# Patient Record
Sex: MALE | Race: WHITE | Employment: OTHER | URBAN - METROPOLITAN AREA
[De-identification: names, ages, dates, MRNs, and addresses within clinical notes are randomized per-mention and may not be internally consistent; named-entity substitution may affect disease eponyms.]

---

## 2017-02-28 ENCOUNTER — OFFICE VISIT (OUTPATIENT)
Dept: FAMILY MEDICINE CLINIC | Age: 62
End: 2017-02-28

## 2017-02-28 VITALS
OXYGEN SATURATION: 98 % | HEIGHT: 68 IN | DIASTOLIC BLOOD PRESSURE: 64 MMHG | BODY MASS INDEX: 34.8 KG/M2 | WEIGHT: 229.6 LBS | SYSTOLIC BLOOD PRESSURE: 122 MMHG | HEART RATE: 60 BPM

## 2017-02-28 DIAGNOSIS — I10 ESSENTIAL HYPERTENSION: ICD-10-CM

## 2017-02-28 DIAGNOSIS — E11.8 TYPE 2 DIABETES MELLITUS WITH COMPLICATION, UNSPECIFIED LONG TERM INSULIN USE STATUS: Primary | ICD-10-CM

## 2017-02-28 LAB
CREATININE URINE POCT: NORMAL
HBA1C MFR BLD: 7.2 %
MICROALBUMIN/CREAT 24H UR: NORMAL MG/G{CREAT}
MICROALBUMIN/CREAT UR-RTO: NORMAL

## 2017-02-28 PROCEDURE — G8417 CALC BMI ABV UP PARAM F/U: HCPCS | Performed by: INTERNAL MEDICINE

## 2017-02-28 PROCEDURE — 3045F PR MOST RECENT HEMOGLOBIN A1C LEVEL 7.0-9.0%: CPT | Performed by: INTERNAL MEDICINE

## 2017-02-28 PROCEDURE — 3017F COLORECTAL CA SCREEN DOC REV: CPT | Performed by: INTERNAL MEDICINE

## 2017-02-28 PROCEDURE — G8427 DOCREV CUR MEDS BY ELIG CLIN: HCPCS | Performed by: INTERNAL MEDICINE

## 2017-02-28 PROCEDURE — G8484 FLU IMMUNIZE NO ADMIN: HCPCS | Performed by: INTERNAL MEDICINE

## 2017-02-28 PROCEDURE — 1036F TOBACCO NON-USER: CPT | Performed by: INTERNAL MEDICINE

## 2017-02-28 PROCEDURE — 99214 OFFICE O/P EST MOD 30 MIN: CPT | Performed by: INTERNAL MEDICINE

## 2017-02-28 PROCEDURE — 82044 UR ALBUMIN SEMIQUANTITATIVE: CPT | Performed by: INTERNAL MEDICINE

## 2017-02-28 PROCEDURE — 83036 HEMOGLOBIN GLYCOSYLATED A1C: CPT | Performed by: INTERNAL MEDICINE

## 2017-05-30 ENCOUNTER — OFFICE VISIT (OUTPATIENT)
Dept: FAMILY MEDICINE CLINIC | Age: 62
End: 2017-05-30

## 2017-05-30 VITALS
SYSTOLIC BLOOD PRESSURE: 108 MMHG | OXYGEN SATURATION: 97 % | WEIGHT: 231 LBS | BODY MASS INDEX: 35.12 KG/M2 | DIASTOLIC BLOOD PRESSURE: 64 MMHG | HEART RATE: 63 BPM

## 2017-05-30 DIAGNOSIS — E78.5 HYPERLIPIDEMIA, UNSPECIFIED HYPERLIPIDEMIA TYPE: ICD-10-CM

## 2017-05-30 DIAGNOSIS — E11.9 TYPE 2 DIABETES MELLITUS WITHOUT COMPLICATION, WITH LONG-TERM CURRENT USE OF INSULIN (HCC): Primary | ICD-10-CM

## 2017-05-30 DIAGNOSIS — Z79.4 TYPE 2 DIABETES MELLITUS WITHOUT COMPLICATION, WITH LONG-TERM CURRENT USE OF INSULIN (HCC): Primary | ICD-10-CM

## 2017-05-30 DIAGNOSIS — I10 ESSENTIAL HYPERTENSION: ICD-10-CM

## 2017-05-30 LAB — HBA1C MFR BLD: 7.9 %

## 2017-05-30 PROCEDURE — 3045F PR MOST RECENT HEMOGLOBIN A1C LEVEL 7.0-9.0%: CPT | Performed by: INTERNAL MEDICINE

## 2017-05-30 PROCEDURE — G8427 DOCREV CUR MEDS BY ELIG CLIN: HCPCS | Performed by: INTERNAL MEDICINE

## 2017-05-30 PROCEDURE — 83036 HEMOGLOBIN GLYCOSYLATED A1C: CPT | Performed by: INTERNAL MEDICINE

## 2017-05-30 PROCEDURE — 99214 OFFICE O/P EST MOD 30 MIN: CPT | Performed by: INTERNAL MEDICINE

## 2017-05-30 PROCEDURE — 3017F COLORECTAL CA SCREEN DOC REV: CPT | Performed by: INTERNAL MEDICINE

## 2017-05-30 PROCEDURE — 1036F TOBACCO NON-USER: CPT | Performed by: INTERNAL MEDICINE

## 2017-05-30 PROCEDURE — G8417 CALC BMI ABV UP PARAM F/U: HCPCS | Performed by: INTERNAL MEDICINE

## 2017-05-30 RX ORDER — GABAPENTIN 300 MG/1
CAPSULE ORAL
Qty: 90 CAPSULE | Refills: 1 | Status: SHIPPED | OUTPATIENT
Start: 2017-05-30 | End: 2017-07-17 | Stop reason: SDUPTHER

## 2017-05-30 RX ORDER — OMEPRAZOLE 40 MG/1
CAPSULE, DELAYED RELEASE ORAL
Qty: 90 CAPSULE | Refills: 1 | Status: SHIPPED | OUTPATIENT
Start: 2017-05-30 | End: 2017-07-17 | Stop reason: SDUPTHER

## 2017-05-30 RX ORDER — HYDROCHLOROTHIAZIDE 25 MG/1
TABLET ORAL
Qty: 90 TABLET | Refills: 1 | Status: SHIPPED | OUTPATIENT
Start: 2017-05-30 | End: 2017-07-17 | Stop reason: SDUPTHER

## 2017-05-30 RX ORDER — SIMVASTATIN 40 MG
TABLET ORAL
Qty: 90 TABLET | Refills: 1 | Status: SHIPPED | OUTPATIENT
Start: 2017-05-30 | End: 2017-07-17 | Stop reason: SDUPTHER

## 2017-05-30 RX ORDER — LOSARTAN POTASSIUM 100 MG/1
TABLET ORAL
Qty: 90 TABLET | Refills: 1 | Status: SHIPPED | OUTPATIENT
Start: 2017-05-30 | End: 2017-07-17 | Stop reason: SDUPTHER

## 2017-07-17 DIAGNOSIS — I10 ESSENTIAL HYPERTENSION: ICD-10-CM

## 2017-07-17 DIAGNOSIS — E78.5 HYPERLIPIDEMIA, UNSPECIFIED HYPERLIPIDEMIA TYPE: ICD-10-CM

## 2017-07-20 RX ORDER — OMEPRAZOLE 40 MG/1
CAPSULE, DELAYED RELEASE ORAL
Qty: 90 CAPSULE | Refills: 0 | Status: SHIPPED | OUTPATIENT
Start: 2017-07-20 | End: 2018-01-30 | Stop reason: SDUPTHER

## 2017-07-20 RX ORDER — LOSARTAN POTASSIUM 100 MG/1
TABLET ORAL
Qty: 90 TABLET | Refills: 0 | Status: SHIPPED | OUTPATIENT
Start: 2017-07-20 | End: 2018-01-30 | Stop reason: SDUPTHER

## 2017-07-20 RX ORDER — SIMVASTATIN 40 MG
TABLET ORAL
Qty: 90 TABLET | Refills: 0 | Status: SHIPPED | OUTPATIENT
Start: 2017-07-20 | End: 2018-01-30 | Stop reason: SDUPTHER

## 2017-07-20 RX ORDER — HYDROCHLOROTHIAZIDE 25 MG/1
TABLET ORAL
Qty: 90 TABLET | Refills: 0 | Status: SHIPPED | OUTPATIENT
Start: 2017-07-20 | End: 2018-06-04 | Stop reason: SDUPTHER

## 2017-07-20 RX ORDER — METFORMIN HYDROCHLORIDE 500 MG/1
500 TABLET, EXTENDED RELEASE ORAL DAILY
Qty: 90 TABLET | Refills: 0 | Status: SHIPPED | OUTPATIENT
Start: 2017-07-20 | End: 2018-01-30 | Stop reason: SDUPTHER

## 2017-07-20 RX ORDER — GABAPENTIN 300 MG/1
CAPSULE ORAL
Qty: 90 CAPSULE | Refills: 0 | Status: SHIPPED | OUTPATIENT
Start: 2017-07-20 | End: 2017-12-22 | Stop reason: SDUPTHER

## 2017-08-29 ENCOUNTER — OFFICE VISIT (OUTPATIENT)
Dept: FAMILY MEDICINE CLINIC | Age: 62
End: 2017-08-29

## 2017-08-29 VITALS
DIASTOLIC BLOOD PRESSURE: 70 MMHG | BODY MASS INDEX: 35.55 KG/M2 | SYSTOLIC BLOOD PRESSURE: 120 MMHG | OXYGEN SATURATION: 98 % | WEIGHT: 233.8 LBS | HEART RATE: 70 BPM

## 2017-08-29 DIAGNOSIS — S39.012A LUMBAR STRAIN, INITIAL ENCOUNTER: ICD-10-CM

## 2017-08-29 DIAGNOSIS — I10 ESSENTIAL HYPERTENSION: ICD-10-CM

## 2017-08-29 DIAGNOSIS — Z11.59 ENCOUNTER FOR HEPATITIS C SCREENING TEST FOR LOW RISK PATIENT: ICD-10-CM

## 2017-08-29 DIAGNOSIS — Z23 NEED FOR PROPHYLACTIC VACCINATION AND INOCULATION AGAINST INFLUENZA: ICD-10-CM

## 2017-08-29 DIAGNOSIS — Z79.4 TYPE 2 DIABETES MELLITUS WITHOUT COMPLICATION, WITH LONG-TERM CURRENT USE OF INSULIN (HCC): Primary | ICD-10-CM

## 2017-08-29 DIAGNOSIS — E78.5 HYPERLIPIDEMIA, UNSPECIFIED HYPERLIPIDEMIA TYPE: ICD-10-CM

## 2017-08-29 DIAGNOSIS — E11.9 TYPE 2 DIABETES MELLITUS WITHOUT COMPLICATION, WITH LONG-TERM CURRENT USE OF INSULIN (HCC): Primary | ICD-10-CM

## 2017-08-29 LAB
A/G RATIO: 1.8 (ref 1.1–2.2)
ALBUMIN SERPL-MCNC: 4.6 G/DL (ref 3.4–5)
ALP BLD-CCNC: 73 U/L (ref 40–129)
ALT SERPL-CCNC: 24 U/L (ref 10–40)
ANION GAP SERPL CALCULATED.3IONS-SCNC: 14 MMOL/L (ref 3–16)
AST SERPL-CCNC: 19 U/L (ref 15–37)
BILIRUB SERPL-MCNC: 0.4 MG/DL (ref 0–1)
BUN BLDV-MCNC: 16 MG/DL (ref 7–20)
CALCIUM SERPL-MCNC: 10.1 MG/DL (ref 8.3–10.6)
CHLORIDE BLD-SCNC: 98 MMOL/L (ref 99–110)
CHOLESTEROL, TOTAL: 126 MG/DL (ref 0–199)
CO2: 26 MMOL/L (ref 21–32)
CREAT SERPL-MCNC: 0.7 MG/DL (ref 0.8–1.3)
GFR AFRICAN AMERICAN: >60
GFR NON-AFRICAN AMERICAN: >60
GLOBULIN: 2.6 G/DL
GLUCOSE BLD-MCNC: 191 MG/DL (ref 70–99)
HBA1C MFR BLD: 8.2 %
HCT VFR BLD CALC: 45.6 % (ref 40.5–52.5)
HDLC SERPL-MCNC: 53 MG/DL (ref 40–60)
HEMOGLOBIN: 15.5 G/DL (ref 13.5–17.5)
HEPATITIS C ANTIBODY INTERPRETATION: NORMAL
LDL CHOLESTEROL CALCULATED: 47 MG/DL
MCH RBC QN AUTO: 31.1 PG (ref 26–34)
MCHC RBC AUTO-ENTMCNC: 34 G/DL (ref 31–36)
MCV RBC AUTO: 91.6 FL (ref 80–100)
PDW BLD-RTO: 12.6 % (ref 12.4–15.4)
PLATELET # BLD: 201 K/UL (ref 135–450)
PMV BLD AUTO: 9.5 FL (ref 5–10.5)
POTASSIUM SERPL-SCNC: 4.4 MMOL/L (ref 3.5–5.1)
RBC # BLD: 4.98 M/UL (ref 4.2–5.9)
SODIUM BLD-SCNC: 138 MMOL/L (ref 136–145)
TOTAL PROTEIN: 7.2 G/DL (ref 6.4–8.2)
TRIGL SERPL-MCNC: 130 MG/DL (ref 0–150)
TSH REFLEX: 2.73 UIU/ML (ref 0.27–4.2)
VLDLC SERPL CALC-MCNC: 26 MG/DL
WBC # BLD: 9.4 K/UL (ref 4–11)

## 2017-08-29 PROCEDURE — 90686 IIV4 VACC NO PRSV 0.5 ML IM: CPT | Performed by: INTERNAL MEDICINE

## 2017-08-29 PROCEDURE — G0008 ADMIN INFLUENZA VIRUS VAC: HCPCS | Performed by: INTERNAL MEDICINE

## 2017-08-29 PROCEDURE — 99214 OFFICE O/P EST MOD 30 MIN: CPT | Performed by: INTERNAL MEDICINE

## 2017-08-29 PROCEDURE — 83036 HEMOGLOBIN GLYCOSYLATED A1C: CPT | Performed by: INTERNAL MEDICINE

## 2017-08-29 RX ORDER — TIZANIDINE 4 MG/1
4 TABLET ORAL 3 TIMES DAILY
Qty: 40 TABLET | Refills: 1 | Status: SHIPPED | OUTPATIENT
Start: 2017-08-29 | End: 2018-01-30 | Stop reason: SDUPTHER

## 2017-11-15 ENCOUNTER — TELEPHONE (OUTPATIENT)
Dept: FAMILY MEDICINE CLINIC | Age: 62
End: 2017-11-15

## 2017-12-21 ENCOUNTER — TELEPHONE (OUTPATIENT)
Dept: FAMILY MEDICINE CLINIC | Age: 62
End: 2017-12-21

## 2017-12-22 RX ORDER — GABAPENTIN 300 MG/1
CAPSULE ORAL
Qty: 90 CAPSULE | Refills: 0 | Status: SHIPPED | OUTPATIENT
Start: 2017-12-22 | End: 2018-01-30 | Stop reason: SDUPTHER

## 2018-01-23 ENCOUNTER — TELEPHONE (OUTPATIENT)
Dept: FAMILY MEDICINE CLINIC | Age: 63
End: 2018-01-23

## 2018-01-23 DIAGNOSIS — E11.9 TYPE 2 DIABETES MELLITUS WITHOUT COMPLICATION, WITH LONG-TERM CURRENT USE OF INSULIN (HCC): Primary | ICD-10-CM

## 2018-01-23 DIAGNOSIS — Z79.4 TYPE 2 DIABETES MELLITUS WITHOUT COMPLICATION, WITH LONG-TERM CURRENT USE OF INSULIN (HCC): Primary | ICD-10-CM

## 2018-01-30 ENCOUNTER — OFFICE VISIT (OUTPATIENT)
Dept: FAMILY MEDICINE CLINIC | Age: 63
End: 2018-01-30

## 2018-01-30 VITALS
DIASTOLIC BLOOD PRESSURE: 80 MMHG | SYSTOLIC BLOOD PRESSURE: 160 MMHG | BODY MASS INDEX: 34.64 KG/M2 | WEIGHT: 227.8 LBS | OXYGEN SATURATION: 96 % | HEART RATE: 116 BPM

## 2018-01-30 DIAGNOSIS — E78.5 HYPERLIPIDEMIA, UNSPECIFIED HYPERLIPIDEMIA TYPE: ICD-10-CM

## 2018-01-30 DIAGNOSIS — F33.0 MILD EPISODE OF RECURRENT MAJOR DEPRESSIVE DISORDER (HCC): ICD-10-CM

## 2018-01-30 DIAGNOSIS — A88.1 ACUTE EPIDEMIC VERTIGO: ICD-10-CM

## 2018-01-30 DIAGNOSIS — I10 ESSENTIAL HYPERTENSION: ICD-10-CM

## 2018-01-30 DIAGNOSIS — E11.9 TYPE 2 DIABETES MELLITUS WITHOUT COMPLICATION, WITH LONG-TERM CURRENT USE OF INSULIN (HCC): Primary | ICD-10-CM

## 2018-01-30 DIAGNOSIS — M25.511 ACUTE PAIN OF RIGHT SHOULDER: ICD-10-CM

## 2018-01-30 DIAGNOSIS — I49.9 IRREGULAR HEARTBEAT: ICD-10-CM

## 2018-01-30 DIAGNOSIS — Z79.4 TYPE 2 DIABETES MELLITUS WITHOUT COMPLICATION, WITH LONG-TERM CURRENT USE OF INSULIN (HCC): Primary | ICD-10-CM

## 2018-01-30 LAB — HBA1C MFR BLD: 7.2 %

## 2018-01-30 PROCEDURE — 93000 ELECTROCARDIOGRAM COMPLETE: CPT | Performed by: INTERNAL MEDICINE

## 2018-01-30 PROCEDURE — 99214 OFFICE O/P EST MOD 30 MIN: CPT | Performed by: INTERNAL MEDICINE

## 2018-01-30 PROCEDURE — 83036 HEMOGLOBIN GLYCOSYLATED A1C: CPT | Performed by: INTERNAL MEDICINE

## 2018-01-30 RX ORDER — PAROXETINE HYDROCHLORIDE 20 MG/1
20 TABLET, FILM COATED ORAL EVERY MORNING
Qty: 30 TABLET | Refills: 3 | Status: SHIPPED | OUTPATIENT
Start: 2018-01-30 | End: 2018-06-04 | Stop reason: ALTCHOICE

## 2018-01-30 RX ORDER — AMOXICILLIN 500 MG/1
500 CAPSULE ORAL 3 TIMES DAILY
Qty: 30 CAPSULE | Refills: 0 | Status: SHIPPED | OUTPATIENT
Start: 2018-01-30 | End: 2018-02-09

## 2018-01-30 RX ORDER — MECLIZINE HYDROCHLORIDE 25 MG/1
25 TABLET ORAL 3 TIMES DAILY PRN
Qty: 20 TABLET | Refills: 0 | Status: SHIPPED | OUTPATIENT
Start: 2018-01-30 | End: 2018-02-19

## 2018-01-30 RX ORDER — OMEPRAZOLE 40 MG/1
CAPSULE, DELAYED RELEASE ORAL
Qty: 90 CAPSULE | Refills: 1 | Status: SHIPPED | OUTPATIENT
Start: 2018-01-30 | End: 2018-10-17 | Stop reason: SDUPTHER

## 2018-01-30 RX ORDER — SIMVASTATIN 40 MG
TABLET ORAL
Qty: 90 TABLET | Refills: 1 | Status: SHIPPED | OUTPATIENT
Start: 2018-01-30 | End: 2019-01-25 | Stop reason: SDUPTHER

## 2018-01-30 RX ORDER — GABAPENTIN 300 MG/1
CAPSULE ORAL
Qty: 90 CAPSULE | Refills: 1 | Status: SHIPPED | OUTPATIENT
Start: 2018-01-30 | End: 2018-10-05

## 2018-01-30 RX ORDER — TIZANIDINE 4 MG/1
4 TABLET ORAL 3 TIMES DAILY
Qty: 40 TABLET | Refills: 1 | Status: SHIPPED | OUTPATIENT
Start: 2018-01-30 | End: 2018-10-05 | Stop reason: ALTCHOICE

## 2018-01-30 RX ORDER — LOSARTAN POTASSIUM 100 MG/1
TABLET ORAL
Qty: 90 TABLET | Refills: 1 | Status: SHIPPED | OUTPATIENT
Start: 2018-01-30 | End: 2018-06-04 | Stop reason: SDUPTHER

## 2018-01-30 RX ORDER — METFORMIN HYDROCHLORIDE 500 MG/1
1000 TABLET, EXTENDED RELEASE ORAL 2 TIMES DAILY
Qty: 360 TABLET | Refills: 1 | Status: SHIPPED | OUTPATIENT
Start: 2018-01-30 | End: 2019-04-12 | Stop reason: SDUPTHER

## 2018-02-08 PROBLEM — F33.0 MILD EPISODE OF RECURRENT MAJOR DEPRESSIVE DISORDER (HCC): Status: ACTIVE | Noted: 2018-02-08

## 2018-02-09 ENCOUNTER — OFFICE VISIT (OUTPATIENT)
Dept: FAMILY MEDICINE CLINIC | Age: 63
End: 2018-02-09

## 2018-02-09 VITALS
HEIGHT: 68 IN | DIASTOLIC BLOOD PRESSURE: 60 MMHG | BODY MASS INDEX: 35.86 KG/M2 | OXYGEN SATURATION: 98 % | WEIGHT: 236.6 LBS | SYSTOLIC BLOOD PRESSURE: 110 MMHG | HEART RATE: 80 BPM

## 2018-02-09 DIAGNOSIS — G89.29 CHRONIC RIGHT SHOULDER PAIN: Primary | ICD-10-CM

## 2018-02-09 DIAGNOSIS — M25.511 CHRONIC RIGHT SHOULDER PAIN: Primary | ICD-10-CM

## 2018-02-09 DIAGNOSIS — I10 ESSENTIAL HYPERTENSION: ICD-10-CM

## 2018-02-09 PROCEDURE — 99212 OFFICE O/P EST SF 10 MIN: CPT | Performed by: INTERNAL MEDICINE

## 2018-02-09 RX ORDER — FLUTICASONE PROPIONATE 50 MCG
2 SPRAY, SUSPENSION (ML) NASAL DAILY
Qty: 1 BOTTLE | Refills: 3 | Status: SHIPPED | OUTPATIENT
Start: 2018-02-09 | End: 2019-01-11

## 2018-04-06 ENCOUNTER — OFFICE VISIT (OUTPATIENT)
Dept: FAMILY MEDICINE CLINIC | Age: 63
End: 2018-04-06

## 2018-04-06 VITALS
HEART RATE: 76 BPM | BODY MASS INDEX: 36.77 KG/M2 | WEIGHT: 241.8 LBS | DIASTOLIC BLOOD PRESSURE: 70 MMHG | SYSTOLIC BLOOD PRESSURE: 124 MMHG | OXYGEN SATURATION: 96 %

## 2018-04-06 DIAGNOSIS — I10 ESSENTIAL HYPERTENSION: ICD-10-CM

## 2018-04-06 DIAGNOSIS — Z79.4 TYPE 2 DIABETES MELLITUS WITHOUT COMPLICATION, WITH LONG-TERM CURRENT USE OF INSULIN (HCC): Primary | ICD-10-CM

## 2018-04-06 DIAGNOSIS — E11.9 TYPE 2 DIABETES MELLITUS WITHOUT COMPLICATION, WITH LONG-TERM CURRENT USE OF INSULIN (HCC): Primary | ICD-10-CM

## 2018-04-06 LAB — HBA1C MFR BLD: 8.7 %

## 2018-04-06 PROCEDURE — 99213 OFFICE O/P EST LOW 20 MIN: CPT | Performed by: INTERNAL MEDICINE

## 2018-04-06 PROCEDURE — 83036 HEMOGLOBIN GLYCOSYLATED A1C: CPT | Performed by: INTERNAL MEDICINE

## 2018-04-06 ASSESSMENT — PATIENT HEALTH QUESTIONNAIRE - PHQ9
SUM OF ALL RESPONSES TO PHQ9 QUESTIONS 1 & 2: 0
2. FEELING DOWN, DEPRESSED OR HOPELESS: 0
SUM OF ALL RESPONSES TO PHQ QUESTIONS 1-9: 0
1. LITTLE INTEREST OR PLEASURE IN DOING THINGS: 0

## 2018-04-13 ENCOUNTER — OFFICE VISIT (OUTPATIENT)
Dept: FAMILY MEDICINE CLINIC | Age: 63
End: 2018-04-13

## 2018-04-13 VITALS
DIASTOLIC BLOOD PRESSURE: 76 MMHG | TEMPERATURE: 100.1 F | OXYGEN SATURATION: 96 % | BODY MASS INDEX: 36.4 KG/M2 | SYSTOLIC BLOOD PRESSURE: 122 MMHG | HEART RATE: 101 BPM | WEIGHT: 239.4 LBS

## 2018-04-13 DIAGNOSIS — J10.1 INFLUENZA A: Primary | ICD-10-CM

## 2018-04-13 PROCEDURE — 99213 OFFICE O/P EST LOW 20 MIN: CPT | Performed by: INTERNAL MEDICINE

## 2018-04-13 RX ORDER — BENZONATATE 200 MG/1
200 CAPSULE ORAL 3 TIMES DAILY PRN
Qty: 30 CAPSULE | Refills: 0 | Status: SHIPPED | OUTPATIENT
Start: 2018-04-13 | End: 2018-04-20

## 2018-04-13 RX ORDER — INHALER, ASSIST DEVICES
SPACER (EA) MISCELLANEOUS
Qty: 1 DEVICE | Refills: 1 | Status: SHIPPED | OUTPATIENT
Start: 2018-04-13 | End: 2018-10-05

## 2018-04-13 RX ORDER — ONDANSETRON 4 MG/1
4 TABLET, FILM COATED ORAL EVERY 8 HOURS PRN
Qty: 30 TABLET | Refills: 0 | Status: SHIPPED | OUTPATIENT
Start: 2018-04-13 | End: 2018-10-05 | Stop reason: ALTCHOICE

## 2018-04-13 RX ORDER — OSELTAMIVIR PHOSPHATE 75 MG/1
75 CAPSULE ORAL 2 TIMES DAILY
Qty: 10 CAPSULE | Refills: 0 | Status: SHIPPED | OUTPATIENT
Start: 2018-04-13 | End: 2018-04-23

## 2018-04-13 RX ORDER — ALBUTEROL SULFATE 90 UG/1
2 AEROSOL, METERED RESPIRATORY (INHALATION) EVERY 6 HOURS PRN
Qty: 1 INHALER | Refills: 0 | Status: SHIPPED | OUTPATIENT
Start: 2018-04-13 | End: 2018-06-04 | Stop reason: SDUPTHER

## 2018-04-13 ASSESSMENT — ENCOUNTER SYMPTOMS
SORE THROAT: 1
WHEEZING: 0
COUGH: 1
SHORTNESS OF BREATH: 0

## 2018-06-04 ENCOUNTER — NURSE ONLY (OUTPATIENT)
Dept: URGENT CARE | Age: 63
End: 2018-06-04

## 2018-06-04 ENCOUNTER — OFFICE VISIT (OUTPATIENT)
Dept: FAMILY MEDICINE CLINIC | Age: 63
End: 2018-06-04

## 2018-06-04 VITALS
SYSTOLIC BLOOD PRESSURE: 116 MMHG | DIASTOLIC BLOOD PRESSURE: 76 MMHG | BODY MASS INDEX: 35.46 KG/M2 | TEMPERATURE: 98.2 F | HEART RATE: 72 BPM | WEIGHT: 234 LBS | OXYGEN SATURATION: 99 % | RESPIRATION RATE: 16 BRPM | HEIGHT: 68 IN

## 2018-06-04 DIAGNOSIS — I10 ESSENTIAL HYPERTENSION: ICD-10-CM

## 2018-06-04 DIAGNOSIS — E78.5 HYPERLIPIDEMIA, UNSPECIFIED HYPERLIPIDEMIA TYPE: ICD-10-CM

## 2018-06-04 DIAGNOSIS — R05.9 COUGH: ICD-10-CM

## 2018-06-04 DIAGNOSIS — R05.9 COUGH: Primary | ICD-10-CM

## 2018-06-04 PROCEDURE — 99213 OFFICE O/P EST LOW 20 MIN: CPT | Performed by: INTERNAL MEDICINE

## 2018-06-04 RX ORDER — BENZONATATE 200 MG/1
200 CAPSULE ORAL 3 TIMES DAILY PRN
Qty: 30 CAPSULE | Refills: 1 | Status: SHIPPED | OUTPATIENT
Start: 2018-06-04 | End: 2018-06-11

## 2018-06-04 RX ORDER — HYDROCHLOROTHIAZIDE 25 MG/1
TABLET ORAL
Qty: 90 TABLET | Refills: 0 | Status: SHIPPED | OUTPATIENT
Start: 2018-06-04 | End: 2018-09-01 | Stop reason: SDUPTHER

## 2018-06-04 RX ORDER — INHALER, ASSIST DEVICES
SPACER (EA) MISCELLANEOUS
Qty: 1 DEVICE | Refills: 1 | Status: SHIPPED | OUTPATIENT
Start: 2018-06-04 | End: 2019-04-12

## 2018-06-04 RX ORDER — ALBUTEROL SULFATE 90 UG/1
2 AEROSOL, METERED RESPIRATORY (INHALATION) EVERY 6 HOURS PRN
Qty: 1 INHALER | Refills: 0 | Status: CANCELLED | OUTPATIENT
Start: 2018-06-04

## 2018-06-04 RX ORDER — FLUTICASONE PROPIONATE 50 MCG
2 SPRAY, SUSPENSION (ML) NASAL DAILY
Qty: 1 BOTTLE | Refills: 3 | Status: CANCELLED | OUTPATIENT
Start: 2018-06-04

## 2018-06-04 RX ORDER — SIMVASTATIN 40 MG
TABLET ORAL
Qty: 90 TABLET | Refills: 1 | Status: CANCELLED | OUTPATIENT
Start: 2018-06-04

## 2018-06-04 RX ORDER — ONDANSETRON 4 MG/1
4 TABLET, FILM COATED ORAL EVERY 8 HOURS PRN
Qty: 30 TABLET | Refills: 0 | Status: CANCELLED | OUTPATIENT
Start: 2018-06-04

## 2018-06-04 RX ORDER — GABAPENTIN 300 MG/1
CAPSULE ORAL
Qty: 90 CAPSULE | Refills: 1 | Status: CANCELLED | OUTPATIENT
Start: 2018-06-04 | End: 2018-09-02

## 2018-06-04 RX ORDER — HYDROCHLOROTHIAZIDE 25 MG/1
TABLET ORAL
Qty: 90 TABLET | Refills: 0 | Status: CANCELLED | OUTPATIENT
Start: 2018-06-04

## 2018-06-04 RX ORDER — METFORMIN HYDROCHLORIDE 500 MG/1
1000 TABLET, EXTENDED RELEASE ORAL 2 TIMES DAILY
Qty: 360 TABLET | Refills: 1 | Status: CANCELLED | OUTPATIENT
Start: 2018-06-04

## 2018-06-04 RX ORDER — OMEPRAZOLE 40 MG/1
CAPSULE, DELAYED RELEASE ORAL
Qty: 90 CAPSULE | Refills: 1 | Status: CANCELLED | OUTPATIENT
Start: 2018-06-04

## 2018-06-04 RX ORDER — ALBUTEROL SULFATE 90 UG/1
2 AEROSOL, METERED RESPIRATORY (INHALATION) EVERY 6 HOURS PRN
Qty: 1 INHALER | Refills: 0 | Status: SHIPPED | OUTPATIENT
Start: 2018-06-04 | End: 2019-04-12 | Stop reason: ALTCHOICE

## 2018-06-04 RX ORDER — LOSARTAN POTASSIUM 100 MG/1
TABLET ORAL
Qty: 90 TABLET | Refills: 1 | Status: SHIPPED | OUTPATIENT
Start: 2018-06-04 | End: 2019-07-12 | Stop reason: SDUPTHER

## 2018-06-04 RX ORDER — LOSARTAN POTASSIUM 100 MG/1
TABLET ORAL
Qty: 90 TABLET | Refills: 1 | Status: CANCELLED | OUTPATIENT
Start: 2018-06-04

## 2018-06-04 RX ORDER — MONTELUKAST SODIUM 10 MG/1
10 TABLET ORAL NIGHTLY
Qty: 30 TABLET | Refills: 2 | Status: SHIPPED | OUTPATIENT
Start: 2018-06-04 | End: 2018-08-25 | Stop reason: SDUPTHER

## 2018-06-04 ASSESSMENT — ENCOUNTER SYMPTOMS
WHEEZING: 0
SORE THROAT: 0
SHORTNESS OF BREATH: 1
COUGH: 1

## 2018-08-07 ENCOUNTER — TELEPHONE (OUTPATIENT)
Dept: FAMILY MEDICINE CLINIC | Age: 63
End: 2018-08-07

## 2018-08-07 RX ORDER — PEN NEEDLE, DIABETIC 31 G X1/4"
NEEDLE, DISPOSABLE MISCELLANEOUS
Qty: 200 EACH | Refills: 5 | Status: SHIPPED | OUTPATIENT
Start: 2018-08-07

## 2018-08-09 RX ORDER — BLOOD SUGAR DIAGNOSTIC
1 STRIP MISCELLANEOUS 2 TIMES DAILY
Qty: 100 EACH | Refills: 3 | Status: SHIPPED | OUTPATIENT
Start: 2018-08-09 | End: 2019-07-12 | Stop reason: CLARIF

## 2018-08-13 ENCOUNTER — TELEPHONE (OUTPATIENT)
Dept: FAMILY MEDICINE CLINIC | Age: 63
End: 2018-08-13

## 2018-08-13 NOTE — TELEPHONE ENCOUNTER
Pt's wife, Anjel Hanks (on hipaa) called stating pt is in the donut hole and cannot afford his insulin. [employer] and has a form for Patient Assistance. Anjel Hanks says there is a small portion that has to be filled out by Dr. An De Jesus. Will drop form off this afternoon to be filled out. Wants to know if Dr. An De Jesus will fill out without patient having an appointment? Please advise    If Dr. An De Jesus will fill out, Anjel Hanks asked that we fax/scan form and then send original to address on file.

## 2018-08-28 RX ORDER — MONTELUKAST SODIUM 10 MG/1
TABLET ORAL
Qty: 30 TABLET | Refills: 2 | Status: SHIPPED | OUTPATIENT
Start: 2018-08-28 | End: 2019-04-28 | Stop reason: SDUPTHER

## 2018-09-04 RX ORDER — HYDROCHLOROTHIAZIDE 25 MG/1
TABLET ORAL
Qty: 90 TABLET | Refills: 0 | Status: SHIPPED | OUTPATIENT
Start: 2018-09-04 | End: 2019-01-12 | Stop reason: SDUPTHER

## 2018-10-05 ENCOUNTER — OFFICE VISIT (OUTPATIENT)
Dept: FAMILY MEDICINE CLINIC | Age: 63
End: 2018-10-05
Payer: COMMERCIAL

## 2018-10-05 VITALS
OXYGEN SATURATION: 99 % | DIASTOLIC BLOOD PRESSURE: 64 MMHG | BODY MASS INDEX: 37.74 KG/M2 | WEIGHT: 249 LBS | SYSTOLIC BLOOD PRESSURE: 118 MMHG | HEART RATE: 78 BPM | HEIGHT: 68 IN

## 2018-10-05 DIAGNOSIS — I10 ESSENTIAL HYPERTENSION: ICD-10-CM

## 2018-10-05 DIAGNOSIS — Z79.4 TYPE 2 DIABETES MELLITUS WITHOUT COMPLICATION, WITH LONG-TERM CURRENT USE OF INSULIN (HCC): Primary | ICD-10-CM

## 2018-10-05 DIAGNOSIS — F33.0 MILD EPISODE OF RECURRENT MAJOR DEPRESSIVE DISORDER (HCC): ICD-10-CM

## 2018-10-05 DIAGNOSIS — E78.5 HYPERLIPIDEMIA, UNSPECIFIED HYPERLIPIDEMIA TYPE: ICD-10-CM

## 2018-10-05 DIAGNOSIS — E11.9 TYPE 2 DIABETES MELLITUS WITHOUT COMPLICATION, WITH LONG-TERM CURRENT USE OF INSULIN (HCC): Primary | ICD-10-CM

## 2018-10-05 LAB
CREATININE URINE POCT: 200
HBA1C MFR BLD: 8.8 %
MICROALBUMIN/CREAT 24H UR: 10 MG/G{CREAT}
MICROALBUMIN/CREAT UR-RTO: <30

## 2018-10-05 PROCEDURE — 99214 OFFICE O/P EST MOD 30 MIN: CPT | Performed by: INTERNAL MEDICINE

## 2018-10-05 PROCEDURE — 83036 HEMOGLOBIN GLYCOSYLATED A1C: CPT | Performed by: INTERNAL MEDICINE

## 2018-10-05 PROCEDURE — 36415 COLL VENOUS BLD VENIPUNCTURE: CPT | Performed by: INTERNAL MEDICINE

## 2018-10-05 PROCEDURE — 82044 UR ALBUMIN SEMIQUANTITATIVE: CPT | Performed by: INTERNAL MEDICINE

## 2018-10-06 LAB
ALBUMIN SERPL-MCNC: 4.6 G/DL (ref 3.4–5)
ANION GAP SERPL CALCULATED.3IONS-SCNC: 14 MMOL/L (ref 3–16)
BUN BLDV-MCNC: 20 MG/DL (ref 7–20)
CALCIUM SERPL-MCNC: 9.9 MG/DL (ref 8.3–10.6)
CHLORIDE BLD-SCNC: 102 MMOL/L (ref 99–110)
CHOLESTEROL, FASTING: 137 MG/DL (ref 0–199)
CO2: 26 MMOL/L (ref 21–32)
CREAT SERPL-MCNC: 1 MG/DL (ref 0.8–1.3)
GFR AFRICAN AMERICAN: >60
GFR NON-AFRICAN AMERICAN: >60
GLUCOSE BLD-MCNC: 124 MG/DL (ref 70–99)
HDLC SERPL-MCNC: 40 MG/DL (ref 40–60)
LDL CHOLESTEROL CALCULATED: 54 MG/DL
PHOSPHORUS: 2.7 MG/DL (ref 2.5–4.9)
POTASSIUM SERPL-SCNC: 4.2 MMOL/L (ref 3.5–5.1)
SODIUM BLD-SCNC: 142 MMOL/L (ref 136–145)
TRIGLYCERIDE, FASTING: 214 MG/DL (ref 0–150)
VLDLC SERPL CALC-MCNC: 43 MG/DL

## 2018-10-17 RX ORDER — OMEPRAZOLE 40 MG/1
CAPSULE, DELAYED RELEASE ORAL
Qty: 90 CAPSULE | Refills: 1 | Status: SHIPPED | OUTPATIENT
Start: 2018-10-17 | End: 2019-04-12 | Stop reason: SDUPTHER

## 2019-01-02 ENCOUNTER — TELEPHONE (OUTPATIENT)
Dept: FAMILY MEDICINE CLINIC | Age: 64
End: 2019-01-02

## 2019-01-02 RX ORDER — BLOOD-GLUCOSE METER
1 EACH MISCELLANEOUS DAILY
Qty: 1 KIT | Refills: 0 | Status: SHIPPED | OUTPATIENT
Start: 2019-01-02

## 2019-01-11 ENCOUNTER — OFFICE VISIT (OUTPATIENT)
Dept: FAMILY MEDICINE CLINIC | Age: 64
End: 2019-01-11
Payer: COMMERCIAL

## 2019-01-11 VITALS
OXYGEN SATURATION: 99 % | SYSTOLIC BLOOD PRESSURE: 130 MMHG | WEIGHT: 250 LBS | HEIGHT: 68 IN | BODY MASS INDEX: 37.89 KG/M2 | DIASTOLIC BLOOD PRESSURE: 68 MMHG | HEART RATE: 70 BPM

## 2019-01-11 DIAGNOSIS — E78.2 MIXED HYPERLIPIDEMIA: ICD-10-CM

## 2019-01-11 DIAGNOSIS — Z79.4 TYPE 2 DIABETES MELLITUS WITHOUT COMPLICATION, WITH LONG-TERM CURRENT USE OF INSULIN (HCC): Primary | ICD-10-CM

## 2019-01-11 DIAGNOSIS — I10 ESSENTIAL HYPERTENSION: ICD-10-CM

## 2019-01-11 DIAGNOSIS — F33.0 MILD EPISODE OF RECURRENT MAJOR DEPRESSIVE DISORDER (HCC): ICD-10-CM

## 2019-01-11 DIAGNOSIS — E11.9 TYPE 2 DIABETES MELLITUS WITHOUT COMPLICATION, WITH LONG-TERM CURRENT USE OF INSULIN (HCC): Primary | ICD-10-CM

## 2019-01-11 DIAGNOSIS — Z23 NEED FOR PROPHYLACTIC VACCINATION AND INOCULATION AGAINST VARICELLA: ICD-10-CM

## 2019-01-11 DIAGNOSIS — R35.0 BENIGN PROSTATIC HYPERPLASIA WITH URINARY FREQUENCY: ICD-10-CM

## 2019-01-11 DIAGNOSIS — N40.1 BENIGN PROSTATIC HYPERPLASIA WITH URINARY FREQUENCY: ICD-10-CM

## 2019-01-11 LAB — HBA1C MFR BLD: 9 %

## 2019-01-11 PROCEDURE — 83036 HEMOGLOBIN GLYCOSYLATED A1C: CPT | Performed by: INTERNAL MEDICINE

## 2019-01-11 PROCEDURE — 99214 OFFICE O/P EST MOD 30 MIN: CPT | Performed by: INTERNAL MEDICINE

## 2019-01-14 RX ORDER — HYDROCHLOROTHIAZIDE 25 MG/1
TABLET ORAL
Qty: 90 TABLET | Refills: 0 | Status: SHIPPED | OUTPATIENT
Start: 2019-01-14 | End: 2019-04-12 | Stop reason: SDUPTHER

## 2019-01-25 DIAGNOSIS — E78.5 HYPERLIPIDEMIA, UNSPECIFIED HYPERLIPIDEMIA TYPE: ICD-10-CM

## 2019-01-25 RX ORDER — SIMVASTATIN 40 MG
TABLET ORAL
Qty: 90 TABLET | Refills: 1 | Status: SHIPPED | OUTPATIENT
Start: 2019-01-25 | End: 2019-04-12 | Stop reason: SDUPTHER

## 2019-04-12 ENCOUNTER — OFFICE VISIT (OUTPATIENT)
Dept: FAMILY MEDICINE CLINIC | Age: 64
End: 2019-04-12
Payer: COMMERCIAL

## 2019-04-12 ENCOUNTER — TELEPHONE (OUTPATIENT)
Dept: FAMILY MEDICINE CLINIC | Age: 64
End: 2019-04-12

## 2019-04-12 VITALS
TEMPERATURE: 98 F | DIASTOLIC BLOOD PRESSURE: 62 MMHG | WEIGHT: 250 LBS | HEIGHT: 68 IN | BODY MASS INDEX: 37.89 KG/M2 | HEART RATE: 74 BPM | SYSTOLIC BLOOD PRESSURE: 136 MMHG | OXYGEN SATURATION: 98 %

## 2019-04-12 DIAGNOSIS — Z79.4 TYPE 2 DIABETES MELLITUS WITHOUT COMPLICATION, WITH LONG-TERM CURRENT USE OF INSULIN (HCC): Primary | ICD-10-CM

## 2019-04-12 DIAGNOSIS — E11.9 TYPE 2 DIABETES MELLITUS WITHOUT COMPLICATION, WITH LONG-TERM CURRENT USE OF INSULIN (HCC): Primary | ICD-10-CM

## 2019-04-12 DIAGNOSIS — R10.11 RUQ PAIN: ICD-10-CM

## 2019-04-12 DIAGNOSIS — E78.5 HYPERLIPIDEMIA, UNSPECIFIED HYPERLIPIDEMIA TYPE: ICD-10-CM

## 2019-04-12 DIAGNOSIS — E11.65 UNCONTROLLED TYPE 2 DIABETES MELLITUS WITH HYPERGLYCEMIA (HCC): Primary | ICD-10-CM

## 2019-04-12 DIAGNOSIS — I10 ESSENTIAL HYPERTENSION: ICD-10-CM

## 2019-04-12 LAB
A/G RATIO: 1.8 (ref 1.1–2.2)
ALBUMIN SERPL-MCNC: 5 G/DL (ref 3.4–5)
ALP BLD-CCNC: 106 U/L (ref 40–129)
ALT SERPL-CCNC: 34 U/L (ref 10–40)
ANION GAP SERPL CALCULATED.3IONS-SCNC: 15 MMOL/L (ref 3–16)
AST SERPL-CCNC: 30 U/L (ref 15–37)
BILIRUB SERPL-MCNC: 0.4 MG/DL (ref 0–1)
BUN BLDV-MCNC: 20 MG/DL (ref 7–20)
CALCIUM SERPL-MCNC: 10.5 MG/DL (ref 8.3–10.6)
CHLORIDE BLD-SCNC: 103 MMOL/L (ref 99–110)
CO2: 28 MMOL/L (ref 21–32)
CREAT SERPL-MCNC: 1 MG/DL (ref 0.8–1.3)
GFR AFRICAN AMERICAN: >60
GFR NON-AFRICAN AMERICAN: >60
GLOBULIN: 2.8 G/DL
GLUCOSE BLD-MCNC: 83 MG/DL (ref 70–99)
HBA1C MFR BLD: 9.7 %
POTASSIUM SERPL-SCNC: 4.6 MMOL/L (ref 3.5–5.1)
SODIUM BLD-SCNC: 146 MMOL/L (ref 136–145)
TOTAL PROTEIN: 7.8 G/DL (ref 6.4–8.2)

## 2019-04-12 PROCEDURE — 36415 COLL VENOUS BLD VENIPUNCTURE: CPT | Performed by: INTERNAL MEDICINE

## 2019-04-12 PROCEDURE — 99214 OFFICE O/P EST MOD 30 MIN: CPT | Performed by: INTERNAL MEDICINE

## 2019-04-12 PROCEDURE — 83036 HEMOGLOBIN GLYCOSYLATED A1C: CPT | Performed by: INTERNAL MEDICINE

## 2019-04-12 RX ORDER — OMEPRAZOLE 40 MG/1
CAPSULE, DELAYED RELEASE ORAL
Qty: 90 CAPSULE | Refills: 1 | Status: SHIPPED | OUTPATIENT
Start: 2019-04-12 | End: 2019-07-12 | Stop reason: SDUPTHER

## 2019-04-12 RX ORDER — HYDROCHLOROTHIAZIDE 25 MG/1
TABLET ORAL
Qty: 90 TABLET | Refills: 1 | Status: SHIPPED | OUTPATIENT
Start: 2019-04-12 | End: 2019-11-25 | Stop reason: SDUPTHER

## 2019-04-12 RX ORDER — SIMVASTATIN 40 MG
TABLET ORAL
Qty: 90 TABLET | Refills: 1 | Status: SHIPPED | OUTPATIENT
Start: 2019-04-12 | End: 2019-11-25 | Stop reason: SDUPTHER

## 2019-04-12 RX ORDER — METFORMIN HYDROCHLORIDE 500 MG/1
1000 TABLET, EXTENDED RELEASE ORAL 2 TIMES DAILY
Qty: 360 TABLET | Refills: 1 | Status: SHIPPED | OUTPATIENT
Start: 2019-04-12 | End: 2020-08-25 | Stop reason: SDUPTHER

## 2019-04-12 NOTE — PROGRESS NOTES
SUBJECTIVE:  CC: Diabetes    HPI:  Cas Conner presents for follow up and evaluation of diabetes. Also follow up on   Problem List Items Addressed This Visit     Hyperlipidemia    Relevant Medications    simvastatin (ZOCOR) 40 MG tablet    hydrochlorothiazide (HYDRODIURIL) 25 MG tablet    Type 2 diabetes mellitus without complication, with long-term current use of insulin (HCC) - Primary    Relevant Medications    metFORMIN (GLUCOPHAGE-XR) 500 MG extended release tablet    insulin detemir (LEVEMIR) 100 UNIT/ML injection vial    insulin aspart (NOVOLOG) 100 UNIT/ML injection vial    Other Relevant Orders    POCT glycosylated hemoglobin (Hb A1C) (Completed)    Essential hypertension    Relevant Medications    simvastatin (ZOCOR) 40 MG tablet    hydrochlorothiazide (HYDRODIURIL) 25 MG tablet      Other Visit Diagnoses     RUQ pain        Relevant Orders    [address] blood glucose log brought to visit: No Control is labile per patient report. Notes glucose can be 130 at night, then is over 200 in the morning without eating anything. Discussed again to bring a log and offered again for patient to input mychart for review and help with adjusting insulin. Diet has worsened and this was helping before. Discussed carb consistent diet to make insulin dosing easier. Wonders about prostate screening. Notes increased nocturia and frequency. he has no symptoms of hypoglycemia. he has No symptoms of hyperglycemia. The patient denied polyphagia, polydipsia, or polyuria.     The last HBA1C and routine lab was   Lab Results   Component Value Date    LABA1C 9.7 04/12/2019     Lab Results   Component Value Date    .2 10/08/2014     Lab Results   Component Value Date    WBC 9.4 08/29/2017    HGB 15.5 08/29/2017    HCT 45.6 08/29/2017     08/29/2017    CHOL 126 08/29/2017    TRIG 130 08/29/2017    HDL 40 10/05/2018    ALT 24 08/29/2017    AST 19 08/29/2017     10/05/2018    K 4.2 10/05/2018     10/05/2018    CREATININE 1.0 10/05/2018    BUN 20 10/05/2018    CO2 26 10/05/2018    TSH 1.17 12/10/2010    PSA 0.23 04/27/2012    LABA1C 9.0 01/11/2019    LABMICR 0.4 05/06/2015    LABMICR 0.4 05/06/2015       he has no side effects from the current diabetes medications. Review Of Systems:   Constitutional: No fatigue or weight loss. Respiratory: No cough or dyspnea. Cardiovascular: No chest pain or palpitations. Gastrointestinal: No constipation or diarrhea. OBJECTIVE:    VS:   /62 (Site: Left Upper Arm, Position: Sitting, Cuff Size: Large Adult)   Pulse 74   Temp 98 °F (36.7 °C) (Oral)   Ht 5' 7.5\" (1.715 m)   Wt 250 lb (113.4 kg)   SpO2 98% Comment: RA  BMI 38.58 kg/m²   General appearance: Alert, Awake, Oriented times 3, no distress  Skin: Warm and dry  Lungs: Lungs clear to auscultation bilaterally. No rhonchi, crackles or wheezes  Heart: S1 S2  Regular rate and rhythm. No rub, murmur or gallop  Extremities: No edema, Peripheral pulses palpable  Feet:  No lesions, sensation intact to monofilament, Toe nails intact and without signs of fungus    ASSESSMENT/PLAN:    Tomás Wilson was seen today for diabetes, hyperlipidemia and hypertension. Diagnoses and all orders for this visit:    Type 2 diabetes mellitus without complication, with long-term current use of insulin ([facility])  -     POCT glycosylated hemoglobin (Hb A1C)    Hyperlipidemia, unspecified hyperlipidemia type  -     simvastatin (ZOCOR) 40 MG tablet; TAKE 1 TABLET BY MOUTH NIGHTLY. -     COMPREHENSIVE METABOLIC PANEL    Essential hypertension    RUQ pain  -     US ABDOMEN LIMITED; Future  -     COMPREHENSIVE METABOLIC PANEL    Other orders  -     metFORMIN (GLUCOPHAGE-XR) 500 MG extended release tablet;  Take 2 tablets by mouth 2 times daily  -     insulin detemir (LEVEMIR) 100 UNIT/ML injection vial; Inject 100 Units into the skin nightly  -     insulin aspart (NOVOLOG) 100 UNIT/ML injection vial; Inject 35 Units into the skin 3 times daily (before meals)  -     omeprazole (PRILOSEC) 40 MG delayed release capsule; TAKE ONE CAPSULE BY MOUTH EVERY DAY  -     hydrochlorothiazide (HYDRODIURIL) 25 MG tablet; TAKE 1 TABLET BY MOUTH EVERY DAY  -     Liraglutide (VICTOZA) 18 MG/3ML SOPN SC injection; Inject 0.6 mg into the skin daily    trial of victoza for uncontrolled DM, adjust insulin and control carbs. Instructions:    See patient goals. I have reviewed my findings and recommendations with Lane Sanchez.     Brijesh Tadeo MD

## 2019-04-17 ENCOUNTER — HOSPITAL ENCOUNTER (OUTPATIENT)
Dept: ULTRASOUND IMAGING | Age: 64
Discharge: HOME | End: 2019-04-17
Payer: COMMERCIAL

## 2019-04-17 DIAGNOSIS — R10.11 RUQ PAIN: ICD-10-CM

## 2019-04-17 PROCEDURE — 76705 ECHO EXAM OF ABDOMEN: CPT

## 2019-04-22 NOTE — TELEPHONE ENCOUNTER
Please let patient know, I have sent alternative into the pharmacy, trulicity is a once weekly injection. I sent in 90 day supplies, can fill for 30 if cheaper.

## 2019-04-29 RX ORDER — MONTELUKAST SODIUM 10 MG/1
TABLET ORAL
Qty: 30 TABLET | Refills: 2 | Status: SHIPPED | OUTPATIENT
Start: 2019-04-29 | End: 2019-07-12 | Stop reason: SDUPTHER

## 2019-05-01 ENCOUNTER — TELEPHONE (OUTPATIENT)
Dept: FAMILY MEDICINE CLINIC | Age: 64
End: 2019-05-01

## 2019-05-01 NOTE — TELEPHONE ENCOUNTER
Received fax from [facility] advising that Humalog Solution isn't on pt's formulary and they are requesting an alternative therapy of Novolin R Fiasp.   Do you wish to switch or PA the Humalog

## 2019-05-13 ENCOUNTER — TELEPHONE (OUTPATIENT)
Dept: FAMILY MEDICINE CLINIC | Age: 64
End: 2019-05-13

## 2019-05-13 NOTE — TELEPHONE ENCOUNTER
Pt states insulin was changed to Novolin R, this replaces the Levemir, states this was written for a 12-day supply. Asking if this could be sent in for a 60 or 90-day supply or if there was a reason it was written for a 12 day supply on the Novolin.

## 2019-05-15 NOTE — TELEPHONE ENCOUNTER
I believe Dr. Peter Sayres message needed to state to cancel Levemir script and Novolin R sent to replace     I called [facility] - Spoke Taz Scale at [facility] and informed her to cancel Levemir and Novolog. Pt is using Novolin R.     I spoke to pt's wife and informed her of the changes.

## 2019-05-29 ENCOUNTER — TELEPHONE (OUTPATIENT)
Dept: FAMILY MEDICINE CLINIC | Age: 64
End: 2019-05-29

## 2019-07-12 ENCOUNTER — OFFICE VISIT (OUTPATIENT)
Dept: FAMILY MEDICINE CLINIC | Age: 64
End: 2019-07-12
Payer: COMMERCIAL

## 2019-07-12 ENCOUNTER — CARE COORDINATION (OUTPATIENT)
Dept: CARE COORDINATION | Age: 64
End: 2019-07-12

## 2019-07-12 VITALS
DIASTOLIC BLOOD PRESSURE: 78 MMHG | BODY MASS INDEX: 38.42 KG/M2 | OXYGEN SATURATION: 96 % | SYSTOLIC BLOOD PRESSURE: 142 MMHG | WEIGHT: 249 LBS | HEART RATE: 70 BPM

## 2019-07-12 DIAGNOSIS — Z79.4 TYPE 2 DIABETES MELLITUS WITHOUT COMPLICATION, WITH LONG-TERM CURRENT USE OF INSULIN (HCC): Primary | ICD-10-CM

## 2019-07-12 DIAGNOSIS — Z11.4 ENCOUNTER FOR SCREENING FOR HIV: ICD-10-CM

## 2019-07-12 DIAGNOSIS — I10 ESSENTIAL HYPERTENSION: ICD-10-CM

## 2019-07-12 DIAGNOSIS — E11.9 TYPE 2 DIABETES MELLITUS WITHOUT COMPLICATION, WITH LONG-TERM CURRENT USE OF INSULIN (HCC): Primary | ICD-10-CM

## 2019-07-12 LAB
CHOLESTEROL, TOTAL: 144 MG/DL (ref 0–199)
HBA1C MFR BLD: 9.8 %
HDLC SERPL-MCNC: 50 MG/DL (ref 40–60)
LDL CHOLESTEROL CALCULATED: 66 MG/DL
TRIGL SERPL-MCNC: 138 MG/DL (ref 0–150)
VLDLC SERPL CALC-MCNC: 28 MG/DL

## 2019-07-12 PROCEDURE — 99213 OFFICE O/P EST LOW 20 MIN: CPT | Performed by: INTERNAL MEDICINE

## 2019-07-12 PROCEDURE — 83036 HEMOGLOBIN GLYCOSYLATED A1C: CPT | Performed by: INTERNAL MEDICINE

## 2019-07-12 RX ORDER — LOSARTAN POTASSIUM 100 MG/1
TABLET ORAL
Qty: 90 TABLET | Refills: 1 | Status: SHIPPED | OUTPATIENT
Start: 2019-07-12 | End: 2019-11-25 | Stop reason: SDUPTHER

## 2019-07-12 RX ORDER — OMEPRAZOLE 40 MG/1
CAPSULE, DELAYED RELEASE ORAL
Qty: 90 CAPSULE | Refills: 1 | Status: SHIPPED | OUTPATIENT
Start: 2019-07-12 | End: 2019-11-25 | Stop reason: SDUPTHER

## 2019-07-12 RX ORDER — MONTELUKAST SODIUM 10 MG/1
TABLET ORAL
Qty: 90 TABLET | Refills: 1 | Status: SHIPPED | OUTPATIENT
Start: 2019-07-12 | End: 2019-11-25 | Stop reason: SDUPTHER

## 2019-07-12 NOTE — PROGRESS NOTES
SUBJECTIVE:  CC: Diabetes    HPI:  Juan Kumar presents for follow up and evaluation of diabetes. Also follow up on   Problem List Items Addressed This Visit     Type 2 diabetes mellitus without complication, with long-term current use of insulin ([address] - Primary    Relevant Medications    insulin regular (NOVOLIN R) 100 UNIT/ML injection    Other Relevant Orders    POCT glycosylated hemoglobin (Hb A1C) (Completed)    LIPID PANEL (Completed)    Essential hypertension    Relevant Medications    losartan (COZAAR) 100 MG tablet      Other Visit Diagnoses     Encounter for screening for HIV        Relevant Orders    HIV-1 AND HIV-2 ANTIBODIES (Completed)      Home blood glucose log brought to visit: No Control is labile per patient report. Notes glucose can be 130 at night, then is over 200 in the morning without eating anything. Discussed again to bring a log and offered again for patient to input mychart for review and help with adjusting insulin. Diet has worsened and this was helping before. Discussed carb consistent diet to make insulin dosing easier. Wonders about prostate screening. Notes increased nocturia and frequency. he has no symptoms of hypoglycemia. he has No symptoms of hyperglycemia. The patient denied polyphagia, polydipsia, or polyuria.     The last HBA1C and routine lab was   Lab Results   Component Value Date    LABA1C 9.8 07/12/2019     Lab Results   Component Value Date    .2 10/08/2014     Lab Results   Component Value Date    WBC 9.4 08/29/2017    HGB 15.5 08/29/2017    HCT 45.6 08/29/2017     08/29/2017    CHOL 126 08/29/2017    TRIG 130 08/29/2017    HDL 40 10/05/2018    ALT 34 04/12/2019    AST 30 04/12/2019     (H) 04/12/2019    K 4.6 04/12/2019     04/12/2019    CREATININE 1.0 04/12/2019    BUN 20 04/12/2019    CO2 28 04/12/2019    TSH 1.17 12/10/2010    PSA 0.23 04/27/2012    LABA1C 9.8 07/12/2019    LABMICR 0.4 05/06/2015    LABMICR 0.4

## 2019-07-13 LAB
HIV AG/AB: NORMAL
HIV ANTIGEN: NORMAL
HIV-1 ANTIBODY: NORMAL
HIV-2 AB: NORMAL

## 2019-07-18 ENCOUNTER — CARE COORDINATION (OUTPATIENT)
Dept: FAMILY MEDICINE CLINIC | Age: 64
End: 2019-07-18

## 2019-07-22 ENCOUNTER — OFFICE VISIT (OUTPATIENT)
Dept: FAMILY MEDICINE CLINIC | Age: 64
End: 2019-07-22
Payer: COMMERCIAL

## 2019-07-22 VITALS
OXYGEN SATURATION: 99 % | WEIGHT: 253 LBS | HEIGHT: 68 IN | SYSTOLIC BLOOD PRESSURE: 136 MMHG | BODY MASS INDEX: 38.34 KG/M2 | HEART RATE: 82 BPM | TEMPERATURE: 98.6 F | RESPIRATION RATE: 16 BRPM | DIASTOLIC BLOOD PRESSURE: 76 MMHG

## 2019-07-22 DIAGNOSIS — M79.645 PAIN OF FINGER OF LEFT HAND: Primary | ICD-10-CM

## 2019-07-22 PROCEDURE — 99213 OFFICE O/P EST LOW 20 MIN: CPT | Performed by: NURSE PRACTITIONER

## 2019-07-22 RX ORDER — METHYLPREDNISOLONE 4 MG/1
TABLET ORAL
Qty: 1 KIT | Refills: 0 | Status: SHIPPED | OUTPATIENT
Start: 2019-07-22 | End: 2019-07-28

## 2019-07-22 RX ORDER — TRAMADOL HYDROCHLORIDE 50 MG/1
50 TABLET ORAL EVERY 6 HOURS PRN
Qty: 10 TABLET | Refills: 0 | Status: SHIPPED | OUTPATIENT
Start: 2019-07-22 | End: 2019-07-26

## 2019-07-22 ASSESSMENT — ENCOUNTER SYMPTOMS
CONSTIPATION: 0
BACK PAIN: 0
NAUSEA: 0
CHEST TIGHTNESS: 0

## 2019-07-23 LAB
SEDIMENTATION RATE, ERYTHROCYTE: 9 MM/HR (ref 0–20)
URIC ACID, SERUM: 6.3 MG/DL (ref 3.5–7.2)

## 2019-07-24 ENCOUNTER — CARE COORDINATION (OUTPATIENT)
Dept: FAMILY MEDICINE CLINIC | Age: 64
End: 2019-07-24

## 2019-07-24 NOTE — CARE COORDINATION
Outreach call made to f/u on extra help application and PAP for insulin. No answer and voicemail has not been set up.

## 2019-07-25 ENCOUNTER — TELEPHONE (OUTPATIENT)
Dept: FAMILY MEDICINE CLINIC | Age: 64
End: 2019-07-25

## 2019-07-26 ENCOUNTER — TELEPHONE (OUTPATIENT)
Dept: FAMILY MEDICINE CLINIC | Age: 64
End: 2019-07-26

## 2019-07-26 ENCOUNTER — HOSPITAL ENCOUNTER (OUTPATIENT)
Age: 64
Discharge: HOME | End: 2019-07-26
Payer: COMMERCIAL

## 2019-07-26 ENCOUNTER — HOSPITAL ENCOUNTER (OUTPATIENT)
Dept: GENERAL RADIOLOGY | Age: 64
Discharge: HOME | End: 2019-07-26
Payer: COMMERCIAL

## 2019-07-26 DIAGNOSIS — M79.642 PAIN IN LEFT HAND: ICD-10-CM

## 2019-07-26 DIAGNOSIS — M79.642 PAIN IN LEFT HAND: Primary | ICD-10-CM

## 2019-07-26 PROCEDURE — 73120 X-RAY EXAM OF HAND: CPT

## 2019-07-26 RX ORDER — TRAMADOL HYDROCHLORIDE 50 MG/1
50 TABLET ORAL EVERY 6 HOURS PRN
Qty: 28 TABLET | Refills: 0 | Status: SHIPPED | OUTPATIENT
Start: 2019-07-26 | End: 2019-08-02

## 2019-07-26 NOTE — TELEPHONE ENCOUNTER
I ordered an xray of the left hand. He can have this done at the hospital. I have also refilled his tramadol. Did he have any improvement with the prednisone.

## 2019-07-26 NOTE — TELEPHONE ENCOUNTER
Patient is calling about still in a lot of pain when does not take pain meds . He will be out of pain meds today. He cannot go without it . If needs appt he can come back in. cb patient his wife will take call.

## 2019-07-29 ENCOUNTER — CARE COORDINATION (OUTPATIENT)
Dept: FAMILY MEDICINE CLINIC | Age: 64
End: 2019-07-29

## 2019-07-29 ENCOUNTER — OFFICE VISIT (OUTPATIENT)
Dept: FAMILY MEDICINE CLINIC | Age: 64
End: 2019-07-29
Payer: COMMERCIAL

## 2019-07-29 VITALS
BODY MASS INDEX: 39.25 KG/M2 | SYSTOLIC BLOOD PRESSURE: 138 MMHG | HEART RATE: 65 BPM | DIASTOLIC BLOOD PRESSURE: 70 MMHG | HEIGHT: 68 IN | OXYGEN SATURATION: 98 % | WEIGHT: 259 LBS | TEMPERATURE: 98.6 F

## 2019-07-29 DIAGNOSIS — M25.542 ARTHRALGIA OF LEFT HAND: Primary | ICD-10-CM

## 2019-07-29 PROCEDURE — 99213 OFFICE O/P EST LOW 20 MIN: CPT | Performed by: NURSE PRACTITIONER

## 2019-07-29 RX ORDER — GABAPENTIN 300 MG/1
300 CAPSULE ORAL 3 TIMES DAILY
Qty: 90 CAPSULE | Refills: 0 | Status: SHIPPED | OUTPATIENT
Start: 2019-07-29 | End: 2019-12-17 | Stop reason: SDUPTHER

## 2019-07-29 ASSESSMENT — ENCOUNTER SYMPTOMS
SHORTNESS OF BREATH: 0
NAUSEA: 0
CHEST TIGHTNESS: 0
CONSTIPATION: 0
BACK PAIN: 0

## 2019-07-29 NOTE — PROGRESS NOTES
Subjective:      Patient ID: Maude Rubinstein is a 70 y.o. male. HPI     For follow up pain left index finger. No relief with prednisone. Taking tramadol with some relief but no improvement. Review of Systems   Constitutional: Negative for appetite change, chills and fever. Respiratory: Negative for chest tightness and shortness of breath. Cardiovascular: Negative for chest pain and palpitations. Gastrointestinal: Negative for constipation and nausea. Musculoskeletal: Positive for arthralgias and joint swelling. Negative for back pain and gait problem. Neurological: Negative for dizziness and headaches. Psychiatric/Behavioral: Negative. Objective:   Physical Exam   Constitutional: He is oriented to person, place, and time. He appears well-developed and well-nourished. No distress. Cardiovascular: Normal rate, regular rhythm and normal heart sounds. Pulmonary/Chest: Effort normal and breath sounds normal.   Abdominal: Soft. Bowel sounds are normal.   Musculoskeletal: He exhibits edema and tenderness. Left finger # 2 tender to light touch, palpation, ROM proxima and distal joints. Distal joint tender, enlarged generalized. Neg for redness, warmth. Distal joint with appearance of cystic changes, tender. Neurological: He is alert and oriented to person, place, and time. Skin: Skin is warm and dry. Psychiatric: He has a normal mood and affect. Assessment:      1. Pain left index finger  2. Degenerative changes      Plan:      1. Reviewed xray, degenerative changes noted. Sed rate normal as well as uric acid normal    2. Will add gabapentin for nerve pain    3. Has tramadol from last refill. Will consider changing to Percocet on next refill, possibly Wednesday. Will see what develops.          KLAUDIA BAKER, APRN - CNP

## 2019-07-30 ENCOUNTER — OFFICE VISIT (OUTPATIENT)
Dept: ORTHOPEDIC SURGERY | Age: 64
End: 2019-07-30
Payer: COMMERCIAL

## 2019-07-30 VITALS — HEIGHT: 68 IN | WEIGHT: 259.04 LBS | BODY MASS INDEX: 39.26 KG/M2

## 2019-07-30 DIAGNOSIS — M79.645 FINGER PAIN, LEFT: Primary | ICD-10-CM

## 2019-07-30 PROCEDURE — 20600 DRAIN/INJ JOINT/BURSA W/O US: CPT | Performed by: ORTHOPAEDIC SURGERY

## 2019-07-30 PROCEDURE — 99203 OFFICE O/P NEW LOW 30 MIN: CPT | Performed by: ORTHOPAEDIC SURGERY

## 2019-07-30 NOTE — PROGRESS NOTES
(PEN NEEDLES) 31G X 6 MM MISC Use with insulin 4 times daily. 200 each 5    glucose blood VI test strips (ONE TOUCH ULTRA TEST) strip USE AS DIRECTED 3 TIMES A DAY Dx Code E11.9 100 strip 5    acetaminophen (TYLENOL) 500 MG tablet Take 500 mg by mouth every 6 hours as needed for Pain      ONETOUCH DELICA LANCETS 69J MISC TEST 3 TIMES A  each 12     No current facility-administered medications for this visit. Allergies   Allergen Reactions    Aspirin [Aspirin]      Takes baby aspirin without problems    Oxycontin [Oxycodone Hcl] Nausea And Vomiting       Review of Systems  Relevant review of systems reviewed and available in the patient's chart    Vital Signs  There were no vitals filed for this visit. General Exam:   Constitutional: Patient is adequately groomed with no evidence of malnutrition. He is somewhat uncomfortable in office today. Mental Status: The patient is oriented to time, place and person. The patient's mood and affect are appropriate. Lymphatic: The lymphatic examination bilaterally reveals all areas to be without enlargement or induration. Neurological: The patient has good coordination. There is no weakness or sensory deficit. Finger Examination  Inspection: Left index reveals moderate swelling at the IP joint, distally. Prominence of osteophytes, more so radially. Good alignment at rest.    Palpation: Exquisite tenderness around the DIP joint of the index without warmth. No fluctuance or discrete mass    Range of Motion: Limited motion secondary to pain and stiffness. He denies triggering. Strength:  weakness. Index finger is warm and sensate    Special Tests: No streaking erythema. No pain of the flexor tendon sheath. Skin: There are no additional worrisome rashes, ulcerations or lesions. Gait: normal    Circulation: well perfused    No pain at the PIP or MP joint of the left index. No triggering over the A1 pulley.       Radiology:     X-rays patient tomorrow to assess improvement. If symptoms are not improving over the next day or 2, MRI would be recommended. We appreciate the patient referral    All questions and concerns were addressed today. Patient is in agreement with the plan. Rebekah Dominguez MD  Hand & Upper Extremity Surgery  [id number]  A partner of [facility] ([facility])        Please note that this transcription was created using voice recognition software. Any errors are unintentional and may be due to voice recognition transcription.

## 2019-07-31 ENCOUNTER — TELEPHONE (OUTPATIENT)
Dept: ORTHOPEDIC SURGERY | Age: 64
End: 2019-07-31

## 2019-08-01 ENCOUNTER — TELEPHONE (OUTPATIENT)
Dept: ORTHOPEDIC SURGERY | Age: 64
End: 2019-08-01

## 2019-08-05 ENCOUNTER — CARE COORDINATION (OUTPATIENT)
Dept: FAMILY MEDICINE CLINIC | Age: 64
End: 2019-08-05

## 2019-08-08 DIAGNOSIS — M79.645 FINGER PAIN, LEFT: Primary | ICD-10-CM

## 2019-08-09 ENCOUNTER — CARE COORDINATION (OUTPATIENT)
Dept: FAMILY MEDICINE CLINIC | Age: 64
End: 2019-08-09

## 2019-08-14 ENCOUNTER — HOSPITAL ENCOUNTER (OUTPATIENT)
Dept: MRI IMAGING | Age: 64
Discharge: HOME | End: 2019-08-14
Payer: COMMERCIAL

## 2019-08-14 DIAGNOSIS — M79.645 FINGER PAIN, LEFT: ICD-10-CM

## 2019-08-14 PROCEDURE — 73218 MRI UPPER EXTREMITY W/O DYE: CPT

## 2019-08-15 ENCOUNTER — CARE COORDINATION (OUTPATIENT)
Dept: FAMILY MEDICINE CLINIC | Age: 64
End: 2019-08-15

## 2019-08-16 ENCOUNTER — TELEPHONE (OUTPATIENT)
Dept: ORTHOPEDIC SURGERY | Age: 64
End: 2019-08-16

## 2019-08-27 ENCOUNTER — OFFICE VISIT (OUTPATIENT)
Dept: ORTHOPEDIC SURGERY | Age: 64
End: 2019-08-27
Payer: COMMERCIAL

## 2019-08-27 VITALS — BODY MASS INDEX: 39.26 KG/M2 | WEIGHT: 259.04 LBS | HEIGHT: 68 IN

## 2019-08-27 DIAGNOSIS — M79.645 FINGER PAIN, LEFT: Primary | ICD-10-CM

## 2019-08-27 PROCEDURE — 99213 OFFICE O/P EST LOW 20 MIN: CPT | Performed by: ORTHOPAEDIC SURGERY

## 2019-08-27 PROCEDURE — 20552 NJX 1/MLT TRIGGER POINT 1/2: CPT | Performed by: ORTHOPAEDIC SURGERY

## 2019-08-27 NOTE — PROGRESS NOTES
INJECTION ADMINISTRATION DETAILS:    Date & Time:  8/27/19 10:57 AM  Site & Comments: left index finger  Administered by Dr John Tijerina    Betamethasone (30mg/mL)  #of CC:1  NDC #: 7886-1401-16  Lot #: [id number]  EXP: 05/2020    1% Lidocaine ( 10mg/mL)  # of CC : 1  NDC #: 8149-0763-76  LOT# :[id number]  EXP :11/2020

## 2019-09-11 ENCOUNTER — TELEPHONE (OUTPATIENT)
Dept: ORTHOPEDIC SURGERY | Age: 64
End: 2019-09-11

## 2019-09-11 ENCOUNTER — OFFICE VISIT (OUTPATIENT)
Dept: ORTHOPEDIC SURGERY | Age: 64
End: 2019-09-11
Payer: COMMERCIAL

## 2019-09-11 VITALS — HEIGHT: 68 IN | BODY MASS INDEX: 39.26 KG/M2 | WEIGHT: 259.04 LBS

## 2019-09-11 DIAGNOSIS — S83.242A TEAR OF MEDIAL MENISCUS OF LEFT KNEE, CURRENT, UNSPECIFIED TEAR TYPE, INITIAL ENCOUNTER: ICD-10-CM

## 2019-09-11 DIAGNOSIS — M25.562 LEFT KNEE PAIN, UNSPECIFIED CHRONICITY: Primary | ICD-10-CM

## 2019-09-11 PROCEDURE — 99214 OFFICE O/P EST MOD 30 MIN: CPT | Performed by: ORTHOPAEDIC SURGERY

## 2019-09-11 RX ORDER — DICLOFENAC SODIUM 75 MG/1
75 TABLET, DELAYED RELEASE ORAL 2 TIMES DAILY
Qty: 40 TABLET | Refills: 0 | Status: SHIPPED | OUTPATIENT
Start: 2019-09-11 | End: 2019-10-17

## 2019-09-11 NOTE — PROGRESS NOTES
Outpatient Medications:     gabapentin (NEURONTIN) 300 MG capsule, Take 1 capsule by mouth 3 times daily for 30 days. , Disp: 90 capsule, Rfl: 0    Insulin Syringe-Needle U-100 30G X 1/2\" 1 ML MISC, Use for insulin 4 times daily, Disp: 400 each, Rfl: 1    insulin regular (NOVOLIN R) 100 UNIT/ML injection, Inject 50 Units into the skin 3 times daily (before meals), Disp: 14 vial, Rfl: 1    omeprazole (PRILOSEC) 40 MG delayed release capsule, TAKE ONE CAPSULE BY MOUTH EVERY DAY, Disp: 90 capsule, Rfl: 1    losartan (COZAAR) 100 MG tablet, TAKE 1 TABLET BY MOUTH DAILY, Disp: 90 tablet, Rfl: 1    montelukast (SINGULAIR) 10 MG tablet, TAKE 1 TABLET BY MOUTH EVERY DAY AT NIGHT, Disp: 90 tablet, Rfl: 1    metFORMIN (GLUCOPHAGE-XR) 500 MG extended release tablet, Take 2 tablets by mouth 2 times daily, Disp: 360 tablet, Rfl: 1    simvastatin (ZOCOR) 40 MG tablet, TAKE 1 TABLET BY MOUTH NIGHTLY., Disp: 90 tablet, Rfl: 1    hydrochlorothiazide (HYDRODIURIL) 25 MG tablet, TAKE 1 TABLET BY MOUTH EVERY DAY, Disp: 90 tablet, Rfl: 1    Blood Glucose Monitoring Suppl (ONE TOUCH ULTRA 2) w/Device KIT, 1 kit by Does not apply route daily, Disp: 1 kit, Rfl: 0    Insulin Pen Needle (PEN NEEDLES) 31G X 6 MM MISC, Use with insulin 4 times daily. , Disp: 200 each, Rfl: 5    glucose blood VI test strips (ONE TOUCH ULTRA TEST) strip, USE AS DIRECTED 3 TIMES A DAY Dx Code E11.9, Disp: 100 strip, Rfl: 5    acetaminophen (TYLENOL) 500 MG tablet, Take 500 mg by mouth every 6 hours as needed for Pain, Disp: , Rfl:     ONETOUCH DELICA LANCETS 44S MISC, TEST 3 TIMES A DAY, Disp: 100 each, Rfl: 12  Allergies:  Aspirin [aspirin] and Oxycontin [oxycodone hcl]  Social History:    reports that he has never smoked. He quit smokeless tobacco use about 29 years ago. He reports that he does not drink alcohol or use drugs.   Family History:   Family History   Problem Relation Age of Onset    Cancer Mother     Cancer Father     Heart Disease Father     Diabetes Father     Heart Disease Brother        REVIEW OF SYSTEMS:   For new problems, a full review of systems will be found scanned in the patient's chart. CONSTITUTIONAL: Denies unexplained weight loss, fevers, chills   NEUROLOGICAL: Denies unsteady gait or progressive weakness  SKIN: Denies skin changes, delayed healing, rash, itching       PHYSICAL EXAM:    Vitals: There were no vitals taken for this visit. GENERAL EXAM:  · General Apparence: Patient is adequately groomed with no evidence of malnutrition. · Orientation: The patient is oriented to time, place and person. · Mood & Affect:The patient's mood and affect are appropriate       LEFT knee PHYSICAL EXAMINATION:  · Inspection: Mild edema without erythema or ecchymosis. · Palpation: Tenderness to palpation directly over the medial joint space    · Range of Motion: He struggles with full extension secondary to pain. · Strength: No gross strength deficits are noted    · Special Tests: Pain with Jm's testing. · Skin:  There are no rashes, ulcerations or lesions. · There are no dysvascular changes     Gait & station: Antalgic gait      Additional Examinations:        Right Lower Extremity: Examination of the right lower extremity does not show any tenderness, deformity or injury. Range of motion is unremarkable. There is no gross instability. There are no rashes, ulcerations or lesions. Strength and tone are normal.      Diagnostic Testing: The following x rays were read and interpreted by myself      1.  3 x-rays of the LEFT knee were taken today and reveal mild tricompartmental osteoarthritis with mild osteophyte formation    Orders     Orders Placed This Encounter   Procedures    XR KNEE LEFT (3 VIEWS)     Standing Status:   Future     Number of Occurrences:   1     Standing Expiration Date:   9/11/2020         Assessment / Treatment Plan:     1.   LEFT knee medial meniscus tear    Given his acute injury and consistent pain and swelling, I would like to obtain a MRI of his LEFT knee to rule out medial meniscus tear. He will return to clinic to review the results of the study. He was prescribed Voltaren 75 mg to help with the symptoms. I spent 25 minutes face-to-face with the patient and greater than 50% that time was spent counseling/coordinating care for the above stated diagnosis and treatment. I have personally performed and/or participated in the history, exam and medical decision making and agree with all pertinent clinical information. I have also reviewed and agree with the past medical, family and social history unless otherwise noted. This dictation was performed with a verbal recognition program (DRAGON) and it was checked for errors. It is possible that there are still dictated errors within this office note. If so, please bring any errors to my attention for an addendum. All efforts were made to ensure that this office note is accurate.           Gail Dumont MD

## 2019-10-03 ENCOUNTER — OFFICE VISIT (OUTPATIENT)
Dept: ORTHOPEDIC SURGERY | Age: 64
End: 2019-10-03
Payer: COMMERCIAL

## 2019-10-03 VITALS — WEIGHT: 259.04 LBS | BODY MASS INDEX: 39.26 KG/M2 | HEIGHT: 68 IN

## 2019-10-03 DIAGNOSIS — S83.412D SPRAIN OF MEDIAL COLLATERAL LIGAMENT OF LEFT KNEE, SUBSEQUENT ENCOUNTER: ICD-10-CM

## 2019-10-03 DIAGNOSIS — S83.242A ACUTE MEDIAL MENISCUS TEAR OF LEFT KNEE, INITIAL ENCOUNTER: Primary | ICD-10-CM

## 2019-10-03 DIAGNOSIS — M17.12 PRIMARY OSTEOARTHRITIS OF LEFT KNEE: ICD-10-CM

## 2019-10-03 PROCEDURE — 99214 OFFICE O/P EST MOD 30 MIN: CPT | Performed by: PHYSICIAN ASSISTANT

## 2019-10-07 DIAGNOSIS — S83.242A ACUTE MEDIAL MENISCUS TEAR OF LEFT KNEE, INITIAL ENCOUNTER: Primary | ICD-10-CM

## 2019-10-15 ENCOUNTER — TELEPHONE (OUTPATIENT)
Dept: ORTHOPEDIC SURGERY | Age: 64
End: 2019-10-15

## 2019-10-17 ENCOUNTER — OFFICE VISIT (OUTPATIENT)
Dept: FAMILY MEDICINE CLINIC | Age: 64
End: 2019-10-17
Payer: COMMERCIAL

## 2019-10-17 VITALS
HEART RATE: 76 BPM | RESPIRATION RATE: 16 BRPM | DIASTOLIC BLOOD PRESSURE: 60 MMHG | WEIGHT: 254 LBS | BODY MASS INDEX: 39.87 KG/M2 | SYSTOLIC BLOOD PRESSURE: 106 MMHG | OXYGEN SATURATION: 98 % | HEIGHT: 67 IN | TEMPERATURE: 98.1 F

## 2019-10-17 DIAGNOSIS — Z79.4 TYPE 2 DIABETES MELLITUS WITHOUT COMPLICATION, WITH LONG-TERM CURRENT USE OF INSULIN (HCC): ICD-10-CM

## 2019-10-17 DIAGNOSIS — Z01.818 PRE-OP EXAM: ICD-10-CM

## 2019-10-17 DIAGNOSIS — E11.9 TYPE 2 DIABETES MELLITUS WITHOUT COMPLICATION, WITH LONG-TERM CURRENT USE OF INSULIN (HCC): ICD-10-CM

## 2019-10-17 DIAGNOSIS — I10 ESSENTIAL HYPERTENSION: ICD-10-CM

## 2019-10-17 DIAGNOSIS — M25.562 ACUTE PAIN OF LEFT KNEE: Primary | ICD-10-CM

## 2019-10-17 PROCEDURE — 93000 ELECTROCARDIOGRAM COMPLETE: CPT | Performed by: NURSE PRACTITIONER

## 2019-10-17 PROCEDURE — 36415 COLL VENOUS BLD VENIPUNCTURE: CPT | Performed by: NURSE PRACTITIONER

## 2019-10-17 PROCEDURE — 99213 OFFICE O/P EST LOW 20 MIN: CPT | Performed by: NURSE PRACTITIONER

## 2019-10-17 ASSESSMENT — ENCOUNTER SYMPTOMS
TROUBLE SWALLOWING: 0
CHEST TIGHTNESS: 0
VOMITING: 0
COUGH: 0
DIARRHEA: 0
NAUSEA: 0
CONSTIPATION: 0
BACK PAIN: 0
APNEA: 0

## 2019-10-18 LAB
A/G RATIO: 2.7 (ref 1.1–2.2)
ALBUMIN SERPL-MCNC: 5.1 G/DL (ref 3.4–5)
ALP BLD-CCNC: 97 U/L (ref 40–129)
ALT SERPL-CCNC: 52 U/L (ref 10–40)
ANION GAP SERPL CALCULATED.3IONS-SCNC: 17 MMOL/L (ref 3–16)
AST SERPL-CCNC: 33 U/L (ref 15–37)
BASOPHILS ABSOLUTE: 0.1 K/UL (ref 0–0.2)
BASOPHILS RELATIVE PERCENT: 0.5 %
BILIRUB SERPL-MCNC: 0.5 MG/DL (ref 0–1)
BUN BLDV-MCNC: 25 MG/DL (ref 7–20)
CALCIUM SERPL-MCNC: 10.2 MG/DL (ref 8.3–10.6)
CHLORIDE BLD-SCNC: 97 MMOL/L (ref 99–110)
CO2: 26 MMOL/L (ref 21–32)
CREAT SERPL-MCNC: 1.3 MG/DL (ref 0.8–1.3)
EOSINOPHILS ABSOLUTE: 0.1 K/UL (ref 0–0.6)
EOSINOPHILS RELATIVE PERCENT: 1.2 %
GFR AFRICAN AMERICAN: >60
GFR NON-AFRICAN AMERICAN: 55
GLOBULIN: 1.9 G/DL
GLUCOSE BLD-MCNC: 89 MG/DL (ref 70–99)
HCT VFR BLD CALC: 42.4 % (ref 40.5–52.5)
HEMOGLOBIN: 14.3 G/DL (ref 13.5–17.5)
LYMPHOCYTES ABSOLUTE: 3.2 K/UL (ref 1–5.1)
LYMPHOCYTES RELATIVE PERCENT: 25.6 %
MCH RBC QN AUTO: 31.4 PG (ref 26–34)
MCHC RBC AUTO-ENTMCNC: 33.6 G/DL (ref 31–36)
MCV RBC AUTO: 93.5 FL (ref 80–100)
MONOCYTES ABSOLUTE: 1.2 K/UL (ref 0–1.3)
MONOCYTES RELATIVE PERCENT: 9.1 %
NEUTROPHILS ABSOLUTE: 8 K/UL (ref 1.7–7.7)
NEUTROPHILS RELATIVE PERCENT: 63.6 %
PDW BLD-RTO: 13.2 % (ref 12.4–15.4)
PLATELET # BLD: 182 K/UL (ref 135–450)
PMV BLD AUTO: 9.5 FL (ref 5–10.5)
POTASSIUM SERPL-SCNC: 4.8 MMOL/L (ref 3.5–5.1)
RBC # BLD: 4.53 M/UL (ref 4.2–5.9)
SODIUM BLD-SCNC: 140 MMOL/L (ref 136–145)
TOTAL PROTEIN: 7 G/DL (ref 6.4–8.2)
WBC # BLD: 12.6 K/UL (ref 4–11)

## 2019-10-19 LAB
ESTIMATED AVERAGE GLUCOSE: 223.1 MG/DL
HBA1C MFR BLD: 9.4 %

## 2019-10-22 ENCOUNTER — ANESTHESIA (OUTPATIENT)
Dept: OPERATING ROOM | Age: 64
End: 2019-10-22
Payer: COMMERCIAL

## 2019-10-22 ENCOUNTER — ANESTHESIA EVENT (OUTPATIENT)
Dept: OPERATING ROOM | Age: 64
End: 2019-10-22
Payer: COMMERCIAL

## 2019-10-22 ENCOUNTER — HOSPITAL ENCOUNTER (OUTPATIENT)
Age: 64
Setting detail: OUTPATIENT SURGERY
Discharge: HOME | End: 2019-10-22
Attending: ORTHOPAEDIC SURGERY | Admitting: ORTHOPAEDIC SURGERY
Payer: COMMERCIAL

## 2019-10-22 VITALS
TEMPERATURE: 97.8 F | WEIGHT: 259 LBS | DIASTOLIC BLOOD PRESSURE: 87 MMHG | RESPIRATION RATE: 16 BRPM | SYSTOLIC BLOOD PRESSURE: 174 MMHG | OXYGEN SATURATION: 94 % | HEIGHT: 68 IN | BODY MASS INDEX: 39.25 KG/M2 | HEART RATE: 94 BPM

## 2019-10-22 VITALS
SYSTOLIC BLOOD PRESSURE: 133 MMHG | DIASTOLIC BLOOD PRESSURE: 86 MMHG | RESPIRATION RATE: 15 BRPM | OXYGEN SATURATION: 100 %

## 2019-10-22 DIAGNOSIS — Z98.890 S/P ARTHROSCOPY OF KNEE: Primary | ICD-10-CM

## 2019-10-22 LAB
GLUCOSE BLD-MCNC: 238 MG/DL (ref 70–99)
GLUCOSE BLD-MCNC: 256 MG/DL (ref 70–99)
PERFORMED ON: ABNORMAL
PERFORMED ON: ABNORMAL

## 2019-10-22 PROCEDURE — 2580000003 HC RX 258: Performed by: ANESTHESIOLOGY

## 2019-10-22 PROCEDURE — 7100000011 HC PHASE II RECOVERY - ADDTL 15 MIN: Performed by: ORTHOPAEDIC SURGERY

## 2019-10-22 PROCEDURE — 2709999900 HC NON-CHARGEABLE SUPPLY: Performed by: ORTHOPAEDIC SURGERY

## 2019-10-22 PROCEDURE — 7100000001 HC PACU RECOVERY - ADDTL 15 MIN: Performed by: ORTHOPAEDIC SURGERY

## 2019-10-22 PROCEDURE — 2500000003 HC RX 250 WO HCPCS: Performed by: ORTHOPAEDIC SURGERY

## 2019-10-22 PROCEDURE — 3700000000 HC ANESTHESIA ATTENDED CARE: Performed by: ORTHOPAEDIC SURGERY

## 2019-10-22 PROCEDURE — 3700000001 HC ADD 15 MINUTES (ANESTHESIA): Performed by: ORTHOPAEDIC SURGERY

## 2019-10-22 PROCEDURE — 2580000003 HC RX 258: Performed by: ORTHOPAEDIC SURGERY

## 2019-10-22 PROCEDURE — 3600000004 HC SURGERY LEVEL 4 BASE: Performed by: ORTHOPAEDIC SURGERY

## 2019-10-22 PROCEDURE — 6360000002 HC RX W HCPCS: Performed by: NURSE ANESTHETIST, CERTIFIED REGISTERED

## 2019-10-22 PROCEDURE — 3600000014 HC SURGERY LEVEL 4 ADDTL 15MIN: Performed by: ORTHOPAEDIC SURGERY

## 2019-10-22 PROCEDURE — 7100000000 HC PACU RECOVERY - FIRST 15 MIN: Performed by: ORTHOPAEDIC SURGERY

## 2019-10-22 PROCEDURE — 7100000010 HC PHASE II RECOVERY - FIRST 15 MIN: Performed by: ORTHOPAEDIC SURGERY

## 2019-10-22 PROCEDURE — 2580000003 HC RX 258: Performed by: NURSE ANESTHETIST, CERTIFIED REGISTERED

## 2019-10-22 PROCEDURE — 2500000003 HC RX 250 WO HCPCS: Performed by: NURSE ANESTHETIST, CERTIFIED REGISTERED

## 2019-10-22 RX ORDER — LABETALOL HYDROCHLORIDE 5 MG/ML
5 INJECTION, SOLUTION INTRAVENOUS EVERY 10 MIN PRN
Status: DISCONTINUED | OUTPATIENT
Start: 2019-10-22 | End: 2019-10-22 | Stop reason: HOSPADM

## 2019-10-22 RX ORDER — HYDROMORPHONE HCL 110MG/55ML
PATIENT CONTROLLED ANALGESIA SYRINGE INTRAVENOUS PRN
Status: DISCONTINUED | OUTPATIENT
Start: 2019-10-22 | End: 2019-10-22 | Stop reason: SDUPTHER

## 2019-10-22 RX ORDER — SODIUM CHLORIDE 0.9 % (FLUSH) 0.9 %
10 SYRINGE (ML) INJECTION PRN
Status: DISCONTINUED | OUTPATIENT
Start: 2019-10-22 | End: 2019-10-22 | Stop reason: HOSPADM

## 2019-10-22 RX ORDER — SODIUM CHLORIDE, SODIUM LACTATE, POTASSIUM CHLORIDE, AND CALCIUM CHLORIDE .6; .31; .03; .02 G/100ML; G/100ML; G/100ML; G/100ML
IRRIGANT IRRIGATION PRN
Status: DISCONTINUED | OUTPATIENT
Start: 2019-10-22 | End: 2019-10-22 | Stop reason: ALTCHOICE

## 2019-10-22 RX ORDER — CEFAZOLIN SODIUM 1 G/3ML
INJECTION, POWDER, FOR SOLUTION INTRAMUSCULAR; INTRAVENOUS PRN
Status: DISCONTINUED | OUTPATIENT
Start: 2019-10-22 | End: 2019-10-22 | Stop reason: SDUPTHER

## 2019-10-22 RX ORDER — PROMETHAZINE HYDROCHLORIDE 25 MG/ML
6.25 INJECTION, SOLUTION INTRAMUSCULAR; INTRAVENOUS
Status: DISCONTINUED | OUTPATIENT
Start: 2019-10-22 | End: 2019-10-22 | Stop reason: HOSPADM

## 2019-10-22 RX ORDER — SODIUM CHLORIDE, SODIUM LACTATE, POTASSIUM CHLORIDE, CALCIUM CHLORIDE 600; 310; 30; 20 MG/100ML; MG/100ML; MG/100ML; MG/100ML
INJECTION, SOLUTION INTRAVENOUS CONTINUOUS
Status: DISCONTINUED | OUTPATIENT
Start: 2019-10-22 | End: 2019-10-22 | Stop reason: HOSPADM

## 2019-10-22 RX ORDER — FENTANYL CITRATE 50 UG/ML
INJECTION, SOLUTION INTRAMUSCULAR; INTRAVENOUS PRN
Status: DISCONTINUED | OUTPATIENT
Start: 2019-10-22 | End: 2019-10-22 | Stop reason: SDUPTHER

## 2019-10-22 RX ORDER — GABAPENTIN 300 MG/1
300 CAPSULE ORAL 3 TIMES DAILY
Status: ON HOLD | COMMUNITY
End: 2019-10-22 | Stop reason: HOSPADM

## 2019-10-22 RX ORDER — ONDANSETRON 2 MG/ML
4 INJECTION INTRAMUSCULAR; INTRAVENOUS
Status: DISCONTINUED | OUTPATIENT
Start: 2019-10-22 | End: 2019-10-22 | Stop reason: HOSPADM

## 2019-10-22 RX ORDER — HYDROCODONE BITARTRATE AND ACETAMINOPHEN 5; 325 MG/1; MG/1
1 TABLET ORAL PRN
Status: DISCONTINUED | OUTPATIENT
Start: 2019-10-22 | End: 2019-10-22 | Stop reason: HOSPADM

## 2019-10-22 RX ORDER — BUPIVACAINE HYDROCHLORIDE 2.5 MG/ML
INJECTION, SOLUTION INFILTRATION; PERINEURAL PRN
Status: DISCONTINUED | OUTPATIENT
Start: 2019-10-22 | End: 2019-10-22 | Stop reason: ALTCHOICE

## 2019-10-22 RX ORDER — FENTANYL CITRATE 50 UG/ML
25 INJECTION, SOLUTION INTRAMUSCULAR; INTRAVENOUS EVERY 5 MIN PRN
Status: DISCONTINUED | OUTPATIENT
Start: 2019-10-22 | End: 2019-10-22 | Stop reason: HOSPADM

## 2019-10-22 RX ORDER — LIDOCAINE HYDROCHLORIDE 20 MG/ML
INJECTION, SOLUTION INFILTRATION; PERINEURAL PRN
Status: DISCONTINUED | OUTPATIENT
Start: 2019-10-22 | End: 2019-10-22 | Stop reason: SDUPTHER

## 2019-10-22 RX ORDER — SODIUM CHLORIDE, SODIUM LACTATE, POTASSIUM CHLORIDE, CALCIUM CHLORIDE 600; 310; 30; 20 MG/100ML; MG/100ML; MG/100ML; MG/100ML
INJECTION, SOLUTION INTRAVENOUS CONTINUOUS PRN
Status: DISCONTINUED | OUTPATIENT
Start: 2019-10-22 | End: 2019-10-22 | Stop reason: SDUPTHER

## 2019-10-22 RX ORDER — HYDROCODONE BITARTRATE AND ACETAMINOPHEN 5; 325 MG/1; MG/1
2 TABLET ORAL PRN
Status: DISCONTINUED | OUTPATIENT
Start: 2019-10-22 | End: 2019-10-22 | Stop reason: HOSPADM

## 2019-10-22 RX ORDER — LIDOCAINE HYDROCHLORIDE 10 MG/ML
0.3 INJECTION, SOLUTION EPIDURAL; INFILTRATION; INTRACAUDAL; PERINEURAL
Status: DISCONTINUED | OUTPATIENT
Start: 2019-10-22 | End: 2019-10-22 | Stop reason: HOSPADM

## 2019-10-22 RX ORDER — DIPHENHYDRAMINE HYDROCHLORIDE 50 MG/ML
12.5 INJECTION INTRAMUSCULAR; INTRAVENOUS
Status: DISCONTINUED | OUTPATIENT
Start: 2019-10-22 | End: 2019-10-22 | Stop reason: HOSPADM

## 2019-10-22 RX ORDER — PROPOFOL 10 MG/ML
INJECTION, EMULSION INTRAVENOUS PRN
Status: DISCONTINUED | OUTPATIENT
Start: 2019-10-22 | End: 2019-10-22 | Stop reason: SDUPTHER

## 2019-10-22 RX ORDER — SODIUM CHLORIDE 0.9 % (FLUSH) 0.9 %
10 SYRINGE (ML) INJECTION EVERY 12 HOURS SCHEDULED
Status: DISCONTINUED | OUTPATIENT
Start: 2019-10-22 | End: 2019-10-22 | Stop reason: HOSPADM

## 2019-10-22 RX ORDER — HYDRALAZINE HYDROCHLORIDE 20 MG/ML
5 INJECTION INTRAMUSCULAR; INTRAVENOUS EVERY 10 MIN PRN
Status: DISCONTINUED | OUTPATIENT
Start: 2019-10-22 | End: 2019-10-22 | Stop reason: HOSPADM

## 2019-10-22 RX ORDER — KETAMINE HYDROCHLORIDE 100 MG/ML
INJECTION, SOLUTION INTRAMUSCULAR; INTRAVENOUS PRN
Status: DISCONTINUED | OUTPATIENT
Start: 2019-10-22 | End: 2019-10-22 | Stop reason: SDUPTHER

## 2019-10-22 RX ORDER — HYDROCODONE BITARTRATE AND ACETAMINOPHEN 5; 325 MG/1; MG/1
1 TABLET ORAL EVERY 6 HOURS PRN
Qty: 28 TABLET | Refills: 0 | Status: SHIPPED | OUTPATIENT
Start: 2019-10-22 | End: 2019-11-14 | Stop reason: SDUPTHER

## 2019-10-22 RX ORDER — ONDANSETRON 2 MG/ML
INJECTION INTRAMUSCULAR; INTRAVENOUS PRN
Status: DISCONTINUED | OUTPATIENT
Start: 2019-10-22 | End: 2019-10-22 | Stop reason: SDUPTHER

## 2019-10-22 RX ADMIN — KETAMINE HYDROCHLORIDE 10 MG: 100 INJECTION INTRAMUSCULAR; INTRAVENOUS at 09:25

## 2019-10-22 RX ADMIN — KETAMINE HYDROCHLORIDE 10 MG: 100 INJECTION INTRAMUSCULAR; INTRAVENOUS at 09:23

## 2019-10-22 RX ADMIN — CEFAZOLIN 2000 MG: 1 INJECTION, POWDER, FOR SOLUTION INTRAMUSCULAR; INTRAVENOUS at 09:07

## 2019-10-22 RX ADMIN — FENTANYL CITRATE 12.5 MCG: 50 INJECTION INTRAMUSCULAR; INTRAVENOUS at 09:07

## 2019-10-22 RX ADMIN — ONDANSETRON 4 MG: 2 INJECTION INTRAMUSCULAR; INTRAVENOUS at 08:55

## 2019-10-22 RX ADMIN — SODIUM CHLORIDE, POTASSIUM CHLORIDE, SODIUM LACTATE AND CALCIUM CHLORIDE: 600; 310; 30; 20 INJECTION, SOLUTION INTRAVENOUS at 08:55

## 2019-10-22 RX ADMIN — FENTANYL CITRATE 12.5 MCG: 50 INJECTION INTRAMUSCULAR; INTRAVENOUS at 09:11

## 2019-10-22 RX ADMIN — FENTANYL CITRATE 12.5 MCG: 50 INJECTION INTRAMUSCULAR; INTRAVENOUS at 09:16

## 2019-10-22 RX ADMIN — FENTANYL CITRATE 12.5 MCG: 50 INJECTION INTRAMUSCULAR; INTRAVENOUS at 09:01

## 2019-10-22 RX ADMIN — LIDOCAINE HYDROCHLORIDE 100 MG: 20 INJECTION, SOLUTION INFILTRATION; PERINEURAL at 09:01

## 2019-10-22 RX ADMIN — SODIUM CHLORIDE, POTASSIUM CHLORIDE, SODIUM LACTATE AND CALCIUM CHLORIDE: 600; 310; 30; 20 INJECTION, SOLUTION INTRAVENOUS at 07:37

## 2019-10-22 RX ADMIN — HYDROMORPHONE HYDROCHLORIDE 0.4 MG: 2 INJECTION INTRAMUSCULAR; INTRAVENOUS; SUBCUTANEOUS at 09:27

## 2019-10-22 RX ADMIN — PROPOFOL 250 MG: 10 INJECTION, EMULSION INTRAVENOUS at 09:01

## 2019-10-22 RX ADMIN — FENTANYL CITRATE 50 MCG: 50 INJECTION INTRAMUSCULAR; INTRAVENOUS at 09:32

## 2019-10-22 ASSESSMENT — PULMONARY FUNCTION TESTS
PIF_VALUE: 15
PIF_VALUE: 24
PIF_VALUE: 4
PIF_VALUE: 0
PIF_VALUE: 5
PIF_VALUE: 11
PIF_VALUE: 3
PIF_VALUE: 0
PIF_VALUE: 0
PIF_VALUE: 17
PIF_VALUE: 9
PIF_VALUE: 8
PIF_VALUE: 15
PIF_VALUE: 14
PIF_VALUE: 10
PIF_VALUE: 6
PIF_VALUE: 6
PIF_VALUE: 10
PIF_VALUE: 8
PIF_VALUE: 23
PIF_VALUE: 0
PIF_VALUE: 10
PIF_VALUE: 0
PIF_VALUE: 14
PIF_VALUE: 10
PIF_VALUE: 7
PIF_VALUE: 1
PIF_VALUE: 10
PIF_VALUE: 0
PIF_VALUE: 8
PIF_VALUE: 10
PIF_VALUE: 15
PIF_VALUE: 28
PIF_VALUE: 6
PIF_VALUE: 24
PIF_VALUE: 1
PIF_VALUE: 14
PIF_VALUE: 24
PIF_VALUE: 2
PIF_VALUE: 8
PIF_VALUE: 9
PIF_VALUE: 7

## 2019-10-22 ASSESSMENT — PAIN SCALES - GENERAL
PAINLEVEL_OUTOF10: 0

## 2019-10-22 ASSESSMENT — PAIN - FUNCTIONAL ASSESSMENT
PAIN_FUNCTIONAL_ASSESSMENT: PREVENTS OR INTERFERES SOME ACTIVE ACTIVITIES AND ADLS
PAIN_FUNCTIONAL_ASSESSMENT: 0-10

## 2019-10-22 ASSESSMENT — ENCOUNTER SYMPTOMS
DYSPNEA ACTIVITY LEVEL: AFTER AMBULATING 2 FLIGHTS OF STAIRS
SHORTNESS OF BREATH: 1

## 2019-10-22 ASSESSMENT — PAIN DESCRIPTION - DESCRIPTORS: DESCRIPTORS: STABBING

## 2019-10-25 ENCOUNTER — OFFICE VISIT (OUTPATIENT)
Dept: ORTHOPEDIC SURGERY | Age: 64
End: 2019-10-25

## 2019-10-25 VITALS — BODY MASS INDEX: 39.86 KG/M2 | HEIGHT: 67 IN | WEIGHT: 253.97 LBS

## 2019-10-25 DIAGNOSIS — Z98.890 H/O ARTHROSCOPY OF KNEE: Primary | ICD-10-CM

## 2019-10-25 PROCEDURE — 99024 POSTOP FOLLOW-UP VISIT: CPT | Performed by: PHYSICIAN ASSISTANT

## 2019-11-14 ENCOUNTER — OFFICE VISIT (OUTPATIENT)
Dept: ORTHOPEDIC SURGERY | Age: 64
End: 2019-11-14

## 2019-11-14 VITALS — BODY MASS INDEX: 39.86 KG/M2 | WEIGHT: 253.97 LBS | HEIGHT: 67 IN

## 2019-11-14 DIAGNOSIS — Z98.890 S/P ARTHROSCOPY OF KNEE: ICD-10-CM

## 2019-11-14 PROCEDURE — 99024 POSTOP FOLLOW-UP VISIT: CPT | Performed by: PHYSICIAN ASSISTANT

## 2019-11-14 RX ORDER — HYDROCODONE BITARTRATE AND ACETAMINOPHEN 5; 325 MG/1; MG/1
1 TABLET ORAL EVERY 6 HOURS PRN
Qty: 28 TABLET | Refills: 0 | Status: SHIPPED | OUTPATIENT
Start: 2019-11-14 | End: 2019-11-21

## 2019-11-25 ENCOUNTER — OFFICE VISIT (OUTPATIENT)
Dept: FAMILY MEDICINE CLINIC | Age: 64
End: 2019-11-25
Payer: COMMERCIAL

## 2019-11-25 VITALS
BODY MASS INDEX: 38.52 KG/M2 | HEART RATE: 74 BPM | OXYGEN SATURATION: 97 % | WEIGHT: 246 LBS | DIASTOLIC BLOOD PRESSURE: 68 MMHG | SYSTOLIC BLOOD PRESSURE: 124 MMHG

## 2019-11-25 DIAGNOSIS — E78.5 HYPERLIPIDEMIA, UNSPECIFIED HYPERLIPIDEMIA TYPE: ICD-10-CM

## 2019-11-25 DIAGNOSIS — E11.9 TYPE 2 DIABETES MELLITUS WITHOUT COMPLICATION, WITHOUT LONG-TERM CURRENT USE OF INSULIN (HCC): Primary | ICD-10-CM

## 2019-11-25 DIAGNOSIS — I10 ESSENTIAL HYPERTENSION: ICD-10-CM

## 2019-11-25 LAB
CREATININE URINE POCT: 300
HBA1C MFR BLD: 9.5 %
MICROALBUMIN/CREAT 24H UR: 30 MG/G{CREAT}
MICROALBUMIN/CREAT UR-RTO: <30

## 2019-11-25 PROCEDURE — 83036 HEMOGLOBIN GLYCOSYLATED A1C: CPT | Performed by: INTERNAL MEDICINE

## 2019-11-25 PROCEDURE — 99214 OFFICE O/P EST MOD 30 MIN: CPT | Performed by: INTERNAL MEDICINE

## 2019-11-25 PROCEDURE — 82044 UR ALBUMIN SEMIQUANTITATIVE: CPT | Performed by: INTERNAL MEDICINE

## 2019-11-25 RX ORDER — OMEPRAZOLE 40 MG/1
CAPSULE, DELAYED RELEASE ORAL
Qty: 90 CAPSULE | Refills: 1 | Status: SHIPPED | OUTPATIENT
Start: 2019-11-25 | End: 2020-05-26 | Stop reason: SDUPTHER

## 2019-11-25 RX ORDER — SIMVASTATIN 40 MG
TABLET ORAL
Qty: 90 TABLET | Refills: 1 | Status: SHIPPED | OUTPATIENT
Start: 2019-11-25 | End: 2020-05-26 | Stop reason: SDUPTHER

## 2019-11-25 RX ORDER — LOSARTAN POTASSIUM 100 MG/1
TABLET ORAL
Qty: 90 TABLET | Refills: 1 | Status: SHIPPED | OUTPATIENT
Start: 2019-11-25 | End: 2020-05-26 | Stop reason: SDUPTHER

## 2019-11-25 RX ORDER — MONTELUKAST SODIUM 10 MG/1
TABLET ORAL
Qty: 90 TABLET | Refills: 1 | Status: SHIPPED | OUTPATIENT
Start: 2019-11-25 | End: 2020-04-08 | Stop reason: SDUPTHER

## 2019-11-25 RX ORDER — METFORMIN HYDROCHLORIDE 500 MG/1
1000 TABLET, EXTENDED RELEASE ORAL 2 TIMES DAILY
Qty: 360 TABLET | Refills: 1 | Status: CANCELLED | OUTPATIENT
Start: 2019-11-25

## 2019-11-25 RX ORDER — HYDROCHLOROTHIAZIDE 25 MG/1
TABLET ORAL
Qty: 90 TABLET | Refills: 1 | Status: SHIPPED | OUTPATIENT
Start: 2019-11-25 | End: 2020-05-26 | Stop reason: SDUPTHER

## 2019-11-25 ASSESSMENT — ENCOUNTER SYMPTOMS
SINUS PRESSURE: 0
CONSTIPATION: 0
NAUSEA: 0
DIARRHEA: 0
BACK PAIN: 0
ABDOMINAL DISTENTION: 0
ABDOMINAL PAIN: 0
VOMITING: 0
SINUS PAIN: 0
RHINORRHEA: 0
COUGH: 0
WHEEZING: 0
SHORTNESS OF BREATH: 0

## 2019-11-26 ENCOUNTER — CARE COORDINATION (OUTPATIENT)
Dept: CARE COORDINATION | Age: 64
End: 2019-11-26

## 2019-11-26 ENCOUNTER — TELEPHONE (OUTPATIENT)
Dept: FAMILY MEDICINE CLINIC | Age: 64
End: 2019-11-26

## 2019-12-17 DIAGNOSIS — M25.542 ARTHRALGIA OF LEFT HAND: ICD-10-CM

## 2019-12-23 RX ORDER — GABAPENTIN 300 MG/1
CAPSULE ORAL
Qty: 90 CAPSULE | Refills: 0 | Status: SHIPPED | OUTPATIENT
Start: 2019-12-23 | End: 2020-01-30

## 2020-01-30 RX ORDER — GABAPENTIN 300 MG/1
CAPSULE ORAL
Qty: 90 CAPSULE | Refills: 0 | Status: SHIPPED | OUTPATIENT
Start: 2020-01-30 | End: 2021-05-13

## 2020-01-30 NOTE — TELEPHONE ENCOUNTER
Refill Request     Last Seen: 11/25/2019    Last Written: #90  0RF  12/23/2019    Next Appointment:   No future appointments.         Requested Prescriptions     Pending Prescriptions Disp Refills    gabapentin (NEURONTIN) 300 MG capsule [Pharmacy Med Name: GABAPENTIN 300 MG CAPSULE] 90 capsule 0     Sig: TAKE 1 CAPSULE BY MOUTH THREE TIMES A DAY

## 2020-02-06 ENCOUNTER — OFFICE VISIT (OUTPATIENT)
Dept: FAMILY MEDICINE CLINIC | Age: 65
End: 2020-02-06
Payer: COMMERCIAL

## 2020-02-06 VITALS
TEMPERATURE: 98.5 F | WEIGHT: 244.2 LBS | HEART RATE: 100 BPM | DIASTOLIC BLOOD PRESSURE: 80 MMHG | BODY MASS INDEX: 38.33 KG/M2 | HEIGHT: 67 IN | RESPIRATION RATE: 24 BRPM | SYSTOLIC BLOOD PRESSURE: 150 MMHG | OXYGEN SATURATION: 97 %

## 2020-02-06 PROCEDURE — 99213 OFFICE O/P EST LOW 20 MIN: CPT | Performed by: PHYSICIAN ASSISTANT

## 2020-02-06 RX ORDER — BENZONATATE 100 MG/1
100 CAPSULE ORAL 2 TIMES DAILY PRN
Qty: 20 CAPSULE | Refills: 0 | Status: SHIPPED | OUTPATIENT
Start: 2020-02-06 | End: 2020-02-13

## 2020-02-06 RX ORDER — ALBUTEROL SULFATE 2.5 MG/3ML
2.5 SOLUTION RESPIRATORY (INHALATION) EVERY 4 HOURS PRN
Qty: 25 VIAL | Refills: 3 | Status: SHIPPED | OUTPATIENT
Start: 2020-02-06

## 2020-02-06 RX ORDER — AZITHROMYCIN 250 MG/1
TABLET, FILM COATED ORAL
Qty: 1 PACKET | Refills: 0 | Status: SHIPPED | OUTPATIENT
Start: 2020-02-06 | End: 2020-02-16

## 2020-02-06 RX ORDER — ALBUTEROL SULFATE 90 UG/1
2 AEROSOL, METERED RESPIRATORY (INHALATION) 4 TIMES DAILY PRN
Qty: 1 INHALER | Refills: 0 | Status: SHIPPED | OUTPATIENT
Start: 2020-02-06 | End: 2020-02-24

## 2020-02-06 ASSESSMENT — ENCOUNTER SYMPTOMS
WHEEZING: 1
ABDOMINAL PAIN: 0
COUGH: 1
RHINORRHEA: 0
SORE THROAT: 0
SHORTNESS OF BREATH: 1
DIARRHEA: 0
NAUSEA: 0
VOMITING: 0
CHEST TIGHTNESS: 1
CONSTIPATION: 0

## 2020-02-06 NOTE — PROGRESS NOTES
gabapentin (NEURONTIN) 300 MG capsule TAKE 1 CAPSULE BY MOUTH THREE TIMES A DAY 90 capsule 0    hydrochlorothiazide (HYDRODIURIL) 25 MG tablet TAKE 1 TABLET BY MOUTH EVERY DAY 90 tablet 1    insulin 70-30 (NOVOLIN 70/30) (70-30) 100 UNIT per ML injection vial Inject 50 Units into the skin 3 times daily 45 mL 2    losartan (COZAAR) 100 MG tablet TAKE 1 TABLET BY MOUTH DAILY 90 tablet 1    montelukast (SINGULAIR) 10 MG tablet TAKE 1 TABLET BY MOUTH EVERY DAY AT NIGHT 90 tablet 1    omeprazole (PRILOSEC) 40 MG delayed release capsule TAKE ONE CAPSULE BY MOUTH EVERY DAY 90 capsule 1    simvastatin (ZOCOR) 40 MG tablet TAKE 1 TABLET BY MOUTH NIGHTLY. 90 tablet 1    Insulin Syringe-Needle U-100 30G X 1/2\" 1 ML MISC Use for insulin 4 times daily 400 each 1    metFORMIN (GLUCOPHAGE-XR) 500 MG extended release tablet Take 2 tablets by mouth 2 times daily 360 tablet 1    Blood Glucose Monitoring Suppl (ONE TOUCH ULTRA 2) w/Device KIT 1 kit by Does not apply route daily 1 kit 0    Insulin Pen Needle (PEN NEEDLES) 31G X 6 MM MISC Use with insulin 4 times daily. 200 each 5    glucose blood VI test strips (ONE TOUCH ULTRA TEST) strip USE AS DIRECTED 3 TIMES A DAY Dx Code E11.9 100 strip 5    acetaminophen (TYLENOL) 500 MG tablet Take 500 mg by mouth every 6 hours as needed for Pain      ONETOUCH DELICA LANCETS 17I MISC TEST 3 TIMES A  each 12     No current facility-administered medications for this visit. Vitals:    02/06/20 1034 02/06/20 1041   BP: (!) 157/74 (!) 150/80   Site: Left Upper Arm Right Upper Arm   Position: Sitting Sitting   Cuff Size: Large Adult Large Adult   Pulse: 100    Resp: 24    Temp: 98.5 °F (36.9 °C)    TempSrc: Oral    SpO2: 97%    Weight: 244 lb 3.2 oz (110.8 kg)    Height: 5' 7\" (1.702 m)      Estimated body mass index is 38.25 kg/m² as calculated from the following:    Height as of this encounter: 5' 7\" (1.702 m).     Weight as of this encounter: 244 lb 3.2 oz (110.8 kg).    Physical Exam  Constitutional:       General: He is not in acute distress. Appearance: He is well-developed. HENT:      Head: Normocephalic and atraumatic. Eyes:      Conjunctiva/sclera: Conjunctivae normal.      Pupils: Pupils are equal, round, and reactive to light. Neck:      Musculoskeletal: Neck supple. Cardiovascular:      Rate and Rhythm: Normal rate and regular rhythm. Heart sounds: Normal heart sounds. No murmur. Pulmonary:      Effort: Pulmonary effort is normal. No respiratory distress. Breath sounds: Examination of the right-upper field reveals wheezing. Examination of the right-middle field reveals rhonchi. Examination of the left-middle field reveals rhonchi. Wheezing and rhonchi present. Abdominal:      General: Bowel sounds are normal.      Palpations: Abdomen is soft. Tenderness: There is no abdominal tenderness. Lymphadenopathy:      Cervical: No cervical adenopathy. Skin:     General: Skin is warm and dry. Findings: No rash. Neurological:      Mental Status: He is alert and oriented to person, place, and time. Deep Tendon Reflexes: Reflexes are normal and symmetric. ASSESSMENT and PLAN:  Tyra Snyder was seen today for cough, generalized body aches, pharyngitis and wheezing. Diagnoses and all orders for this visit:    Bronchitis  -     azithromycin (ZITHROMAX Z-CARLINE) 250 MG tablet; Take 2 tablets on day 1, then 1 tablet daily for the next 4 days. -     benzonatate (TESSALON) 100 MG capsule; Take 1 capsule by mouth 2 times daily as needed for Cough  -     albuterol sulfate  (90 Base) MCG/ACT inhaler; Inhale 2 puffs into the lungs 4 times daily as needed for Wheezing  -     XR CHEST STANDARD (2 VW); Future  -     albuterol (PROVENTIL) (2.5 MG/3ML) 0.083% nebulizer solution; Take 3 mLs by nebulization every 4 hours as needed for Wheezing      Declines A1c today, patient due for follow up, will call to schedule when feeling better. Return if symptoms worsen or fail to improve.

## 2020-02-14 ENCOUNTER — TELEPHONE (OUTPATIENT)
Dept: FAMILY MEDICINE CLINIC | Age: 65
End: 2020-02-14

## 2020-02-14 RX ORDER — CEFUROXIME AXETIL 250 MG/1
250 TABLET ORAL 2 TIMES DAILY
Qty: 14 TABLET | Refills: 0 | Status: SHIPPED | OUTPATIENT
Start: 2020-02-14 | End: 2020-02-21

## 2020-02-24 RX ORDER — ALBUTEROL SULFATE 90 UG/1
2 AEROSOL, METERED RESPIRATORY (INHALATION) 4 TIMES DAILY PRN
Qty: 8.5 INHALER | Refills: 0 | Status: SHIPPED | OUTPATIENT
Start: 2020-02-24

## 2020-03-13 ENCOUNTER — OFFICE VISIT (OUTPATIENT)
Dept: ORTHOPEDIC SURGERY | Age: 65
End: 2020-03-13
Payer: COMMERCIAL

## 2020-03-13 RX ORDER — BUPIVACAINE HYDROCHLORIDE 2.5 MG/ML
30 INJECTION, SOLUTION INFILTRATION; PERINEURAL ONCE
Status: COMPLETED | OUTPATIENT
Start: 2020-03-13 | End: 2020-03-13

## 2020-03-13 RX ORDER — LIDOCAINE HYDROCHLORIDE 10 MG/ML
20 INJECTION, SOLUTION INFILTRATION; PERINEURAL ONCE
Status: COMPLETED | OUTPATIENT
Start: 2020-03-13 | End: 2020-03-13

## 2020-03-13 RX ORDER — METHYLPREDNISOLONE ACETATE 40 MG/ML
80 INJECTION, SUSPENSION INTRA-ARTICULAR; INTRALESIONAL; INTRAMUSCULAR; SOFT TISSUE ONCE
Status: COMPLETED | OUTPATIENT
Start: 2020-03-13 | End: 2020-03-13

## 2020-03-13 RX ADMIN — LIDOCAINE HYDROCHLORIDE 20 ML: 10 INJECTION, SOLUTION INFILTRATION; PERINEURAL at 10:44

## 2020-03-13 RX ADMIN — BUPIVACAINE HYDROCHLORIDE 75 MG: 2.5 INJECTION, SOLUTION INFILTRATION; PERINEURAL at 10:44

## 2020-03-13 RX ADMIN — METHYLPREDNISOLONE ACETATE 80 MG: 40 INJECTION, SUSPENSION INTRA-ARTICULAR; INTRALESIONAL; INTRAMUSCULAR; SOFT TISSUE at 10:44

## 2020-03-13 NOTE — PROGRESS NOTES
Chief Complaint   Patient presents with    Knee Pain     LEFT KNEE SILAS INJ     Diagnosis: Left knee osteoarthritis. Status post arthroscopy 10/22/2019    He returns to clinic today to attempt a cortisone injection to the left knee. He is continued to struggle somewhat since surgery and his symptoms do not seem to be improving with physical therapy, ice and rest.    Orders Placed This Encounter   Procedures    US ARTHR/ASP/INJ MAJOR JNT/BURSA LEFT     Order Specific Question:   Reason for exam:     Answer:   pain     I discussed in detail the risks, benefits and complications of an injection which included but are not limited to infection, skin reactions, hot swollen joint, and anaphylaxis with the patient. The patient verbalized understanding and gave informed consent for the injection. The patient's knee was flexed to 90° and the skin prepped using sterile alcohol solution. A sterile 22-gauge needle was inserted into the knee and the mixture of 4 mL of 2% Carbocaine, 4 mL of 0.25% Marcaine, and 80 mg of Depo-Medrol was injected under sterile technique. The needle was withdrawn and the puncture site sealed with a Band-Aid. Technique: Under sterile conditions a [employer] ultrasound unit with a variable frequency (6.0-15.0 MHz) linear transducer was used to localize the placement of a 22-gauge needle into the left knee joint. Findings: Successful needle placement for knee injection. Final images were taken and saved for permanent record. The patient tolerated the injection well. The patient was instructed to call the office immediately if there is any pain, redness, warmth, fever, or chills. Eldon Nelson, [facility]    This dictation was performed with a verbal recognition program [facility]) and it was checked for errors. It is possible that there are still dictated errors within this office note. If so, please bring any errors to my attention for an addendum.  All efforts were made to ensure that this

## 2020-04-08 RX ORDER — MONTELUKAST SODIUM 10 MG/1
TABLET ORAL
Qty: 90 TABLET | Refills: 1 | Status: SHIPPED | OUTPATIENT
Start: 2020-04-08 | End: 2021-07-14

## 2020-05-21 ENCOUNTER — TELEPHONE (OUTPATIENT)
Dept: FAMILY MEDICINE CLINIC | Age: 65
End: 2020-05-21

## 2020-05-26 ENCOUNTER — VIRTUAL VISIT (OUTPATIENT)
Dept: FAMILY MEDICINE CLINIC | Age: 65
End: 2020-05-26
Payer: COMMERCIAL

## 2020-05-26 VITALS — SYSTOLIC BLOOD PRESSURE: 144 MMHG | DIASTOLIC BLOOD PRESSURE: 76 MMHG | WEIGHT: 254 LBS | BODY MASS INDEX: 39.78 KG/M2

## 2020-05-26 PROCEDURE — 99442 PR PHYS/QHP TELEPHONE EVALUATION 11-20 MIN: CPT | Performed by: INTERNAL MEDICINE

## 2020-05-26 RX ORDER — SIMVASTATIN 40 MG
TABLET ORAL
Qty: 90 TABLET | Refills: 1 | Status: SHIPPED | OUTPATIENT
Start: 2020-05-26 | End: 2022-05-06 | Stop reason: SDUPTHER

## 2020-05-26 RX ORDER — LOSARTAN POTASSIUM 100 MG/1
TABLET ORAL
Qty: 90 TABLET | Refills: 1 | Status: SHIPPED | OUTPATIENT
Start: 2020-05-26 | End: 2021-04-20

## 2020-05-26 RX ORDER — HYDROCHLOROTHIAZIDE 25 MG/1
TABLET ORAL
Qty: 90 TABLET | Refills: 1 | Status: SHIPPED | OUTPATIENT
Start: 2020-05-26 | End: 2021-01-21

## 2020-05-26 RX ORDER — OMEPRAZOLE 40 MG/1
CAPSULE, DELAYED RELEASE ORAL
Qty: 90 CAPSULE | Refills: 1 | Status: SHIPPED | OUTPATIENT
Start: 2020-05-26 | End: 2021-04-27

## 2020-08-25 ENCOUNTER — OFFICE VISIT (OUTPATIENT)
Dept: FAMILY MEDICINE CLINIC | Age: 65
End: 2020-08-25
Payer: COMMERCIAL

## 2020-08-25 VITALS
OXYGEN SATURATION: 97 % | WEIGHT: 249 LBS | HEART RATE: 74 BPM | BODY MASS INDEX: 39.08 KG/M2 | DIASTOLIC BLOOD PRESSURE: 66 MMHG | HEIGHT: 67 IN | SYSTOLIC BLOOD PRESSURE: 124 MMHG | TEMPERATURE: 98.4 F

## 2020-08-25 PROBLEM — E66.01 MORBIDLY OBESE (HCC): Status: ACTIVE | Noted: 2020-08-25

## 2020-08-25 LAB
ALBUMIN SERPL-MCNC: 4.4 G/DL (ref 3.4–5)
ANION GAP SERPL CALCULATED.3IONS-SCNC: 15 MMOL/L (ref 3–16)
BILIRUBIN, POC: ABNORMAL
BLOOD URINE, POC: ABNORMAL
BUN BLDV-MCNC: 14 MG/DL (ref 7–20)
CALCIUM SERPL-MCNC: 9.7 MG/DL (ref 8.3–10.6)
CHLORIDE BLD-SCNC: 102 MMOL/L (ref 99–110)
CHOLESTEROL, FASTING: 166 MG/DL (ref 0–199)
CLARITY, POC: CLEAR
CO2: 23 MMOL/L (ref 21–32)
COLOR, POC: YELLOW
CREAT SERPL-MCNC: 1.1 MG/DL (ref 0.8–1.3)
GFR AFRICAN AMERICAN: >60
GFR NON-AFRICAN AMERICAN: >60
GLUCOSE BLD-MCNC: 269 MG/DL (ref 70–99)
GLUCOSE URINE, POC: 100
HBA1C MFR BLD: 10.6 %
HCT VFR BLD CALC: 44.8 % (ref 40.5–52.5)
HDLC SERPL-MCNC: 47 MG/DL (ref 40–60)
HEMOGLOBIN: 15 G/DL (ref 13.5–17.5)
KETONES, POC: 40
LDL CHOLESTEROL CALCULATED: 87 MG/DL
LEUKOCYTE EST, POC: ABNORMAL
MCH RBC QN AUTO: 31.5 PG (ref 26–34)
MCHC RBC AUTO-ENTMCNC: 33.6 G/DL (ref 31–36)
MCV RBC AUTO: 93.8 FL (ref 80–100)
NITRITE, POC: ABNORMAL
PDW BLD-RTO: 13.3 % (ref 12.4–15.4)
PH, POC: 5
PHOSPHORUS: 3.5 MG/DL (ref 2.5–4.9)
PLATELET # BLD: 172 K/UL (ref 135–450)
PMV BLD AUTO: 9.3 FL (ref 5–10.5)
POTASSIUM SERPL-SCNC: 4.2 MMOL/L (ref 3.5–5.1)
PROTEIN, POC: ABNORMAL
RBC # BLD: 4.78 M/UL (ref 4.2–5.9)
SODIUM BLD-SCNC: 140 MMOL/L (ref 136–145)
SPECIFIC GRAVITY, POC: 1.01
TRIGLYCERIDE, FASTING: 159 MG/DL (ref 0–150)
TSH REFLEX: 2.33 UIU/ML (ref 0.27–4.2)
UROBILINOGEN, POC: 4
VLDLC SERPL CALC-MCNC: 32 MG/DL
WBC # BLD: 9.9 K/UL (ref 4–11)

## 2020-08-25 PROCEDURE — 81002 URINALYSIS NONAUTO W/O SCOPE: CPT | Performed by: INTERNAL MEDICINE

## 2020-08-25 PROCEDURE — 83036 HEMOGLOBIN GLYCOSYLATED A1C: CPT | Performed by: INTERNAL MEDICINE

## 2020-08-25 PROCEDURE — 99214 OFFICE O/P EST MOD 30 MIN: CPT | Performed by: INTERNAL MEDICINE

## 2020-08-25 RX ORDER — METFORMIN HYDROCHLORIDE 500 MG/1
1000 TABLET, EXTENDED RELEASE ORAL 2 TIMES DAILY
Qty: 360 TABLET | Refills: 1 | Status: SHIPPED | OUTPATIENT
Start: 2020-08-25 | End: 2021-05-05

## 2020-08-25 ASSESSMENT — ENCOUNTER SYMPTOMS
COUGH: 0
SHORTNESS OF BREATH: 0

## 2020-08-25 NOTE — PROGRESS NOTES
SUBJECTIVE:  CC: Diabetes    HPI:  Rankin Crigler presents for follow up and evaluation of diabetes. Also follow up on   Problem List Items Addressed This Visit     Hyperlipidemia    Type 2 diabetes mellitus without complication, with long-term current use of insulin ([facility] [address] - Primary    Essential hypertension    Mild episode of recurrent major depressive disorder ([facility] [address]    Morbidly obese (HCC)        Acute complaint of persistent right groin pain for the last 6 weeks, not improving. Home blood glucose log brought to visit: No.  Control is Acceptable per home record. he has No symptoms of hypoglycemia. he has No symptoms of hyperglycemia. The patient denied polyphagia, polydipsia, or polyuria. The last HBA1C and routine lab was   Lab Results   Component Value Date    LABA1C 9.5 11/25/2019     Lab Results   Component Value Date    .1 10/17/2019     Lab Results   Component Value Date    WBC 12.6 (H) 10/17/2019    HGB 14.3 10/17/2019    HCT 42.4 10/17/2019     10/17/2019    CHOL 144 07/12/2019    TRIG 138 07/12/2019    HDL 50 07/12/2019    ALT 52 (H) 10/17/2019    AST 33 10/17/2019     10/17/2019    K 4.8 10/17/2019    CL 97 (L) 10/17/2019    CREATININE 1.3 10/17/2019    BUN 25 (H) 10/17/2019    CO2 26 10/17/2019    TSH 1.17 12/10/2010    PSA 0.23 04/27/2012    LABA1C 9.5 11/25/2019    LABMICR 0.4 05/06/2015    LABMICR 0.4 05/06/2015       Has been taking meds as ordered Yes. he has no side effects from the current diabetes medications. Review of Systems   Constitutional: Negative for fatigue. Respiratory: Negative for cough and shortness of breath. Cardiovascular: Negative for chest pain and leg swelling. Neurological: Negative for dizziness and headaches. OBJECTIVE:    VS: Wt 249 lb (112.9 kg)   BMI 39.00 kg/m²   General appearance: Alert, Awake, Oriented times 3, no distress  Skin: Warm and dry  Lungs: Lungs clear to auscultation bilaterally.   No rhonchi, crackles or wheezes  Heart: S1 S2  Regular rate and rhythm. No rub, murmur or gallop  Extremities: No edema, Peripheral pulses palpable  Feet:  No lesions, sensation intact to monofilament, Toe nails intact and without signs of fungus    ASSESSMENT/PLAN:    Carly Perez was seen today for diabetes, hypertension, allergic rhinitis , hyperlipidemia and immunizations. Diagnoses and all orders for this visit:    Type 2 diabetes mellitus without complication, with long-term current use of insulin (HCC)  -     POCT glycosylated hemoglobin (Hb A1C)  -     TSH with Reflex  -     CBC  -     Gracie Amaya, JOVANY, Endocrinology, [facility]    Essential hypertension  -     Renal Function Panel    Mild episode of recurrent major depressive disorder ([facility] [address]    Morbidly obese (HCC)    Mixed hyperlipidemia  -     Lipid, Fasting    RLQ abdominal pain  -     POCT Urinalysis no Micro  -     CT ABDOMEN PELVIS W IV CONTRAST Additional Contrast? Radiologist Recommendation; Future  -     Culture, Urine    Abnormal finding in urine  -     Culture, Urine    Other orders  -     Td (adult), 2 Lf Tetanus Toxoid, PF (Td, absorbed)  -     insulin 70-30 (NOVOLIN 70/30) (70-30) 100 UNIT per ML injection vial; Inject 50 Units into the skin 3 times daily  -     metFORMIN (GLUCOPHAGE-XR) 500 MG extended release tablet; Take 2 tablets by mouth 2 times daily  -     Insulin Syringe-Needle U-100 30G X 1/2\" 1 ML MISC; Use for insulin 3 times daily    DM worsening, refer back to endo  Unable to take trulicity due to nausea. New RLQ pain, check CT scan    Instructions:    See patient goals. I have reviewed my findings and recommendations with Gauri Harris

## 2020-08-26 LAB — URINE CULTURE, ROUTINE: NORMAL

## 2020-09-03 ENCOUNTER — HOSPITAL ENCOUNTER (OUTPATIENT)
Dept: CT IMAGING | Age: 65
Discharge: HOME | End: 2020-09-03
Payer: COMMERCIAL

## 2020-09-03 PROCEDURE — 6360000004 HC RX CONTRAST MEDICATION: Performed by: INTERNAL MEDICINE

## 2020-09-03 PROCEDURE — 74177 CT ABD & PELVIS W/CONTRAST: CPT

## 2020-09-03 RX ADMIN — IOHEXOL 50 ML: 240 INJECTION, SOLUTION INTRATHECAL; INTRAVASCULAR; INTRAVENOUS; ORAL at 09:30

## 2020-09-03 RX ADMIN — IOPAMIDOL 75 ML: 755 INJECTION, SOLUTION INTRAVENOUS at 09:31

## 2020-09-11 ENCOUNTER — TELEPHONE (OUTPATIENT)
Dept: FAMILY MEDICINE CLINIC | Age: 65
End: 2020-09-11

## 2020-09-11 RX ORDER — DICYCLOMINE HYDROCHLORIDE 10 MG/1
10 CAPSULE ORAL 4 TIMES DAILY
Qty: 360 CAPSULE | Refills: 1 | Status: SHIPPED | OUTPATIENT
Start: 2020-09-11 | End: 2021-06-11

## 2020-09-11 RX ORDER — AMITRIPTYLINE HYDROCHLORIDE 10 MG/1
10 TABLET, FILM COATED ORAL NIGHTLY
Qty: 30 TABLET | Refills: 5 | Status: SHIPPED | OUTPATIENT
Start: 2020-09-11 | End: 2020-09-28 | Stop reason: SDUPTHER

## 2020-09-11 NOTE — TELEPHONE ENCOUNTER
Spoke with patient regarding results. Pain is improved. Occurring intermittently, not daily. On days he has it goes 4-5 times daily. Urgently. Worse when he is upset or when he eats certain foods. Denies fevers chills, nausea and vomiting. No sign of skin infection. Discussed fatty liver and possible progression to cirrhosis. Start amitriptyline and bentyl for IBS. Follow up 2 weeks, if not improved will refer to GI. Pt to call with worsening sxs fever, melena/hematochezia, developing nausea/vomiting.

## 2020-09-28 ENCOUNTER — PROCEDURE VISIT (OUTPATIENT)
Dept: FAMILY MEDICINE CLINIC | Age: 65
End: 2020-09-28
Payer: COMMERCIAL

## 2020-09-28 ENCOUNTER — OFFICE VISIT (OUTPATIENT)
Dept: FAMILY MEDICINE CLINIC | Age: 65
End: 2020-09-28
Payer: COMMERCIAL

## 2020-09-28 VITALS
WEIGHT: 250 LBS | DIASTOLIC BLOOD PRESSURE: 82 MMHG | SYSTOLIC BLOOD PRESSURE: 134 MMHG | OXYGEN SATURATION: 98 % | HEIGHT: 67 IN | BODY MASS INDEX: 39.24 KG/M2 | TEMPERATURE: 98.1 F | HEART RATE: 84 BPM

## 2020-09-28 VITALS
HEIGHT: 67 IN | TEMPERATURE: 98.1 F | HEART RATE: 84 BPM | WEIGHT: 250 LBS | BODY MASS INDEX: 39.24 KG/M2 | DIASTOLIC BLOOD PRESSURE: 82 MMHG | SYSTOLIC BLOOD PRESSURE: 134 MMHG | OXYGEN SATURATION: 98 %

## 2020-09-28 PROCEDURE — 99213 OFFICE O/P EST LOW 20 MIN: CPT | Performed by: PHYSICIAN ASSISTANT

## 2020-09-28 PROCEDURE — 90694 VACC AIIV4 NO PRSRV 0.5ML IM: CPT | Performed by: PHYSICIAN ASSISTANT

## 2020-09-28 PROCEDURE — G0008 ADMIN INFLUENZA VIRUS VAC: HCPCS | Performed by: PHYSICIAN ASSISTANT

## 2020-09-28 PROCEDURE — 11306 SHAVE SKIN LESION 0.6-1.0 CM: CPT | Performed by: FAMILY MEDICINE

## 2020-09-28 RX ORDER — AMITRIPTYLINE HYDROCHLORIDE 10 MG/1
10 TABLET, FILM COATED ORAL NIGHTLY
Qty: 90 TABLET | Refills: 1 | Status: SHIPPED | OUTPATIENT
Start: 2020-09-28 | End: 2021-04-23

## 2020-09-28 ASSESSMENT — ENCOUNTER SYMPTOMS
VOMITING: 0
CONSTIPATION: 0
SORE THROAT: 0
SHORTNESS OF BREATH: 0
RHINORRHEA: 0
DIARRHEA: 1
COLOR CHANGE: 1
SHORTNESS OF BREATH: 0
NAUSEA: 0
VOMITING: 0
COUGH: 0
ABDOMINAL PAIN: 1
NAUSEA: 0

## 2020-09-28 NOTE — PROGRESS NOTES
Vaccine Information Sheet, \"Influenza - Inactivated\"  given to Kamille Mendoza, or parent/legal guardian of  Kamille Mendoza and verbalized understanding. Patient responses:    Have you ever had a reaction to a flu vaccine? No  Are you able to eat eggs without adverse effects? Yes  Do you have any current illness? No  Have you ever had Guillian Seneca Syndrome? No    Flu vaccine given per order. Please see immunization tab.

## 2020-09-28 NOTE — PROGRESS NOTES
 Drug use: No    Sexual activity: Yes     Partners: Female   Lifestyle    Physical activity     Days per week: Not on file     Minutes per session: Not on file    Stress: Not on file   Relationships    Social connections     Talks on phone: Not on file     Gets together: Not on file     Attends L[employer] day service: Not on file     Active member of club or organization: Not on file     Attends meetings of clubs or organizations: Not on file     Relationship status: Not on file    Intimate partner violence     Fear of current or ex partner: Not on file     Emotionally abused: Not on file     Physically abused: Not on file     Forced sexual activity: Not on file   Other Topics Concern    Not on file   Social History Narrative    Not on file       Family History   Problem Relation Age of Onset    Cancer Mother     Cancer Father     Heart Disease Father     Diabetes Father     Heart Disease Brother        Current Outpatient Medications   Medication Sig Dispense Refill    amitriptyline (ELAVIL) 10 MG tablet Take 1 tablet by mouth nightly 90 tablet 1    insulin 70-30 (NOVOLIN 70/30) (70-30) 100 UNIT per ML injection vial Inject 50 Units into the skin 3 times daily 135 mL 1    dicyclomine (BENTYL) 10 MG capsule Take 1 capsule by mouth 4 times daily 360 capsule 1    metFORMIN (GLUCOPHAGE-XR) 500 MG extended release tablet Take 2 tablets by mouth 2 times daily (Patient taking differently: Take 1,000 mg by mouth daily ) 360 tablet 1    Insulin Syringe-Needle U-100 30G X 1/2\" 1 ML MISC Use for insulin 3 times daily 300 each 1    hydroCHLOROthiazide (HYDRODIURIL) 25 MG tablet TAKE 1 TABLET BY MOUTH EVERY DAY 90 tablet 1    losartan (COZAAR) 100 MG tablet TAKE 1 TABLET BY MOUTH DAILY 90 tablet 1    omeprazole (PRILOSEC) 40 MG delayed release capsule TAKE ONE CAPSULE BY MOUTH EVERY DAY 90 capsule 1    simvastatin (ZOCOR) 40 MG tablet TAKE 1 TABLET BY MOUTH NIGHTLY.  90 tablet 1    montelukast (SINGULAIR) 10 MG tablet TAKE 1 TABLET BY MOUTH EVERY DAY AT NIGHT 90 tablet 1    albuterol sulfate  (90 Base) MCG/ACT inhaler INHALE 2 PUFFS INTO THE LUNGS 4 TIMES DAILY AS NEEDED FOR WHEEZING 8.5 Inhaler 0    albuterol (PROVENTIL) (2.5 MG/3ML) 0.083% nebulizer solution Take 3 mLs by nebulization every 4 hours as needed for Wheezing 25 vial 3    gabapentin (NEURONTIN) 300 MG capsule TAKE 1 CAPSULE BY MOUTH THREE TIMES A DAY 90 capsule 0    Blood Glucose Monitoring Suppl (ONE TOUCH ULTRA 2) w/Device KIT 1 kit by Does not apply route daily 1 kit 0    Insulin Pen Needle (PEN NEEDLES) 31G X 6 MM MISC Use with insulin 4 times daily. 200 each 5    glucose blood VI test strips (ONE TOUCH ULTRA TEST) strip USE AS DIRECTED 3 TIMES A DAY Dx Code E11.9 100 strip 5    acetaminophen (TYLENOL) 500 MG tablet Take 500 mg by mouth every 6 hours as needed for Pain      ONETOUCH DELICA LANCETS 19I MISC TEST 3 TIMES A  each 12     No current facility-administered medications for this visit.         Immunization History   Administered Date(s) Administered    Influenza 10/16/2013    Influenza Vaccine, unspecified formulation 10/15/2016    Influenza, High Dose (Fluzone 65 yrs and older) 10/24/2018, 11/14/2019    Influenza, Intradermal, Preservative free 09/28/2012    Influenza, Quadv, IM, PF (6 mo and older Fluzone, Flulaval, Fluarix, and 3 yrs and older Afluria) 08/29/2017    Influenza, Quadv, adjuvanted, 65 yrs +, IM, PF (Fluad) 09/28/2020    Pneumococcal Conjugate 13-valent ([id number]) 06/15/2015    Pneumococcal Polysaccharide ([id number]) 08/16/2016    Td (Adult), 2 Lf Tetanus Toxoid, Pf (Td, Absorbed) 08/25/2020    Tdap (Boostrix, Adacel) 07/10/2009       Allergies   Allergen Reactions    Aspirin Other (See Comments)     Takes baby aspirin without problems    Caused bleeding in the bowel    Oxycontin [Oxycodone Hcl] Nausea And Vomiting       Review of Systems   Constitutional: Negative for chills, diaphoresis and fever.   Respiratory: Negative for shortness of breath. Cardiovascular: Negative for palpitations. Gastrointestinal: Negative for nausea and vomiting. Musculoskeletal: Negative for arthralgias. Skin: Positive for color change. Negative for pallor, rash and wound. Neurological: Negative for numbness. Hematological: Negative for adenopathy. /82   Pulse 84   Temp 98.1 °F (36.7 °C) (Temporal)   Ht 5' 6.54\" (1.69 m)   Wt 250 lb (113.4 kg)   SpO2 98%   BMI 39.70 kg/m²     Physical Exam  Vitals signs and nursing note reviewed. Constitutional:       General: He is not in acute distress. Appearance: He is well-developed. He is not diaphoretic. HENT:      Head: Normocephalic and atraumatic. Eyes:      Pupils: Pupils are equal, round, and reactive to light. Neck:      Musculoskeletal: Normal range of motion and neck supple. Cardiovascular:      Rate and Rhythm: Normal rate and regular rhythm. Pulmonary:      Effort: Pulmonary effort is normal. No respiratory distress. Musculoskeletal: Normal range of motion. General: No tenderness or deformity. Lymphadenopathy:      Cervical: No cervical adenopathy. Skin:     General: Skin is warm and dry. Capillary Refill: Capillary refill takes 2 to 3 seconds. Coloration: Skin is not jaundiced or pale. Findings: Lesion (see photo) present. No bruising, erythema or rash. Neurological:      Mental Status: He is alert and oriented to person, place, and time. Sensory: No sensory deficit. Psychiatric:         Behavior: Behavior normal.         Thought Content:  Thought content normal.         Judgment: Judgment normal.                     Shave Biopsy Procedure Note    Pre-operative Diagnosis: Suspicious lesion    Post-operative Diagnosis: same    Locations:left 4th digit    Indications: growth, color change, inflammation, irritation, bleeding    Anesthesia: Lidocaine 1% without epinephrine     Procedure Details History of allergy to iodine: no  History of allergy to lidocaine: no    Patient informed of the risks (including bleeding and infection) and benefits of the   procedure and Verbal informed consent obtained. The lesion and surrounding area were given a sterile prep using alcohol and draped in the usual sterile fashion. A razor was used to shave an area of skin approximately 8mm by 6mm. Hemostasis achieved with pressure . Antibiotic ointment and a sterile dressing applied. The specimen was sent for pathologic examination. The patient tolerated the procedure well. EBL: 0.75 ml    Findings:  Per above    Condition:  Stable    Complications:  none. Plan:  1. Instructed to keep the wound dry and covered for 24-48h and clean thereafter. 2. Warning signs of infection were reviewed. 3. Recommended that the patient use OTC analgesics as needed for pain. Wound care discussed in detail. Keep area dry for 24-48 hours. Cleanse wound BID with warm water and unfragranced soap. Apply topical triple antibiotics and clean dressing with every wound cleaning. Can leave open to the air once area is covered with a scab. Can apply Vaseline nightly to assist with the healing process. Plan  1. Neoplasm of uncertain behavior of skin  - 45033 - NE SHAV SKIN LES 6-10MM REMAINDR BODY  - SURGICAL PATHOLOGY  R/o SCC    While assessing care for this patient, I have reviewed all pertinent lab work/imaging/ specialist notes and care in reference to those problems addressed above in detail. Appropriate medical decision making was based on this. Please note that portions of this note may have been completed with a voice recognition program. Efforts were made to edit the dictations but occasionally words are mis-transcribed. Return if symptoms worsen or fail to improve.

## 2020-09-28 NOTE — PROGRESS NOTES
2020  Wesley Weaver (: 1955)  77 y.o. HPI  Follow up abdominal pain. Started amitriptyline 2 weeks ago for iBS symptoms. Was having to take bentyl for the first 4 days with it. Then diarrhea improved. No longer requiring bentyl, continuing on amitriptyline. Denies side effects from the medication. Denies hematochezia/melena. No fevers, chills nausea or vomiting. Review of Systems   Constitutional: Negative for activity change, chills and fever. HENT: Negative for congestion, ear pain, rhinorrhea and sore throat. Eyes: Negative for visual disturbance. Respiratory: Negative for cough and shortness of breath. Cardiovascular: Negative for chest pain and palpitations. Gastrointestinal: Positive for abdominal pain and diarrhea. Negative for constipation, nausea and vomiting. Genitourinary: Negative for difficulty urinating and dysuria. Musculoskeletal: Negative for arthralgias and myalgias. Skin: Negative for rash. Neurological: Negative for dizziness, weakness and numbness. Psychiatric/Behavioral: Negative for sleep disturbance. Allergies, past medical history, family history, and social history reviewed and unchanged from previous encounter.      Current Outpatient Medications   Medication Sig Dispense Refill    amitriptyline (ELAVIL) 10 MG tablet Take 1 tablet by mouth nightly 90 tablet 1    insulin 70-30 (NOVOLIN 70/30) (70-30) 100 UNIT per ML injection vial Inject 50 Units into the skin 3 times daily 135 mL 1    dicyclomine (BENTYL) 10 MG capsule Take 1 capsule by mouth 4 times daily 360 capsule 1    metFORMIN (GLUCOPHAGE-XR) 500 MG extended release tablet Take 2 tablets by mouth 2 times daily (Patient taking differently: Take 1,000 mg by mouth daily ) 360 tablet 1    Insulin Syringe-Needle U-100 30G X 1/2\" 1 ML MISC Use for insulin 3 times daily 300 each 1    hydroCHLOROthiazide (HYDRODIURIL) 25 MG tablet TAKE 1 TABLET BY MOUTH EVERY DAY 90 tablet 1    losartan (COZAAR) 100 MG tablet TAKE 1 TABLET BY MOUTH DAILY 90 tablet 1    omeprazole (PRILOSEC) 40 MG delayed release capsule TAKE ONE CAPSULE BY MOUTH EVERY DAY 90 capsule 1    simvastatin (ZOCOR) 40 MG tablet TAKE 1 TABLET BY MOUTH NIGHTLY. 90 tablet 1    montelukast (SINGULAIR) 10 MG tablet TAKE 1 TABLET BY MOUTH EVERY DAY AT NIGHT 90 tablet 1    albuterol sulfate  (90 Base) MCG/ACT inhaler INHALE 2 PUFFS INTO THE LUNGS 4 TIMES DAILY AS NEEDED FOR WHEEZING 8.5 Inhaler 0    albuterol (PROVENTIL) (2.5 MG/3ML) 0.083% nebulizer solution Take 3 mLs by nebulization every 4 hours as needed for Wheezing 25 vial 3    Blood Glucose Monitoring Suppl (ONE TOUCH ULTRA 2) w/Device KIT 1 kit by Does not apply route daily 1 kit 0    Insulin Pen Needle (PEN NEEDLES) 31G X 6 MM MISC Use with insulin 4 times daily. 200 each 5    glucose blood VI test strips (ONE TOUCH ULTRA TEST) strip USE AS DIRECTED 3 TIMES A DAY Dx Code E11.9 100 strip 5    acetaminophen (TYLENOL) 500 MG tablet Take 500 mg by mouth every 6 hours as needed for Pain      ONETOUCH DELICA LANCETS 02M MISC TEST 3 TIMES A  each 12    gabapentin (NEURONTIN) 300 MG capsule TAKE 1 CAPSULE BY MOUTH THREE TIMES A DAY 90 capsule 0     No current facility-administered medications for this visit. Vitals:    09/28/20 1006 09/28/20 1013   BP: (!) 150/80 134/82   Site: Left Upper Arm Left Upper Arm   Position: Sitting Sitting   Cuff Size: Large Adult Large Adult   Pulse: 84    Temp: 98.1 °F (36.7 °C)    TempSrc: Oral    SpO2: 98%  Comment: RA    Weight: 250 lb (113.4 kg)    Height: 5' 6.54\" (1.69 m)      Estimated body mass index is 39.7 kg/m² as calculated from the following:    Height as of this encounter: 5' 6.54\" (1.69 m). Weight as of this encounter: 250 lb (113.4 kg). Physical Exam  Constitutional:       General: He is not in acute distress. Appearance: He is well-developed. HENT:      Head: Normocephalic and atraumatic. Eyes:      Conjunctiva/sclera: Conjunctivae normal.      Pupils: Pupils are equal, round, and reactive to light. Neck:      Musculoskeletal: Neck supple. Cardiovascular:      Rate and Rhythm: Normal rate and regular rhythm. Heart sounds: Normal heart sounds. No murmur. Pulmonary:      Effort: Pulmonary effort is normal.      Breath sounds: Normal breath sounds. No wheezing. Abdominal:      General: Bowel sounds are normal.      Palpations: Abdomen is soft. Tenderness: There is abdominal tenderness (RUQ). Lymphadenopathy:      Cervical: No cervical adenopathy. Skin:     General: Skin is warm and dry. Findings: No rash. Neurological:      Mental Status: He is alert and oriented to person, place, and time. Deep Tendon Reflexes: Reflexes are normal and symmetric. ASSESSMENT and PLAN:  Susan Smith was seen today for follow-up. Diagnoses and all orders for this visit:    Irritable bowel syndrome with diarrhea  -     amitriptyline (ELAVIL) 10 MG tablet; Take 1 tablet by mouth nightly  - Continue bentyl prn.  - Notify office with melena hematochezia, fever, nausea and/or vomiting. Need for influenza vaccination  -     INFLUENZA, QUADV, ADJUVANTED, 72 YRS =, IM, PF, PREFILL SYR, 0.5ML (FLUAD)      Return in about 3 months (around 12/28/2020) for Diabetes Mellitus.

## 2020-09-28 NOTE — PATIENT INSTRUCTIONS
Basic Wound Care Instruction from Dr. Vince Chavarria    Keep the area dry for 24-48 hours after your procedure. Do not submerge the wound in water, especially pools, hot tubs, rivers, lakes, or oceans. You can shower, however, keep the areas covered and away from direct water. Change the bandage after immediately if it's wet. Cleanse the wound twice daily with warm water and unfragranced soap (I.e. Orange Dial soap). Always wash your hands before you clean your wound. Pat the wound completely dry afterwards. Apply topical triple antibiotic ointment (or any other ointment I discussed with you during my visit) and a clean dressing with every wound cleaning (either gauze if the wound is draining a lot or a large bandaid). You can leave the wound open to the air once the area is covered with a protective scab. You can apply Vaseline nightly to assist with the healing process. If you are returning for suture removal, aim to see me in 8-9 days from your procedure. For pain, You can take over-the-counter Advil OR Motrin OR Ibuprofen 400-600 mg every 8 hours as needed, unless we talked about something specific during your visit, OR unless you have been told in the past not to take these medications. If this is the case, take 500 mg of Tylenol.

## 2020-12-28 ENCOUNTER — OFFICE VISIT (OUTPATIENT)
Dept: FAMILY MEDICINE CLINIC | Age: 65
End: 2020-12-28
Payer: COMMERCIAL

## 2020-12-28 VITALS
WEIGHT: 246 LBS | SYSTOLIC BLOOD PRESSURE: 138 MMHG | TEMPERATURE: 97 F | HEIGHT: 67 IN | OXYGEN SATURATION: 96 % | HEART RATE: 100 BPM | DIASTOLIC BLOOD PRESSURE: 72 MMHG | BODY MASS INDEX: 38.61 KG/M2

## 2020-12-28 DIAGNOSIS — R53.83 FATIGUE, UNSPECIFIED TYPE: ICD-10-CM

## 2020-12-28 DIAGNOSIS — Z79.4 TYPE 2 DIABETES MELLITUS WITHOUT COMPLICATION, WITH LONG-TERM CURRENT USE OF INSULIN (HCC): ICD-10-CM

## 2020-12-28 DIAGNOSIS — E11.9 TYPE 2 DIABETES MELLITUS WITHOUT COMPLICATION, WITH LONG-TERM CURRENT USE OF INSULIN (HCC): ICD-10-CM

## 2020-12-28 LAB
A/G RATIO: 1.5 (ref 1.1–2.2)
ALBUMIN SERPL-MCNC: 4.2 G/DL (ref 3.4–5)
ALP BLD-CCNC: 161 U/L (ref 40–129)
ALT SERPL-CCNC: 46 U/L (ref 10–40)
ANION GAP SERPL CALCULATED.3IONS-SCNC: 14 MMOL/L (ref 3–16)
AST SERPL-CCNC: 37 U/L (ref 15–37)
BILIRUB SERPL-MCNC: 0.8 MG/DL (ref 0–1)
BUN BLDV-MCNC: 11 MG/DL (ref 7–20)
CALCIUM SERPL-MCNC: 9.8 MG/DL (ref 8.3–10.6)
CHLORIDE BLD-SCNC: 98 MMOL/L (ref 99–110)
CO2: 29 MMOL/L (ref 21–32)
CREAT SERPL-MCNC: 0.8 MG/DL (ref 0.8–1.3)
CREATININE URINE POCT: 200
GFR AFRICAN AMERICAN: >60
GFR NON-AFRICAN AMERICAN: >60
GLOBULIN: 2.8 G/DL
GLUCOSE BLD-MCNC: 248 MG/DL (ref 70–99)
HBA1C MFR BLD: 13.6 %
MICROALBUMIN/CREAT 24H UR: 10 MG/G{CREAT}
MICROALBUMIN/CREAT UR-RTO: <30
POTASSIUM SERPL-SCNC: 3.6 MMOL/L (ref 3.5–5.1)
SODIUM BLD-SCNC: 141 MMOL/L (ref 136–145)
TOTAL PROTEIN: 7 G/DL (ref 6.4–8.2)
VITAMIN B-12: 875 PG/ML (ref 211–911)

## 2020-12-28 PROCEDURE — 83036 HEMOGLOBIN GLYCOSYLATED A1C: CPT | Performed by: PHYSICIAN ASSISTANT

## 2020-12-28 PROCEDURE — 99214 OFFICE O/P EST MOD 30 MIN: CPT | Performed by: PHYSICIAN ASSISTANT

## 2020-12-28 PROCEDURE — 82044 UR ALBUMIN SEMIQUANTITATIVE: CPT | Performed by: PHYSICIAN ASSISTANT

## 2020-12-28 SDOH — ECONOMIC STABILITY: FOOD INSECURITY: WITHIN THE PAST 12 MONTHS, THE FOOD YOU BOUGHT JUST DIDN'T LAST AND YOU DIDN'T HAVE MONEY TO GET MORE.: NEVER TRUE

## 2020-12-28 SDOH — ECONOMIC STABILITY: TRANSPORTATION INSECURITY
IN THE PAST 12 MONTHS, HAS THE LACK OF TRANSPORTATION KEPT YOU FROM MEDICAL APPOINTMENTS OR FROM GETTING MEDICATIONS?: NO

## 2020-12-28 SDOH — ECONOMIC STABILITY: FOOD INSECURITY: WITHIN THE PAST 12 MONTHS, YOU WORRIED THAT YOUR FOOD WOULD RUN OUT BEFORE YOU GOT MONEY TO BUY MORE.: NEVER TRUE

## 2020-12-28 SDOH — ECONOMIC STABILITY: TRANSPORTATION INSECURITY
IN THE PAST 12 MONTHS, HAS LACK OF TRANSPORTATION KEPT YOU FROM MEETINGS, WORK, OR FROM GETTING THINGS NEEDED FOR DAILY LIVING?: NO

## 2020-12-28 SDOH — ECONOMIC STABILITY: INCOME INSECURITY: HOW HARD IS IT FOR YOU TO PAY FOR THE VERY BASICS LIKE FOOD, HOUSING, MEDICAL CARE, AND HEATING?: SOMEWHAT HARD

## 2020-12-28 NOTE — PROGRESS NOTES
2020  Galdino Marte (: 1955)  77 y.o. HPI  Routine follow up chronic conditions. DM: on 70/30 50 units TID and metformin , has not been monitoring BG. Noncompliant with low carb diet. On arbe and statin. Neuropathy stable on gabapentin. He had appt scheduled with endo, but did not complete. Has done diabetes nutritional education in the past. \"knows what to do, just doesn't do it. \"     HTN: on losartan and hctz. Nml in office today. Patient denies HA, CP, soa, LE swelling. HLD: on simvastatin 40 mg daily, no current diet or exercise regimen. IBS: started on amitriptyline with prn bentyl. Working well. The 10-year ASCVD risk score (Harinder Leal., et al., 2013) is: 31.5%    Values used to calculate the score:      Age: 77 years      Sex: Male      Is Non- : No      Diabetic: Yes      Tobacco smoker: No      Systolic Blood Pressure: 483 mmHg      Is BP treated: Yes      HDL Cholesterol: 47 mg/dL      Total Cholesterol: 166 mg/dL       Review of Systems   Constitutional: Negative for activity change, chills and fever. HENT: Negative for congestion, ear pain, rhinorrhea and sore throat. Eyes: Negative for visual disturbance. Respiratory: Negative for cough and shortness of breath. Cardiovascular: Negative for chest pain and palpitations. Gastrointestinal: Negative for abdominal pain, constipation, diarrhea, nausea and vomiting. Genitourinary: Negative for difficulty urinating and dysuria. Musculoskeletal: Negative for arthralgias and myalgias. Skin: Negative for rash. Neurological: Negative for dizziness, weakness and numbness. Psychiatric/Behavioral: Negative for sleep disturbance. Allergies, past medical history, family history, and social history reviewed and unchanged from previous encounter.      Current Outpatient Medications   Medication Sig Dispense Refill    amitriptyline (ELAVIL) 10 MG tablet Take 1 tablet by mouth nightly 90 tablet 1    dicyclomine (BENTYL) 10 MG capsule Take 1 capsule by mouth 4 times daily 360 capsule 1    Insulin Syringe-Needle U-100 30G X 1/2\" 1 ML MISC Use for insulin 3 times daily 300 each 1    hydroCHLOROthiazide (HYDRODIURIL) 25 MG tablet TAKE 1 TABLET BY MOUTH EVERY DAY 90 tablet 1    losartan (COZAAR) 100 MG tablet TAKE 1 TABLET BY MOUTH DAILY 90 tablet 1    omeprazole (PRILOSEC) 40 MG delayed release capsule TAKE ONE CAPSULE BY MOUTH EVERY DAY 90 capsule 1    simvastatin (ZOCOR) 40 MG tablet TAKE 1 TABLET BY MOUTH NIGHTLY. 90 tablet 1    montelukast (SINGULAIR) 10 MG tablet TAKE 1 TABLET BY MOUTH EVERY DAY AT NIGHT 90 tablet 1    albuterol sulfate  (90 Base) MCG/ACT inhaler INHALE 2 PUFFS INTO THE LUNGS 4 TIMES DAILY AS NEEDED FOR WHEEZING 8.5 Inhaler 0    albuterol (PROVENTIL) (2.5 MG/3ML) 0.083% nebulizer solution Take 3 mLs by nebulization every 4 hours as needed for Wheezing 25 vial 3    Blood Glucose Monitoring Suppl (ONE TOUCH ULTRA 2) w/Device KIT 1 kit by Does not apply route daily 1 kit 0    Insulin Pen Needle (PEN NEEDLES) 31G X 6 MM MISC Use with insulin 4 times daily. 200 each 5    glucose blood VI test strips (ONE TOUCH ULTRA TEST) strip USE AS DIRECTED 3 TIMES A DAY Dx Code E11.9 100 strip 5    acetaminophen (TYLENOL) 500 MG tablet Take 500 mg by mouth every 6 hours as needed for Pain      ONETOUCH DELICA LANCETS 41X MISC TEST 3 TIMES A  each 12    insulin 70-30 (NOVOLIN 70/30) (70-30) 100 UNIT per ML injection vial Inject 50 Units into the skin 3 times daily 135 mL 1    metFORMIN (GLUCOPHAGE-XR) 500 MG extended release tablet Take 2 tablets by mouth 2 times daily (Patient not taking: Reported on 12/28/2020) 360 tablet 1    gabapentin (NEURONTIN) 300 MG capsule TAKE 1 CAPSULE BY MOUTH THREE TIMES A DAY 90 capsule 0     No current facility-administered medications for this visit.         Vitals:    12/28/20 0859   BP: 138/72   Site: Right Upper Arm   Position: progressive daily aerobic exercise program, follow a low fat, low cholesterol diet, attempt to lose weight, decrease or avoid alcohol intake and improve dietary compliance. Mixed hyperlipidemia  - due for repeat 8/2021  - discussed 10 year cardiovascular risk     Essential hypertension  - stable, continue current regimen. Return in about 3 months (around 3/28/2021) for Diabetes Mellitus. If pt sees endo, ok to follow up with PCP in 6 months.

## 2020-12-29 ASSESSMENT — ENCOUNTER SYMPTOMS
ABDOMINAL PAIN: 0
VOMITING: 0
SHORTNESS OF BREATH: 0
DIARRHEA: 0
RHINORRHEA: 0
COUGH: 0
CONSTIPATION: 0
NAUSEA: 0
SORE THROAT: 0

## 2021-01-21 RX ORDER — HYDROCHLOROTHIAZIDE 25 MG/1
TABLET ORAL
Qty: 90 TABLET | Refills: 1 | Status: SHIPPED | OUTPATIENT
Start: 2021-01-21 | End: 2021-11-29

## 2021-01-21 NOTE — TELEPHONE ENCOUNTER
Refill Request     Last Seen: 12/28/2020    Last Written: 5/26/20    Next Appointment:   Future Appointments   Date Time Provider Dre Cristina   2/4/2021  1:30 PM Scott Duverney, PA [facility]       Future appointment scheduled      Requested Prescriptions     Pending Prescriptions Disp Refills    hydroCHLOROthiazide (HYDRODIURIL) 25 MG tablet [Pharmacy Med Name: HYDROCHLOROTHIAZIDE 25 MG TAB] 90 tablet 1     Sig: TAKE 1 TABLET BY MOUTH EVERY DAY     5/26/20

## 2021-02-04 ENCOUNTER — VIRTUAL VISIT (OUTPATIENT)
Dept: FAMILY MEDICINE CLINIC | Age: 66
End: 2021-02-04
Payer: COMMERCIAL

## 2021-02-04 DIAGNOSIS — Z12.11 COLON CANCER SCREENING: Primary | ICD-10-CM

## 2021-02-04 DIAGNOSIS — Z00.00 ROUTINE GENERAL MEDICAL EXAMINATION AT A HEALTH CARE FACILITY: ICD-10-CM

## 2021-02-04 PROCEDURE — G0438 PPPS, INITIAL VISIT: HCPCS | Performed by: PHYSICIAN ASSISTANT

## 2021-02-04 RX ORDER — TIZANIDINE 4 MG/1
4 TABLET ORAL 3 TIMES DAILY PRN
Qty: 30 TABLET | Refills: 0 | Status: SHIPPED | OUTPATIENT
Start: 2021-02-04 | End: 2021-12-03 | Stop reason: SDUPTHER

## 2021-02-04 ASSESSMENT — PATIENT HEALTH QUESTIONNAIRE - PHQ9
SUM OF ALL RESPONSES TO PHQ QUESTIONS 1-9: 1
SUM OF ALL RESPONSES TO PHQ QUESTIONS 1-9: 1
2. FEELING DOWN, DEPRESSED OR HOPELESS: 1

## 2021-02-04 ASSESSMENT — LIFESTYLE VARIABLES
HAS A RELATIVE, FRIEND, DOCTOR, OR ANOTHER HEALTH PROFESSIONAL EXPRESSED CONCERN ABOUT YOUR DRINKING OR SUGGESTED YOU CUT DOWN: 0
HOW OFTEN DO YOU HAVE SIX OR MORE DRINKS ON ONE OCCASION: 0
HOW OFTEN DO YOU HAVE A DRINK CONTAINING ALCOHOL: 1
HOW OFTEN DURING THE LAST YEAR HAVE YOU BEEN UNABLE TO REMEMBER WHAT HAPPENED THE NIGHT BEFORE BECAUSE YOU HAD BEEN DRINKING: 0
HOW MANY STANDARD DRINKS CONTAINING ALCOHOL DO YOU HAVE ON A TYPICAL DAY: 0
HOW OFTEN DURING THE LAST YEAR HAVE YOU FOUND THAT YOU WERE NOT ABLE TO STOP DRINKING ONCE YOU HAD STARTED: 0

## 2021-02-04 NOTE — PROGRESS NOTES
Medicare Annual Wellness Visit  Are Name: Sj Soriano Date: 2021   MRN: <[id number]> Sex: Male   Age: 77 y.o. Ethnicity: Non-/Non    : 1955 Race: Fam Carrizales is here for Medicare AWV and Lower Back Pain (Pt hurt low back over weekend. Asking for muscle relaxer)    Screenings for behavioral, psychosocial and functional/safety risks, and cognitive dysfunction are all negative except as indicated below. These results, as well as other patient data from the [address] form, are documented in Flowsheets linked to this Encounter. Allergies   Allergen Reactions    Aspirin Other (See Comments)     Takes baby aspirin without problems    Caused bleeding in the bowel    Oxycontin [Oxycodone Hcl] Nausea And Vomiting         Prior to Visit Medications    Medication Sig Taking? Authorizing Provider   tiZANidine (ZANAFLEX) 4 MG tablet Take 1 tablet by mouth 3 times daily as needed (back pain) Yes HO Bae   hydroCHLOROthiazide (HYDRODIURIL) 25 MG tablet TAKE 1 TABLET BY MOUTH EVERY DAY Yes HO Bae   amitriptyline (ELAVIL) 10 MG tablet Take 1 tablet by mouth nightly Yes HO Bae   dicyclomine (BENTYL) 10 MG capsule Take 1 capsule by mouth 4 times daily Yes HO Bae   Insulin Syringe-Needle U-100 30G X 1/2\" 1 ML MISC Use for insulin 3 times daily Yes Santiago Briones MD   losartan (COZAAR) 100 MG tablet TAKE 1 TABLET BY MOUTH DAILY Yes Santiago Briones MD   omeprazole (PRILOSEC) 40 MG delayed release capsule TAKE ONE CAPSULE BY MOUTH EVERY DAY Yes Santiago Briones MD   simvastatin (ZOCOR) 40 MG tablet TAKE 1 TABLET BY MOUTH NIGHTLY.  Yes Santiago Briones MD   montelukast (SINGULAIR) 10 MG tablet TAKE 1 TABLET BY MOUTH EVERY DAY AT NIGHT Yes HO Bae   albuterol sulfate  (90 Base) MCG/ACT inhaler INHALE 2 PUFFS INTO THE LUNGS 4 TIMES DAILY AS NEEDED FOR WHEEZING Yes HO Bae albuterol (PROVENTIL) (2.5 MG/3ML) 0.083% nebulizer solution Take 3 mLs by nebulization every 4 hours as needed for Wheezing Yes HO Bae   Blood Glucose Monitoring Suppl (ONE TOUCH ULTRA 2) w/Device KIT 1 kit by Does not apply route daily Yes HO Bae   Insulin Pen Needle (PEN NEEDLES) 31G X 6 MM MISC Use with insulin 4 times daily.  Yes Santiago Briones MD   glucose blood VI test strips (ONE TOUCH ULTRA TEST) strip USE AS DIRECTED 3 TIMES A DAY Dx Code E11.9 Yes HO Bae   acetaminophen (TYLENOL) 500 MG tablet Take 500 mg by mouth every 6 hours as needed for Pain Yes Historical Provider, MD Moose Payneet LANCETS 71Y MISC TEST 3 TIMES A DAY Yes Santiago Briones MD   insulin 70-30 (NOVOLIN 70/30) (70-30) 100 UNIT per ML injection vial Inject 50 Units into the skin 3 times daily  HO Bae   metFORMIN (GLUCOPHAGE-XR) 500 MG extended release tablet Take 2 tablets by mouth 2 times daily  Patient not taking: Reported on 12/28/2020  Santiago Briones MD   gabapentin (NEURONTIN) 300 MG capsule TAKE 1 CAPSULE BY MOUTH THREE TIMES A DAY  HO Bae         Past Medical History:   Diagnosis Date    Cervical radiculopathy 4/22/2016    Chronic pain     GERD (gastroesophageal reflux disease)     Hyperlipidemia     Hypertension     Liver disease     Mild episode of recurrent major depressive disorder ([facility] 2/8/2018    Morbidly obese ([facility] 8/25/2020    PONV (postoperative nausea and vomiting)     Type II or unspecified type diabetes mellitus without mention of complication, not stated as uncontrolled        Past Surgical History:   Procedure Laterality Date    CERVICAL SPINE SURGERY  2014    [facility]    EXAM UNDER ANESTHESIA VIDEO ARTHROSCOPY LEFT KNEE, PARTIAL MEDIAL MENISCECTOMY,  MEDIAL-FEMORAL CHONDROPLASTY, SYNOVECTOMY, EXCISION OF ADHESIONS performed by Alvaro Mehta MD at [address] [place] [address] [facility]    NECK SURGERY  1996    fusion    ROTATOR CUFF REPAIR Right 2014    TONSILLECTOMY  1978    VASECTOMY           Family History   Problem Relation Age of Onset    Cancer Mother     Cancer Father     Heart Disease Father     Diabetes Father     Heart Disease Brother        CareTeam (Including outside providers/suppliers regularly involved in providing care):   Patient Care Team:  Mnidy Ramírez MD as PCP - Bebo Howard MD as PCP - [facility] Empaneled Provider    Wt Readings from Last 3 Encounters:   12/28/20 246 lb (111.6 kg)   09/28/20 250 lb (113.4 kg)   09/28/20 250 lb (113.4 kg)      Patient-Reported Vitals 2/4/2021   Patient-Reported Weight 246   Patient-Reported Height 5'7.5\"   Patient-Reported Systolic 887   Patient-Reported Diastolic 72      There is no height or weight on file to calculate BMI. Based upon direct observation of the patient, evaluation of cognition reveals recent and remote memory intact. Psychiatric:           [x]? Normal Affect     Patient's complete Health Risk Assessment and screening values have been reviewed and are found in Flowsheets. The following problems were reviewed today and where indicated follow up appointments were made and/or referrals ordered. Positive Risk Factor Screenings with Interventions:         Substance History:  Social History     Tobacco History     Smoking Status  Never Smoker    Smokeless Tobacco Use  Current User          Alcohol History     Alcohol Use Status  No          Drug Use     Drug Use Status  No          Sexual Activity     Sexually Active  Yes Partners  Female               Alcohol Screening: Audit-C Score: 1  Total Score: 1    A score of 8 or more is associated with harmful or hazardous drinking. A score of 13 or more in women, and 15 or more in men, is likely to indicate alcohol dependence.   Substance Abuse Interventions: · Alcohol misuse/dependence:  patient agrees to limit alcohol intake to a moderate level- no more than 14 drinks/week and 4 drinks per occasion for men, or no more than 7 drinks/week and 3 drinks/occasion for women    General Health and ACP:  General  In general, how would you say your health is?: Fair  In the past 7 days, have you experienced any of the following? New or Increased Pain, New or Increased Fatigue, Loneliness, Social Isolation, Stress or Anger?: (!) New or Increased Pain, Stress  Do you get the social and emotional support that you need?: Yes  Do you have a Living Will?: Yes  Advance Directives     Power of  Living Will ACP-Advance Directive ACP-Power of     Not on File Filed on 03/26/12 Filed Not on File      General Health Risk Interventions:  · Pain issues: injured back on a ladder.  muscle relaxer sent to the pharmacy    Health Habits/Nutrition:  Health Habits/Nutrition  Do you exercise for at least 20 minutes 2-3 times per week?: (!) No  Have you lost any weight without trying in the past 3 months?: No  Do you eat only one meal per day?: No  Have you seen the dentist within the past year?: (!) No     Health Habits/Nutrition Interventions:  · Inadequate physical activity:  patient is not ready to increase his/her physical activity level at this time     Safety:  Safety  Do you have working smoke detectors?: Yes  Have all throw rugs been removed or fastened?: (!) No  Do you have non-slip mats or surfaces in all bathtubs/showers?: Yes  Do all of your stairways have a railing or banister?: (!) No  Are your doorways, halls and stairs free of clutter?: Yes  Do you always fasten your seatbelt when you are in a car?: Yes  Safety Interventions:  · Patient declines any further evaluation/treatment for this issue     Personalized Preventive Plan   Current Health Maintenance Status  Immunization History   Administered Date(s) Administered    Influenza 10/16/2013  Influenza Vaccine, unspecified formulation 10/15/2016    Influenza, High Dose (Fluzone 65 yrs and older) 10/24/2018, 11/14/2019    Influenza, Intradermal, Preservative free 09/28/2012    Influenza, Quadv, IM, PF (6 mo and older Fluzone, Flulaval, Fluarix, and 3 yrs and older Afluria) 08/29/2017    Influenza, Quadv, adjuvanted, 65 yrs +, IM, PF (Fluad) 09/28/2020    Pneumococcal Conjugate 13-valent ([id number]) 06/15/2015    Pneumococcal Polysaccharide ([id number]) 08/16/2016    Td (Adult), 2 Lf Tetanus Toxoid, Pf (Td, Absorbed) 08/25/2020    Tdap (Boostrix, Adacel) 07/10/2009        Health Maintenance   Topic Date Due    Shingles Vaccine (1 of 2) 08/28/2003    PSA counseling  03/30/2015    Annual Wellness Visit (AWV)  05/29/2019    Diabetic retinal exam  03/28/2020    Colon cancer screen colonoscopy  12/10/2020    A1C test (Diabetic or Prediabetic)  03/28/2021    Pneumococcal 65+ years Vaccine (2 of 2 - PPSV23) 08/16/2021    Lipid screen  08/25/2021    Diabetic foot exam  12/28/2021    Diabetic microalbuminuria test  12/28/2021    Potassium monitoring  12/28/2021    Creatinine monitoring  12/28/2021    DTaP/Tdap/Td vaccine (3 - Td) 08/25/2030    Flu vaccine  Completed    Hepatitis C screen  Completed    Hepatitis A vaccine  Aged Out    Hib vaccine  Aged Out    Meningococcal (ACWY) vaccine  Aged Out     Recommendations for [employer] Due: see orders and patient instructions/AVS.  . Recommended screening schedule for the next 5-10 years is provided to the patient in written form: see Patient Atul Curran was seen today for medicare awv and lower back pain. Diagnoses and all orders for this visit:    Colon cancer screening  -     Cologuard (For External Results Only); Future    Other orders  -     tiZANidine (ZANAFLEX) 4 MG tablet;  Take 1 tablet by mouth 3 times daily as needed (back pain) Jose Gramajo is a 77 y.o. male being evaluated by a Virtual Visit (phone) encounter to address concerns as mentioned above. A caregiver was present when appropriate. Due to this being a TeleHealth encounter (During QFBAK-85 public health emergency), evaluation of the following organ systems was limited: Vitals/Constitutional/EENT/Resp/CV/GI//MS/Neuro/Skin/Heme-Lymph-Imm. Pursuant to the emergency declaration under the [address] [place] [address] and the [employer] Act, this Virtual Visit was conducted with patient's (and/or legal guardian's) consent, to reduce the patient's risk of exposure to COVID-19 and provide necessary medical care. The patient (and/or legal guardian) has also been advised to contact this office for worsening conditions or problems, and seek emergency medical treatment and/or call 911 if deemed necessary. Patient identification was verified at the start of the visit: Yes    Services were provided through phone to substitute for in-person clinic visit. Patient and provider were located at their individual homes. --HO Crowder on 2/4/2021 at 1:39 PM    An electronic signature was used to authenticate this note.

## 2021-02-04 NOTE — PATIENT INSTRUCTIONS
Personalized Preventive Plan for Thien Patel - 2/4/2021  Medicare offers a range of preventive health benefits. Some of the tests and screenings are paid in full while other may be subject to a deductible, co-insurance, and/or copay. Some of these benefits include a comprehensive review of your medical history including lifestyle, illnesses that may run in your family, and various assessments and screenings as appropriate. After reviewing your medical record and screening and assessments performed today your provider may have ordered immunizations, labs, imaging, and/or referrals for you. A list of these orders (if applicable) as well as your Preventive Care list are included within your After Visit Summary for your review. Other Preventive Recommendations:    · A preventive eye exam performed by an eye specialist is recommended every 1-2 years to screen for glaucoma; cataracts, macular degeneration, and other eye disorders. · A preventive dental visit is recommended every 6 months. · Try to get at least 150 minutes of exercise per week or 10,000 steps per day on a pedometer . · Order or download the FREE \"Exercise & Physical Activity: Your Everyday Guide\" from [employer]. Call 1-712.332.4176 or search [employer] online. · You need 2493-0696 mg of calcium and 1434-4018 IU of vitamin D per day. It is possible to meet your calcium requirement with diet alone, but a vitamin D supplement is usually necessary to meet this goal.  · When exposed to the sun, use a sunscreen that protects against both UVA and UVB radiation with an SPF of 30 or greater. Reapply every 2 to 3 hours or after sweating, drying off with a towel, or swimming. · Always wear a seat belt when traveling in a car. Always wear a helmet when riding a bicycle or motorcycle.

## 2021-03-10 DIAGNOSIS — Z12.11 COLON CANCER SCREENING: ICD-10-CM

## 2021-03-30 DIAGNOSIS — R74.8 ELEVATED LIVER ENZYMES: ICD-10-CM

## 2021-03-30 LAB
ALBUMIN SERPL-MCNC: 3.9 G/DL (ref 3.4–5)
ALP BLD-CCNC: 194 U/L (ref 40–129)
ALT SERPL-CCNC: 38 U/L (ref 10–40)
AST SERPL-CCNC: 36 U/L (ref 15–37)
BILIRUB SERPL-MCNC: 0.8 MG/DL (ref 0–1)
BILIRUBIN DIRECT: 0.3 MG/DL (ref 0–0.3)
BILIRUBIN, INDIRECT: 0.5 MG/DL (ref 0–1)
TOTAL PROTEIN: 7.4 G/DL (ref 6.4–8.2)

## 2021-04-02 ENCOUNTER — TELEPHONE (OUTPATIENT)
Dept: FAMILY MEDICINE CLINIC | Age: 66
End: 2021-04-02

## 2021-04-02 DIAGNOSIS — K74.60 CIRRHOSIS OF LIVER WITHOUT ASCITES, UNSPECIFIED HEPATIC CIRRHOSIS TYPE (HCC): Primary | ICD-10-CM

## 2021-04-02 DIAGNOSIS — R74.8 ELEVATED LIVER ENZYMES: ICD-10-CM

## 2021-04-02 NOTE — TELEPHONE ENCOUNTER
Please call patient. Elevated liver enzyme on labs. With recent ct abdomen showing cirrhosis of the liver, I do recommend follow up with GI.      [facility]- Darya Friend.  [address] [facility] [address], [address]   Phone: [phone]    If he does not already have a gi specialist, I have placed the referral.

## 2021-04-05 NOTE — TELEPHONE ENCOUNTER
Pt would like to cancel his appt with Leni Chong on 05/05/21. Spoke to him about the results from blood work and Ct from 09/21. Pt was concerned that he had liver cirrhosis. Discussed that more testing would be needed.

## 2021-04-05 NOTE — TELEPHONE ENCOUNTER
Pt informed and all Dr. Marilee Muñiz information provided. Patient has questions regarding CT results from last year, please call to discuss.

## 2021-04-10 NOTE — TELEPHONE ENCOUNTER
He is due for a diabetes follow up, last a1c was 12/2020 and 13.6. He was referred to endocrinology, if he has been unable to schedule, I recommend he keep his May appointment to check his a1c.

## 2021-04-12 NOTE — TELEPHONE ENCOUNTER
Pt and wife informed, says he does not have an appointment with Dr. Iza Buck, but they will call to get scheduled. Informed them if they can get in before May 5th then he can call back and cancel appointment with Malathi Ochoa.

## 2021-04-27 RX ORDER — OMEPRAZOLE 40 MG/1
CAPSULE, DELAYED RELEASE ORAL
Qty: 90 CAPSULE | Refills: 1 | Status: ON HOLD | OUTPATIENT
Start: 2021-04-27 | End: 2021-08-16 | Stop reason: SDUPTHER

## 2021-04-27 NOTE — TELEPHONE ENCOUNTER
Refill Request     Last Seen: Last Seen Department: 2/4/2021  Last Seen by PCP: 8/25/2020    Last Written: 5/26/2020    Next Appointment:   Future Appointments   Date Time Provider Dre Evans   5/5/2021  9:20 AM HO Shields [facility]   5/13/2021 12:45 PM Destini Amanda MD [facility]   8/11/2021  1:00 PM Thedford Dubin, APRN - CNP AND ENDO [facility]   2/7/2022  3:15 PM Jose Oppenheim, MD [facility]             Requested Prescriptions     Pending Prescriptions Disp Refills    omeprazole (PRILOSEC) 40 MG delayed release capsule [Pharmacy Med Name: OMEPRAZOLE DR 40 MG CAPSULE] 90 capsule 1     Sig: TAKE 1 CAPSULE BY MOUTH EVERY DAY

## 2021-05-05 ENCOUNTER — OFFICE VISIT (OUTPATIENT)
Dept: FAMILY MEDICINE CLINIC | Age: 66
End: 2021-05-05
Payer: COMMERCIAL

## 2021-05-05 VITALS
HEART RATE: 106 BPM | DIASTOLIC BLOOD PRESSURE: 72 MMHG | BODY MASS INDEX: 37.44 KG/M2 | OXYGEN SATURATION: 97 % | TEMPERATURE: 98.2 F | HEIGHT: 68 IN | WEIGHT: 247 LBS | SYSTOLIC BLOOD PRESSURE: 138 MMHG

## 2021-05-05 DIAGNOSIS — F33.0 MILD EPISODE OF RECURRENT MAJOR DEPRESSIVE DISORDER (HCC): ICD-10-CM

## 2021-05-05 DIAGNOSIS — E66.01 MORBIDLY OBESE (HCC): ICD-10-CM

## 2021-05-05 DIAGNOSIS — Z79.4 TYPE 2 DIABETES MELLITUS WITHOUT COMPLICATION, WITH LONG-TERM CURRENT USE OF INSULIN (HCC): Primary | ICD-10-CM

## 2021-05-05 DIAGNOSIS — E11.9 TYPE 2 DIABETES MELLITUS WITHOUT COMPLICATION, WITH LONG-TERM CURRENT USE OF INSULIN (HCC): Primary | ICD-10-CM

## 2021-05-05 DIAGNOSIS — H69.83 DYSFUNCTION OF BOTH EUSTACHIAN TUBES: ICD-10-CM

## 2021-05-05 DIAGNOSIS — J30.2 SEASONAL ALLERGIES: ICD-10-CM

## 2021-05-05 LAB — HBA1C MFR BLD: 10.1 %

## 2021-05-05 PROCEDURE — 83036 HEMOGLOBIN GLYCOSYLATED A1C: CPT | Performed by: PHYSICIAN ASSISTANT

## 2021-05-05 PROCEDURE — 99214 OFFICE O/P EST MOD 30 MIN: CPT | Performed by: PHYSICIAN ASSISTANT

## 2021-05-05 RX ORDER — AZELASTINE 1 MG/ML
1 SPRAY, METERED NASAL 2 TIMES DAILY
Qty: 1 BOTTLE | Refills: 5 | Status: SHIPPED | OUTPATIENT
Start: 2021-05-05

## 2021-05-05 ASSESSMENT — ENCOUNTER SYMPTOMS
SORE THROAT: 0
VOMITING: 0
CONSTIPATION: 0
RHINORRHEA: 0
COUGH: 0
DIARRHEA: 0
SHORTNESS OF BREATH: 0
NAUSEA: 0
ABDOMINAL PAIN: 0

## 2021-05-05 NOTE — PROGRESS NOTES
DAY AT NIGHT 90 tablet 0    losartan (COZAAR) 100 MG tablet TAKE 1 TABLET BY MOUTH EVERY DAY 90 tablet 0    tiZANidine (ZANAFLEX) 4 MG tablet Take 1 tablet by mouth 3 times daily as needed (back pain) 30 tablet 0    hydroCHLOROthiazide (HYDRODIURIL) 25 MG tablet TAKE 1 TABLET BY MOUTH EVERY DAY 90 tablet 1    dicyclomine (BENTYL) 10 MG capsule Take 1 capsule by mouth 4 times daily 360 capsule 1    Insulin Syringe-Needle U-100 30G X 1/2\" 1 ML MISC Use for insulin 3 times daily 300 each 1    simvastatin (ZOCOR) 40 MG tablet TAKE 1 TABLET BY MOUTH NIGHTLY. 90 tablet 1    montelukast (SINGULAIR) 10 MG tablet TAKE 1 TABLET BY MOUTH EVERY DAY AT NIGHT 90 tablet 1    albuterol sulfate  (90 Base) MCG/ACT inhaler INHALE 2 PUFFS INTO THE LUNGS 4 TIMES DAILY AS NEEDED FOR WHEEZING 8.5 Inhaler 0    albuterol (PROVENTIL) (2.5 MG/3ML) 0.083% nebulizer solution Take 3 mLs by nebulization every 4 hours as needed for Wheezing 25 vial 3    Blood Glucose Monitoring Suppl (ONE TOUCH ULTRA 2) w/Device KIT 1 kit by Does not apply route daily 1 kit 0    Insulin Pen Needle (PEN NEEDLES) 31G X 6 MM MISC Use with insulin 4 times daily. 200 each 5    glucose blood VI test strips (ONE TOUCH ULTRA TEST) strip USE AS DIRECTED 3 TIMES A DAY Dx Code E11.9 100 strip 5    acetaminophen (TYLENOL) 500 MG tablet Take 500 mg by mouth every 6 hours as needed for Pain      ONETOUCH DELICA LANCETS 75R MISC TEST 3 TIMES A  each 12    insulin 70-30 (NOVOLIN 70/30) (70-30) 100 UNIT per ML injection vial Inject 50 Units into the skin 3 times daily 135 mL 1    gabapentin (NEURONTIN) 300 MG capsule TAKE 1 CAPSULE BY MOUTH THREE TIMES A DAY 90 capsule 0     No current facility-administered medications for this visit.         Vitals:    05/05/21 0924   BP: 138/72   Site: Right Upper Arm   Position: Sitting   Cuff Size: Small Adult   Pulse: 106   Temp: 98.2 °F (36.8 °C)   TempSrc: Oral   SpO2: 97%  Comment: RA   Weight: 247 lb (112 kg) Height: 5' 8\" (1.727 m)     Estimated body mass index is 37.56 kg/m² as calculated from the following:    Height as of this encounter: 5' 8\" (1.727 m). Weight as of this encounter: 247 lb (112 kg). Physical Exam  Constitutional:       General: He is not in acute distress. Appearance: He is well-developed. HENT:      Head: Normocephalic and atraumatic. Right Ear: A middle ear effusion is present. Left Ear: A middle ear effusion is present. Eyes:      Conjunctiva/sclera: Conjunctivae normal.      Pupils: Pupils are equal, round, and reactive to light. Neck:      Musculoskeletal: Neck supple. Cardiovascular:      Rate and Rhythm: Normal rate and regular rhythm. Heart sounds: Normal heart sounds. No murmur. Pulmonary:      Effort: Pulmonary effort is normal.      Breath sounds: Normal breath sounds. No wheezing. Abdominal:      General: Bowel sounds are normal.      Palpations: Abdomen is soft. Tenderness: There is no abdominal tenderness. Lymphadenopathy:      Cervical: No cervical adenopathy. Skin:     General: Skin is warm and dry. Findings: No rash. Neurological:      Mental Status: He is alert and oriented to person, place, and time. Deep Tendon Reflexes: Reflexes are normal and symmetric. ASSESSMENT and PLAN:  Ashish Pimentel was seen today for diabetes, hypertension and hyperlipidemia. Diagnoses and all orders for this visit:    Type 2 diabetes mellitus without complication, with long-term current use of insulin ([facility])  -     POCT glycosylated hemoglobin (Hb A1C)10.1 (previously 13.6)  - trending down, still uncontrolled   - recommended switching to lantus/novolog. Patient would like to discuss with linda clemens appt scheduled 8/21    Mild episode of recurrent major depressive disorder ([address]  - stable on current regime, continue. Morbidly obese ([address]  - encouraged to slowly build up exercise tolerance.  Start with 3 times weekly 5-10 minutes and increase form there. Seasonal allergies  Dysfunction of both eustachian tubes   -     azelastine (ASTELIN) 0.1 % nasal spray; 1 spray by Nasal route 2 times daily Use in each nostril as directed  - encouraged patient to stop afrin as it can cause rebound congestion if used longer than 3 days. Return in about 6 months (around 11/5/2021) for Diabetes Mellitus.

## 2021-05-07 ENCOUNTER — TELEPHONE (OUTPATIENT)
Dept: FAMILY MEDICINE CLINIC | Age: 66
End: 2021-05-07

## 2021-05-13 ENCOUNTER — INITIAL CONSULT (OUTPATIENT)
Dept: GASTROENTEROLOGY | Age: 66
End: 2021-05-13
Payer: COMMERCIAL

## 2021-05-13 VITALS
BODY MASS INDEX: 38.34 KG/M2 | HEIGHT: 68 IN | WEIGHT: 253 LBS | DIASTOLIC BLOOD PRESSURE: 55 MMHG | SYSTOLIC BLOOD PRESSURE: 120 MMHG | HEART RATE: 92 BPM

## 2021-05-13 DIAGNOSIS — R79.89 ABNORMAL LFTS: Primary | ICD-10-CM

## 2021-05-13 PROCEDURE — 99203 OFFICE O/P NEW LOW 30 MIN: CPT | Performed by: INTERNAL MEDICINE

## 2021-05-13 NOTE — PROGRESS NOTES
[facility]    [address],  [address] [facility]  [place], [facility]  Phone: [phone]   [facility]:[phone]    CHIEF COMPLAINT     Chief Complaint   Patient presents with    New Patient     RUQ Pain; had abdominal CT scan       HPI     Thank you Holly Vizcaino MD for asking me to see Berto Galindo in consultation. Berto Galindo is a 77 y.o. male  [2] White [1] with medical history of morbid obesity (BMI 39), GERD, hyperlipidemia, hypertension, diabetes mellitus type 2 on insulin, depression, cholecystectomy 1995 seen independently with referral for abnormal liver chemistries and CT findings of cirrhosis. Patient reports dull right upper quadrant discomfort. He reports history grand daughter having jumped onto that area in the past.  Labs reviewed from 3/30/2021. Normal liver chemistries except elevated alkaline phosphatase 194. CT abdomen and pelvis on 9/3/2020 performed for evaluation of RUQ abdominal pain noted lobular liver contour suggestive of cirrhosis. Denies  nausea, vomiting, fever, chills, unintentional weight loss, constipation, diarrhea, hematochezia or melenic stools. Reports remote history of alcohol use about 30 years ago. Family history is positive for liver cancer in father. Last Encounter Reviewed: None  Pertinent PMH, FH, SH is reviewed below. Last EGD: None  Last Colonoscopy: Many years ago. Cologuard 30 4/20/2021 negative    Review of available records reveals:   Wt Readings from Last 50 Encounters:   05/13/21 253 lb (114.8 kg)   05/05/21 247 lb (112 kg)   12/28/20 246 lb (111.6 kg)   09/28/20 250 lb (113.4 kg)   09/28/20 250 lb (113.4 kg)   08/25/20 249 lb (112.9 kg)   05/26/20 254 lb (115.2 kg)   02/06/20 244 lb 3.2 oz (110.8 kg)   11/25/19 246 lb (111.6 kg)   11/14/19 253 lb 15.5 oz (115.2 kg)   10/25/19 253 lb 15.5 oz (115.2 kg)   10/16/19 259 lb (117.5 kg)   10/17/19 254 lb (115.2 kg)   10/03/19 259 lb 0.7 oz (117.5 kg)   09/11/19 259 lb 0.7 oz (117.5 kg)   08/27/19 259 lb 0.7 oz (117.5 kg)   07/30/19 259 lb 0.7 oz (117.5 kg)   07/29/19 259 lb (117.5 kg)   07/22/19 253 lb (114.8 kg)   07/12/19 249 lb (112.9 kg)   04/12/19 250 lb (113.4 kg)   01/11/19 250 lb (113.4 kg)   10/05/18 249 lb (112.9 kg)   06/04/18 234 lb (106.1 kg)   04/13/18 239 lb 6.4 oz (108.6 kg)   04/06/18 241 lb 12.8 oz (109.7 kg)   02/09/18 236 lb 9.6 oz (107.3 kg)   01/30/18 227 lb 12.8 oz (103.3 kg)   08/29/17 233 lb 12.8 oz (106.1 kg)   05/30/17 231 lb (104.8 kg)   02/28/17 229 lb 9.6 oz (104.1 kg)   11/22/16 248 lb (112.5 kg)   08/16/16 244 lb (110.7 kg)   05/03/16 231 lb (104.8 kg)   02/01/16 242 lb (109.8 kg)   12/11/15 248 lb (112.5 kg)   09/09/15 231 lb 3.2 oz (104.9 kg)   08/27/15 229 lb (103.9 kg)   06/15/15 231 lb (104.8 kg)   05/13/15 234 lb 3.2 oz (106.2 kg)   01/14/15 241 lb 12.8 oz (109.7 kg)   12/15/14 239 lb (108.4 kg)   10/08/14 240 lb 9.6 oz (109.1 kg)   07/02/14 239 lb (108.4 kg)   06/23/14 239 lb (108.4 kg)   06/04/14 247 lb (112 kg)   04/28/14 250 lb (113.4 kg)   04/15/14 258 lb (117 kg)   03/28/14 258 lb (117 kg)   12/23/13 255 lb (115.7 kg)       No components found for: HGBA1C  BP Readings from Last 3 Encounters:   05/13/21 (!) 120/55   05/05/21 138/72   12/28/20 138/72     Health Maintenance   Topic Date Due    Hepatitis A vaccine (1 of 2 - Risk 2-dose series) Never done    Shingles Vaccine (1 of 2) Never done    PSA counseling  03/30/2015    COVID-19 Vaccine (2 - Moderna 2-dose series) 06/09/2021    A1C test (Diabetic or Prediabetic)  08/05/2021    Pneumococcal 65+ years Vaccine (2 of 2 - PPSV23) 08/16/2021    Lipid screen  08/25/2021    Diabetic foot exam  12/28/2021    Diabetic microalbuminuria test  12/28/2021    Potassium monitoring  12/28/2021    Creatinine monitoring  12/28/2021    Annual Wellness Visit (AWV)  02/05/2022    Diabetic retinal exam  03/30/2022    Colon cancer screen fecal DNA test (Cologuard)  03/04/2024    DTaP/Tdap/Td vaccine (3 - Td) 08/25/2030    Flu vaccine  Completed    Hepatitis C screen  Completed    Hib vaccine  Aged Out    Meningococcal (ACWY) vaccine  Aged Out       No components found for: [employer]     PAST MEDICAL HISTORY     Past Medical History:   Diagnosis Date    Cervical radiculopathy 4/22/2016    Chronic pain     GERD (gastroesophageal reflux disease)     Hyperlipidemia     Hypertension     Liver disease     Mild episode of recurrent major depressive disorder ([facility] [address] 2/8/2018    Morbidly obese ([facility] [address] 8/25/2020    PONV (postoperative nausea and vomiting)     Type II or unspecified type diabetes mellitus without mention of complication, not stated as uncontrolled      FAMILY HISTORY     Family History   Problem Relation Age of Onset    Cancer Mother     Cancer Father     Heart Disease Father     Diabetes Father     Heart Disease Brother      SOCIAL HISTORY     Social History     Socioeconomic History    Marital status:      Spouse name: Not on file    Number of children: Not on file    Years of education: Not on file    Highest education level: Not on file   Occupational History    Not on file   Social Needs    Financial resource strain: Somewhat hard    Food insecurity     Worry: Never true     Inability: Never true    Transportation needs     Medical: No     Non-medical: No   Tobacco Use    Smoking status: Never Smoker    Smokeless tobacco: Current User   Substance and Sexual Activity    Alcohol use: No    Drug use: No    Sexual activity: Yes     Partners: Female   Lifestyle    Physical activity     Days per week: Not on file     Minutes per session: Not on file    Stress: Not on file   Relationships    Social connections     Talks on phone: Not on file     Gets together: Not on file     Attends Religion service: Not on file     Active member of club or organization: Not on file     Attends meetings of clubs or organizations: Not on file     Relationship status: Not on file   [employer] Intimate partner violence     Fear of current or ex partner: Not on file     Emotionally abused: Not on file     Physically abused: Not on file     Forced sexual activity: Not on file   Other Topics Concern    Not on file   Social History Narrative    Not on file     SURGICAL HISTORY     Past Surgical History:   Procedure Laterality Date   [address]  2014    fusion   Suzie ARTHROSCOPY Left 10/22/2019    EXAM UNDER ANESTHESIA VIDEO ARTHROSCOPY LEFT KNEE, PARTIAL MEDIAL MENISCECTOMY,  MEDIAL-FEMORAL CHONDROPLASTY, SYNOVECTOMY, EXCISION OF ADHESIONS performed by Jeanna Jackson MD at [address] [place] [address]    fusion   [address] Right 2014   [address]   (This list may include medications prescribed during this encounter as epic can not insert only the list prior to this encounter.)  Current Outpatient Rx   Medication Sig Dispense Refill    azelastine (ASTELIN) 0.1 % nasal spray 1 spray by Nasal route 2 times daily Use in each nostril as directed 1 Bottle 5    omeprazole (PRILOSEC) 40 MG delayed release capsule TAKE 1 CAPSULE BY MOUTH EVERY DAY 90 capsule 1    amitriptyline (ELAVIL) 10 MG tablet TAKE 1 TABLET BY MOUTH EVERY DAY AT NIGHT 90 tablet 0    losartan (COZAAR) 100 MG tablet TAKE 1 TABLET BY MOUTH EVERY DAY 90 tablet 0    hydroCHLOROthiazide (HYDRODIURIL) 25 MG tablet TAKE 1 TABLET BY MOUTH EVERY DAY 90 tablet 1    insulin 70-30 (NOVOLIN 70/30) (70-30) 100 UNIT per ML injection vial Inject 50 Units into the skin 3 times daily 135 mL 1    dicyclomine (BENTYL) 10 MG capsule Take 1 capsule by mouth 4 times daily 360 capsule 1    Insulin Syringe-Needle U-100 30G X 1/2\" 1 ML MISC Use for insulin 3 times daily 300 each 1    simvastatin (ZOCOR) 40 MG tablet TAKE 1 TABLET BY MOUTH NIGHTLY.  90 tablet 1    montelukast (SINGULAIR) 10 MG tablet TAKE 1 TABLET BY MOUTH EVERY DAY AT NIGHT 90 tablet 1    albuterol sulfate  (90 Base) MCG/ACT inhaler INHALE 2 PUFFS INTO THE LUNGS 4 TIMES DAILY AS NEEDED FOR WHEEZING 8.5 Inhaler 0    albuterol (PROVENTIL) (2.5 MG/3ML) 0.083% nebulizer solution Take 3 mLs by nebulization every 4 hours as needed for Wheezing 25 vial 3    gabapentin (NEURONTIN) 300 MG capsule TAKE 1 CAPSULE BY MOUTH THREE TIMES A DAY 90 capsule 0    Blood Glucose Monitoring Suppl (ONE TOUCH ULTRA 2) w/Device KIT 1 kit by Does not apply route daily 1 kit 0    Insulin Pen Needle (PEN NEEDLES) 31G X 6 MM MISC Use with insulin 4 times daily.  200 each 5    glucose blood VI test strips (ONE TOUCH ULTRA TEST) strip USE AS DIRECTED 3 TIMES A DAY Dx Code E11.9 100 strip 5    acetaminophen (TYLENOL) 500 MG tablet Take 500 mg by mouth every 6 hours as needed for Pain      ONETOUCH DELICA LANCETS 70I MISC TEST 3 TIMES A  each 12    tiZANidine (ZANAFLEX) 4 MG tablet Take 1 tablet by mouth 3 times daily as needed (back pain) (Patient not taking: Reported on 5/13/2021) 30 tablet 0     ALLERGIES     Allergies   Allergen Reactions    Aspirin Other (See Comments)     Takes baby aspirin without problems    Caused bleeding in the bowel    Oxycontin [Oxycodone Hcl] Nausea And Vomiting     IMMUNIZATIONS     Immunization History   Administered Date(s) Administered    Influenza 10/16/2013    Influenza Vaccine, unspecified formulation 10/15/2016    Influenza, High Dose (Fluzone 65 yrs and older) 10/24/2018, 11/14/2019    Influenza, Intradermal, Preservative free 09/28/2012    Influenza, Quadv, IM, PF (6 mo and older Fluzone, Flulaval, Fluarix, and 3 yrs and older Afluria) 08/29/2017    Influenza, Quadv, adjuvanted, 65 yrs +, IM, PF (Fluad) 09/28/2020    Pneumococcal Conjugate 13-valent ([id number]) 06/15/2015    Pneumococcal Polysaccharide ([id number]) 08/16/2016    Td (Adult), 2 Lf Tetanus Toxoid, Pf (Td, Absorbed) 08/25/2020    Tdap (Boostrix, Adacel) 07/10/2009     REVIEW OF SYSTEMS   See HPI for further details and pertinent postiives. Negative for the following:  Constitutional: Negative for weight change. Negative for appetite change and fatigue. HENT: Negative for nosebleeds, sore throat, mouth sores, and voice change. Respiratory: Negative for cough, choking and chest tightness. Cardiovascular: Negative for chest pain   Gastrointestinal: See HPI  Musculoskeletal: Negative for arthralgias. Skin: Negative for pallor. Neurological: Negative for weakness and light-headedness. Hematological: Negative for adenopathy. Does not bruise/bleed easily. Psychiatric/Behavioral: Negative for suicidal ideas. PHYSICAL EXAM   VITAL SIGNS: BP (!) 120/55 (Site: Left Wrist, Position: Sitting, Cuff Size: Medium Adult)   Pulse 92   Ht 5' 8\" (1.727 m)   Wt 253 lb (114.8 kg)   BMI 38.47 kg/m²   Wt Readings from Last 3 Encounters:   05/13/21 253 lb (114.8 kg)   05/05/21 247 lb (112 kg)   12/28/20 246 lb (111.6 kg)     Constitutional: Obese male. Well developed, Well nourished, No acute distress, Non-toxic appearance. HENT: Normocephalic, Atraumatic, Bilateral external ears normal, Oropharynx moist, No oral exudates, Nose normal.   Eyes: Conjunctiva normal, No discharge. Neck: Normal range of motion, No tenderness, Supple, No stridor. Cardiovascular: Normal heart rate, Normal rhythm, No murmurs, No rubs, No gallops. Thorax & Lungs: Normal breath sounds, No respiratory distress, No wheezing, No chest tenderness. Abdomen: Pannus abdomen, normal bowel sounds, soft, non tender, non distended, no hernias,     Rectal:  Deferred. Skin: Warm, Dry, No erythema, No rash. No bruising. No spider hemangiomas. Back: No tenderness, No CVA tenderness. Lower Extremities: Intact distal pulses, 1+ pitting edema, No tenderness, No cyanosis, No clubbing. Neurologic: Alert & oriented x 3, Normal motor function, Normal sensory function, No focal deficits noted.  No asterixis. RADIOLOGY/PROCEDURES   No results found. FINAL IMPRESSION   Vic Milian was seen today for new patient. Diagnoses and all orders for this visit:    Abnormal LFTs -with isolated elevation of alkaline phosphatase. CT questionable for cirrhosis. -     GAMMA GT; Future  -     Alkaline Phosphatase, Isoenzymes; Future  -     HEPATIC FUNCTION PANEL; Future  -     US LESION ELASTOGRAPHY; Future  - Discussed lifestyle modification with goal of weight loss and need for fibrosis assessment. Orders Placed This Encounter   Procedures    US LESION ELASTOGRAPHY     Standing Status:   Future     Standing Expiration Date:   5/13/2022     Order Specific Question:   Reason for exam:     Answer:   suspected cirrhosis on CT. Rule out cirrhosis    GAMMA GT     Standing Status:   Future     Standing Expiration Date:   5/13/2022    Alkaline Phosphatase, Isoenzymes     Standing Status:   Future     Standing Expiration Date:   6/13/2021    HEPATIC FUNCTION PANEL     Standing Status:   Future     Standing Expiration Date:   5/13/2022       ORDERED FUTURE/PENDING TESTS     Lab Frequency Next Occurrence   Psa screening Once 05/26/2020   Hemoglobin A1C Once 05/26/2020       FOLLOWUP   No follow-ups on file.           Jose Chicas 5/13/21 12:50 PM EDT    CC:  Mak Pavon MD

## 2021-05-17 ENCOUNTER — HOSPITAL ENCOUNTER (OUTPATIENT)
Age: 66
Discharge: HOME | End: 2021-05-17
Payer: COMMERCIAL

## 2021-05-17 ENCOUNTER — HOSPITAL ENCOUNTER (OUTPATIENT)
Dept: ENDOSCOPY | Age: 66
Discharge: HOME | End: 2021-05-17
Payer: COMMERCIAL

## 2021-05-17 DIAGNOSIS — R79.89 ABNORMAL LFTS: ICD-10-CM

## 2021-05-17 LAB
ALBUMIN SERPL-MCNC: 3.9 G/DL (ref 3.4–5)
ALP BLD-CCNC: 153 U/L (ref 40–129)
ALT SERPL-CCNC: 34 U/L (ref 10–40)
AST SERPL-CCNC: 37 U/L (ref 15–37)
BILIRUB SERPL-MCNC: 0.7 MG/DL (ref 0–1)
BILIRUBIN DIRECT: 0.3 MG/DL (ref 0–0.3)
BILIRUBIN, INDIRECT: 0.4 MG/DL (ref 0–1)
GAMMA GLUTAMYL TRANSFERASE: 134 U/L (ref 8–61)
TOTAL PROTEIN: 7.1 G/DL (ref 6.4–8.2)

## 2021-05-17 PROCEDURE — 91200 LIVER ELASTOGRAPHY: CPT

## 2021-05-17 PROCEDURE — 36415 COLL VENOUS BLD VENIPUNCTURE: CPT

## 2021-05-17 PROCEDURE — 82977 ASSAY OF GGT: CPT

## 2021-05-17 PROCEDURE — 91200 LIVER ELASTOGRAPHY: CPT | Performed by: INTERNAL MEDICINE

## 2021-05-17 PROCEDURE — 80076 HEPATIC FUNCTION PANEL: CPT

## 2021-05-17 PROCEDURE — 84080 ASSAY ALKALINE PHOSPHATASES: CPT

## 2021-05-17 PROCEDURE — 84075 ASSAY ALKALINE PHOSPHATASE: CPT

## 2021-05-20 LAB
ALK PHOS OTHER CALC: 0 U/L
ALK PHOSPHATASE: 179 U/L (ref 40–120)
ALKALINE PHOSPHATASE BONE FRACTION: 43 U/L (ref 0–55)
ALKALINE PHOSPHATASE LIVER FRACTION: 136 U/L (ref 0–94)

## 2021-05-21 DIAGNOSIS — K74.60 CIRRHOSIS OF LIVER WITHOUT ASCITES, UNSPECIFIED HEPATIC CIRRHOSIS TYPE (HCC): ICD-10-CM

## 2021-05-21 DIAGNOSIS — R74.8 ELEVATED SERUM ALKALINE PHOSPHATASE LEVEL: ICD-10-CM

## 2021-05-21 DIAGNOSIS — R77.2 ELEVATED AFP: Primary | ICD-10-CM

## 2021-06-07 ENCOUNTER — TELEPHONE (OUTPATIENT)
Dept: GASTROENTEROLOGY | Age: 66
End: 2021-06-07

## 2021-06-07 NOTE — TELEPHONE ENCOUNTER
Mrs. Mary Alice Tate called back letting us know that they are requesting to go to [facility] in [facility] to have MRI with MRCP.  Faxed order

## 2021-06-07 NOTE — TELEPHONE ENCOUNTER
Result Notes     Yaquelin Cavanaugh [place]   6/7/2021  9:09 AM EDT Back to Top      Spoke with Mrs. Wells re: results and recommendations  Dr. Sussy Velazquez, Mrs. Shuemake she is calling [facility] to get additional testing set up; I'm not finding the order for the EMG in the chart? Negin Patel [place]   5/24/2021  8:22 AM EDT       Left message for patient to call the office for results. Negin Patel [place]   5/21/2021 12:06 PM EDT       Left message for patient to call the office for results. Tori White MD   5/21/2021 10:58 AM EDT       Isolated elevation in alkaline phosphatase noted to be predominantly from liver along with elevated GGT.     I will order for EMG to evaluate for primary biliary cholangitis and MRI abdomen with MRCP to evaluate biliary tree and rule out obstruction.

## 2021-06-07 NOTE — TELEPHONE ENCOUNTER
----- Message from Kimmy Pizano MD sent at 5/21/2021 10:58 AM EDT -----  Isolated elevation in alkaline phosphatase noted to be predominantly from liver along with elevated GGT. I will order for EMG to evaluate for primary biliary cholangitis and MRI abdomen with MRCP to evaluate biliary tree and rule out obstruction.

## 2021-06-11 ENCOUNTER — TELEPHONE (OUTPATIENT)
Dept: GASTROENTEROLOGY | Age: 66
End: 2021-06-11

## 2021-06-11 DIAGNOSIS — R79.89 ABNORMAL LFTS: Primary | ICD-10-CM

## 2021-06-11 DIAGNOSIS — R77.2 ELEVATED AFP: Primary | ICD-10-CM

## 2021-06-11 DIAGNOSIS — R79.89 ABNORMAL LFTS: ICD-10-CM

## 2021-06-11 RX ORDER — LORAZEPAM 1 MG/1
2 TABLET ORAL ONCE
Qty: 2 TABLET | Refills: 0 | Status: SHIPPED | OUTPATIENT
Start: 2021-06-11 | End: 2021-06-11

## 2021-06-11 RX ORDER — DICYCLOMINE HYDROCHLORIDE 10 MG/1
CAPSULE ORAL
Qty: 2 CAPSULE | Refills: 0 | Status: SHIPPED | OUTPATIENT
Start: 2021-06-11 | End: 2021-12-03

## 2021-06-11 RX ORDER — DICYCLOMINE HYDROCHLORIDE 10 MG/1
10 CAPSULE ORAL SEE ADMIN INSTRUCTIONS
Qty: 2 CAPSULE | Refills: 0 | Status: CANCELLED | OUTPATIENT
Start: 2021-06-11

## 2021-06-11 RX ORDER — LORAZEPAM 1 MG/1
1 TABLET ORAL ONCE
Qty: 1 TABLET | Refills: 0 | Status: SHIPPED | OUTPATIENT
Start: 2021-06-11 | End: 2021-06-11

## 2021-06-11 NOTE — TELEPHONE ENCOUNTER
Pt's wife called wanting to know if we can prescribe something for Yomaira Silveira to help with his anxiety prior to his MRIt scheduled at [facility]. They use [facility] pharmacy. Please advise. Anali Zhang

## 2021-06-11 NOTE — TELEPHONE ENCOUNTER
I called [facility] they said that it is their protocol with a MRI of the abdomen. It slows down digestion, things are not moving around as much for better pictures.

## 2021-06-11 NOTE — TELEPHONE ENCOUNTER
Anderson calling needing bentyl called in, prep for MRI of the abdomen scheduled for Monday morning at 9am      Medication pending with instructions    Please advise

## 2021-06-11 NOTE — TELEPHONE ENCOUNTER
Bentyl? Not sure how bentyl will help with the MRI.     I have prescribed ativan 1 mg once to be take 20-30 minutes prior to MRI

## 2021-07-01 ENCOUNTER — OFFICE VISIT (OUTPATIENT)
Dept: GASTROENTEROLOGY | Age: 66
End: 2021-07-01
Payer: COMMERCIAL

## 2021-07-01 ENCOUNTER — TELEPHONE (OUTPATIENT)
Dept: GASTROENTEROLOGY | Age: 66
End: 2021-07-01

## 2021-07-01 VITALS
HEIGHT: 68 IN | BODY MASS INDEX: 38.07 KG/M2 | SYSTOLIC BLOOD PRESSURE: 149 MMHG | DIASTOLIC BLOOD PRESSURE: 82 MMHG | WEIGHT: 251.2 LBS | HEART RATE: 97 BPM

## 2021-07-01 DIAGNOSIS — K75.81 LIVER CIRRHOSIS SECONDARY TO NASH (HCC): Primary | ICD-10-CM

## 2021-07-01 DIAGNOSIS — K74.60 LIVER CIRRHOSIS SECONDARY TO NASH (HCC): Primary | ICD-10-CM

## 2021-07-01 PROCEDURE — 99214 OFFICE O/P EST MOD 30 MIN: CPT | Performed by: INTERNAL MEDICINE

## 2021-07-01 RX ORDER — LACTULOSE 10 G/15ML
20 SOLUTION ORAL 3 TIMES DAILY
Qty: 2700 ML | Refills: 2 | Status: SHIPPED | OUTPATIENT
Start: 2021-07-01 | End: 2021-07-31

## 2021-07-01 NOTE — TELEPHONE ENCOUNTER
Provider:Dr. Dave Yanes  Has the patient been vaccinated against COVID? Yes  Procedure:EGD  Sedation:MAC  Type of prep:NPO after midnight  Location:[place]  Prep instructions provided to patient during office visit

## 2021-07-01 NOTE — PROGRESS NOTES
history is positive for liver cancer in father.      Last Encounter Reviewed: None  Pertinent PMH, FH, SH is reviewed below. Last EGD: None  Last Colonoscopy: Many years ago. Cologuard 30 4/20/2021 negative      PAST MEDICAL HISTORY     Past Medical History:   Diagnosis Date    Cervical radiculopathy 4/22/2016    Chronic pain     GERD (gastroesophageal reflux disease)     Hyperlipidemia     Hypertension     Liver disease     Mild episode of recurrent major depressive disorder ([facility] 2/8/2018    Morbidly obese ([facility] 8/25/2020    PONV (postoperative nausea and vomiting)     Type II or unspecified type diabetes mellitus without mention of complication, not stated as uncontrolled      FAMILY HISTORY     Family History   Problem Relation Age of Onset    Cancer Mother     Cancer Father     Heart Disease Father     Diabetes Father     Heart Disease Brother      SOCIAL HISTORY     Social History     Socioeconomic History    Marital status:      Spouse name: Not on file    Number of children: Not on file    Years of education: Not on file    Highest education level: Not on file   Occupational History    Not on file   Tobacco Use    Smoking status: Never Smoker    Smokeless tobacco: Current User   Vaping Use    Vaping Use: Never used   Substance and Sexual Activity    Alcohol use: No    Drug use: No    Sexual activity: Yes     Partners: Female   Other Topics Concern    Not on file   Social History Narrative    Not on file     Social Determinants of Health     Financial Resource Strain: Medium Risk    Difficulty of Paying Living Expenses: Somewhat hard   Food Insecurity: No Food Insecurity    Worried About Running Out of Food in the Last Year: Never true    Vicky of Food in the Last Year: Never true   Transportation Needs: No Transportation Needs    Lack of Transportation (Medical): No    Lack of Transportation (Non-Medical):  No   Physical Activity:     Days of Exercise per Week:  Minutes of Exercise per Session:    Stress:     Feeling of Stress :    Social Connections:     Frequency of Communication with Friends and Family:     Frequency of Social Gatherings with Friends and Family:     Attends Denominational Services:     Active Member of Clubs or Organizations:     Attends Club or Organization Meetings:     Marital Status:    Intimate Partner Violence:     Fear of Current or Ex-Partner:     Emotionally Abused:     Physically Abused:     Sexually Abused:      SURGICAL HISTORY     Past Surgical History:   Procedure Laterality Date   [address]  2014    fusion   [place] ARTHROSCOPY Left 10/22/2019    EXAM UNDER ANESTHESIA VIDEO ARTHROSCOPY LEFT KNEE, PARTIAL MEDIAL MENISCECTOMY,  MEDIAL-FEMORAL CHONDROPLASTY, SYNOVECTOMY, EXCISION OF ADHESIONS performed by Rex Gong MD at [facility]   [address]    fusion   [address] 2014   [address]   (This list may include medications prescribed during this encounter as epic can not insert only the list prior to this encounter.)  Current Outpatient Rx   Medication Sig Dispense Refill    lactulose (CHRONULAC) 10 GM/15ML solution Take 30 mLs by mouth 3 times daily Titrate/adjust dose to have 3 bowel movement daily to prevent confusion/encephalopathy.  2700 mL 2    dicyclomine (BENTYL) 10 MG capsule As directed for MRI 2 capsule 0    azelastine (ASTELIN) 0.1 % nasal spray 1 spray by Nasal route 2 times daily Use in each nostril as directed 1 Bottle 5    omeprazole (PRILOSEC) 40 MG delayed release capsule TAKE 1 CAPSULE BY MOUTH EVERY DAY 90 capsule 1    amitriptyline (ELAVIL) 10 MG tablet TAKE 1 TABLET BY MOUTH EVERY DAY AT NIGHT 90 tablet 0    losartan (COZAAR) 100 MG tablet TAKE 1 TABLET BY MOUTH EVERY DAY 90 tablet 0    tiZANidine (ZANAFLEX) 4 MG tablet Take 1 tablet by mouth 3 times daily as needed (back pain) 30 tablet 0    hydroCHLOROthiazide (HYDRODIURIL) 25 MG tablet TAKE 1 TABLET BY MOUTH EVERY DAY 90 tablet 1    Insulin Syringe-Needle U-100 30G X 1/2\" 1 ML MISC Use for insulin 3 times daily 300 each 1    simvastatin (ZOCOR) 40 MG tablet TAKE 1 TABLET BY MOUTH NIGHTLY. 90 tablet 1    montelukast (SINGULAIR) 10 MG tablet TAKE 1 TABLET BY MOUTH EVERY DAY AT NIGHT 90 tablet 1    albuterol sulfate  (90 Base) MCG/ACT inhaler INHALE 2 PUFFS INTO THE LUNGS 4 TIMES DAILY AS NEEDED FOR WHEEZING 8.5 Inhaler 0    albuterol (PROVENTIL) (2.5 MG/3ML) 0.083% nebulizer solution Take 3 mLs by nebulization every 4 hours as needed for Wheezing 25 vial 3    Blood Glucose Monitoring Suppl (ONE TOUCH ULTRA 2) w/Device KIT 1 kit by Does not apply route daily 1 kit 0    Insulin Pen Needle (PEN NEEDLES) 31G X 6 MM MISC Use with insulin 4 times daily.  200 each 5    glucose blood VI test strips (ONE TOUCH ULTRA TEST) strip USE AS DIRECTED 3 TIMES A DAY Dx Code E11.9 100 strip 5    acetaminophen (TYLENOL) 500 MG tablet Take 500 mg by mouth every 6 hours as needed for Pain      ONETOUCH DELICA LANCETS 87O MISC TEST 3 TIMES A  each 12    insulin 70-30 (NOVOLIN 70/30) (70-30) 100 UNIT per ML injection vial Inject 50 Units into the skin 3 times daily 135 mL 1    gabapentin (NEURONTIN) 300 MG capsule TAKE 1 CAPSULE BY MOUTH THREE TIMES A DAY 90 capsule 0     ALLERGIES     Allergies   Allergen Reactions    Aspirin Other (See Comments)     Takes baby aspirin without problems    Caused bleeding in the bowel    Oxycontin [Oxycodone Hcl] Nausea And Vomiting     IMMUNIZATIONS     Immunization History   Administered Date(s) Administered    Influenza 10/16/2013    Influenza Vaccine, unspecified formulation 10/15/2016    Influenza, High Dose (Fluzone 65 yrs and older) 10/24/2018, 11/14/2019    Influenza, Intradermal, Preservative free 09/28/2012    Influenza, Quadv, IM, PF (6 mo and older Fluzone, distended,  no hernias,     Rectal:  Deferred. Skin: Warm, Dry, No erythema, No rash. No bruising. No spider hemangiomas. Lower Extremities: Intact distal pulses, trace pitting edema, No tenderness, No cyanosis, No clubbing. Neurologic: Alert & oriented x 3, Normal motor function, Normal sensory function, No focal deficits noted. No asterixis. No results found. FINAL IMPRESSION   Nikita Villatoro was seen today for follow-up. Diagnoses and all orders for this visit:    Liver cirrhosis secondary to LANG [facility]); compensated  -     Covid-19 Ambulatory; Future  -     EGD  -     US ABDOMEN COMPLETE; Future  -     CBC Auto Differential; Future  -     Basic Metabolic Panel; Future  -     Protime-INR; Future  -     Hepatic Function Panel; Future  -     AFP TUMOR MARKER; Future  -     HCC Screening: No focal liver lesion on MRI 6/14/21  -      SBP prophylaxis: Not indicated  -      Diuretics: none - No ascites noted on exam. Continue HCTZ  -      Hepatic encephalopathy: not noted on exam  -      Variceal screening: To schedule for EGD  -      Transplantation: pending MELD labs    Discussed lifestyle modification with goal of weight loss. Avoidance of alcohol. Other orders  -     lactulose (CHRONULAC) 10 GM/15ML solution; Take 30 mLs by mouth 3 times daily Titrate/adjust dose to have 3 bowel movement daily to prevent confusion/encephalopathy. Orders Placed This Encounter   Procedures    US ABDOMEN COMPLETE     This procedure can be scheduled via [employer]. Access your [employer] account by visiting [email or web address].      Standing Status:   Future     Standing Expiration Date:   1/1/2022     Order Specific Question:   Reason for exam:     Answer:   rule out hepatoma/HCC    Covid-19 Ambulatory     Standing Status:   Future     Standing Expiration Date:   7/1/2022     Scheduling Instructions:      Saline media preferred given current shortage of viral transport media but both acceptable     Order Specific Question: Status     Answer:   Asymptomatic/Surveillance (e.g. pre-op/pre-procedure, pre-delivery, transfer)     Order Specific Question:   Reason for Test     Answer:   Upcoming elective surgery/procedure/delivery, return to work, or discharge to another facility    CBC Auto Differential     Standing Status:   Future     Standing Expiration Date:   8/1/2021    Basic Metabolic Panel     Standing Status:   Future     Standing Expiration Date:   7/1/2022    Protime-INR     Standing Status:   Future     Standing Expiration Date:   7/1/2022     Order Specific Question:   Daily Coumadin Dose? Answer:   no    Hepatic Function Panel     Standing Status:   Future     Standing Expiration Date:   7/1/2022    AFP TUMOR MARKER     Standing Status:   Future     Standing Expiration Date:   7/1/2022    EGD     Scheduling Instructions:      Schedule with anesthesia provided diprivan sedation. Please provide prep of choice instructions and prescription. General guidelines for holding blood thinners/anticoagulants around endoscopic procedure are but patients are encouraged to check with their prescribing physician. The patient may hold Plavix, Effient, Brilinta 5 days prior to the procedure unless:       A drug eluting stent has been placed within past 12 months. A nondrug eluting stent has been placed within past 1 month. Coumadin may be held 4 days prior to the procedure unless:        Mechanical mitral valve replacement (requires heparin bridge while Coumadin held and is managed by pharmacy)      Pradaxa, Xarelto, Eliquis may be held 2-3 days prior to procedure.   According to pharmacokinetics of the drug, package insert, cardiology practice patterns, and T1/2 of theses drugs (12 hrs), Eliquis and Xarelto are held 48hrs prior to any procedure, including major surgical procedures w/o       increased bleeding.  That is usually the standard of care, as coagulation would/should be normalized at 48hrs. Every attempt should be made to maintain ASA 81mg per day throughout the risa-operative period in patients with diagnosis of ASHD. These recommendations may need to be modified by the provider/ based on risk /benefit analysis of the procedure and the patients history. If anticoagulation can not be held because recent cardiac stent, elective endoscopic procedures should be delayed until they have received the minimum duration of recommended antiplatlet therapy and it can safely be held. Again if unsure, patient should discuss with prescribing physician/service. If anticoagulation can not be stopped, endoscopic procedures can still be performed either diagnostically at a somewhat higher risk. Understand that any therapeutic procedure where anything beyond looking is performed, carries higher risks. For this reason without overt bleeding other testing       such as cologuard may be more appropriate. High risk endoscopic procedures that require stopping antiplatelet and anticoagulation therapy include polypectomy, biliary or pancreatic sphincterotomy, pneumatic or bougie dilation, PEG placement, therapeutic balloon-assisted enteroscopy, EUS and FNA, tumor ablation by any technique,       cystogastrostomy,and treatment of varices. Order Specific Question:   Screening or Diagnostic? Answer:   Diagnostic       ORDERED FUTURE/PENDING TESTS     Lab Frequency Next Occurrence   US LESION ELASTOGRAPHY Once 05/13/2021   MRI ABDOMEN W WO CONTRAST MRCP Once 55/42/7537   BASIC METABOLIC PANEL Once 25/99/5679       FOLLOWUP   No follow-ups on file.           Annamarie Goncalves MD 7/1/21 9:27 AM EDT    CC:  Katie Kline MD

## 2021-07-01 NOTE — PATIENT INSTRUCTIONS
increasing yellow tint to your skin or the whites of your eyes.     · You have any abnormal bleeding, such as:  ? Nosebleeds. ? Vaginal bleeding that is different (heavier, more frequent, at a different time of the month) than what you are used to.  ? Bloody or black stools, or rectal bleeding. ? Bloody or pink urine. Watch closely for changes in your health, and be sure to contact your doctor if:    · Your belly is getting bigger.     · You are gaining weight.     · You have any problems. Where can you learn more? Go to https://"[employer][email or web address] [email or web address]. org and sign in to your [employer] account. Enter W983 in the iChange box to learn more about \"Nonalcoholic Steatohepatitis (LANG): Care Instructions. \"     If you do not have an account, please click on the \"Sign Up Now\" link. Current as of: February 10, 2021               Content Version: 12.9  © 2006-2021 [employer]wise, Inc[employer]. Care instructions adapted under license by [facility] ([facility]). If you have questions about a medical condition or this instruction, always ask your healthcare professional. [place] [phone] any warranty or liability for your use of this information.

## 2021-07-14 RX ORDER — MONTELUKAST SODIUM 10 MG/1
TABLET ORAL
Qty: 90 TABLET | Refills: 1 | Status: SHIPPED | OUTPATIENT
Start: 2021-07-14 | End: 2022-01-11

## 2021-07-14 NOTE — TELEPHONE ENCOUNTER
Refill Request     Last Seen: Last Seen Department: 5/5/2021  Last Seen by PCP: 5/5/2021    Last Written: 04/08/2021    Next Appointment:   Future Appointments   Date Time Provider Dre Evans   8/11/2021  1:00 PM KATIE Singleton - CNP AND [facility]   11/8/2021 10:40 AM HO Huff  [facility] - [place]   2[facility]2022  3:15 [facility]       Future appointment scheduled      Requested Prescriptions     Pending Prescriptions Disp Refills    montelukast (SINGULAIR) 10 MG tablet [Pharmacy Med Name: MONTELUKAST SOD 10 MG TABLET] 90 tablet 1     Sig: TAKE 1 TABLET BY MOUTH EVERY DAY AT NIGHT

## 2021-07-18 DIAGNOSIS — I10 ESSENTIAL HYPERTENSION: ICD-10-CM

## 2021-07-18 DIAGNOSIS — K58.0 IRRITABLE BOWEL SYNDROME WITH DIARRHEA: ICD-10-CM

## 2021-07-19 RX ORDER — AMITRIPTYLINE HYDROCHLORIDE 10 MG/1
TABLET, FILM COATED ORAL
Qty: 90 TABLET | Refills: 0 | Status: SHIPPED | OUTPATIENT
Start: 2021-07-19 | End: 2021-10-11

## 2021-07-19 RX ORDER — LOSARTAN POTASSIUM 100 MG/1
TABLET ORAL
Qty: 90 TABLET | Refills: 0 | Status: SHIPPED | OUTPATIENT
Start: 2021-07-19 | End: 2021-10-11

## 2021-07-19 NOTE — TELEPHONE ENCOUNTER
Refill Request     Last Seen: Last Seen Department: 5/5/2021  Last Seen by PCP: Visit date not found    Last Written: 4/23/2021 for #90 tab    Next Appointment:   Future Appointments   Date Time Provider Dre Evans   8/11/2021  1:00 PM KATIE Mukherjee - CNP AND [facility]ERWIN CHAPARRO   11/8/2021 10:40 AM HO Luciano  T[facility] - [place]   2/7/2022  3:15 PM Kervin Rizvi MD [facility] [place]       Future appointment scheduled      Requested Prescriptions     Pending Prescriptions Disp Refills    amitriptyline (ELAVIL) 10 MG tablet [Pharmacy Med Name: AMITRIPTYLINE HCL 10 MG TAB] 90 tablet 0     Sig: TAKE 1 TABLET BY MOUTH EVERY DAY AT NIGHT

## 2021-08-11 ENCOUNTER — VIRTUAL VISIT (OUTPATIENT)
Dept: ENDOCRINOLOGY | Age: 66
End: 2021-08-11
Payer: COMMERCIAL

## 2021-08-11 DIAGNOSIS — E66.01 MORBIDLY OBESE (HCC): ICD-10-CM

## 2021-08-11 DIAGNOSIS — Z79.4 TYPE 2 DIABETES MELLITUS WITHOUT COMPLICATION, WITH LONG-TERM CURRENT USE OF INSULIN (HCC): Primary | ICD-10-CM

## 2021-08-11 DIAGNOSIS — I10 ESSENTIAL HYPERTENSION: ICD-10-CM

## 2021-08-11 DIAGNOSIS — E11.9 TYPE 2 DIABETES MELLITUS WITHOUT COMPLICATION, WITH LONG-TERM CURRENT USE OF INSULIN (HCC): Primary | ICD-10-CM

## 2021-08-11 DIAGNOSIS — E78.2 MIXED HYPERLIPIDEMIA: ICD-10-CM

## 2021-08-11 PROBLEM — E11.65 TYPE 2 DIABETES MELLITUS WITH HYPERGLYCEMIA (HCC): Status: ACTIVE | Noted: 2021-08-11

## 2021-08-11 PROCEDURE — 99214 OFFICE O/P EST MOD 30 MIN: CPT | Performed by: NURSE PRACTITIONER

## 2021-08-11 NOTE — PROGRESS NOTES
Endocrinology  Elodia Andino, JOVANY, [address] [facility], [address]  Phone [phone]  Fax [phone]    Ronnie Laird is  being evaluated by a Virtual Visit (video visit) encounter to address concerns as mentioned above. Due to this being a TeleHealth encounter (During DZAVL-54 public health emergency), evaluation of the following organ systems was limited: Vitals/Constitutional/EENT/Resp/CV/GI//MS/Neuro/Skin/Heme-Lymph-Imm. Pursuant to the emergency declaration under the [address] [place] [address], [address] [place] [address] authority and the [employer]nie [employer] and [employer] Act, this Virtual Visit was conducted with patient's (and/or legal guardian's) consent, to reduce the patient's risk of exposure to COVID-19 and provide necessary medical care. The patient (and/or legal guardian) has also been advised to contact this office for worsening conditions or problems, and seek emergency medical treatment and/or call 911 if deemed necessary. Services were provided through a video synchronous discussion virtually to substitute for in-person clinic visit. Patient and provider were located at their individual homes. Patient was identified via name,  on the video visit      Ronnie Laird is a 77 y.o. male who is presenting for management of Diabetes Mellitus Type 2.     PCP Elina Skinner MD    Last A1C:   Lab Results   Component Value Date    LABA1C 10.1 2021    LABA1C 13.6 2020    LABA1C 10.6 2020     Last BP Readings:  BP Readings from Last 3 Encounters:   21 (!) 149/82   21 (!) 120/55   21 138/72     Last LDL:   Lab Results   Component Value Date    LDLCALC 87 2020     Aspirin Use: no    Tobacco/Alcohol History:  Social History     Tobacco Use   Smoking Status Never Smoker   Smokeless Tobacco Current User      Social History     Substance and Sexual Activity   Alcohol Use No       PMH includes  Past Medical History:   Diagnosis Date    Cervical radiculopathy 2016    Chronic pain     GERD (gastroesophageal reflux disease)     Hyperlipidemia     Hypertension     Liver disease     Mild episode of recurrent major depressive disorder ([facility] 2018    Morbidly obese ([facility] 2020    PONV (postoperative nausea and vomiting)     Type II or unspecified type diabetes mellitus without mention of complication, not stated as uncontrolled      Family History   Problem Relation Age of Onset    Cancer Mother     Cancer Father     Heart Disease Father     Diabetes Father     Heart Disease Brother        Diabetes:     Mehran Valles  was diagnosed with diabetes type 2 > 12 Years ago   On insulin: 8 years   Patient reports that disease course has been variable and is currently poorly controlled   Current microvascular complications include peripheral neuropathy    Current Macrovascular complications there is no personal history of  CVD, CAD, PVD   Patient reports compliance for about 100 % of the time and adheres to medication, but usually not to diet and exercise instructions.  There have been no recent hospital or ED admissions for hypoglycemia, hyperglycemia or DKA.   [facility] Other ED visit include for no      Blood glucose trends:  Checks blood sugar 4 times a day  BG Range  Fastin - 200  Prandial:140- 210s  Hypoglycemia awareness and symptoms: none recently  Has not done CGM    Current Medication regimen:   Inject Insulin 4 times daily  Novolin 70/30 : 50 units AC TID (uses 100 units about every other dose)    Other meds tried in past:   metformin (GI  S/e)  Humalog/Lantus (affordability)  Trulicity (s/e)    GFR > 60    Diabetic Health Maintenance   Nutrition Education:   BF: oatmeal/milk, boiled eggs, well[place], coffee with sugar free creamer  Lunch: salad and lunch meat  Dinner: salad, greens, meat,   The above is a recent improvement; non compliant prior to this  Eats 3 meals a day  Average carbs per day: does not count  Current Exercise: No structured exercise  Last Eye Exam: 2020  Last Foot exam: at PCP Visit/podiatrist      Hyperlipidemia: Current complaints include occasional myalgias but otherwise tolerates well. Lab Results   Component Value Date    CHOL 144 07/12/2019    CHOL 126 08/29/2017    CHOL 147 11/22/2016     Lab Results   Component Value Date    TRIG 138 07/12/2019    TRIG 130 08/29/2017    TRIG 130 11/22/2016     Lab Results   Component Value Date    HDL 47 08/25/2020    HDL 50 07/12/2019    HDL 40 10/05/2018    HDL 48 04/27/2012    HDL 45 06/14/2011    HDL 41 12/10/2010     Lab Results   Component Value Date    LDLCALC 87 08/25/2020    LDLCALC 66 07/12/2019    LDLCALC 54 10/05/2018     No results found for: LDLDIRECT  Lab Results   Component Value Date    CHOLHDLRATIO 2.9 05/06/2015       Vitamin D deficiency: Currently is on no supplementation. Current complaints include fatigue on daily basis. Last vitamin Dlevel is:  No results found for: VD23T, VITD3, VD25, VITD25    Hypertension  Controlled , denies symptoms of dizziness, light headedness. Occasional dependent edema. Tries to follow a salt restricted diet.    Lab Results   Component Value Date     12/28/2020    K 3.6 12/28/2020    CL 98 (L) 12/28/2020    CO2 29 12/28/2020    BUN 11 12/28/2020    CREATININE 0.8 12/28/2020    GLUCOSE 248 (H) 12/28/2020    CALCIUM 9.8 12/28/2020    PROT 7.1 05/17/2021    LABALBU 3.9 05/17/2021    BILITOT 0.7 05/17/2021    ALKPHOS 153 (H) 05/17/2021    ALKPHOS 179 (H) 05/17/2021    AST 37 05/17/2021    ALT 34 05/17/2021    LABGLOM >60 12/28/2020    GFRAA >60 12/28/2020    AGRATIO 1.5 12/28/2020    GLOB 2.8 12/28/2020     The 10-year ASCVD risk score (Yelitza Pink, et al., 2013) is: 35.2%    Values used to calculate the score:      Age: 77 years      Sex: Male      Is Non- : No      Diabetic: Yes      Tobacco smoker: No      Systolic Blood Pressure: 149 mmHg      Is BP treated: Yes      HDL Cholesterol: 47 mg/dL      Total Cholesterol: 166 mg/dL      Review of Systems   Constitutional: Positive for fatigue. Negative for activity change, appetite change, diaphoresis, fever and unexpected weight change. HENT: Negative for dental problem. Eyes: Negative for pain and visual disturbance. Respiratory: Negative for shortness of breath. Cardiovascular: Negative for chest pain, palpitations and leg swelling. Gastrointestinal: Negative for abdominal distention, constipation, diarrhea and nausea. Endocrine: Negative for cold intolerance, heat intolerance, polydipsia, polyphagia and polyuria. Genitourinary: Negative. Negative for frequency and urgency. Musculoskeletal: Negative for arthralgias, back pain, joint swelling and myalgias. Skin: Negative. Negative for color change and pallor. Allergic/Immunologic: Negative. Neurological: Negative for dizziness, weakness, numbness and headaches. Psychiatric/Behavioral: Negative for dysphoric mood and sleep disturbance. The patient is not nervous/anxious. There were no vitals filed for this visit. televisit    Physical Exam: televisit    Skeletal foot exam: deferred.     Assessment  Giancarlo Poole is a 77 y.o. male with uncontrolled Diabetes Mellitus type 2 complicated by peripheral neuropathy and associated with hypertension, hyperlipidemia and depression    Plan  Diabetes Mellitus Type 2  Lab Results   Component Value Date    LABA1C 10.1 05/05/2021     Lab Results   Component Value Date    HGB 15.0 08/25/2020       Goal A1c  < 6.5%  Follow up on labs  Medication : none ; discussed using GLP1 and Metformin, states affordability may be an issue  Insulin: Recommended using 75 units BID and may use Insulin R with SSI at Lunch  Patient declined at this time , wants to see if improved dietary management will allow him to make do with only Novolin 70/30; discussed titration of PM dose to FBG 90 -140  Avoid hypoglycemia  Sensor rx sent    Diet :   Referral to [facility]/ Christiano Velez RD  Significant diatary non compliance, snakcs in between meals  30-40 grams per meal , 3 meals per day, avoid carbs in snack choices  Include moderate proteins and good fats   Ensure adequate hydration and  electrolyte replacement    Exercise :  Recommended exercise is 5-7 days a week for 30-60 mins at least, per day OR a total of 2.5 hours per week , which ever is more feasible. Ambulate as possible     Diabetic Health Maintenance  Follow up with annual eye exams  Follow up with annual podiatry exams if needed  Reviewed sick day management of BG     Other areas of Diabetic Education reviewed:   Carbs: good carbs and bad carbs, importance of carb counting, incorporation of protein with each meal to reduce Glycemic index, importance of portions, Carb/insulin ratio   Fats: Good fats and bad fats, meal planning and supplements.  Discussed how food affects blood sugar readings.     Different diabetic medications   Managing high and low sugar readings   Rotation of sites for subcutaneous medication injection    Mixed Hyperlipidemia  Lab Results   Component Value Date    LDLCALC 87 08/25/2020    LDLCALC 66 07/12/2019    LDLCALC 54 10/05/2018     No results found for: LDLDIRECT  Lab Results   Component Value Date    TRIG 138 07/12/2019    TRIG 130 08/29/2017    TRIG 130 11/22/2016     LDL goal  < 100;   TG goal < 150; not elevated at 138  Continue current regimen  Managed by PCP    Essential Hypertension  Blood pressure controlled per patient Continue current regimen    Problem List Items Addressed This Visit     Essential hypertension    Relevant Orders    Comprehensive Metabolic Panel    Hyperlipidemia    Relevant Orders    Lipid Panel    Morbidly obese ([address]    Relevant Orders    Lipid Panel    Type 2 diabetes mellitus without complication, with long-term current use of insulin ([address] - Primary    Relevant Medications Continuous Blood Gluc Sensor (FREESTYLE ELIZABETH 14 DAY SENSOR) MISC    Continuous Blood Gluc  (FREESTYLE ELIZABETH 14 DAY READER) SOUMYA    Other Relevant Orders    [facility] Individual Diabetes Education (Non Care Coord Patient), [facility]    Hemoglobin A1C    Lipid Panel    Comprehensive Metabolic Panel    C-Peptide    Glutamic Acid Decarboxylase        Return in about 3 months (around 11/11/2021).

## 2021-08-14 RX ORDER — FLASH GLUCOSE SCANNING READER
1 EACH MISCELLANEOUS DAILY
Qty: 1 DEVICE | Refills: 0 | Status: SHIPPED | OUTPATIENT
Start: 2021-08-14

## 2021-08-14 RX ORDER — FLASH GLUCOSE SENSOR
1 KIT MISCELLANEOUS
Qty: 2 EACH | Refills: 5 | Status: SHIPPED | OUTPATIENT
Start: 2021-08-14

## 2021-08-16 ENCOUNTER — HOSPITAL ENCOUNTER (OUTPATIENT)
Age: 66
Discharge: HOME | End: 2021-08-16
Attending: INTERNAL MEDICINE
Payer: COMMERCIAL

## 2021-08-16 ENCOUNTER — ANESTHESIA EVENT (OUTPATIENT)
Dept: ENDOSCOPY | Age: 66
End: 2021-08-16
Payer: COMMERCIAL

## 2021-08-16 ENCOUNTER — HOSPITAL ENCOUNTER (OUTPATIENT)
Age: 66
Setting detail: OUTPATIENT SURGERY
Discharge: HOME | End: 2021-08-16
Attending: INTERNAL MEDICINE | Admitting: INTERNAL MEDICINE
Payer: COMMERCIAL

## 2021-08-16 ENCOUNTER — ANESTHESIA (OUTPATIENT)
Dept: ENDOSCOPY | Age: 66
End: 2021-08-16
Payer: COMMERCIAL

## 2021-08-16 VITALS
HEART RATE: 80 BPM | OXYGEN SATURATION: 97 % | RESPIRATION RATE: 16 BRPM | TEMPERATURE: 97.2 F | HEIGHT: 68 IN | WEIGHT: 245 LBS | SYSTOLIC BLOOD PRESSURE: 162 MMHG | DIASTOLIC BLOOD PRESSURE: 72 MMHG | BODY MASS INDEX: 37.13 KG/M2

## 2021-08-16 VITALS
RESPIRATION RATE: 19 BRPM | SYSTOLIC BLOOD PRESSURE: 172 MMHG | DIASTOLIC BLOOD PRESSURE: 99 MMHG | OXYGEN SATURATION: 96 %

## 2021-08-16 DIAGNOSIS — K75.81 LIVER CIRRHOSIS SECONDARY TO NASH (HCC): ICD-10-CM

## 2021-08-16 DIAGNOSIS — K74.60 LIVER CIRRHOSIS SECONDARY TO NASH (HCC): ICD-10-CM

## 2021-08-16 LAB
ALBUMIN SERPL-MCNC: 4 G/DL (ref 3.4–5)
ALP BLD-CCNC: 124 U/L (ref 40–129)
ALT SERPL-CCNC: 29 U/L (ref 10–40)
ANION GAP SERPL CALCULATED.3IONS-SCNC: 9 MMOL/L (ref 3–16)
AST SERPL-CCNC: 37 U/L (ref 15–37)
BASOPHILS ABSOLUTE: 0.1 K/UL (ref 0–0.2)
BASOPHILS RELATIVE PERCENT: 1.2 %
BILIRUB SERPL-MCNC: 0.8 MG/DL (ref 0–1)
BILIRUBIN DIRECT: <0.2 MG/DL (ref 0–0.3)
BILIRUBIN, INDIRECT: NORMAL MG/DL (ref 0–1)
BUN BLDV-MCNC: 16 MG/DL (ref 7–20)
CALCIUM SERPL-MCNC: 9.5 MG/DL (ref 8.3–10.6)
CHLORIDE BLD-SCNC: 99 MMOL/L (ref 99–110)
CO2: 24 MMOL/L (ref 21–32)
CREAT SERPL-MCNC: 0.8 MG/DL (ref 0.8–1.3)
EOSINOPHILS ABSOLUTE: 0 K/UL (ref 0–0.6)
EOSINOPHILS RELATIVE PERCENT: 0.4 %
GFR AFRICAN AMERICAN: >60
GFR NON-AFRICAN AMERICAN: >60
GLUCOSE BLD-MCNC: 249 MG/DL (ref 70–99)
GLUCOSE BLD-MCNC: 284 MG/DL (ref 70–99)
HCT VFR BLD CALC: 42.5 % (ref 40.5–52.5)
HEMOGLOBIN: 14.1 G/DL (ref 13.5–17.5)
INR BLD: 1.04 (ref 0.88–1.12)
LYMPHOCYTES ABSOLUTE: 1.5 K/UL (ref 1–5.1)
LYMPHOCYTES RELATIVE PERCENT: 26.6 %
MCH RBC QN AUTO: 31.7 PG (ref 26–34)
MCHC RBC AUTO-ENTMCNC: 33.2 G/DL (ref 31–36)
MCV RBC AUTO: 95.6 FL (ref 80–100)
MONOCYTES ABSOLUTE: 0.5 K/UL (ref 0–1.3)
MONOCYTES RELATIVE PERCENT: 9 %
NEUTROPHILS ABSOLUTE: 3.5 K/UL (ref 1.7–7.7)
NEUTROPHILS RELATIVE PERCENT: 62.8 %
PDW BLD-RTO: 13.4 % (ref 12.4–15.4)
PERFORMED ON: ABNORMAL
PLATELET # BLD: 128 K/UL (ref 135–450)
PMV BLD AUTO: 8.8 FL (ref 5–10.5)
POTASSIUM SERPL-SCNC: 4.1 MMOL/L (ref 3.5–5.1)
PROTHROMBIN TIME: 11.8 SEC (ref 9.9–12.7)
RBC # BLD: 4.44 M/UL (ref 4.2–5.9)
SODIUM BLD-SCNC: 132 MMOL/L (ref 136–145)
TOTAL PROTEIN: 7.1 G/DL (ref 6.4–8.2)
WBC # BLD: 5.6 K/UL (ref 4–11)

## 2021-08-16 PROCEDURE — 43244 EGD VARICES LIGATION: CPT | Performed by: INTERNAL MEDICINE

## 2021-08-16 PROCEDURE — 80076 HEPATIC FUNCTION PANEL: CPT

## 2021-08-16 PROCEDURE — 7100000010 HC PHASE II RECOVERY - FIRST 15 MIN: Performed by: INTERNAL MEDICINE

## 2021-08-16 PROCEDURE — 85025 COMPLETE CBC W/AUTO DIFF WBC: CPT

## 2021-08-16 PROCEDURE — 2709999900 HC NON-CHARGEABLE SUPPLY: Performed by: INTERNAL MEDICINE

## 2021-08-16 PROCEDURE — 3700000000 HC ANESTHESIA ATTENDED CARE: Performed by: INTERNAL MEDICINE

## 2021-08-16 PROCEDURE — 88305 TISSUE EXAM BY PATHOLOGIST: CPT

## 2021-08-16 PROCEDURE — 2720000010 HC SURG SUPPLY STERILE: Performed by: INTERNAL MEDICINE

## 2021-08-16 PROCEDURE — 3700000001 HC ADD 15 MINUTES (ANESTHESIA): Performed by: INTERNAL MEDICINE

## 2021-08-16 PROCEDURE — 43239 EGD BIOPSY SINGLE/MULTIPLE: CPT | Performed by: INTERNAL MEDICINE

## 2021-08-16 PROCEDURE — 3609013000 HC EGD TRANSORAL CONTROL BLEEDING ANY METHOD: Performed by: INTERNAL MEDICINE

## 2021-08-16 PROCEDURE — 2500000003 HC RX 250 WO HCPCS: Performed by: NURSE ANESTHETIST, CERTIFIED REGISTERED

## 2021-08-16 PROCEDURE — 82105 ALPHA-FETOPROTEIN SERUM: CPT

## 2021-08-16 PROCEDURE — 7100000011 HC PHASE II RECOVERY - ADDTL 15 MIN: Performed by: INTERNAL MEDICINE

## 2021-08-16 PROCEDURE — 36415 COLL VENOUS BLD VENIPUNCTURE: CPT

## 2021-08-16 PROCEDURE — 88342 IMHCHEM/IMCYTCHM 1ST ANTB: CPT

## 2021-08-16 PROCEDURE — 3609012400 HC EGD TRANSORAL BIOPSY SINGLE/MULTIPLE: Performed by: INTERNAL MEDICINE

## 2021-08-16 PROCEDURE — 2580000003 HC RX 258: Performed by: NURSE ANESTHETIST, CERTIFIED REGISTERED

## 2021-08-16 PROCEDURE — 85610 PROTHROMBIN TIME: CPT

## 2021-08-16 PROCEDURE — 6360000002 HC RX W HCPCS: Performed by: NURSE ANESTHETIST, CERTIFIED REGISTERED

## 2021-08-16 PROCEDURE — 80048 BASIC METABOLIC PNL TOTAL CA: CPT

## 2021-08-16 RX ORDER — PROPOFOL 10 MG/ML
INJECTION, EMULSION INTRAVENOUS PRN
Status: DISCONTINUED | OUTPATIENT
Start: 2021-08-16 | End: 2021-08-16 | Stop reason: SDUPTHER

## 2021-08-16 RX ORDER — SODIUM CHLORIDE, SODIUM LACTATE, POTASSIUM CHLORIDE, CALCIUM CHLORIDE 600; 310; 30; 20 MG/100ML; MG/100ML; MG/100ML; MG/100ML
INJECTION, SOLUTION INTRAVENOUS CONTINUOUS PRN
Status: DISCONTINUED | OUTPATIENT
Start: 2021-08-16 | End: 2021-08-16 | Stop reason: SDUPTHER

## 2021-08-16 RX ORDER — SODIUM CHLORIDE, SODIUM LACTATE, POTASSIUM CHLORIDE, CALCIUM CHLORIDE 600; 310; 30; 20 MG/100ML; MG/100ML; MG/100ML; MG/100ML
INJECTION, SOLUTION INTRAVENOUS CONTINUOUS
Status: DISCONTINUED | OUTPATIENT
Start: 2021-08-16 | End: 2021-08-16 | Stop reason: HOSPADM

## 2021-08-16 RX ORDER — OMEPRAZOLE 40 MG/1
CAPSULE, DELAYED RELEASE ORAL
Qty: 90 CAPSULE | Refills: 2 | Status: SHIPPED | OUTPATIENT
Start: 2021-08-16 | End: 2022-11-04 | Stop reason: SDUPTHER

## 2021-08-16 RX ORDER — LIDOCAINE HYDROCHLORIDE 20 MG/ML
INJECTION, SOLUTION EPIDURAL; INFILTRATION; INTRACAUDAL; PERINEURAL PRN
Status: DISCONTINUED | OUTPATIENT
Start: 2021-08-16 | End: 2021-08-16 | Stop reason: SDUPTHER

## 2021-08-16 RX ADMIN — PROPOFOL 200 MG: 10 INJECTION, EMULSION INTRAVENOUS at 12:05

## 2021-08-16 RX ADMIN — LIDOCAINE HYDROCHLORIDE 50 MG: 20 INJECTION, SOLUTION EPIDURAL; INFILTRATION; INTRACAUDAL; PERINEURAL at 12:05

## 2021-08-16 RX ADMIN — PROPOFOL 200 MG: 10 INJECTION, EMULSION INTRAVENOUS at 12:11

## 2021-08-16 RX ADMIN — SODIUM CHLORIDE, POTASSIUM CHLORIDE, SODIUM LACTATE AND CALCIUM CHLORIDE: 600; 310; 30; 20 INJECTION, SOLUTION INTRAVENOUS at 11:46

## 2021-08-16 ASSESSMENT — PAIN - FUNCTIONAL ASSESSMENT: PAIN_FUNCTIONAL_ASSESSMENT: 0-10

## 2021-08-16 ASSESSMENT — PAIN SCALES - GENERAL: PAINLEVEL_OUTOF10: 0

## 2021-08-16 ASSESSMENT — ENCOUNTER SYMPTOMS: SHORTNESS OF BREATH: 1

## 2021-08-16 NOTE — OP NOTE
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 year old male with history of LANG cirrhosis diagnosed with fibroScan 2021 presents for variceal screening    Procedure Details  Informed consent was obtained for the procedure, including conscious sedation. Risks of pancreatitis, infection, perforation, hemorrhage, adverse drug reaction and aspiration were discussed. The patient was placed in the left lateral decubitus position. Based on the pre-procedure assessment, including review of the patient's medical history, medications, allergies, and review of systems, he had been deemed to be an appropriate candidate for conscious sedation; he was therefore sedated with the medications listed above. He was monitored continuously with ECG tracing, pulse oximetry, blood pressure monitoring, and direct observation. A gastroscope was inserted into the mouth and advanced under direct vision to second portion of the duodenum. A careful inspection was made as the gastroscope was withdrawn, including a retroflexed view of the proximal stomach including views of the incisura and cardia; findings and interventions are described below. Appropriate photodocumentation Was Obtained.   If photos taken, they were ordered to be scanned into the medical record. Findings:  Normal upper esophagus. Three columns of large esophageal varices seen in the distal and mid esophagus. Band ligation performed with a total of 4 bands placed with collapse of varices. No bleeding noted. Irregular Z-line at 40 cm from incisors  Moderate erythematous mosaic appearing mucosa in body of stomach suggestive of portal hypertensive gastropathy. Biopsies taken from antrum, incisura and body of stomach to evaluate for H. Pylori  Area of non bleeding punctate erythematous mucosa in the antrum suggestive gastric antral vascular ectasia (GAVE). Normal duodenal bulb and second portion of duodenum. Specimens: Was Obtained:    Complications:   None; patient tolerated the procedure well. Disposition:   PACU - hemodynamically stable. Estimated Blood loss:  none    Impression:   Normal upper esophagus. Three columns of large esophageal varices seen in the distal and mid esophagus. Band ligation performed with a total of 4 bands placed with collapse of varices. No bleeding noted. Irregular Z-line at 40 cm from incisors  Moderate portal hypertensive gastropathy. Biopsied  Area of non bleeding punctate erythematous mucosa in the antrum suggestive gastric antral vascular ectasia (GAVE). Normal duodenal bulb and second portion of duodenum. Recommendations:  -Continue Omeprazole 40mg orally daily,   -Await pathology. ,   -Complete ordered MELD labs  -Repeat EGD with variceal banding in 4 weeks.          Rosa Maria De Paz MD 8/16/21 12:19 PM EDT

## 2021-08-16 NOTE — ANESTHESIA PRE PROCEDURE
2 PUFFS INTO THE LUNGS 4 TIMES DAILY AS NEEDED FOR WHEEZING 2/24/20  Yes HO Tillman   albuterol (PROVENTIL) (2.5 MG/3ML) 0.083% nebulizer solution Take 3 mLs by nebulization every 4 hours as needed for Wheezing 2/6/20  Yes HO Tillman   Blood Glucose Monitoring Suppl (ONE TOUCH ULTRA 2) w/Device KIT 1 kit by Does not apply route daily 1/2/19  Yes HO Tillman   Insulin Pen Needle (PEN NEEDLES) 31G X 6 MM MISC Use with insulin 4 times daily. 8/7/18  Yes Fam Hunter MD   glucose blood VI test strips (ONE TOUCH ULTRA TEST) strip USE AS DIRECTED 3 TIMES A DAY Dx Code E11.9 5/14/18  Yes HO Tillman   acetaminophen (TYLENOL) 500 MG tablet Take 500 mg by mouth every 6 hours as needed for Pain   Yes Historical Provider, MD Jaclyn Peterson LANCETS 41C MISC TEST 3 TIMES A DAY 3/19/15  Yes Fam Hunter MD   insulin 70-30 (NOVOLIN 70/30) (70-30) 100 UNIT per ML injection vial Inject 50 Units into the skin 3 times daily 9/11/20 5/13/21  HO Tillman   gabapentin (NEURONTIN) 300 MG capsule TAKE 1 CAPSULE BY MOUTH THREE TIMES A DAY 1/30/20 5/13/21  HO Tillman       Current medications:    Current Facility-Administered Medications   Medication Dose Route Frequency Provider Last Rate Last Admin    lactated ringers infusion   Intravenous Continuous Machelle Jacinto MD        lactated ringers infusion   Intravenous Continuous Machelle Jacinto MD           Allergies:     Allergies   Allergen Reactions    Aspirin Other (See Comments)     Takes baby aspirin without problems    Caused bleeding in the bowel    Oxycontin [Oxycodone Hcl] Nausea And Vomiting       Problem List:    Patient Active Problem List   Diagnosis Code    Hyperlipidemia E78.5    ARREDONDO (dyspnea on exertion) R06.00    Left arm numbness R20.0    Type 2 diabetes mellitus without complication, with long-term current use of insulin (HCC) E11.9, Z79.4    Essential hypertension I10    [facility] of recurrent major depressive [facility] F33.0    Cervical radiculopathy M54.12    Morbidly obese ([facility] E66.01    Type 2 diabetes mellitus with hyperglycemia E11.65       Past Medical History:        Diagnosis Date    Cervical radiculopathy 4/22/2016    Chronic pain     GERD (gastroesophageal reflux disease)     Hyperlipidemia     Hypertension     Liver disease     Mild episode of recurrent major depressive disorder ([facility] 2/8/2018    Morbidly obese ([facility] 8/25/2020    PONV (postoperative nausea and vomiting)     Type II or unspecified type diabetes mellitus without mention of complication, not stated as uncontrolled        Past Surgical History:        Procedure Laterality Date    CERVICAL SPINE SURGERY  2014    fusion   [place] ARTHROSCOPY Left 10/22/2019    EXAM UNDER ANESTHESIA VIDEO ARTHROSCOPY LEFT KNEE, PARTIAL MEDIAL MENISCECTOMY,  MEDIAL-FEMORAL CHONDROPLASTY, SYNOVECTOMY, EXCISION OF ADHESIONS performed by Bill Maxwell MD at [address] [place] [address]    fusion   [facility] [address] Right 2014   [place]    VASECTOMY         Social History:    Social History     Tobacco Use    Smoking status: Never Smoker    Smokeless tobacco: Current User   Substance Use Topics    Alcohol use: Yes     Comment: very occasional                                Ready to quit: Not Answered  Counseling given: Not Answered      Vital Signs (Current):   Vitals:    08/16/21 1105   BP: (!) 145/69   Pulse: 84   Resp: 16   Temp: 97.6 °F (36.4 °C)   TempSrc: Temporal   SpO2: 96%   Weight: 245 lb (111.1 kg)   Height: 5' 8\" (1.727 m)                                              BP Readings from Last 3 Encounters:   08/16/21 (!) 145/69   07/01/21 (!) 149/82   05/13/21 (!) 120/55       NPO Status: Time of last liquid consumption: 2000                        Time of last solid consumption: 2000                        Date of last liquid consumption: 08/15/21 Date of last solid food consumption: 08/15/21    BMI:   Wt Readings from Last 3 Encounters:   08/16/21 245 lb (111.1 kg)   07/01/21 251 lb 3.2 oz (113.9 kg)   05/13/21 253 lb (114.8 kg)     Body mass index is 37.25 kg/m². CBC:   Lab Results   Component Value Date    WBC 9.9 08/25/2020    RBC 4.78 08/25/2020    HGB 15.0 08/25/2020    HCT 44.8 08/25/2020    MCV 93.8 08/25/2020    RDW 13.3 08/25/2020     08/25/2020       CMP:   Lab Results   Component Value Date     12/28/2020    K 3.6 12/28/2020    CL 98 12/28/2020    CO2 29 12/28/2020    BUN 11 12/28/2020    CREATININE 0.8 12/28/2020    GFRAA >60 12/28/2020    GFRAA >60 06/10/2013    AGRATIO 1.5 12/28/2020    LABGLOM >60 12/28/2020    LABGLOM 85 05/06/2015    GLUCOSE 248 12/28/2020    PROT 7.1 05/17/2021    PROT 7.9 01/04/2013    CALCIUM 9.8 12/28/2020    BILITOT 0.7 05/17/2021    ALKPHOS 153 05/17/2021    ALKPHOS 179 05/17/2021    AST 37 05/17/2021    ALT 34 05/17/2021       POC Tests:   Recent Labs     08/16/21  1130   POCGLU 249*       Coags: No results found for: PROTIME, INR, APTT    HCG (If Applicable): No results found for: PREGTESTUR, PREGSERUM, HCG, HCGQUANT     ABGs: No results found for: PHART, PO2ART, TOT7GMY, LEM4KAI, BEART, G3TRNHWP     Type & Screen (If Applicable):  No results found for: LABABO, LABRH    Drug/Infectious Status (If Applicable):  No results found for: HIV, HEPCAB    COVID-19 Screening (If Applicable): No results found for: COVID19        Anesthesia Evaluation  Patient summary reviewed and Nursing notes reviewed   history of anesthetic complications: PONV.   Airway: Mallampati: II  TM distance: >3 FB   Neck ROM: full  Mouth opening: > = 3 FB Dental:    (+) upper dentures and lower dentures      Pulmonary:normal exam  breath sounds clear to auscultation  (+) shortness of breath: new,                             Cardiovascular:    (+) hypertension:,         Rhythm: regular  Rate: normal Neuro/Psych:   (+) neuromuscular disease:, psychiatric history:             ROS comment: S/p neck surgery GI/Hepatic/Renal:   (+) GERD:, liver disease:,           Endo/Other:    (+) DiabetesType II DM, , .                 Abdominal:   (+) obese,           Vascular: negative vascular ROS. Other Findings:             Anesthesia Plan      MAC     ASA 3       Induction: intravenous. Anesthetic plan and risks discussed with patient. Plan discussed with CRNA.                   Jocelyn Oviedo MD   8/16/2021

## 2021-08-16 NOTE — H&P
[address] ,  [address] [facility], [place] [address]  Phone: [phone]       CHIEF COMPLAINT  Esophageal variceal screening     [address] [facility] Jimmy is a 77 y.o. male who returns for follow-up of LANG cirrhosis with fibroScan 5/17/2021 fatty liver with cirrhosis (F4). Denies confusions, lower extremity swelling. Reports 2 soft bowel movements daily. Denies abdominal pain, nausea, vomiting, fever, chills, unintentional weight loss, constipation, diarrhea, hematochezia or melenic stools.      Labs reviewed with isolated elevation of alkaline phosphatase with elevated ,  with predominance of liver fractionated . Hemoglobin A1c 10.1. MRI liver with MRCP 6/14/2021 chronic parenchymal disease of liver with slight small size of right hepatic lobe and caudate hypertrophy. No focal liver lesions. Patent umbilical vein suggestive of portal hypertension. No ascites or splenomegaly. Moderate fatty infiltration of pancreas.        Date of last office visit and details: 5/13/2021  77 y. o. male  [2] White [1] with medical history of morbid obesity (BMI 39), GERD, hyperlipidemia, hypertension, diabetes mellitus type 2 on insulin, depression, cholecystectomy 1995 seen independently with referral for abnormal liver chemistries and CT findings of cirrhosis.  Patient reports dull right upper quadrant discomfort.  He reports history grand daughter having jumped onto that area in the past.  Labs reviewed from 3/30/2021.  Normal liver chemistries except elevated alkaline phosphatase 194.  CT abdomen and pelvis on 9/3/2020 performed for evaluation of RUQ abdominal pain noted lobular liver contour suggestive of cirrhosis.   Denies  nausea, vomiting, fever, chills, unintentional weight loss, constipation, diarrhea, hematochezia or melenic stools.  Reports remote history of alcohol use about 30 years ago.  Family history is positive for liver Session:    Stress:     Feeling of Stress :    Social Connections:     Frequency of Communication with Friends and Family:     Frequency of Social Gatherings with Friends and Family:     Attends Advent Services:     Active Member of Clubs or Organizations:     Attends Club or Organization Meetings:     Marital Status:    Intimate Partner Violence:     Fear of Current or Ex-Partner:     Emotionally Abused:     Physically Abused:     Sexually Abused:      SURGICAL HISTORY     Past Surgical History:   Procedure Laterality Date   [address] [address]  2014    fusion   [place] ARTHROSCOPY Left 10/22/2019    EXAM UNDER ANESTHESIA VIDEO ARTHROSCOPY LEFT KNEE, PARTIAL MEDIAL MENISCECTOMY,  MEDIAL-FEMORAL CHONDROPLASTY, SYNOVECTOMY, EXCISION OF ADHESIONS performed by Gari Hammans, MD at [address] [facility]   [address]    fusion   [address] Hidalgolar Right 2014   [address]   (This list may include medications prescribed during this encounter as epic can not insert only the list prior to this encounter.)  Current Outpatient Rx   Medication Sig Dispense Refill    Continuous Blood Gluc Sensor (FREESTYLE ELIZABETH 14 DAY SENSOR) MISC 1 Units by Does not apply route every 14 days 2 each 5    Continuous Blood Gluc  (FREESTYLE ELIZABETH 14 DAY READER) SOUMYA 1 Units by Does not apply route daily 1 Device 0    amitriptyline (ELAVIL) 10 MG tablet TAKE 1 TABLET BY MOUTH EVERY DAY AT NIGHT 90 tablet 0    losartan (COZAAR) 100 MG tablet TAKE 1 TABLET BY MOUTH EVERY DAY 90 tablet 0    montelukast (SINGULAIR) 10 MG tablet TAKE 1 TABLET BY MOUTH EVERY DAY AT NIGHT 90 tablet 1    dicyclomine (BENTYL) 10 MG capsule As directed for MRI 2 capsule 0    azelastine (ASTELIN) 0.1 % nasal spray 1 spray by Nasal route 2 times daily Use in each nostril as directed 1 Bottle 5    omeprazole (PRILOSEC) 40 MG delayed Influenza, Intradermal, Preservative free 09/28/2012    Influenza, Quadv, IM, PF (6 mo and older Fluzone, Flulaval, Fluarix, and 3 yrs and older Afluria) 08/29/2017    Influenza, Quadv, adjuvanted, 65 yrs +, IM, PF (Fluad) 09/28/2020    Pneumococcal Conjugate 13-valent ([id number]) 06/15/2015    Pneumococcal Polysaccharide ([id number]) 08/16/2016    Td (Adult), 2 Lf Tetanus Toxoid, Pf (Td, Absorbed) 08/25/2020    Tdap (Boostrix, Adacel) 07/10/2009       REVIEW OF SYSTEMS   See HPI for further details and pertinent postiives. Negative for the following:  Constitutional: Negative for weight change. Negative for appetite change and fatigue. HENT: Negative for nosebleeds, sore throat, mouth sores, and voice change. Respiratory: Negative for cough, choking and chest tightness. Cardiovascular: Negative for chest pain   Gastrointestinal: See HPI  Musculoskeletal: Negative for arthralgias. Skin: Negative for pallor. Neurological: Negative for weakness and light-headedness. Hematological: Negative for adenopathy. Does not bruise/bleed easily. Psychiatric/Behavioral: Negative for suicidal ideas. PHYSICAL EXAM   VITAL SIGNS: There were no vitals taken for this visit. Wt Readings from Last 3 Encounters:   07/01/21 251 lb 3.2 oz (113.9 kg)   05/13/21 253 lb (114.8 kg)   05/05/21 247 lb (112 kg)     Constitutional: Obese male, Well developed, Well nourished, No acute distress, Non-toxic appearance. HENT: Normocephalic, Atraumatic, Bilateral external ears normal, Oropharynx moist, No oral exudates, Nose normal.   Eyes: Conjunctiva normal, No discharge. Neck: Normal range of motion, No tenderness, Supple, No stridor. Lymphatic: No cervical, subclavian, or axillary lymphadenopathy. Cardiovascular: Normal heart rate, Normal rhythm, No murmurs, No rubs, No gallops. Thorax & Lungs: Normal breath sounds, No respiratory distress, No wheezing, No chest tenderness.    Abdomen: Pannus abdomen, normal bowel sounds, soft, non tender, non distended, no hernias   Rectal:  Deferred. Skin: Warm, Dry, No erythema, No rash. No bruising. Lower Extremities: Intact distal pulses, No edema, No tenderness, No cyanosis, No clubbing. Neurologic: Alert & oriented x 3, Normal motor function, Normal sensory function, No focal deficits noted. No asterixis. No results found. FINAL IMPRESSION   Liver cirrhosis secondary to LANG ([address] compensated  -     Covid-19 Ambulatory; Future  -     EGD  -     US ABDOMEN COMPLETE; Future  -     CBC Auto Differential; Future  -     Basic Metabolic Panel; Future  -     Protime-INR; Future  -     Hepatic Function Panel; Future  -     AFP TUMOR MARKER; Future  -     HCC Screening: No focal liver lesion on MRI 6/14/21  -      SBP prophylaxis: Not indicated  -      Diuretics: none - No ascites noted on exam. Continue HCTZ  -      Hepatic encephalopathy: not noted on exam  -      Variceal screening: To schedule for EGD  -      Transplantation: pending MELD labs     Discussed lifestyle modification with goal of weight loss. Avoidance of alcohol.      Other orders  -     lactulose (CHRONULAC) 10 GM/15ML solution; Take 30 mLs by mouth 3 times daily Titrate/adjust dose to have 3 bowel movement daily to prevent confusion/encephalopathy. ORDERED FUTURE/PENDING TESTS     Lab Frequency Next Occurrence   US LESION ELASTOGRAPHY Once 05/13/2021   MRI ABDOMEN W WO CONTRAST MRCP Once 05/21/2021   Covid-19 Ambulatory Once 07/01/2021   US ABDOMEN COMPLETE Once 31/17/4087   Basic Metabolic Panel Once 49/42/0110   Protime-INR Once 07/01/2021   Hepatic Function Panel Once 07/01/2021   AFP TUMOR MARKER Once 10/01/2021   Hemoglobin A1C Once 11/09/2021   Lipid Panel Once 11/08/2021   Comprehensive Metabolic Panel Once 81/91/5910   C-Peptide Once 08/11/2021   Glutamic Acid Decarboxylase Once 08/11/2021       FOLLOWUP   No follow-ups on file.           Blaise Reyes MD 8/16/21 9:38 AM EDT    CC:  Karlee Ding, MD

## 2021-08-16 NOTE — ANESTHESIA POSTPROCEDURE EVALUATION
Department of Anesthesiology  Postprocedure Note    Patient: Mary Jo Wilburn  MRN: [id number]  YOB: 1955  Date of evaluation: 8/16/2021  Time:  1:53 PM     Procedure Summary     Date: 08/16/21 Room / Location: [address]    Anesthesia Start: 7856 Anesthesia Stop: 0033    Procedures:       EGD W/ANES. 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        Anesthesia Post Evaluation    Patient location during evaluation: PACU  Patient participation: complete - patient participated  Level of consciousness: awake and alert  Pain score: 0  Airway patency: patent  Nausea & Vomiting: no nausea and no vomiting  Complications: no  Cardiovascular status: blood pressure returned to baseline  Respiratory status: acceptable  Hydration status: stable

## 2021-08-17 ENCOUNTER — TELEPHONE (OUTPATIENT)
Dept: GASTROENTEROLOGY | Age: 66
End: 2021-08-17

## 2021-08-19 LAB — AFP: 2.1 UG/L

## 2021-09-09 NOTE — PROGRESS NOTES
PAT completed with wife, pt coming in on 09/10/2021 for Covid testing, aware of 0700 arrival time on 09/13/2021 NPO after midnight the night prior to surgery, aware he needs to bring license  with DOS.   Macrina Subramanian RN

## 2021-09-09 NOTE — PRE-PROCEDURE INSTRUCTIONS

## 2021-09-10 ENCOUNTER — HOSPITAL ENCOUNTER (OUTPATIENT)
Age: 66
Discharge: HOME | End: 2021-09-10
Payer: COMMERCIAL

## 2021-09-10 PROCEDURE — U0003 INFECTIOUS AGENT DETECTION BY NUCLEIC ACID (DNA OR RNA); SEVERE ACUTE RESPIRATORY SYNDROME CORONAVIRUS 2 (SARS-COV-2) (CORONAVIRUS DISEASE [COVID-19]), AMPLIFIED PROBE TECHNIQUE, MAKING USE OF HIGH THROUGHPUT TECHNOLOGIES AS DESCRIBED BY CMS-2020-01-R: HCPCS

## 2021-09-10 PROCEDURE — U0005 INFEC AGEN DETEC AMPLI PROBE: HCPCS

## 2021-09-11 LAB — SARS-COV-2: NOT DETECTED

## 2021-09-12 NOTE — H&P
Interval H&P    I have reviewed the history 8/16/21 and examined the patient. I find no relevant changes. I have reviewed with the patient the EGD procedure    Esophagogastroduodenoscopy (EGD) with alternatives, rationale, benefits and risks, not limited to bleeding, infection, perforation, need of surgery, prolong hospital stay and anesthesia side effects were explained. Patient verbalized understanding and agrees to proceed with EGD.

## 2021-09-13 ENCOUNTER — ANESTHESIA (OUTPATIENT)
Dept: ENDOSCOPY | Age: 66
End: 2021-09-13
Payer: COMMERCIAL

## 2021-09-13 ENCOUNTER — HOSPITAL ENCOUNTER (OUTPATIENT)
Age: 66
Setting detail: OUTPATIENT SURGERY
Discharge: HOME | End: 2021-09-13
Attending: INTERNAL MEDICINE | Admitting: INTERNAL MEDICINE
Payer: COMMERCIAL

## 2021-09-13 ENCOUNTER — ANESTHESIA EVENT (OUTPATIENT)
Dept: ENDOSCOPY | Age: 66
End: 2021-09-13
Payer: COMMERCIAL

## 2021-09-13 VITALS
SYSTOLIC BLOOD PRESSURE: 139 MMHG | RESPIRATION RATE: 14 BRPM | HEART RATE: 93 BPM | WEIGHT: 245 LBS | BODY MASS INDEX: 37.13 KG/M2 | DIASTOLIC BLOOD PRESSURE: 73 MMHG | TEMPERATURE: 97.9 F | HEIGHT: 68 IN | OXYGEN SATURATION: 96 %

## 2021-09-13 VITALS — SYSTOLIC BLOOD PRESSURE: 137 MMHG | DIASTOLIC BLOOD PRESSURE: 83 MMHG

## 2021-09-13 LAB
GLUCOSE BLD-MCNC: 130 MG/DL (ref 70–99)
PERFORMED ON: ABNORMAL

## 2021-09-13 PROCEDURE — 2580000003 HC RX 258: Performed by: INTERNAL MEDICINE

## 2021-09-13 PROCEDURE — 3700000001 HC ADD 15 MINUTES (ANESTHESIA): Performed by: INTERNAL MEDICINE

## 2021-09-13 PROCEDURE — 7100000011 HC PHASE II RECOVERY - ADDTL 15 MIN: Performed by: INTERNAL MEDICINE

## 2021-09-13 PROCEDURE — 7100000010 HC PHASE II RECOVERY - FIRST 15 MIN: Performed by: INTERNAL MEDICINE

## 2021-09-13 PROCEDURE — 2709999900 HC NON-CHARGEABLE SUPPLY: Performed by: INTERNAL MEDICINE

## 2021-09-13 PROCEDURE — 2500000003 HC RX 250 WO HCPCS

## 2021-09-13 PROCEDURE — 6360000002 HC RX W HCPCS

## 2021-09-13 PROCEDURE — 43235 EGD DIAGNOSTIC BRUSH WASH: CPT | Performed by: INTERNAL MEDICINE

## 2021-09-13 PROCEDURE — 3609017100 HC EGD: Performed by: INTERNAL MEDICINE

## 2021-09-13 PROCEDURE — 3700000000 HC ANESTHESIA ATTENDED CARE: Performed by: INTERNAL MEDICINE

## 2021-09-13 RX ORDER — SODIUM CHLORIDE, SODIUM LACTATE, POTASSIUM CHLORIDE, CALCIUM CHLORIDE 600; 310; 30; 20 MG/100ML; MG/100ML; MG/100ML; MG/100ML
INJECTION, SOLUTION INTRAVENOUS CONTINUOUS
Status: DISCONTINUED | OUTPATIENT
Start: 2021-09-13 | End: 2021-09-13 | Stop reason: HOSPADM

## 2021-09-13 RX ORDER — LIDOCAINE HYDROCHLORIDE 20 MG/ML
INJECTION, SOLUTION EPIDURAL; INFILTRATION; INTRACAUDAL; PERINEURAL PRN
Status: DISCONTINUED | OUTPATIENT
Start: 2021-09-13 | End: 2021-09-13 | Stop reason: SDUPTHER

## 2021-09-13 RX ORDER — PROPOFOL 10 MG/ML
INJECTION, EMULSION INTRAVENOUS PRN
Status: DISCONTINUED | OUTPATIENT
Start: 2021-09-13 | End: 2021-09-13 | Stop reason: SDUPTHER

## 2021-09-13 RX ADMIN — SODIUM CHLORIDE, POTASSIUM CHLORIDE, SODIUM LACTATE AND CALCIUM CHLORIDE: 600; 310; 30; 20 INJECTION, SOLUTION INTRAVENOUS at 07:41

## 2021-09-13 RX ADMIN — PROPOFOL 70 MG: 10 INJECTION, EMULSION INTRAVENOUS at 08:13

## 2021-09-13 RX ADMIN — PROPOFOL 50 MG: 10 INJECTION, EMULSION INTRAVENOUS at 08:19

## 2021-09-13 RX ADMIN — PROPOFOL 50 MG: 10 INJECTION, EMULSION INTRAVENOUS at 08:21

## 2021-09-13 RX ADMIN — LIDOCAINE HYDROCHLORIDE 60 MG: 20 INJECTION, SOLUTION EPIDURAL; INFILTRATION; INTRACAUDAL; PERINEURAL at 08:13

## 2021-09-13 RX ADMIN — PROPOFOL 30 MG: 10 INJECTION, EMULSION INTRAVENOUS at 08:17

## 2021-09-13 ASSESSMENT — PAIN - FUNCTIONAL ASSESSMENT: PAIN_FUNCTIONAL_ASSESSMENT: 0-10

## 2021-09-13 ASSESSMENT — ENCOUNTER SYMPTOMS: SHORTNESS OF BREATH: 1

## 2021-09-13 ASSESSMENT — PAIN SCALES - GENERAL: PAINLEVEL_OUTOF10: 0

## 2021-09-13 NOTE — PROGRESS NOTES
Assessment unchanged from previous. Patient and wife both verbalizes understanding of discharge instructions and written instructions also given to patient. Questions and concerns addressed at this time. Denies any needs at this time. IV d/c'd.

## 2021-09-13 NOTE — ANESTHESIA PRE PROCEDURE
Department of Anesthesiology  Preprocedure Note       Name:  Adán Anthony   Age:  74 y.o.  :  1955                                          MRN:  [id number]         Date:  2021      Surgeon: Reyna Ojeda):  Alia Barrientos MD    Procedure: Procedure(s):  EGD W/ANES. (8:00)    Medications prior to admission:   Prior to Admission medications    Medication Sig Start Date End Date Taking?  Authorizing Provider   omeprazole (PRILOSEC) 40 MG delayed release capsule TAKE 1 CAPSULE BY MOUTH EVERY DAY 21  Yes Alia Barrientos MD   Continuous Blood Gluc Sensor (FREESTYLE ELIZABETH 14 DAY SENSOR) MISC 1 Units by Does not apply route every 14 days 21  Yes KATIE Ceron CNP   Continuous Blood Gluc  (FREESTYLE ELIZABETH 14 DAY READER) SOUMYA 1 Units by Does not apply route daily 21  Yes KATIE Ceron CNP   amitriptyline (ELAVIL) 10 MG tablet TAKE 1 TABLET BY MOUTH EVERY DAY AT NIGHT 21  Yes KATIE Cabrera CNP   losartan (COZAAR) 100 MG tablet TAKE 1 TABLET BY MOUTH EVERY DAY 21  Yes KATIE Cabrera - CNP   montelukast (SINGULAIR) 10 MG tablet TAKE 1 TABLET BY MOUTH EVERY DAY AT NIGHT 21  Yes Esperanza Cheng, KATIE - CNP   dicyclomine (BENTYL) 10 MG capsule As directed for MRI 21  Yes Alia Barrientos MD   azelastine (ASTELIN) 0.1 % nasal spray 1 spray by Nasal route 2 times daily Use in each nostril as directed 21  Yes HO Willams   tiZANidine (ZANAFLEX) 4 MG tablet Take 1 tablet by mouth 3 times daily as needed (back pain) 21  Yes HO [place]   hydroCHLOROthiazide (HYDRODIURIL) 25 MG tablet TAKE 1 TABLET BY MOUTH EVERY DAY 21  Yes HO [place]   Insulin Syringe-Needle U-100 30G X 1/2\" 1 ML MISC Use for insulin 3 times daily 20  Yes Bhargav Crockett MD   simvastatin (ZOCOR) 40 MG tablet TAKE 1 TABLET BY MOUTH NIGHTLY. 20  Yes Bhargav Crockett MD   albuterol sulfate  (90 Base) MCG/ACT inhaler INHALE 2 PUFFS INTO THE LUNGS 4 TIMES DAILY AS NEEDED FOR WHEEZING 2/24/20  Yes HO Balderas   albuterol (PROVENTIL) (2.5 MG/3ML) 0.083% nebulizer solution Take 3 mLs by nebulization every 4 hours as needed for Wheezing 2/6/20  Yes HO Balderas   Blood Glucose Monitoring Suppl (ONE TOUCH ULTRA 2) w/Device KIT 1 kit by Does not apply route daily 1/2/19  Yes HO Balderas   Insulin Pen Needle (PEN NEEDLES) 31G X 6 MM MISC Use with insulin 4 times daily. 8/7/18  Yes Comfort Irving MD   glucose blood VI test strips (ONE TOUCH ULTRA TEST) strip USE AS DIRECTED 3 TIMES A DAY Dx Code E11.9 5/14/18  Yes HO Balderas   acetaminophen (TYLENOL) 500 MG tablet Take 500 mg by mouth every 6 hours as needed for Pain   Yes Historical Provider, MD Palencia Manners LANCETS 29S MISC TEST 3 TIMES A DAY 3/19/15  Yes Comfort Irvign MD   insulin 70-30 (NOVOLIN 70/30) (70-30) 100 UNIT per ML injection vial Inject 50 Units into the skin 3 times daily 9/11/20 5/13/21  HO Balderas   gabapentin (NEURONTIN) 300 MG capsule TAKE 1 CAPSULE BY MOUTH THREE TIMES A DAY 1/30/20 5/13/21  HO Balderas       Current medications:    Current Facility-Administered Medications   Medication Dose Route Frequency Provider Last Rate Last Admin    lactated ringers infusion   IntraVENous Continuous King Violet MD           Allergies:     Allergies   Allergen Reactions    Aspirin Other (See Comments)     Takes baby aspirin without problems    Caused bleeding in the bowel    Oxycontin [Oxycodone Hcl] Nausea And Vomiting       Problem List:    Patient Active Problem List   Diagnosis Code    Hyperlipidemia E78.5    ARREDONDO (dyspnea on exertion) R06.00    Left arm numbness R20.0    Type 2 diabetes mellitus without complication, with long-term current use of insulin ([facility]) E11.9, Z79.4    Essential hypertension I10    Mild episode of recurrent major depressive disorder ([facility] F33.0    Cervical radiculopathy M54.12    Morbidly obese ([facility] E66.01    Type 2 diabetes mellitus with hyperglycemia E11.65    Secondary esophageal varices with bleeding (HCC) I85.11    Portal hypertensive gastropathy (HCC) K76.6, K31.89    GAVE (gastric antral vascular ectasia) K31.819       Past Medical History:        Diagnosis Date    Cervical radiculopathy 4/22/2016    Chronic pain     GERD (gastroesophageal reflux disease)     Hyperlipidemia     Hypertension     Liver disease     Mild episode of recurrent major depressive disorder ([facility] 2/8/2018    Morbidly obese ([facility] 8/25/2020    PONV (postoperative nausea and vomiting)     Type II or unspecified type diabetes mellitus without mention of complication, not stated as uncontrolled        Past Surgical History:        Procedure Laterality Date    CERVICAL SPINE SURGERY  2014    fusion   [place] ARTHROSCOPY Left 10/22/2019    EXAM UNDER ANESTHESIA VIDEO ARTHROSCOPY LEFT KNEE, PARTIAL MEDIAL MENISCECTOMY,  MEDIAL-FEMORAL CHONDROPLASTY, SYNOVECTOMY, EXCISION OF ADHESIONS performed by Corrie Gaston MD at [address] [place] [address]    fusion   [address] Right 2014    TONSILLECTOMY  1978    UPPER GASTROINTESTINAL ENDOSCOPY N/A 8/16/2021    EGD W/ANES.  (11:30) performed by Geraldo Arias MD at [place] [address]  8/16/2021    EGD CONTROL HEMORRHAGE performed by Geraldo Arias MD at [address] [place] [address],[address]         Social History:    Social History     Tobacco Use    Smoking status: Never Smoker    Smokeless tobacco: Current User   Substance Use Topics    Alcohol use: Yes     Comment: very occasional                                Ready to quit: Not Answered  Counseling given: Not Answered      Vital Signs (Current):   Vitals:    09/09/21 1311 09/13/21 0722   BP:  (!) 173/85   Pulse:  107   Resp:  16   Temp:  97.1 °F (36.2 °C)   TempSrc:  Temporal   SpO2:  99%   Weight: 240 lb (108.9 Anesthesia Evaluation  Patient summary reviewed and Nursing notes reviewed no history of anesthetic complications:   Airway: Mallampati: III     Neck ROM: full   Dental:          Pulmonary:   (+) shortness of breath:                             Cardiovascular:    (+) hypertension:, ARREDONDO:,                   Neuro/Psych:   (+) neuromuscular disease:, psychiatric history:            GI/Hepatic/Renal:   (+) GERD:, liver disease:,           Endo/Other:    (+) Diabetes, . Abdominal:             Vascular: Other Findings:             Anesthesia Plan      MAC     ASA 3     (Medications & allergies reviewed  All available lab & EKG data reviewed)  Induction: intravenous. Anesthetic plan and risks discussed with patient. Plan discussed with CRNA.                   Eliza Willson MD   9/13/2021

## 2021-09-13 NOTE — OP NOTE
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 year old male with history of LANG cirrhosis diagnosed with fibroScan 2021 and esophageal varices s/p banding 21 presents for EGD with possible variceal banding    Procedure Details  Informed consent was obtained for the procedure, including conscious sedation. Risks of pancreatitis, infection, perforation, hemorrhage, adverse drug reaction and aspiration were discussed. The patient was placed in the left lateral decubitus position. Based on the pre-procedure assessment, including review of the patient's medical history, medications, allergies, and review of systems, he had been deemed to be an appropriate candidate for conscious sedation; he was therefore sedated with the medications listed above. He was monitored continuously with ECG tracing, pulse oximetry, blood pressure monitoring, and direct observation. A gastroscope was inserted into the mouth and advanced under direct vision to second portion of the duodenum. A careful inspection was made as the gastroscope was withdrawn, including a retroflexed view of the proximal stomach including views of the incisura and cardia; findings and interventions are described below. Appropriate photodocumentation Was Obtained.   If photos taken, they were ordered to be scanned into the medical record. Findings:  Normal upper esophagus. Three columns of small esophageal varices seen in the distal and mid esophagus, not amenable to band ligation  Irregular Z-line at 40 cm from incisors  Mild erythematous mosaic appearing mucosa in body of stomach suggestive of portal hypertensive gastropathy. Previously biopsied. Area of non bleeding punctate erythematous mucosa in the antrum suggestive gastric antral vascular ectasia (GAVE). Normal duodenal bulb and second portion of duodenum. Specimens: Was Not Obtained    Complications:   None; patient tolerated the procedure well. Disposition:   PACU - hemodynamically stable. Estimated Blood loss:  none    Impression:   Normal upper esophagus. Three columns of small esophageal varices seen in the distal and mid esophagus, not amenable to band ligation  Irregular Z-line at 40 cm from incisors  Mild portal hypertensive gastropathy. Previously biopsied. Area of non bleeding punctate erythematous mucosa in the antrum suggestive gastric antral vascular ectasia (GAVE). Normal duodenal bulb and second portion of duodenum.     Recommendations:  Continue omeprazole 40 mg orally daily   Complete US Right upper quadrant for [address] screening  Follow up with me in 3 months         Grealdo Arias MD 9/13/21 8:21 AM EDT

## 2021-09-13 NOTE — ANESTHESIA POSTPROCEDURE EVALUATION
Department of Anesthesiology  Postprocedure Note    Patient: Marine Walton  MRN: [id number]  YOB: 1955  Date of evaluation: 9/13/2021  Time:  1:17 PM     Procedure Summary     Date: 09/13/21 Room / Location: [facility] / [facility]    Anesthesia Start: 8415 Anesthesia Stop: 8005    Procedure: EGD W/ANES. 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        Anesthesia Post Evaluation    Comments: Postoperative Anesthesia Note    Name:    Marine Walton  MRN:      [id number]    Patient Vitals in the past 12 hrs:  09/13/21 0843, BP:139/73, Pulse:93, Resp:14, SpO2:96 %  09/13/21 0827, BP:(!) 151/80, Temp:97.9 °F (36.6 °C), Temp src:Infrared, Pulse:98, Resp:14, SpO2:96 %  09/13/21 0722, BP:(!) 173/85, Temp:97.1 °F (36.2 °C), Temp src:Temporal, Pulse:107, Resp:16, SpO2:99 %, Height:5' 8\" (1.727 m), Weight:245 lb (111.1 kg)     LABS:    CBC  Lab Results       Component                Value               Date/Time                  WBC                      5.6                 08/16/2021 12:55 PM        HGB                      14.1                08/16/2021 12:55 PM        HCT                      42.5                08/16/2021 12:55 PM        PLT                      128 (L)             08/16/2021 12:55 PM   RENAL  Lab Results       Component                Value               Date/Time                  NA                       132 (L)             08/16/2021 12:55 PM        K                        4.1                 08/16/2021 12:55 PM        CL                       99                  08/16/2021 12:55 PM        CO2                      24                  08/16/2021 12:55 PM        BUN 16                  08/16/2021 12:55 PM        CREATININE               0.8                 08/16/2021 12:55 PM        GLUCOSE                  284 (H)             08/16/2021 12:55 PM   COAGS  Lab Results       Component                Value               Date/Time                  PROTIME                  11.8                08/16/2021 12:55 PM        INR                      1.04                08/16/2021 12:55 PM     Intake & Output: In: 100 (I.V.:100)  Out: -     Nausea & Vomiting:  No    Level of Consciousness:  Awake    Pain Assessment:  Adequate analgesia    Anesthesia Complications:  No apparent anesthetic complications    SUMMARY      Vital signs stable  OK to discharge from Stage I post anesthesia care.   Care transferred from Anesthesiology department on discharge from perioperative area

## 2021-10-10 DIAGNOSIS — K58.0 IRRITABLE BOWEL SYNDROME WITH DIARRHEA: ICD-10-CM

## 2021-10-10 DIAGNOSIS — I10 ESSENTIAL HYPERTENSION: ICD-10-CM

## 2021-10-11 RX ORDER — LOSARTAN POTASSIUM 100 MG/1
TABLET ORAL
Qty: 90 TABLET | Refills: 0 | Status: SHIPPED | OUTPATIENT
Start: 2021-10-11 | End: 2022-01-10

## 2021-10-11 RX ORDER — AMITRIPTYLINE HYDROCHLORIDE 10 MG/1
TABLET, FILM COATED ORAL
Qty: 90 TABLET | Refills: 0 | Status: SHIPPED | OUTPATIENT
Start: 2021-10-11 | End: 2022-01-11

## 2021-10-11 NOTE — TELEPHONE ENCOUNTER
Refill Request     Last Seen: Last Seen Department: 5/5/2021  Last Seen by PCP: Visit date not found    Last Written: 7/19/2021 for #90 tablet with No Refills    Next Appointment:   Future Appointments   Date Time Provider Der [place]   11/8/2021 11:00 AM HO Marcus  [place] [facility] [place]   2/7/2022  3:15 PM [facility]       Future appointment scheduled      Requested Prescriptions     Pending Prescriptions Disp Refills    amitriptyline (ELAVIL) 10 MG tablet [Pharmacy Med Name: AMITRIPTYLINE HCL 10 MG TAB] 90 tablet 0     Sig: TAKE 1 TABLET BY MOUTH EVERY DAY AT NIGHT    losartan (COZAAR) 100 MG tablet [Pharmacy Med Name: LOSARTAN POTASSIUM 100 MG TAB] 90 tablet 0     Sig: TAKE 1 TABLET BY MOUTH EVERY DAY

## 2021-11-29 RX ORDER — HYDROCHLOROTHIAZIDE 25 MG/1
TABLET ORAL
Qty: 90 TABLET | Refills: 1 | Status: SHIPPED | OUTPATIENT
Start: 2021-11-29 | End: 2022-05-06 | Stop reason: SDUPTHER

## 2021-12-03 ENCOUNTER — OFFICE VISIT (OUTPATIENT)
Dept: FAMILY MEDICINE CLINIC | Age: 66
End: 2021-12-03
Payer: COMMERCIAL

## 2021-12-03 VITALS
BODY MASS INDEX: 37.28 KG/M2 | HEART RATE: 105 BPM | SYSTOLIC BLOOD PRESSURE: 126 MMHG | DIASTOLIC BLOOD PRESSURE: 66 MMHG | OXYGEN SATURATION: 97 % | WEIGHT: 246 LBS | HEIGHT: 68 IN | TEMPERATURE: 98 F

## 2021-12-03 DIAGNOSIS — E78.2 MIXED HYPERLIPIDEMIA: ICD-10-CM

## 2021-12-03 DIAGNOSIS — Z23 NEED FOR INFLUENZA VACCINATION: ICD-10-CM

## 2021-12-03 DIAGNOSIS — K74.60 LIVER CIRRHOSIS SECONDARY TO NASH (HCC): ICD-10-CM

## 2021-12-03 DIAGNOSIS — I10 ESSENTIAL HYPERTENSION: ICD-10-CM

## 2021-12-03 DIAGNOSIS — E11.65 TYPE 2 DIABETES MELLITUS WITH HYPERGLYCEMIA, WITH LONG-TERM CURRENT USE OF INSULIN (HCC): ICD-10-CM

## 2021-12-03 DIAGNOSIS — Z79.4 TYPE 2 DIABETES MELLITUS WITH HYPERGLYCEMIA, WITH LONG-TERM CURRENT USE OF INSULIN (HCC): ICD-10-CM

## 2021-12-03 DIAGNOSIS — K75.81 LIVER CIRRHOSIS SECONDARY TO NASH (HCC): ICD-10-CM

## 2021-12-03 DIAGNOSIS — L02.211 ABSCESS OF SKIN OF ABDOMEN: Primary | ICD-10-CM

## 2021-12-03 LAB — HBA1C MFR BLD: 11.7 %

## 2021-12-03 PROCEDURE — G0008 ADMIN INFLUENZA VIRUS VAC: HCPCS | Performed by: PHYSICIAN ASSISTANT

## 2021-12-03 PROCEDURE — 99214 OFFICE O/P EST MOD 30 MIN: CPT | Performed by: PHYSICIAN ASSISTANT

## 2021-12-03 PROCEDURE — 90694 VACC AIIV4 NO PRSRV 0.5ML IM: CPT | Performed by: PHYSICIAN ASSISTANT

## 2021-12-03 PROCEDURE — 83036 HEMOGLOBIN GLYCOSYLATED A1C: CPT | Performed by: PHYSICIAN ASSISTANT

## 2021-12-03 RX ORDER — CEPHALEXIN 500 MG/1
500 CAPSULE ORAL 4 TIMES DAILY
Qty: 28 CAPSULE | Refills: 0 | Status: SHIPPED | OUTPATIENT
Start: 2021-12-03 | End: 2021-12-10

## 2021-12-03 RX ORDER — SULFAMETHOXAZOLE AND TRIMETHOPRIM 800; 160 MG/1; MG/1
1 TABLET ORAL 2 TIMES DAILY
Qty: 10 TABLET | Refills: 0 | Status: SHIPPED | OUTPATIENT
Start: 2021-12-03 | End: 2021-12-08

## 2021-12-03 RX ORDER — TIZANIDINE 4 MG/1
4 TABLET ORAL 3 TIMES DAILY PRN
Qty: 30 TABLET | Refills: 1 | Status: SHIPPED | OUTPATIENT
Start: 2021-12-03 | End: 2022-05-06 | Stop reason: SDUPTHER

## 2021-12-03 ASSESSMENT — ENCOUNTER SYMPTOMS
ABDOMINAL PAIN: 0
SORE THROAT: 0
RHINORRHEA: 0
COUGH: 0
VOMITING: 0
NAUSEA: 0
DIARRHEA: 0
CONSTIPATION: 0
SHORTNESS OF BREATH: 0

## 2021-12-03 NOTE — PROGRESS NOTES
12/3/2021  Edward Dukes (: 1955)  74 y.o.    ASSESSMENT and PLAN:  Familia Styles was seen today for hypertension and diabetes. Diagnoses and all orders for this visit:    Abscess of skin of abdomen  -     sulfamethoxazole-trimethoprim (BACTRIM DS;SEPTRA DS) 800-160 MG per tablet; Take 1 tablet by mouth 2 times daily for 5 days  -     cephALEXin (KEFLEX) 500 MG capsule; Take 1 capsule by mouth 4 times daily for 7 days  - close monitoring given uncontrolled diabetes. Type 2 diabetes mellitus with hyperglycemia, with long-term current use of insulin (HCC)  -     POCT glycosylated hemoglobin (Hb A1C) 11.7  - remains uncontrolled, due for follow up with endo, encouraged to schedule. Need for influenza vaccination  -     INFLUENZA, QUADV, ADJUVANTED, 72 YRS =, IM, PF, PREFILL SYR, 0.5ML (FLUAD)    Liver cirrhosis secondary to LANG ([address]  - mgmt per GI. Mixed hyperlipidemia  - Due for labs - order palced. Pt not fasting today, will return Monday. Essential hypertension  - stable, continue on current regimen. Other orders  -     tiZANidine (ZANAFLEX) 4 MG tablet; Take 1 tablet by mouth 3 times daily as needed (back pain)      Return in about 6 months (around 6/3/2022) for HTN, Hyperlipidemia, Diabetes Mellitus. HPI     Boil on the underside of his panus. Getting larger and painful  Denies drainage. No fevers, chills. DM: on 70/30 50 unitsBID with 100 units nightly, has been inconsistently monitoring BG. Noncompliant with low carb diet. On arb and statin. Neuropathy stable on gabapentin. Saw sarath 2021 was scheduled for labs, has not yet completed. Updated DM eye exam.     HTN: on losartan and hctz. Nml in office today. Patient denies HA, CP, soa, LE swelling.      HLD: on simvastatin 40 mg daily, no current diet or exercise regimen. LANG Cirrhosis: following with Dr. Lane Tsai. S/p esophageal banding 2021. FibroScan 2021 fatty liver with cirrhosis (F4).      Review of Systems Constitutional: Negative for activity change, chills and fever. HENT: Negative for congestion, ear pain, rhinorrhea and sore throat. Eyes: Negative for visual disturbance. Respiratory: Negative for cough and shortness of breath. Cardiovascular: Negative for chest pain and palpitations. Gastrointestinal: Negative for abdominal pain, constipation, diarrhea, nausea and vomiting. Genitourinary: Negative for difficulty urinating and dysuria. Musculoskeletal: Negative for arthralgias and myalgias. Skin: Negative for rash. Neurological: Negative for dizziness, weakness and numbness. Psychiatric/Behavioral: Negative for sleep disturbance. Allergies, past medical history, family history, and social history reviewed and unchanged from previous encounter.      Current Outpatient Medications   Medication Sig Dispense Refill    tiZANidine (ZANAFLEX) 4 MG tablet Take 1 tablet by mouth 3 times daily as needed (back pain) 30 tablet 1    sulfamethoxazole-trimethoprim (BACTRIM DS;SEPTRA DS) 800-160 MG per tablet Take 1 tablet by mouth 2 times daily for 5 days 10 tablet 0    cephALEXin (KEFLEX) 500 MG capsule Take 1 capsule by mouth 4 times daily for 7 days 28 capsule 0    hydroCHLOROthiazide (HYDRODIURIL) 25 MG tablet TAKE 1 TABLET BY MOUTH EVERY DAY 90 tablet 1    amitriptyline (ELAVIL) 10 MG tablet TAKE 1 TABLET BY MOUTH EVERY DAY AT NIGHT 90 tablet 0    losartan (COZAAR) 100 MG tablet TAKE 1 TABLET BY MOUTH EVERY DAY 90 tablet 0    omeprazole (PRILOSEC) 40 MG delayed release capsule TAKE 1 CAPSULE BY MOUTH EVERY DAY 90 capsule 2    Continuous Blood Gluc Sensor (FREESTYLE ELIZABETH 14 DAY SENSOR) MISC 1 Units by Does not apply route every 14 days 2 each 5    Continuous Blood Gluc  (FREESTYLE ELIZABETH 14 DAY READER) SOUMYA 1 Units by Does not apply route daily 1 Device 0    montelukast (SINGULAIR) 10 MG tablet TAKE 1 TABLET BY MOUTH EVERY DAY AT NIGHT 90 tablet 1    azelastine (ASTELIN) 0.1 % nasal spray 1 spray by Nasal route 2 times daily Use in each nostril as directed 1 Bottle 5    Insulin Syringe-Needle U-100 30G X 1/2\" 1 ML MISC Use for insulin 3 times daily 300 each 1    simvastatin (ZOCOR) 40 MG tablet TAKE 1 TABLET BY MOUTH NIGHTLY. 90 tablet 1    albuterol sulfate  (90 Base) MCG/ACT inhaler INHALE 2 PUFFS INTO THE LUNGS 4 TIMES DAILY AS NEEDED FOR WHEEZING 8.5 Inhaler 0    albuterol (PROVENTIL) (2.5 MG/3ML) 0.083% nebulizer solution Take 3 mLs by nebulization every 4 hours as needed for Wheezing 25 vial 3    Blood Glucose Monitoring Suppl (ONE TOUCH ULTRA 2) w/Device KIT 1 kit by Does not apply route daily 1 kit 0    Insulin Pen Needle (PEN NEEDLES) 31G X 6 MM MISC Use with insulin 4 times daily. 200 each 5    glucose blood VI test strips (ONE TOUCH ULTRA TEST) strip USE AS DIRECTED 3 TIMES A DAY Dx Code E11.9 100 strip 5    acetaminophen (TYLENOL) 500 MG tablet Take 500 mg by mouth every 6 hours as needed for Pain      ONETOUCH DELICA LANCETS 36M MISC TEST 3 TIMES A  each 12    insulin 70-30 (NOVOLIN 70/30) (70-30) 100 UNIT per ML injection vial Inject 50 Units into the skin 3 times daily 135 mL 1    gabapentin (NEURONTIN) 300 MG capsule TAKE 1 CAPSULE BY MOUTH THREE TIMES A DAY 90 capsule 0     No current facility-administered medications for this visit. Vitals:    12/03/21 0906   BP: 126/66   Site: Right Upper Arm   Position: Sitting   Cuff Size: Large Adult   Pulse: 105   Temp: 98 °F (36.7 °C)   TempSrc: Oral   SpO2: 97%   Weight: 246 lb (111.6 kg)   Height: 5' 8\" (1.727 m)     Estimated body mass index is 37.4 kg/m² as calculated from the following:    Height as of this encounter: 5' 8\" (1.727 m). Weight as of this encounter: 246 lb (111.6 kg). Physical Exam  Constitutional:       General: He is not in acute distress. Appearance: He is well-developed. HENT:      Head: Normocephalic and atraumatic.    Eyes:      Conjunctiva/sclera: Conjunctivae normal.      Pupils: Pupils are equal, round, and reactive to light. Cardiovascular:      Rate and Rhythm: Normal rate and regular rhythm. Heart sounds: Normal heart sounds. No murmur heard. Pulmonary:      Effort: Pulmonary effort is normal.      Breath sounds: Normal breath sounds. No wheezing. Musculoskeletal:      Cervical back: Neck supple. Lymphadenopathy:      Cervical: No cervical adenopathy. Skin:     General: Skin is warm and dry. Findings: No rash. Neurological:      Mental Status: He is alert and oriented to person, place, and time. Deep Tendon Reflexes: Reflexes are normal and symmetric.

## 2021-12-06 DIAGNOSIS — K75.81 LIVER CIRRHOSIS SECONDARY TO NASH (HCC): ICD-10-CM

## 2021-12-06 DIAGNOSIS — E11.9 TYPE 2 DIABETES MELLITUS WITHOUT COMPLICATION, WITH LONG-TERM CURRENT USE OF INSULIN (HCC): ICD-10-CM

## 2021-12-06 DIAGNOSIS — Z79.4 TYPE 2 DIABETES MELLITUS WITH HYPERGLYCEMIA, WITH LONG-TERM CURRENT USE OF INSULIN (HCC): ICD-10-CM

## 2021-12-06 DIAGNOSIS — Z79.4 TYPE 2 DIABETES MELLITUS WITHOUT COMPLICATION, WITH LONG-TERM CURRENT USE OF INSULIN (HCC): ICD-10-CM

## 2021-12-06 DIAGNOSIS — E78.2 MIXED HYPERLIPIDEMIA: ICD-10-CM

## 2021-12-06 DIAGNOSIS — K74.60 LIVER CIRRHOSIS SECONDARY TO NASH (HCC): ICD-10-CM

## 2021-12-06 DIAGNOSIS — E11.65 TYPE 2 DIABETES MELLITUS WITH HYPERGLYCEMIA, WITH LONG-TERM CURRENT USE OF INSULIN (HCC): ICD-10-CM

## 2021-12-06 DIAGNOSIS — I10 ESSENTIAL HYPERTENSION: ICD-10-CM

## 2021-12-06 LAB
A/G RATIO: 1.7 (ref 1.1–2.2)
ALBUMIN SERPL-MCNC: 4.7 G/DL (ref 3.4–5)
ALP BLD-CCNC: 150 U/L (ref 40–129)
ALT SERPL-CCNC: 27 U/L (ref 10–40)
ANION GAP SERPL CALCULATED.3IONS-SCNC: 19 MMOL/L (ref 3–16)
AST SERPL-CCNC: 30 U/L (ref 15–37)
BILIRUB SERPL-MCNC: 0.7 MG/DL (ref 0–1)
BUN BLDV-MCNC: 23 MG/DL (ref 7–20)
CALCIUM SERPL-MCNC: 10.1 MG/DL (ref 8.3–10.6)
CHLORIDE BLD-SCNC: 97 MMOL/L (ref 99–110)
CHOLESTEROL, TOTAL: 154 MG/DL (ref 0–199)
CO2: 22 MMOL/L (ref 21–32)
CREAT SERPL-MCNC: 1.3 MG/DL (ref 0.8–1.3)
GFR AFRICAN AMERICAN: >60
GFR NON-AFRICAN AMERICAN: 55
GLUCOSE BLD-MCNC: 260 MG/DL (ref 70–99)
HDLC SERPL-MCNC: 57 MG/DL (ref 40–60)
LDL CHOLESTEROL CALCULATED: 80 MG/DL
POTASSIUM SERPL-SCNC: 4.1 MMOL/L (ref 3.5–5.1)
SODIUM BLD-SCNC: 138 MMOL/L (ref 136–145)
TOTAL PROTEIN: 7.4 G/DL (ref 6.4–8.2)
TRIGL SERPL-MCNC: 85 MG/DL (ref 0–150)
VLDLC SERPL CALC-MCNC: 17 MG/DL

## 2021-12-07 LAB
ESTIMATED AVERAGE GLUCOSE: 289.1 MG/DL
HBA1C MFR BLD: 11.7 %

## 2021-12-09 LAB — C-PEPTIDE: 3 NG/ML (ref 1.1–4.4)

## 2021-12-11 LAB — GLUTAMIC ACID DECARB AB: <5 IU/ML (ref 0–5)

## 2022-01-09 DIAGNOSIS — K58.0 IRRITABLE BOWEL SYNDROME WITH DIARRHEA: ICD-10-CM

## 2022-01-09 NOTE — TELEPHONE ENCOUNTER
.  Refill Request     Last Seen: Last Seen Department: 12/3/2021  Last Seen by PCP: Visit date not found    Last Written: 10/11/21 90 with 0     Next Appointment:   Future Appointments   Date Time Provider Dre Evans   2/7/2022  3:15 PM MD ABDI Castillo  [place] [facility] [place]         Requested Prescriptions     Pending Prescriptions Disp Refills    amitriptyline (ELAVIL) 10 MG tablet [Pharmacy Med Name: AMITRIPTYLINE HCL 10 MG TAB] 90 tablet 0     Sig: TAKE 1 TABLET BY MOUTH EVERY DAY AT NIGHT

## 2022-01-10 DIAGNOSIS — I10 ESSENTIAL HYPERTENSION: ICD-10-CM

## 2022-01-10 NOTE — TELEPHONE ENCOUNTER
.  Refill Request     Last Seen: Last Seen Department: 12/3/2021  Last Seen by PCP: Visit date not found    Last Written: 10-11-21 90 with 0     Next Appointment:   Future Appointments   Date Time Provider Dre Evans   2/7/2022  3:15 PM MD ABDI Myrick  [place] [facility]       Future appointment scheduled      Requested Prescriptions     Pending Prescriptions Disp Refills    losartan (COZAAR) 100 MG tablet [Pharmacy Med Name: LOSARTAN POTASSIUM 100 MG TAB] 90 tablet 1     Sig: TAKE 1 TABLET BY MOUTH EVERY DAY

## 2022-01-10 NOTE — TELEPHONE ENCOUNTER
Refill Request     Last Seen: Last Seen Department: 12/3/2021  Last Seen by PCP: 8/25/20     Last Written: 7/14/21 90 tablet 1 refill     Next Appointment: 2/7/22     Future Appointments   Date Time Provider Dre Evans   2/7/2022  3:15 PM MD ABDI Baker  [place] [facility]       Future appointment scheduled      Requested Prescriptions     Pending Prescriptions Disp Refills    montelukast (SINGULAIR) 10 MG tablet [Pharmacy Med Name: MONTELUKAST SOD 10 MG TABLET] 90 tablet 1     Sig: TAKE 1 TABLET BY MOUTH EVERY DAY AT NIGHT

## 2022-01-11 RX ORDER — LOSARTAN POTASSIUM 100 MG/1
TABLET ORAL
Qty: 90 TABLET | Refills: 1 | Status: SHIPPED | OUTPATIENT
Start: 2022-01-11 | End: 2022-07-29

## 2022-01-11 RX ORDER — MONTELUKAST SODIUM 10 MG/1
TABLET ORAL
Qty: 90 TABLET | Refills: 1 | Status: SHIPPED | OUTPATIENT
Start: 2022-01-11 | End: 2022-07-29

## 2022-01-11 RX ORDER — AMITRIPTYLINE HYDROCHLORIDE 10 MG/1
TABLET, FILM COATED ORAL
Qty: 90 TABLET | Refills: 0 | Status: SHIPPED | OUTPATIENT
Start: 2022-01-11 | End: 2022-05-06 | Stop reason: SDUPTHER

## 2022-02-07 ENCOUNTER — VIRTUAL VISIT (OUTPATIENT)
Dept: FAMILY MEDICINE CLINIC | Age: 67
End: 2022-02-07
Payer: COMMERCIAL

## 2022-02-07 DIAGNOSIS — E66.01 SEVERE OBESITY (BMI 35.0-39.9) WITH COMORBIDITY (HCC): ICD-10-CM

## 2022-02-07 DIAGNOSIS — Z00.00 ROUTINE GENERAL MEDICAL EXAMINATION AT A HEALTH CARE FACILITY: Primary | ICD-10-CM

## 2022-02-07 PROCEDURE — G0439 PPPS, SUBSEQ VISIT: HCPCS | Performed by: INTERNAL MEDICINE

## 2022-02-07 ASSESSMENT — PATIENT HEALTH QUESTIONNAIRE - PHQ9
4. FEELING TIRED OR HAVING LITTLE ENERGY: 0
3. TROUBLE FALLING OR STAYING ASLEEP: 0
SUM OF ALL RESPONSES TO PHQ QUESTIONS 1-9: 0
7. TROUBLE CONCENTRATING ON THINGS, SUCH AS READING THE NEWSPAPER OR WATCHING TELEVISION: 0
SUM OF ALL RESPONSES TO PHQ QUESTIONS 1-9: 0
8. MOVING OR SPEAKING SO SLOWLY THAT OTHER PEOPLE COULD HAVE NOTICED. OR THE OPPOSITE, BEING SO FIGETY OR RESTLESS THAT YOU HAVE BEEN MOVING AROUND A LOT MORE THAN USUAL: 0
5. POOR APPETITE OR OVEREATING: 0
SUM OF ALL RESPONSES TO PHQ QUESTIONS 1-9: 0
9. THOUGHTS THAT YOU WOULD BE BETTER OFF DEAD, OR OF HURTING YOURSELF: 0
SUM OF ALL RESPONSES TO PHQ QUESTIONS 1-9: 0
2. FEELING DOWN, DEPRESSED OR HOPELESS: 0
6. FEELING BAD ABOUT YOURSELF - OR THAT YOU ARE A FAILURE OR HAVE LET YOURSELF OR YOUR FAMILY DOWN: 0
10. IF YOU CHECKED OFF ANY PROBLEMS, HOW DIFFICULT HAVE THESE PROBLEMS MADE IT FOR YOU TO DO YOUR WORK, TAKE CARE OF THINGS AT HOME, OR GET ALONG WITH OTHER PEOPLE: 0

## 2022-02-07 NOTE — PROGRESS NOTES
Medicare Annual Wellness Visit  Are Name: Pati Tucker Date: 2022   MRN: <[id number]> Sex: Male   Age: 74 y.o. Ethnicity: Non- / Non    : 1955 Race: White (non-)      Saul Sheehan is here for Medicare AWV    Screenings for behavioral, psychosocial and functional/safety risks, and cognitive dysfunction are all negative except as indicated below. These results, as well as other patient data from the [address] form, are documented in Flowsheets linked to this Encounter. Allergies   Allergen Reactions    Aspirin Other (See Comments)     Takes baby aspirin without problems    Caused bleeding in the bowel    Oxycontin [Oxycodone Hcl] Nausea And Vomiting       Prior to Visit Medications    Medication Sig Taking?  Authorizing Provider   amitriptyline (ELAVIL) 10 MG tablet TAKE 1 TABLET BY MOUTH EVERY DAY AT NIGHT Yes Lenka Medel MD   losartan (COZAAR) 100 MG tablet TAKE 1 TABLET BY MOUTH EVERY DAY Yes Lenka Medel MD   montelukast (SINGULAIR) 10 MG tablet TAKE 1 TABLET BY MOUTH EVERY DAY AT NIGHT Yes Lenka Medel MD   tiZANidine (ZANAFLEX) 4 MG tablet Take 1 tablet by mouth 3 times daily as needed (back pain) Yes HO Bowen   hydroCHLOROthiazide (HYDRODIURIL) 25 MG tablet TAKE 1 TABLET BY MOUTH EVERY DAY Yes KATIE Avalos CNP   omeprazole (PRILOSEC) 40 MG delayed release capsule TAKE 1 CAPSULE BY MOUTH EVERY DAY Yes Berto Hernandez MD   Continuous Blood Gluc Sensor (FREESTYLE ELIZABETH 14 DAY SENSOR) MISC 1 Units by Does not apply route every 14 days Yes KATIE Coffman CNP   Continuous Blood Gluc  (FREESTYLE ELIZABETH 14 DAY READER) SOUMYA 1 Units by Does not apply route daily Yes KATIE Coffman CNP   azelastine (ASTELIN) 0.1 % nasal spray 1 spray by Nasal route 2 times daily Use in each nostril as directed Yes HO Bowen   Insulin Syringe-Needle U-100 30G X 1/2\" 1 ML MISC Use for insulin 3 times daily Yes Madelyn Mazariegos MD   simvastatin (ZOCOR) 40 MG tablet TAKE 1 TABLET BY MOUTH NIGHTLY. Yes Madelyn Mazariegos MD   albuterol sulfate  (90 Base) MCG/ACT inhaler INHALE 2 PUFFS INTO THE LUNGS 4 TIMES DAILY AS NEEDED FOR WHEEZING Yes HO Terrell   albuterol (PROVENTIL) (2.5 MG/3ML) 0.083% nebulizer solution Take 3 mLs by nebulization every 4 hours as needed for Wheezing Yes HO Terrell   Blood Glucose Monitoring Suppl (ONE TOUCH ULTRA 2) w/Device KIT 1 kit by Does not apply route daily Yes HO Terrell   Insulin Pen Needle (PEN NEEDLES) 31G X 6 MM MISC Use with insulin 4 times daily.  Yes Madelyn Mazariegos MD   glucose blood VI test strips (ONE TOUCH ULTRA TEST) strip USE AS DIRECTED 3 TIMES A DAY Dx Code E11.9 Yes HO Terrell   acetaminophen (TYLENOL) 500 MG tablet Take 500 mg by mouth every 6 hours as needed for Pain Yes Historical Provider, MD Aby Cruz [address] [facility] TEST [address] Larissa Arauz MD   insulin 70-30 (NOVOLIN 70/30) (70-30) 100 UNIT per ML injection vial Inject 50 Units into the skin 3 times daily  HO Terrell   gabapentin (NEURONTIN) 300 MG capsule TAKE 1 CAPSULE BY MOUTH THREE TIMES A DAY  HO Terrell       Past Medical History:   Diagnosis Date    Cervical radiculopathy 4/22/2016    Chronic pain     GERD (gastroesophageal reflux disease)     Hyperlipidemia     Hypertension     Liver disease     Mild episode of recurrent major depressive disorder ([address] 2/8/2018    Morbidly obese ([address] 8/25/2020    PONV (postoperative nausea and vomiting)     Type II or unspecified type diabetes mellitus without mention of complication, not stated as uncontrolled        Past Surgical History:   Procedure Laterality Date    CERVICAL SPINE SURGERY  2014    fusion   [id number]  2005    Normal    KNEE ARTHROSCOPY Left 10/22/2019    EXAM UNDER ANESTHESIA VIDEO ARTHROSCOPY LEFT KNEE, PARTIAL MEDIAL MENISCECTOMY, MEDIAL-FEMORAL CHONDROPLASTY, SYNOVECTOMY, EXCISION OF ADHESIONS performed by Magdalene Taylor MD at [address] [place] [address]    fusion   [address] Right 2014    TONSILLECTOMY  1978    UPPER GASTROINTESTINAL ENDOSCOPY N/A 8/16/2021    EGD W/ANES. (11:30) performed by Marylene Messier, MD at [place] [address]  8/16/2021    EGD CONTROL HEMORRHAGE performed by Marylene Messier, MD at [place] [address]  09/13/2021    Portal Hypertension; Varices    UPPER GASTROINTESTINAL ENDOSCOPY  09/13/2021    bands removed    UPPER GASTROINTESTINAL ENDOSCOPY N/A 9/13/2021    EGD W/ANES. (8:00) performed by Marylene Messier, MD at [facility] [facility]         Family History   Problem Relation Age of Onset    Cancer Mother     Cancer Father     Heart Disease Father     Diabetes Father     Heart Disease Brother        CareTeam (Including outside providers/suppliers regularly involved in providing care):   Patient Care Team:  Madelyn Mazariegos MD as PCP - Carol Bell MD as PCP - [facility]    Wt Readings from Last 3 Encounters:   12/03/21 246 lb (111.6 kg)   09/13/21 245 lb (111.1 kg)   08/16/21 245 lb (111.1 kg)      Patient-Reported Vitals 2/7/2022   Patient-Reported Weight 240   Patient-Reported Height 5 8   Patient-Reported Systolic 438   Patient-Reported Diastolic 56   Patient-Reported Pulse 72   Patient-Reported Temperature -      There is no height or weight on file to calculate BMI. Based upon direct observation of the patient, evaluation of cognition reveals recent and remote memory intact. Patient's complete Health Risk Assessment and screening values have been reviewed and are found in Flowsheets. The following problems were reviewed today and where indicated follow up appointments were made and/or referrals ordered.     Positive Risk Factor Screenings with Interventions:         Substance History:  Social History     Tobacco History     Smoking Status  Never Smoker    Smokeless Tobacco Use  Current User          Alcohol History     Alcohol Use Status  Yes Comment  very occasional          Drug Use     Drug Use Status  No          Sexual Activity     Sexually Active  Yes Partners  Female               Alcohol Screening:       A score of 8 or more is associated with harmful or hazardous drinking. A score of 13 or more in women, and 15 or more in men, is likely to indicate alcohol dependence.   Substance Abuse Interventions:  · stable       Health Habits/Nutrition:        Health Habits/Nutrition Interventions:  · Dental exam overdue:  patient encouraged to make appointment with his/her dentist         Personalized Preventive Plan   Current Health Maintenance Status  Immunization History   Administered Date(s) Administered    COVID-19, Domingo Yokasta, Primary or Immunocompromised, PF, 100mcg/0.5mL 05/12/2021, 06/09/2021    Influenza 10/16/2013    Influenza Vaccine, unspecified formulation 10/15/2016    Influenza, High Dose (Fluzone 65 yrs and older) 10/24/2018, 11/14/2019    Influenza, Intradermal, Preservative free 09/28/2012    Influenza, Quadv, IM, PF (6 mo and older Fluzone, Flulaval, Fluarix, and 3 yrs and older Afluria) 08/29/2017    Influenza, Quadv, adjuvanted, 65 yrs +, IM, PF (Fluad) 09/28/2020, 12/03/2021    Pneumococcal Conjugate 13-valent ([id number]) 06/15/2015    Pneumococcal Polysaccharide ([id number]) 08/16/2016    Td (Adult), 2 Lf Tetanus Toxoid, Pf (Td, Absorbed) 08/25/2020    Tdap (Boostrix, Adacel) 07/10/2009        Health Maintenance   Topic Date Due    Hepatitis A vaccine (1 of 2 - Risk 2-dose series) Never done    Depression Monitoring  Never done    Shingles Vaccine (1 of 2) Never done    PSA counseling  03/30/2015    Pneumococcal 65+ years Vaccine (2 of 2 - PPSV23) 08/16/2021    COVID-19 Vaccine (3 - Booster for Moderna series) 11/09/2021    Diabetic foot exam  12/28/2021    Diabetic microalbuminuria test  12/28/2021    Annual Wellness Visit (AWV)  02/05/2022    A1C test (Diabetic or Prediabetic)  03/06/2022    Diabetic retinal exam  03/30/2022    Lipid screen  12/06/2022    Potassium monitoring  12/06/2022    Creatinine monitoring  12/06/2022    Colon cancer screen fecal DNA test (Cologuard)  03/04/2024    DTaP/Tdap/Td vaccine (3 - Td or Tdap) 08/25/2030    Flu vaccine  Completed    Hepatitis C screen  Completed    Hib vaccine  Aged Out    Meningococcal (ACWY) vaccine  Aged Out     Recommendations for TranSi[employer] Due: see orders and patient instructions/AVS.  . Recommended screening schedule for the next 5-10 years is provided to the patient in written form: see Patient Instructions/AVS.    There are no diagnoses linked to this encounter. Chauncey Price is a 74 y.o. male being evaluated by a Virtual Visit (phone) encounter to address concerns as mentioned above. A caregiver was present when appropriate. Due to this being a TeleHealth encounter (During [facility] public health emergency), evaluation of the following organ systems was limited: Vitals/Constitutional/EENT/Resp/CV/GI//MS/Neuro/Skin/Heme-Lymph-Imm. Pursuant to the emergency declaration under the [address] [place] [address], [address] [place] [address] authority and the [employer] Act, this Virtual Visit was conducted with patient's (and/or legal guardian's) consent, to reduce the patient's risk of exposure to COVID-19 and provide necessary medical care. The patient (and/or legal guardian) has also been advised to contact this office for worsening conditions or problems, and seek emergency medical treatment and/or call 911 if deemed necessary.      Patient identification was verified at the start of the visit: Yes    Services were provided through phone to substitute for in-person clinic visit. Patient and provider were located at their individual homes. --Mita Bruce MD on 2/7/2022 at 3:04 PM    An electronic signature was used to authenticate this note.

## 2022-02-07 NOTE — PATIENT INSTRUCTIONS
Personalized Preventive Plan for Dylan Slater - 2/7/2022  Medicare offers a range of preventive health benefits. Some of the tests and screenings are paid in full while other may be subject to a deductible, co-insurance, and/or copay. Some of these benefits include a comprehensive review of your medical history including lifestyle, illnesses that may run in your family, and various assessments and screenings as appropriate. After reviewing your medical record and screening and assessments performed today your provider may have ordered immunizations, labs, imaging, and/or referrals for you. A list of these orders (if applicable) as well as your Preventive Care list are included within your After Visit Summary for your review. Other Preventive Recommendations:    · A preventive eye exam performed by an eye specialist is recommended every 1-2 years to screen for glaucoma; cataracts, macular degeneration, and other eye disorders. · A preventive dental visit is recommended every 6 months. · Try to get at least 150 minutes of exercise per week or 10,000 steps per day on a pedometer . · Order or download the FREE \"Exercise & Physical Activity: Your Everyday Guide\" from [employer]. Call 7-593.438.2171 or search [employer] online. · You need 4096-6139 mg of calcium and 1838-6715 IU of vitamin D per day. It is possible to meet your calcium requirement with diet alone, but a vitamin D supplement is usually necessary to meet this goal.  · When exposed to the sun, use a sunscreen that protects against both UVA and UVB radiation with an SPF of 30 or greater. Reapply every 2 to 3 hours or after sweating, drying off with a towel, or swimming. · Always wear a seat belt when traveling in a car. Always wear a helmet when riding a bicycle or motorcycle.

## 2022-04-04 DIAGNOSIS — K58.0 IRRITABLE BOWEL SYNDROME WITH DIARRHEA: ICD-10-CM

## 2022-04-04 RX ORDER — DICYCLOMINE HYDROCHLORIDE 10 MG/1
CAPSULE ORAL
Qty: 360 CAPSULE | Refills: 1 | OUTPATIENT
Start: 2022-04-04

## 2022-04-04 NOTE — TELEPHONE ENCOUNTER
Refill Request     Last Seen: Last Seen Department: 2/7/2022  Last Seen by PCP: 12/3/2021    Last Written: 6/11/2021    Next Appointment:   Future Appointments   Date Time Provider Dre Evans   5/6/2022  1:00 PM Jaxon Dyson, KATIE - CNP [facility] [place] [facility]       Future appointment scheduled      Requested Prescriptions     Pending Prescriptions Disp Refills    dicyclomine (BENTYL) 10 MG capsule [Pharmacy Med Name: DICYCLOMINE 10 MG CAPSULE] 360 capsule 1     Sig: TAKE 1 CAPSULE BY MOUTH FOUR TIMES A DAY

## 2022-04-04 NOTE — TELEPHONE ENCOUNTER
.  Refill Request     Last Seen: Last Seen Department: 2/7/2022  Last Seen by PCP: 2/7/2022    Last Written: 8- 300 with 1     Next Appointment:   Future Appointments   Date Time Provider Dre Evans   5/6/2022  1:00 PM KATIE Rodgers - CNP [facility] [place] [facility]       Future appointment scheduled      Requested Prescriptions     Pending Prescriptions Disp Refills    Insulin Syringe-Needle U-100 30G X 1/2\" 1 ML MISC 300 each 1     Sig: Use for insulin 3 times daily

## 2022-05-04 RX ORDER — TIZANIDINE 4 MG/1
4 TABLET ORAL 3 TIMES DAILY PRN
Qty: 30 TABLET | Refills: 1 | Status: CANCELLED | OUTPATIENT
Start: 2022-05-04

## 2022-05-04 NOTE — TELEPHONE ENCOUNTER
Refill Request     CONFIRM preferrred pharmacy with the patient. If Mail Order Rx - Pend for 90 day refill.       Last Seen: Last Seen Department: 2/7/2022  Last Seen by PCP: 12/3/2021    Last Written: 12/03/2021 with 1 refill    Next Appointment:   Future Appointments   Date Time Provider Dre Evans   5/6/2022  1:00 PM Jerome Dyson, APRN - CNP [facility]  [place] [facility] [place]       Future appointment scheduled      Requested Prescriptions     Pending Prescriptions Disp Refills    tiZANidine (ZANAFLEX) 4 MG tablet 30 tablet 1     Sig: Take 1 tablet by mouth 3 times daily as needed (back pain)

## 2022-05-06 ENCOUNTER — OFFICE VISIT (OUTPATIENT)
Dept: FAMILY MEDICINE CLINIC | Age: 67
End: 2022-05-06
Payer: COMMERCIAL

## 2022-05-06 VITALS
WEIGHT: 239 LBS | BODY MASS INDEX: 36.34 KG/M2 | HEART RATE: 73 BPM | OXYGEN SATURATION: 95 % | DIASTOLIC BLOOD PRESSURE: 66 MMHG | SYSTOLIC BLOOD PRESSURE: 116 MMHG

## 2022-05-06 DIAGNOSIS — M25.542 ARTHRALGIA OF LEFT HAND: ICD-10-CM

## 2022-05-06 DIAGNOSIS — I85.10 SECONDARY ESOPHAGEAL VARICES WITHOUT BLEEDING (HCC): ICD-10-CM

## 2022-05-06 DIAGNOSIS — E11.65 TYPE 2 DIABETES MELLITUS WITH HYPERGLYCEMIA, WITH LONG-TERM CURRENT USE OF INSULIN (HCC): Primary | ICD-10-CM

## 2022-05-06 DIAGNOSIS — K58.0 IRRITABLE BOWEL SYNDROME WITH DIARRHEA: ICD-10-CM

## 2022-05-06 DIAGNOSIS — I10 ESSENTIAL HYPERTENSION: ICD-10-CM

## 2022-05-06 DIAGNOSIS — F33.0 MILD EPISODE OF RECURRENT MAJOR DEPRESSIVE DISORDER (HCC): ICD-10-CM

## 2022-05-06 DIAGNOSIS — Z79.4 TYPE 2 DIABETES MELLITUS WITH HYPERGLYCEMIA, WITH LONG-TERM CURRENT USE OF INSULIN (HCC): Primary | ICD-10-CM

## 2022-05-06 DIAGNOSIS — E78.5 HYPERLIPIDEMIA, UNSPECIFIED HYPERLIPIDEMIA TYPE: ICD-10-CM

## 2022-05-06 PROBLEM — N18.30 CHRONIC RENAL DISEASE, STAGE III (HCC): Status: ACTIVE | Noted: 2022-05-06

## 2022-05-06 PROCEDURE — 82044 UR ALBUMIN SEMIQUANTITATIVE: CPT | Performed by: NURSE PRACTITIONER

## 2022-05-06 PROCEDURE — 99213 OFFICE O/P EST LOW 20 MIN: CPT | Performed by: NURSE PRACTITIONER

## 2022-05-06 NOTE — PROGRESS NOTES
2022  Galdino Marte (: 1955)  74 y.o.    ASSESSMENT and PLAN:  Roberth Clark was seen today for diabetes. Diagnoses and all orders for this visit:    Type 2 diabetes mellitus with hyperglycemia, with long-term current use of insulin ([address]  -     POCT microalbumin  -      DIABETES FOOT EXAM  -Update microalbumin. -A1c improved, not at goal.  -foot exam completed. -Continue weight loss, strict carb control diet, increase exercise.   -Educated on risk of uncontrolled sugars, pt verbalized understanding. -f/u for A1C in 3 months.   -Motivated for lifestyle changes. Not interested in additional medications at this time. Hyperlipidemia, unspecified hyperlipidemia type  -     simvastatin (ZOCOR) 40 MG tablet; TAKE 1 TABLET BY MOUTH NIGHTLY. -The current medical regimen is effective;  continue present plan and medications. Mild episode of recurrent major depressive disorder (HCC)  -Stable, continue current regimen. Return in about 3 months (around 2022) for dm. HPI     DM: uncontrolled. Trying to work on diet, and losing weight. Taking Insulin 70/30 about 50 u with meals. Inconsistent monitoring. A1c 11.7% on . Denies vision changes, polyuria, polydipsia, hyper/hypoglycemic episodes, SOB, chest pain. Mild neuropathy- on gabapentin. On ARB/Statin. Last eye exam: up to date. Last foot exam: Today  Last microalbumin: Today  Last monofilament: Today    HTN-bp stable. On losartan 100 mg daily, and hctz 25 mg. Tolerating well. Denies any highs/lows. Denies cp, sob, palpitations. HLD-On zocor. Last lipid panel normal, 2021. Review of Systems   Constitutional: Negative for activity change, appetite change, chills, fatigue, fever and unexpected weight change. HENT: Negative. Respiratory: Negative for cough, chest tightness, shortness of breath and wheezing. Cardiovascular: Negative for chest pain, palpitations and leg swelling.    Gastrointestinal: Negative for abdominal distention, abdominal pain, constipation, diarrhea, nausea and vomiting. Genitourinary: Negative. Musculoskeletal: Negative. Skin: Negative. Neurological: Negative for dizziness, weakness, light-headedness, numbness and headaches. Hematological: Negative. Psychiatric/Behavioral: Negative. Allergies, past medical history, family history, and social history reviewed and unchanged from previous encounter. Current Outpatient Medications   Medication Sig Dispense Refill    Insulin Syringe-Needle U-100 30G X 1/2\" 1 ML MISC Use for insulin 3 times daily 300 each 1    amitriptyline (ELAVIL) 10 MG tablet TAKE 1 TABLET BY MOUTH EVERY DAY AT NIGHT 90 tablet 0    losartan (COZAAR) 100 MG tablet TAKE 1 TABLET BY MOUTH EVERY DAY 90 tablet 1    montelukast (SINGULAIR) 10 MG tablet TAKE 1 TABLET BY MOUTH EVERY DAY AT NIGHT 90 tablet 1    tiZANidine (ZANAFLEX) 4 MG tablet Take 1 tablet by mouth 3 times daily as needed (back pain) 30 tablet 1    hydroCHLOROthiazide (HYDRODIURIL) 25 MG tablet TAKE 1 TABLET BY MOUTH EVERY DAY 90 tablet 1    omeprazole (PRILOSEC) 40 MG delayed release capsule TAKE 1 CAPSULE BY MOUTH EVERY DAY 90 capsule 2    Continuous Blood Gluc Sensor (FREESTYLE ELIZABETH 14 DAY SENSOR) MISC 1 Units by Does not apply route every 14 days 2 each 5    Continuous Blood Gluc  (FREESTYLE ELIZABETH 14 DAY READER) SOUMYA 1 Units by Does not apply route daily 1 Device 0    azelastine (ASTELIN) 0.1 % nasal spray 1 spray by Nasal route 2 times daily Use in each nostril as directed 1 Bottle 5    simvastatin (ZOCOR) 40 MG tablet TAKE 1 TABLET BY MOUTH NIGHTLY.  90 tablet 1    albuterol sulfate  (90 Base) MCG/ACT inhaler INHALE 2 PUFFS INTO THE LUNGS 4 TIMES DAILY AS NEEDED FOR WHEEZING 8.5 Inhaler 0    albuterol (PROVENTIL) (2.5 MG/3ML) 0.083% nebulizer solution Take 3 mLs by nebulization every 4 hours as needed for Wheezing 25 vial 3    Blood Glucose Monitoring Suppl (ONE TOUCH ULTRA 2) w/Device KIT 1 kit by Does not apply route daily 1 kit 0    Insulin Pen Needle (PEN NEEDLES) 31G X 6 MM MISC Use with insulin 4 times daily. 200 each 5    glucose blood VI test strips (ONE TOUCH ULTRA TEST) strip USE AS DIRECTED 3 TIMES A DAY Dx Code E11.9 100 strip 5    acetaminophen (TYLENOL) 500 MG tablet Take 500 mg by mouth every 6 hours as needed for Pain      ONETOUCH DELICA LANCETS 51X MISC TEST 3 TIMES A  each 12    insulin 70-30 (NOVOLIN 70/30) (70-30) 100 UNIT per ML injection vial Inject 50 Units into the skin 3 times daily 135 mL 1    gabapentin (NEURONTIN) 300 MG capsule TAKE 1 CAPSULE BY MOUTH THREE TIMES A DAY (Patient not taking: Reported on 5/6/2022) 90 capsule 0     No current facility-administered medications for this visit. Vitals:    05/06/22 1306   BP: 116/66   Site: Right Upper Arm   Position: Sitting   Cuff Size: Large Adult   Pulse: 73   SpO2: 95%   Weight: 239 lb (108.4 kg)     Estimated body mass index is 36.34 kg/m² as calculated from the following:    Height as of 12/3/21: 5' 8\" (1.727 m). Weight as of this encounter: 239 lb (108.4 kg). Diabetic foot exam:   Left Foot:   Visual Exam: normal   Pulse DP: 2+ (normal)   Filament test: reduced sensation     Right Foot:   Visual Exam: normal   Pulse DP: 2+ (normal)   Filament test: reduced sensation    Physical Exam  Vitals reviewed. Constitutional:       Appearance: Normal appearance. He is obese. HENT:      Head: Normocephalic and atraumatic. Nose: Nose normal.   Eyes:      Conjunctiva/sclera: Conjunctivae normal.   Cardiovascular:      Rate and Rhythm: Normal rate and regular rhythm. Pulses: Normal pulses. Heart sounds: Normal heart sounds. Pulmonary:      Effort: Pulmonary effort is normal.      Breath sounds: Normal breath sounds. Abdominal:      General: Abdomen is flat. Bowel sounds are normal.      Palpations: Abdomen is soft. Tenderness:  There is no abdominal tenderness. Musculoskeletal:         General: Normal range of motion. Cervical back: Normal range of motion and neck supple. Skin:     General: Skin is warm and dry. Capillary Refill: Capillary refill takes less than 2 seconds. Neurological:      General: No focal deficit present. Mental Status: He is alert and oriented to person, place, and time. Mental status is at baseline. Psychiatric:         Mood and Affect: Mood normal.         Behavior: Behavior normal.         Thought Content:  Thought content normal.         Judgment: Judgment normal.

## 2022-05-12 ENCOUNTER — TELEPHONE (OUTPATIENT)
Dept: FAMILY MEDICINE CLINIC | Age: 67
End: 2022-05-12

## 2022-05-12 RX ORDER — HYDROCHLOROTHIAZIDE 25 MG/1
TABLET ORAL
Qty: 90 TABLET | Refills: 1 | Status: SHIPPED | OUTPATIENT
Start: 2022-05-12 | End: 2022-08-08

## 2022-05-12 RX ORDER — AMITRIPTYLINE HYDROCHLORIDE 10 MG/1
TABLET, FILM COATED ORAL
Qty: 90 TABLET | Refills: 1 | Status: SHIPPED | OUTPATIENT
Start: 2022-05-12

## 2022-05-12 RX ORDER — TIZANIDINE 4 MG/1
4 TABLET ORAL 3 TIMES DAILY PRN
Qty: 30 TABLET | Refills: 1 | Status: SHIPPED | OUTPATIENT
Start: 2022-05-12 | End: 2022-08-08 | Stop reason: SDUPTHER

## 2022-05-12 RX ORDER — SIMVASTATIN 40 MG
TABLET ORAL
Qty: 90 TABLET | Refills: 1 | Status: SHIPPED | OUTPATIENT
Start: 2022-05-12

## 2022-05-12 ASSESSMENT — ENCOUNTER SYMPTOMS
CHEST TIGHTNESS: 0
DIARRHEA: 0
VOMITING: 0
NAUSEA: 0
ABDOMINAL DISTENTION: 0
COUGH: 0
CONSTIPATION: 0
ABDOMINAL PAIN: 0
SHORTNESS OF BREATH: 0
WHEEZING: 0

## 2022-05-12 NOTE — TELEPHONE ENCOUNTER
Pt called saying her  had an appt on Friday and they have been waiting for his medications to e sent to the pharmacy and they have not received them. They said you were prescribing some from the past appt. Pt said it was 4-5 medications please advise.

## 2022-05-12 NOTE — TELEPHONE ENCOUNTER
Called pt and wife, no answer. Left voicemail to confirm which medications, sent pending refills from visit.

## 2022-07-29 DIAGNOSIS — I10 ESSENTIAL HYPERTENSION: ICD-10-CM

## 2022-07-29 RX ORDER — MONTELUKAST SODIUM 10 MG/1
TABLET ORAL
Qty: 90 TABLET | Refills: 1 | Status: SHIPPED | OUTPATIENT
Start: 2022-07-29

## 2022-07-29 RX ORDER — LOSARTAN POTASSIUM 100 MG/1
TABLET ORAL
Qty: 90 TABLET | Refills: 1 | Status: SHIPPED | OUTPATIENT
Start: 2022-07-29

## 2022-07-29 NOTE — TELEPHONE ENCOUNTER
.Refill Request     CONFIRM preferrred pharmacy with the patient. If Mail Order Rx - Pend for 90 day refill.       Last Seen: Last Seen Department: 5/6/2022  Last Seen by PCP: 2/7/2022    Last Written: 1-11-22 90 with 1     Next Appointment:   Future Appointments   Date Time Provider Dre Evans   8/8/2022 10:00 AM [facility]  [place] [facility] [place]       Future appointment scheduled      Requested Prescriptions     Pending Prescriptions Disp Refills    losartan (COZAAR) 100 MG tablet [Pharmacy Med Name: LOSARTAN POTASSIUM 100 MG TAB] 90 tablet 1     Sig: TAKE 1 TABLET BY MOUTH EVERY DAY    montelukast (SINGULAIR) 10 MG tablet [Pharmacy Med Name: MONTELUKAST SOD 10 MG TABLET] 90 tablet 1     Sig: TAKE 1 TABLET BY MOUTH EVERY DAY AT NIGHT

## 2022-08-08 ENCOUNTER — OFFICE VISIT (OUTPATIENT)
Dept: FAMILY MEDICINE CLINIC | Age: 67
End: 2022-08-08
Payer: MEDICARE

## 2022-08-08 VITALS
WEIGHT: 244 LBS | SYSTOLIC BLOOD PRESSURE: 118 MMHG | HEART RATE: 74 BPM | BODY MASS INDEX: 37.1 KG/M2 | DIASTOLIC BLOOD PRESSURE: 60 MMHG | OXYGEN SATURATION: 98 %

## 2022-08-08 DIAGNOSIS — Z12.5 PROSTATE CANCER SCREENING: ICD-10-CM

## 2022-08-08 DIAGNOSIS — Z79.4 TYPE 2 DIABETES MELLITUS WITH HYPERGLYCEMIA, WITH LONG-TERM CURRENT USE OF INSULIN (HCC): ICD-10-CM

## 2022-08-08 DIAGNOSIS — I10 ESSENTIAL HYPERTENSION: ICD-10-CM

## 2022-08-08 DIAGNOSIS — E11.65 TYPE 2 DIABETES MELLITUS WITH HYPERGLYCEMIA, WITH LONG-TERM CURRENT USE OF INSULIN (HCC): ICD-10-CM

## 2022-08-08 DIAGNOSIS — Z79.4 TYPE 2 DIABETES MELLITUS WITH HYPERGLYCEMIA, WITH LONG-TERM CURRENT USE OF INSULIN (HCC): Primary | ICD-10-CM

## 2022-08-08 DIAGNOSIS — I85.11 SECONDARY ESOPHAGEAL VARICES WITH BLEEDING (HCC): ICD-10-CM

## 2022-08-08 DIAGNOSIS — E11.65 TYPE 2 DIABETES MELLITUS WITH HYPERGLYCEMIA, WITH LONG-TERM CURRENT USE OF INSULIN (HCC): Primary | ICD-10-CM

## 2022-08-08 DIAGNOSIS — E78.2 MIXED HYPERLIPIDEMIA: ICD-10-CM

## 2022-08-08 LAB
A/G RATIO: 1.6 (ref 1.1–2.2)
ALBUMIN SERPL-MCNC: 4.2 G/DL (ref 3.4–5)
ALP BLD-CCNC: 112 U/L (ref 40–129)
ALT SERPL-CCNC: 27 U/L (ref 10–40)
ANION GAP SERPL CALCULATED.3IONS-SCNC: 13 MMOL/L (ref 3–16)
AST SERPL-CCNC: 32 U/L (ref 15–37)
BILIRUB SERPL-MCNC: 0.9 MG/DL (ref 0–1)
BUN BLDV-MCNC: 13 MG/DL (ref 7–20)
CALCIUM SERPL-MCNC: 9.4 MG/DL (ref 8.3–10.6)
CHLORIDE BLD-SCNC: 106 MMOL/L (ref 99–110)
CO2: 25 MMOL/L (ref 21–32)
CREAT SERPL-MCNC: 1 MG/DL (ref 0.8–1.3)
CREATININE URINE POCT: 300
GFR AFRICAN AMERICAN: >60
GFR NON-AFRICAN AMERICAN: >60
GLUCOSE BLD-MCNC: 172 MG/DL (ref 70–99)
HBA1C MFR BLD: 9 %
HCT VFR BLD CALC: 41.7 % (ref 40.5–52.5)
HEMOGLOBIN: 14.2 G/DL (ref 13.5–17.5)
MCH RBC QN AUTO: 31.6 PG (ref 26–34)
MCHC RBC AUTO-ENTMCNC: 34.1 G/DL (ref 31–36)
MCV RBC AUTO: 92.8 FL (ref 80–100)
MICROALBUMIN/CREAT 24H UR: 10 MG/G{CREAT}
MICROALBUMIN/CREAT UR-RTO: <30
PDW BLD-RTO: 13.6 % (ref 12.4–15.4)
PLATELET # BLD: 118 K/UL (ref 135–450)
PMV BLD AUTO: 8.6 FL (ref 5–10.5)
POTASSIUM SERPL-SCNC: 4.4 MMOL/L (ref 3.5–5.1)
PROSTATE SPECIFIC ANTIGEN: 0.13 NG/ML (ref 0–4)
RBC # BLD: 4.5 M/UL (ref 4.2–5.9)
SODIUM BLD-SCNC: 144 MMOL/L (ref 136–145)
TOTAL PROTEIN: 6.8 G/DL (ref 6.4–8.2)
WBC # BLD: 5 K/UL (ref 4–11)

## 2022-08-08 PROCEDURE — 83036 HEMOGLOBIN GLYCOSYLATED A1C: CPT | Performed by: NURSE PRACTITIONER

## 2022-08-08 PROCEDURE — 1123F ACP DISCUSS/DSCN MKR DOCD: CPT | Performed by: NURSE PRACTITIONER

## 2022-08-08 PROCEDURE — 99213 OFFICE O/P EST LOW 20 MIN: CPT | Performed by: NURSE PRACTITIONER

## 2022-08-08 PROCEDURE — 3046F HEMOGLOBIN A1C LEVEL >9.0%: CPT | Performed by: NURSE PRACTITIONER

## 2022-08-08 RX ORDER — FLASH GLUCOSE SENSOR
1 KIT MISCELLANEOUS
Qty: 2 EACH | Refills: 3 | Status: SHIPPED | OUTPATIENT
Start: 2022-08-08

## 2022-08-08 RX ORDER — FLASH GLUCOSE SCANNING READER
EACH MISCELLANEOUS
Qty: 1 EACH | Refills: 1 | Status: SHIPPED | OUTPATIENT
Start: 2022-08-08

## 2022-08-08 RX ORDER — TIZANIDINE 4 MG/1
4 TABLET ORAL 3 TIMES DAILY PRN
Qty: 30 TABLET | Refills: 1 | Status: SHIPPED | OUTPATIENT
Start: 2022-08-08

## 2022-08-08 ASSESSMENT — ENCOUNTER SYMPTOMS
VOMITING: 0
CHEST TIGHTNESS: 0
NAUSEA: 0
WHEEZING: 0
DIARRHEA: 0
COUGH: 0
ABDOMINAL DISTENTION: 0
CONSTIPATION: 0
SHORTNESS OF BREATH: 0
ABDOMINAL PAIN: 0

## 2022-08-08 NOTE — PROGRESS NOTES
2022  Leslie Osorio (: 1955)  74 y.o.    ASSESSMENT and PLAN:  Yoanna Garcia was seen today for diabetes. Diagnoses and all orders for this visit:    Type 2 diabetes mellitus with hyperglycemia, with long-term current use of insulin (HCC)  -     POCT glycosylated hemoglobin (Hb A1C)  -     Comprehensive Metabolic Panel; Future  -     CBC; Future  -     Continuous Blood Gluc Sensor (FREESTYLE ELIZABETH 2 SENSOR) MISC; 1 Units by Does not apply route every 14 days  -     Continuous Blood Gluc  (FREESTYLE ELIZABETH 2 READER) SOUMYA; Lifetime use.  -update microalbumin today  -Interested in continuous glucose monitor, would be beneficial for pt due to help glucose control, reduce finger sticks. Has multiple insulin injections daily.  -Lifestyle recommendations given  -ensure compliance. -A1c goal 7, fasting .  -try Januvia 50 mg. Reviewed use and s/e. Instructed to let office know if not covered. -recheck a1c in 3 months    Essential hypertension  -     Comprehensive Metabolic Panel; Future  -     CBC; Future  ok to keep holding hctz, bp controlled. Continue to check at home  Mild edema-decrease salt, compression socks. Elevate leg-likely related to stopping hctz. May consider bringing back as needed if swelling doesn't improve with lifestyle changes. Update labs. Prostate cancer screening  -     PSA, Prostatic Specific Antigen; Future  -screening    Hyperlipidemia  Lipid panel due 2022. Continue statin    Return in about 3 months (around 2022), or if symptoms worsen or fail to improve, for DM, HTN, HLD. HPI    DM: uncontrolled. Was 104 this morning, 220s other morning. Trying to work on diet, and losing weight. Taking Insulin 70/30 about 50 u with meals three times a day   Inconsistent monitoring, inconsistent with meds  A1c 9.0 today  A1c 11.7% on . Denies vision changes, polyuria, polydipsia, hyper/hypoglycemic episodes, SOB, chest pain.  Mild neuropathy- on gabapentin. On ARB/Statin. Last eye exam: due  Last foot exam: 5/2022  Last microalbumin: need today  Last monofilament: 5/2022     Would be a good candidate for continuous glucose monitoring. Using insulin 3x a day with meals. Current regimen requires close monitoring, and adjustments. HTN-bp stable. On losartan 100 mg daily. Stopped hctz 1 month ago. Bp's at home are running 120/60s; was having low bps. Tolerating well. Denies any highs/lows. Denies cp, sob, palpitations. HLD-On zocor. Last lipid panel normal, 12/2021. Very rarely using gabapentin. Takes zanaflex as needed, lower back/neck discomfort. Colon cancer screen-due 2024, cologuard 2021 normal.    Review of Systems   Constitutional:  Negative for activity change, appetite change, chills, fatigue, fever and unexpected weight change. HENT: Negative. Respiratory:  Negative for cough, chest tightness, shortness of breath and wheezing. Cardiovascular:  Negative for chest pain, palpitations and leg swelling. Gastrointestinal:  Negative for abdominal distention, abdominal pain, constipation, diarrhea, nausea and vomiting. Genitourinary: Negative. Musculoskeletal: Negative. Skin: Negative. Neurological:  Negative for dizziness, weakness, light-headedness, numbness and headaches. Hematological: Negative. Psychiatric/Behavioral: Negative. Allergies, past medical history, family history, and social history reviewed and unchanged from previous encounter. Current Outpatient Medications   Medication Sig Dispense Refill    losartan (COZAAR) 100 MG tablet TAKE 1 TABLET BY MOUTH EVERY DAY 90 tablet 1    montelukast (SINGULAIR) 10 MG tablet TAKE 1 TABLET BY MOUTH EVERY DAY AT NIGHT 90 tablet 1    tiZANidine (ZANAFLEX) 4 MG tablet Take 1 tablet by mouth 3 times daily as needed (back pain) 30 tablet 1    simvastatin (ZOCOR) 40 MG tablet TAKE 1 TABLET BY MOUTH NIGHTLY.  90 tablet 1    hydroCHLOROthiazide (HYDRODIURIL) 25 MG tablet TAKE 1 TABLET BY MOUTH EVERY DAY 90 tablet 1    amitriptyline (ELAVIL) 10 MG tablet TAKE 1 TABLET BY MOUTH EVERY DAY AT NIGHT 90 tablet 1    Insulin Syringe-Needle U-100 30G X 1/2\" 1 ML MISC Use for insulin 3 times daily 300 each 1    omeprazole (PRILOSEC) 40 MG delayed release capsule TAKE 1 CAPSULE BY MOUTH EVERY DAY 90 capsule 2    Continuous Blood Gluc Sensor (FREESTYLE ELIZABETH 14 DAY SENSOR) MISC 1 Units by Does not apply route every 14 days 2 each 5    Continuous Blood Gluc  (FREESTYLE ELIZABETH 14 DAY READER) SOUMYA 1 Units by Does not apply route daily 1 Device 0    azelastine (ASTELIN) 0.1 % nasal spray 1 spray by Nasal route 2 times daily Use in each nostril as directed 1 Bottle 5    insulin 70-30 (NOVOLIN 70/30) (70-30) 100 UNIT per ML injection vial Inject 50 Units into the skin 3 times daily 135 mL 1    albuterol sulfate  (90 Base) MCG/ACT inhaler INHALE 2 PUFFS INTO THE LUNGS 4 TIMES DAILY AS NEEDED FOR WHEEZING 8.5 Inhaler 0    albuterol (PROVENTIL) (2.5 MG/3ML) 0.083% nebulizer solution Take 3 mLs by nebulization every 4 hours as needed for Wheezing 25 vial 3    gabapentin (NEURONTIN) 300 MG capsule TAKE 1 CAPSULE BY MOUTH THREE TIMES A DAY (Patient not taking: Reported on 5/6/2022) 90 capsule 0    Blood Glucose Monitoring Suppl (ONE TOUCH ULTRA 2) w/Device KIT 1 kit by Does not apply route daily 1 kit 0    Insulin Pen Needle (PEN NEEDLES) 31G X 6 MM MISC Use with insulin 4 times daily. 200 each 5    glucose blood VI test strips (ONE TOUCH ULTRA TEST) strip USE AS DIRECTED 3 TIMES A DAY Dx Code E11.9 100 strip 5    acetaminophen (TYLENOL) 500 MG tablet Take 500 mg by mouth every 6 hours as needed for Pain      ONETOUCH DELICA LANCETS 20Y MISC TEST 3 TIMES A  each 12     No current facility-administered medications for this visit.        Vitals:    08/08/22 1011   BP: 118/60   Site: Right Upper Arm   Position: Sitting   Cuff Size: Large Adult   Pulse: 74   SpO2: 98%   Weight: 244 lb (110.7 kg)     Estimated body mass index is 37.1 kg/m² as calculated from the following:    Height as of 12/3/21: 5' 8\" (1.727 m). Weight as of this encounter: 244 lb (110.7 kg). Physical Exam  Vitals reviewed. Constitutional:       Appearance: Normal appearance. He is normal weight. HENT:      Head: Normocephalic and atraumatic. Nose: Nose normal.   Eyes:      Conjunctiva/sclera: Conjunctivae normal.   Cardiovascular:      Rate and Rhythm: Normal rate and regular rhythm. Pulses: Normal pulses. Heart sounds: Normal heart sounds. Pulmonary:      Effort: Pulmonary effort is normal. No respiratory distress. Breath sounds: Normal breath sounds. No wheezing or rhonchi. Chest:      Chest wall: No tenderness. Abdominal:      General: Abdomen is flat. Bowel sounds are normal.      Palpations: Abdomen is soft. Tenderness: There is no abdominal tenderness. Musculoskeletal:         General: Normal range of motion. Cervical back: Normal range of motion and neck supple. Right lower leg: Edema present. Left lower leg: Edema present. Skin:     General: Skin is warm and dry. Capillary Refill: Capillary refill takes less than 2 seconds. Neurological:      General: No focal deficit present. Mental Status: He is alert and oriented to person, place, and time. Mental status is at baseline. Psychiatric:         Mood and Affect: Mood normal.         Behavior: Behavior normal.         Thought Content:  Thought content normal.         Judgment: Judgment normal.

## 2022-08-11 DIAGNOSIS — D69.6 THROMBOCYTOPENIA (HCC): Primary | ICD-10-CM

## 2022-08-31 NOTE — TELEPHONE ENCOUNTER
Refill Request     CONFIRM preferrred pharmacy with the patient. If Mail Order Rx - Pend for 90 day refill.       Last Seen: Last Seen Department: 8/8/2022  Last Seen by PCP: 8/8/2022    Last Written: 08/08/2022 30 tablet 5 refills     Next Appointment:   Future Appointments   Date Time Provider Dre Evans   11/17/2022 11:00 AM Gino Dyson, APRN - CNP [facility] [place] [facility] [place]       Future appointment scheduled      Requested Prescriptions     Pending Prescriptions Disp Refills    JANUVIA 50 MG tablet [Pharmacy Med Name: Natalie [place] 50 MG TABLET] 30 tablet 5     Sig: TAKE 1 TABLET BY MOUTH EVERY DAY IN THE MORNING

## 2022-09-01 RX ORDER — SITAGLIPTIN 50 MG/1
TABLET, FILM COATED ORAL
Qty: 30 TABLET | Refills: 5 | Status: SHIPPED | OUTPATIENT
Start: 2022-09-01

## 2022-10-04 ENCOUNTER — TELEPHONE (OUTPATIENT)
Dept: PHARMACY | Facility: CLINIC | Age: 67
End: 2022-10-04

## 2022-10-04 NOTE — TELEPHONE ENCOUNTER
[facility] CLINICAL PHARMACY: ADHERENCE REVIEW  Identified care gap per [employer]: fills at [facility]: ACE/ARB and Diabetes adherence    Last Visit: 08.08.22      Patient also appears to be prescribed: Simvastatin         ASSESSMENT  ACE/ARB ADHERENCE    Insurance Records claims through 09.24.22 (Prior Year Ruddy Morenora = PASSED; YTD Ruddy Beltreandra = Filled only once; Potential Fail Date: 11.27.22 ):   Losartan last filled on 07.29.22 for 90 day supply. Next refill due: 10.27.22    Per  [employer] Portal:  (same as above). BP Readings from Last 3 Encounters:   08/08/22 118/60   05/06/22 116/66   12/03/21 126/66     Estimated Creatinine Clearance: 84 mL/min (based on SCr of 1 mg/dL). DIABETES ADHERENCE    Insurance Records claims through 09.24.22 (Prior Year Ruddy Morenora = PASSED; YTD Ruddy Beltreandra = Filled only once; Potential Fail Date: 10.06.22 ):   Januvia last filled on 08.08.22 for 30 day supply. Next refill due: 09.07.22    Per  [employer] Portal:  (same as above). Lab Results   Component Value Date    LABA1C 9.0 08/08/2022    LABA1C 11.7 12/06/2021    LABA1C 11.7 12/03/2021     NOTE A1c >9%    STATIN ADHERENCE    Per Reconciled Dispense Report:  Simvastatin last filled on 05.12.22 for 90 day supply.      Lab Results   Component Value Date    CHOL 154 12/06/2021    TRIG 85 12/06/2021    HDL 57 12/06/2021    LDLCALC 80 12/06/2021     ALT   Date Value Ref Range Status   08/08/2022 27 10 - 40 U/L Final     AST   Date Value Ref Range Status   08/08/2022 32 15 - 37 U/L Final     The 10-year ASCVD risk score (Jhon HANSEN, et al., 2019) is: 25.3%    Values used to calculate the score:      Age: 69 years      Sex: Male      Is Non- : No      Diabetic: Yes      Tobacco smoker: No      Systolic Blood Pressure: 379 mmHg      Is BP treated: Yes      HDL Cholesterol: 57 mg/dL      Total Cholesterol: 154 mg/dL     PLAN  The following are interventions that have been identified:   - Patient overdue refilling Januvia & Simvastatin and active on home medication list.     Attempting to reach patient to review. Left message asking for return call.          Future Appointments   Date Time Provider Dre Evans   11/17/2022 11:00 AM KATIE Shaw CNP [address] Clinical Pharmacy  Phone: toll free [phone]

## 2022-11-04 RX ORDER — OMEPRAZOLE 40 MG/1
CAPSULE, DELAYED RELEASE ORAL
Qty: 90 CAPSULE | Refills: 2 | Status: SHIPPED | OUTPATIENT
Start: 2022-11-04

## 2022-11-04 NOTE — TELEPHONE ENCOUNTER
Refill Request     CONFIRM preferrred pharmacy with the patient. If Mail Order Rx - Pend for 90 day refill. Last Seen: Last Seen Department: 8/8/2022    Last Seen by PCP: 2/7/2022    Last Written: Prilosec: 8/16/21 90 capsule 2 refills    If no future appointment scheduled, route STAFF MESSAGE with patient name to the [facility], located within [facility] for scheduling. Next Appointment: 11/17/22   Future Appointments   Date Time Provider Dre Evans   11/17/2022 11:00 AM KATIE Barbour - CNP [facility] [place] [facility] [place]       Message sent to  to schedule appt with patient?   NO      Requested Prescriptions     Pending Prescriptions Disp Refills    omeprazole (PRILOSEC) 40 MG delayed release capsule 90 capsule 2     Sig: TAKE 1 CAPSULE BY MOUTH EVERY DAY

## 2023-02-07 DIAGNOSIS — I10 ESSENTIAL HYPERTENSION: ICD-10-CM

## 2023-02-07 RX ORDER — LOSARTAN POTASSIUM 100 MG/1
TABLET ORAL
Qty: 90 TABLET | Refills: 1 | Status: SHIPPED | OUTPATIENT
Start: 2023-02-07

## 2023-02-07 RX ORDER — MONTELUKAST SODIUM 10 MG/1
TABLET ORAL
Qty: 90 TABLET | Refills: 1 | Status: SHIPPED | OUTPATIENT
Start: 2023-02-07

## 2023-02-07 NOTE — TELEPHONE ENCOUNTER
Refill Request     CONFIRM preferrred pharmacy with the patient. If Mail Order Rx - Pend for 90 day refill. Last Seen: Last Seen Department: 8/8/2022  Last Seen by PCP: 2/7/2022    Last Written:   Singulair-07/29/2022 90 tablet 1 refills   Losartan-07/29/2022 90 tablet  1 refills     If no future appointment scheduled, route STAFF MESSAGE with patient name to the [facility] for scheduling. Next Appointment:   Future Appointments   Date Time Provider Dre Evans   2/13/2023  1:00 PM SCHEDULE, CINDY PATTON  MEGAN FINNEGAN [facility] [place] [facility] [place]       Message sent to [address] [place] [address] to schedule appt with patient?   N/A      Requested Prescriptions     Pending Prescriptions Disp Refills    montelukast (SINGULAIR) 10 MG tablet [Pharmacy Med Name: MONTELUKAST SOD 10 MG TABLET] 90 tablet 1     Sig: TAKE 1 TABLET BY MOUTH EVERY DAY AT NIGHT    losartan (COZAAR) 100 MG tablet [Pharmacy Med Name: LOSARTAN POTASSIUM 100 MG TAB] 90 tablet 1     Sig: TAKE 1 TABLET BY MOUTH EVERY DAY

## 2023-02-13 ENCOUNTER — TELEPHONE (OUTPATIENT)
Dept: FAMILY MEDICINE CLINIC | Age: 68
End: 2023-02-13

## 2023-02-13 ENCOUNTER — TELEMEDICINE (OUTPATIENT)
Dept: FAMILY MEDICINE CLINIC | Age: 68
End: 2023-02-13
Payer: MEDICARE

## 2023-02-13 DIAGNOSIS — Z00.00 MEDICARE ANNUAL WELLNESS VISIT, SUBSEQUENT: Primary | ICD-10-CM

## 2023-02-13 DIAGNOSIS — K58.0 IRRITABLE BOWEL SYNDROME WITH DIARRHEA: ICD-10-CM

## 2023-02-13 PROCEDURE — G0439 PPPS, SUBSEQ VISIT: HCPCS | Performed by: FAMILY MEDICINE

## 2023-02-13 PROCEDURE — 1123F ACP DISCUSS/DSCN MKR DOCD: CPT | Performed by: FAMILY MEDICINE

## 2023-02-13 RX ORDER — AMITRIPTYLINE HYDROCHLORIDE 10 MG/1
TABLET, FILM COATED ORAL
Qty: 90 TABLET | Refills: 0 | Status: SHIPPED | OUTPATIENT
Start: 2023-02-13

## 2023-02-13 ASSESSMENT — PATIENT HEALTH QUESTIONNAIRE - PHQ9
10. IF YOU CHECKED OFF ANY PROBLEMS, HOW DIFFICULT HAVE THESE PROBLEMS MADE IT FOR YOU TO DO YOUR WORK, TAKE CARE OF THINGS AT HOME, OR GET ALONG WITH OTHER PEOPLE: 0
SUM OF ALL RESPONSES TO PHQ QUESTIONS 1-9: 0
SUM OF ALL RESPONSES TO PHQ QUESTIONS 1-9: 0
9. THOUGHTS THAT YOU WOULD BE BETTER OFF DEAD, OR OF HURTING YOURSELF: 0
3. TROUBLE FALLING OR STAYING ASLEEP: 0
1. LITTLE INTEREST OR PLEASURE IN DOING THINGS: 0
4. FEELING TIRED OR HAVING LITTLE ENERGY: 0
6. FEELING BAD ABOUT YOURSELF - OR THAT YOU ARE A FAILURE OR HAVE LET YOURSELF OR YOUR FAMILY DOWN: 0
SUM OF ALL RESPONSES TO PHQ QUESTIONS 1-9: 0
SUM OF ALL RESPONSES TO PHQ9 QUESTIONS 1 & 2: 0
8. MOVING OR SPEAKING SO SLOWLY THAT OTHER PEOPLE COULD HAVE NOTICED. OR THE OPPOSITE, BEING SO FIGETY OR RESTLESS THAT YOU HAVE BEEN MOVING AROUND A LOT MORE THAN USUAL: 0
2. FEELING DOWN, DEPRESSED OR HOPELESS: 0
SUM OF ALL RESPONSES TO PHQ QUESTIONS 1-9: 0
5. POOR APPETITE OR OVEREATING: 0
7. TROUBLE CONCENTRATING ON THINGS, SUCH AS READING THE NEWSPAPER OR WATCHING TELEVISION: 0

## 2023-02-13 ASSESSMENT — LIFESTYLE VARIABLES
HOW OFTEN DO YOU HAVE A DRINK CONTAINING ALCOHOL: NEVER
HOW MANY STANDARD DRINKS CONTAINING ALCOHOL DO YOU HAVE ON A TYPICAL DAY: PATIENT DOES NOT DRINK

## 2023-02-13 NOTE — PROGRESS NOTES
Medicare Annual Wellness Visit    Dulce Alejandre is here for Medicare AWV    Assessment & Plan   Medicare annual wellness visit, subsequent  Irritable bowel syndrome with diarrhea  The following orders have not been finalized:  -     amitriptyline (ELAVIL) 10 MG tablet      Recommendations for Preventive Services Due: see orders and patient instructions/AVS.  Recommended screening schedule for the next 5-10 years is provided to the patient in written form: see Patient Instructions/AVS.     No follow-ups on file. Subjective       Patient's complete Health Risk Assessment and screening values have been reviewed and are found in Flowsheets. The following problems were reviewed today and where indicated follow up appointments were made and/or referrals ordered.     Positive Risk Factor Screenings with Interventions:                 Weight and Activity:  Physical Activity: Inactive    Days of Exercise per Week: 0 days    Minutes of Exercise per Session: 0 min     On average, how many days per week do you engage in moderate to strenuous exercise (like a brisk walk)?: 0 days  Have you lost any weight without trying in the past 3 months?: No         Inactivity Interventions:  See AVS for additional education material  Obesity Interventions:  See AVS for additional education material          Dentist Screen:  Have you seen the dentist within the past year?: (!) No    Intervention:  Advised to schedule with their dentist        Advanced Directives:  Do you have a Living Will?: (!) No    Intervention:  has NO advanced directive - information provided        Tobacco Use:  Tobacco Use: High Risk    Smoking Tobacco Use: Never    Smokeless Tobacco Use: Current    Passive Exposure: Not on file     E-cigarette/Vaping       Questions Responses    E-cigarette/Vaping Use Never User    Start Date     Passive Exposure     Quit Date     Counseling Given     Comments           Interventions:  See AVS for additional education material                        Objective      Patient-Reported Vitals  Patient-Reported Systolic (Top): 324 mmHg  Patient-Reported Diastolic (Bottom): 66 mmHg  Patient-Reported Pulse: 65  Patient-Reported Weight: 252lb  Patient-Reported Height: 5' 8\"              Allergies   Allergen Reactions    Aspirin Other (See Comments)     Takes baby aspirin without problems    Caused bleeding in the bowel    Oxycontin [Oxycodone Hcl] Nausea And Vomiting     Prior to Visit Medications    Medication Sig Taking? Authorizing Provider   montelukast (SINGULAIR) 10 MG tablet TAKE 1 TABLET BY MOUTH EVERY DAY AT NIGHT  Susie Sena MD   losartan (COZAAR) 100 MG tablet TAKE 1 TABLET BY MOUTH EVERY DAY  Valeria iDng MD   omeprazole (PRILOSEC) 40 MG delayed release capsule TAKE 1 CAPSULE BY MOUTH EVERY DAY  HO Morin   JANUVIA 50 MG tablet TAKE 1 TABLET BY MOUTH EVERY DAY IN THE MORNING  Silvana [place] Beach[place] CNP   tiZANidine (ZANAFLEX) 4 MG tablet Take 1 tablet by mouth 3 times daily as needed (back pain)  KATIE Munroe CNP   Continuous Blood Gluc Sensor (FREESTYLE ELIZABETH 2 SENSOR) MISC 1 Units by Does not apply route every 14 days  KATIE Bertrand CNP   Continuous Blood Gluc  (FREESTYLE ELIZAEBTH 2 READER) SOUMYA Lifetime use. KATIE Munroe CNP   simvastatin (ZOCOR) 40 MG tablet TAKE 1 TABLET BY MOUTH NIGHTLY.   KATIE Munroe CNP   amitriptyline (ELAVIL) 10 MG tablet TAKE 1 TABLET BY MOUTH EVERY DAY AT NIGHT  KATIE Munroe CNP   Insulin Syringe-Needle U-100 30G X 1/2\" 1 ML MISC Use for insulin 3 times daily  KATIE Yan CNP   Continuous Blood Gluc Sensor (FREESTYLE ELIZABETH 14 DAY SENSOR) MISC 1 Units by Does not apply route every 14 days  Verraza Khan, APRN - CNP   Continuous Blood Gluc  (FREESTYLE ELIZABETH 14 DAY READER) SOUMYA 1 Units by Does not apply route daily  Vertis Began, APRN - CNP   azelastine (ASTELIN) 0.1 % nasal spray 1 spray by Nasal route 2 times daily Use in each nostril as directed  HO Almanzar   insulin 70-30 (NOVOLIN 70/30) (70-30) 100 UNIT per ML injection vial Inject 50 Units into the skin 3 times daily  HO Almanzar   albuterol sulfate  (90 Base) MCG/ACT inhaler INHALE 2 PUFFS INTO THE LUNGS 4 TIMES DAILY AS NEEDED FOR WHEEZING  HO Almanzar   albuterol (PROVENTIL) (2.5 MG/3ML) 0.083% nebulizer solution Take 3 mLs by nebulization every 4 hours as needed for Wheezing  HO Almanzar   gabapentin (NEURONTIN) 300 MG capsule TAKE 1 CAPSULE BY MOUTH THREE TIMES A DAY  Patient not taking: Reported on 5/6/2022  HO Almanzar   Blood Glucose Monitoring Suppl (ONE TOUCH ULTRA 2) w/Device KIT 1 kit by Does not apply route daily  HO Almanzar   Insulin Pen Needle (PEN NEEDLES) 31G X 6 MM MISC Use with insulin 4 times daily. Adi Nicholson MD   glucose blood VI test strips (ONE TOUCH ULTRA TEST) strip USE AS DIRECTED 3 TIMES A DAY Dx Code E11.9  HO Almanzar   acetaminophen (TYLENOL) 500 MG tablet Take 500 mg by mouth every 6 hours as needed for Pain  Historical Provider, MD [facility] [place] TEST 3 TIMES Brenda Kasper MD       [facility] (Including outside providers/suppliers regularly involved in providing care):   Patient Care Team:  Adi Nicholson MD as PCP - Ankit Walker MD as PCP - [facility] Provider     Reviewed and updated this visit:  Tobacco  Allergies  Med Hx  Surg Hx  Soc Hx  Fam Hx        I, Brendan Liriano LPN, 9/79/[birth year], performed the documented evaluation under the direct supervision of the attending physician. Lala Samaniego, was evaluated through a synchronous (real-time) audio-video encounter. The patient (or guardian if applicable) is aware that this is a billable service, which includes applicable co-pays. This Virtual Visit was conducted with patient's (and/or legal guardian's) consent.  The visit was conducted pursuant to the emergency declaration under the [facility], [facility] authority and the CAPPTURE and [employer] General Act. Patient identification was verified, and a caregiver was present when appropriate. The patient was located at Home: [address] [facility] [address]  Provider was located at [facility] (Appt Dept): Kofi Barba [phone]  [place],  [address]        [place] SIVAN Olsen     This encounter was performed under Augustina sykes MDs, direct supervision, 2/13/2023.

## 2023-02-13 NOTE — TELEPHONE ENCOUNTER
DEAN  Spoke with pt in regards to the Dexcom and freestyle mariam. Advised pt that I will submit this request through [address] [place] [address] and they will be giving him a phone call to go over further details before sending out the reader to the pt. Pt asked what the cost will be and what he will have to pay I did advise pt that I am not sure what the cost will be with his insurance. It may be covered 100% or he may have a copay. That is something [facility] will figure out and discuss with him before shipping I did make sure he knew it will be a 1-800 number or [place] number I believe that should be calling him to go over things regarding the freestyle mariam sensor.  As of today 2/13/2023 I have submitted the order thru [employer] and [facility] medical. Will await

## 2023-02-13 NOTE — PATIENT INSTRUCTIONS
Personalized Preventive Plan for [place] - 2/13/2023  Medicare offers a range of preventive health benefits. Some of the tests and screenings are paid in full while other may be subject to a deductible, co-insurance, and/or copay.    Some of these benefits include a comprehensive review of your medical history including lifestyle, illnesses that may run in your family, and various assessments and screenings as appropriate.    After reviewing your medical record and screening and assessments performed today your provider may have ordered immunizations, labs, imaging, and/or referrals for you.  A list of these orders (if applicable) as well as your Preventive Care list are included within your After Visit Summary for your review.    Other Preventive Recommendations:    A preventive eye exam performed by an eye specialist is recommended every 1-2 years to screen for glaucoma; cataracts, macular degeneration, and other eye disorders.  A preventive dental visit is recommended every 6 months.  Try to get at least 150 minutes of exercise per week or 10,000 steps per day on a pedometer .  Order or download the FREE \"Exercise & Physical Activity: Your Everyday Guide\" from [employer] [place] [employer]. Call 1-859.639.5946 or search The [employer] [place] [employer] online.  You need 4530-1560 mg of calcium and 3989-2709 IU of vitamin D per day. It is possible to meet your calcium requirement with diet alone, but a vitamin D supplement is usually necessary to meet this goal.  When exposed to the sun, use a sunscreen that protects against both UVA and UVB radiation with an SPF of 30 or greater. Reapply every 2 to 3 hours or after sweating, drying off with a towel, or swimming.  Always wear a seat belt when traveling in a car. Always wear a helmet when riding a bicycle or motorcycle.       Learning About Being Active as an Older Adult  Why is being active important as you get older?     Being active is one of the best  things you can do for your health. And it's never too late to start. Being active--or getting active, if you aren't already--has definite benefits. It can:  Give you more energy,  Keep your mind sharp. Improve balance to reduce your risk of falls. Help you manage chronic illness with fewer medicines. No matter how old you are, how fit you are, or what health problems you have, there is a form of activity that will work for you. And the more physical activity you can do, the better your overall health will be. What kinds of activity can help you stay healthy? Being more active will make your daily activities easier. Physical activity includes planned exercise and things you do in daily life. There are four types of activity:  Aerobic. Doing aerobic activity makes your heart and lungs strong. Includes walking, dancing, and gardening. Aim for at least 2½ hours spread throughout the week. It improves your energy and can help you sleep better. Muscle-strengthening. This type of activity can help maintain muscle and strengthen bones. Includes climbing stairs, using resistance bands, and lifting or carrying heavy loads. Aim for at least twice a week. It can help protect the knees and other joints. Stretching. Stretching gives you better range of motion in joints and muscles. Includes upper arm stretches, calf stretches, and gentle yoga. Aim for at least twice a week, preferably after your muscles are warmed up from other activities. It can help you function better in daily life. Balancing. This helps you stay coordinated and have good posture. Includes heel-to-toe walking, angela chi, and certain types of yoga. Aim for at least 3 days a week. It can reduce your risk of falling. Even if you have a hard time meeting the recommendations, it's better to be more active than less active.  All activity done in each category counts toward your weekly total. You'd be surprised how daily things like carrying groceries, keeping up with grandchildren, and taking the stairs can add up.  What keeps you from being active?  If you've had a hard time being more active, you're not alone. Maybe you remember being able to do more. Or maybe you've never thought of yourself as being active. It's frustrating when you can't do the things you want. Being more active can help. What's holding you back?  Getting started.  Have a goal, but break it into easy tasks. Small steps build into big accomplishments.  Staying motivated.  If you feel like skipping your activity, remember your goal. Maybe you want to move better and stay independent. Every activity gets you one step closer.  Not feeling your best.  Start with 5 minutes of an activity you enjoy. Prove to yourself you can do it. As you get comfortable, increase your time.  You may not be where you want to be. But you're in the process of getting there. Everyone starts somewhere.  How can you find safe ways to stay active?  Talk with your doctor about any physical challenges you're facing. Make a plan with your doctor if you have a health problem or aren't sure how to get started with activity.  If you're already active, ask your doctor if there is anything you should change to stay safe as your body and health change.  If you tend to feel dizzy after you take medicine, avoid activity at that time. Try being active before you take your medicine. This will reduce your risk of falls.  If you plan to be active at home, make sure to clear your space before you get started. Remove things like TV cords, coffee tables, and throw rugs. It's safest to have plenty of space to move freely.  The key to getting more active is to take it slow and steady. Try to improve only a little bit at a time. Pick just one area to improve on at first. And if an activity hurts, stop and talk to your doctor.  Where can you learn more?  Go to https://www.healthwise.net/patientEd and enter P600 to learn more about  \"Learning About Being Active as an Older Adult. \"  Current as of: October 10, 2022               Content Version: 13.5  © 2006-2022 [employer]. Care instructions adapted under license by [facility] ([facility]). If you have questions about a medical condition or this instruction, always ask your healthcare professional. [facility] any warranty or liability for your use of this information. Learning About Dental Care for Older Adults  Dental care for older adults: Overview  Dental care for older people is much the same as for younger adults. But older adults do have concerns that younger adults do not. Older adults may have problems with gum disease and decay on the roots of their teeth. They may need missing teeth replaced or broken fillings fixed. Or they may have dentures that need to be cared for. Some older adults may have trouble holding a toothbrush. You can help remind the person you are caring for to brush and floss their teeth or to clean their dentures. In some cases, you may need to do the brushing and other dental care tasks. People who have trouble using their hands or who have dementia may need this extra help. How can you help with dental care? Normal dental care  To keep the teeth and gums healthy:  Brush the teeth with fluoride toothpaste twice a day--in the morning and at night--and floss at least once a day. Plaque can quickly build up on the teeth of older adults. Watch for the signs of gum disease. These signs include gums that bleed after brushing or after eating hard foods, such as apples. See a dentist regularly. Many experts recommend checkups every 6 months. Keep the dentist up to date on any new medications the person is taking. Encourage a balanced diet that includes whole grains, vegetables, and fruits, and that is low in saturated fat and sodium. Encourage the person you're caring for not to use tobacco products.  They can affect dental and general health. Many older adults have a fixed income and feel that they can't afford dental care. But most towns and cities have programs in which dentists help older adults by lowering fees. Contact your area's public health offices or  for information about dental care in your area. Using a toothbrush  Older adults with arthritis sometimes have trouble brushing their teeth because they can't easily hold the toothbrush. Their hands and fingers may be stiff, painful, or weak. If this is the case, you can: Offer an electric toothbrush. Enlarge the handle of a non-electric toothbrush by wrapping a sponge, an elastic bandage, or adhesive tape around it. Push the toothbrush handle through a ball made of rubber or soft foam.  Make the handle longer and thicker by taping Popsicle sticks or tongue depressors to it. You may also be able to buy special toothbrushes, toothpaste dispensers, and floss holders. Your doctor may recommend a soft-bristle toothbrush if the person you care for bleeds easily. Bleeding can happen because of a health problem or from certain medicines. A toothpaste for sensitive teeth may help if the person you care for has sensitive teeth. How do you brush and floss someone's teeth? If the person you are caring for has a hard time cleaning their teeth on their own, you may need to brush and floss their teeth for them. It may be easiest to have the person sit and face away from you, and to sit or stand behind them. That way you can steady their head against your arm as you reach around to floss and brush their teeth. Choose a place that has good lighting and is comfortable for both of you. Before you begin, gather your supplies. You will need gloves, floss, a toothbrush, and a container to hold water if you are not near a sink. Wash and dry your hands well and put on gloves. Start by flossing:  Gently work a piece of floss between each of the teeth toward the gums.  A plastic flossing tool may make this easier, and they are available at most [facility]. Curve the floss around each tooth into a U-shape and gently slide it under the gum line. Move the floss firmly up and down several times to scrape off the plaque. After you've finished flossing, throw away the used floss and begin brushing:  Wet the brush and apply toothpaste. Place the brush at a 45-degree angle where the teeth meet the gums. Press firmly, and move the brush in small circles over the surface of the teeth. Be careful not to brush too hard. Vigorous brushing can make the gums pull away from the teeth and can scratch the tooth enamel. Brush all surfaces of the teeth, on the tongue side and on the cheek side. Pay special attention to the front teeth and all surfaces of the back teeth. Brush chewing surfaces with short back-and-forth strokes. After you've finished, help the person rinse the remaining toothpaste from their mouth. Where can you learn more? Go to http://www.woods.com/ and enter F944 to learn more about \"Learning About Dental Care for Older Adults. \"  Current as of: June 16, 2022               Content Version: 13.5  © 2006-2022 Healthwise, [employer]. Care instructions adapted under license by [facility] ([facility]). If you have questions about a medical condition or this instruction, always ask your healthcare professional. [place] [address] any warranty or liability for your use of this information. Advance Directives: Care Instructions  Overview  An advance directive is a legal way to state your wishes at the end of your life. It tells your family and your doctor what to do if you can't say what you want. There are two main types of advance directives. You can change them any time your wishes change. Living will. This form tells your family and your doctor your wishes about life support and other treatment. The form is also called a declaration.   Medical power of . This form lets you name a person to make treatment decisions for you when you can't speak for yourself. This person is called a health care agent (health care proxy, health care surrogate). The form is also called a durable power of  for health care. If you do not have an advance directive, decisions about your medical care may be made by a family member, or by a doctor or a  who doesn't know you. It may help to think of an advance directive as a gift to the people who care for you. If you have one, they won't have to make tough decisions by themselves. For more information, including forms for your state, see the [address] website (www.caringinfo.org/planning/advance-directives/). Follow-up care is a key part of your treatment and safety. Be sure to make and go to all appointments, and call your doctor if you are having problems. It's also a good idea to know your test results and keep a list of the medicines you take. What should you include in an advance directive? Many states have a unique advance directive form. (It may ask you to address specific issues.) Or you might use a universal form that's approved by many states. If your form doesn't tell you what to address, it may be hard to know what to include in your advance directive. Use the questions below to help you get started. Who do you want to make decisions about your medical care if you are not able to? What life-support measures do you want if you have a serious illness that gets worse over time or can't be cured? What are you most afraid of that might happen? (Maybe you're afraid of having pain, losing your independence, or being kept alive by machines.)  Where would you prefer to die? (Your home? A hospital? A nursing home?)  Do you want to donate your organs when you die? Do you want certain [employer] practices performed before you die? When should you call for help?   Be sure to contact your doctor if you have any questions. Where can you learn more? Go to http://www.newell.com/ and enter R264 to learn more about \"Advance Directives: Care Instructions. \"  Current as of: June 16, 2022               Content Version: 13.5  © 0320-2933 Healthwise, [employer]. Care instructions adapted under license by [facility] ([facility]). If you have questions about a medical condition or this instruction, always ask your healthcare professional. [address] any warranty or liability for your use of this information. A Healthy Heart: Care Instructions  Your Care Instructions     Coronary artery disease, also called heart disease, occurs when a substance called plaque builds up in the vessels that supply oxygen-rich blood to your heart muscle. This can narrow the blood vessels and reduce blood flow. A heart attack happens when blood flow is completely blocked. A high-fat diet, smoking, and other factors increase the risk of heart disease. Your doctor has found that you have a chance of having heart disease. You can do lots of things to keep your heart healthy. It may not be easy, but you can change your diet, exercise more, and quit smoking. These steps really work to lower your chance of heart disease. Follow-up care is a key part of your treatment and safety. Be sure to make and go to all appointments, and call your doctor if you are having problems. It's also a good idea to know your test results and keep a list of the medicines you take. How can you care for yourself at home? Diet    Use less salt when you cook and eat. This helps lower your blood pressure. Taste food before salting. Add only a little salt when you think you need it. With time, your taste buds will adjust to less salt.     Eat fewer snack items, fast foods, canned soups, and other high-salt, high-fat, processed foods.     Read food labels and try to avoid saturated and trans fats.  They increase your risk of heart disease by raising cholesterol levels.     Limit the amount of solid fat-butter, margarine, and shortening-you eat. Use olive, peanut, or canola oil when you cook. Bake, broil, and steam foods instead of frying them.     Eat a variety of fruit and vegetables every day. Dark green, deep orange, red, or yellow fruits and vegetables are especially good for you. Examples include spinach, carrots, peaches, and berries.     Foods high in fiber can reduce your cholesterol and provide important vitamins and minerals. High-fiber foods include whole-grain cereals and breads, oatmeal, beans, brown rice, citrus fruits, and apples.     Eat lean proteins. Heart-healthy proteins include seafood, lean meats and poultry, eggs, beans, peas, nuts, seeds, and soy products.     Limit drinks and foods with added sugar. These include candy, desserts, and soda pop. Lifestyle changes    If your doctor recommends it, get more exercise. Walking is a good choice. Bit by bit, increase the amount you walk every day. Try for at least 30 minutes on most days of the week. You also may want to swim, bike, or do other activities.     Do not smoke. If you need help quitting, talk to your doctor about stop-smoking programs and medicines. These can increase your chances of quitting for good. Quitting smoking may be the most important step you can take to protect your heart. It is never too late to quit.     Limit alcohol to 2 drinks a day for men and 1 drink a day for women. Too much alcohol can cause health problems.     Manage other health problems such as diabetes, high blood pressure, and high cholesterol. If you think you may have a problem with alcohol or drug use, talk to your doctor. Medicines    Take your medicines exactly as prescribed. Call your doctor if you think you are having a problem with your medicine.     If your doctor recommends aspirin, take the amount directed each day.  Make sure you take aspirin and not another kind of pain reliever, such as acetaminophen (Tylenol). When should you call for help? Call 911 if you have symptoms of a heart attack. These may include:    Chest pain or pressure, or a strange feeling in the chest.     Sweating.     Shortness of breath.     Pain, pressure, or a strange feeling in the back, neck, jaw, or upper belly or in one or both shoulders or arms.     Lightheadedness or sudden weakness.     A fast or irregular heartbeat. After you call 911, the  may tell you to chew 1 adult-strength or 2 to 4 low-dose aspirin. Wait for an ambulance. Do not try to drive yourself. Watch closely for changes in your health, and be sure to contact your doctor if you have any problems. Where can you learn more? Go to http://www.newell.com/ and enter F075 to learn more about \"A Healthy Heart: Care Instructions. \"  Current as of: September 7, 2022               Content Version: 13.5  © 9691-9893 [employer]. Care instructions adapted under license by [address] [place] [address]. If you have questions about a medical condition or this instruction, always ask your healthcare professional. [place] [address] any warranty or liability for your use of this information. Personalized Preventive Plan for Joy Lopez - 2/13/2023  Medicare offers a range of preventive health benefits. Some of the tests and screenings are paid in full while other may be subject to a deductible, co-insurance, and/or copay. Some of these benefits include a comprehensive review of your medical history including lifestyle, illnesses that may run in your family, and various assessments and screenings as appropriate. After reviewing your medical record and screening and assessments performed today your provider may have ordered immunizations, labs, imaging, and/or referrals for you.   A list of these orders (if applicable) as well as your Preventive Care list are included within your After Visit Summary for your review. Other Preventive Recommendations:    A preventive eye exam performed by an eye specialist is recommended every 1-2 years to screen for glaucoma; cataracts, macular degeneration, and other eye disorders. A preventive dental visit is recommended every 6 months. Try to get at least 150 minutes of exercise per week or 10,000 steps per day on a pedometer . Order or download the FREE \"Exercise & Physical Activity: Your Everyday Guide\" from [employer]. Call 5-745.117.6389 or search [employer] online. You need 1080-3324 mg of calcium and 7654-4309 IU of vitamin D per day. It is possible to meet your calcium requirement with diet alone, but a vitamin D supplement is usually necessary to meet this goal.  When exposed to the sun, use a sunscreen that protects against both UVA and UVB radiation with an SPF of 30 or greater. Reapply every 2 to 3 hours or after sweating, drying off with a towel, or swimming. Always wear a seat belt when traveling in a car. Always wear a helmet when riding a bicycle or motorcycle.

## 2023-02-13 NOTE — TELEPHONE ENCOUNTER
Patient was asking during AWV about getting Dexcom or Pushpa meter- see rx was sent in August  during last office visit but states has never received- please advise or send new rx for pt

## 2023-03-27 ENCOUNTER — OFFICE VISIT (OUTPATIENT)
Dept: FAMILY MEDICINE CLINIC | Age: 68
End: 2023-03-27
Payer: MEDICARE

## 2023-03-27 ENCOUNTER — TELEPHONE (OUTPATIENT)
Dept: FAMILY MEDICINE CLINIC | Age: 68
End: 2023-03-27

## 2023-03-27 VITALS
BODY MASS INDEX: 35.61 KG/M2 | SYSTOLIC BLOOD PRESSURE: 148 MMHG | WEIGHT: 235 LBS | HEART RATE: 95 BPM | OXYGEN SATURATION: 95 % | DIASTOLIC BLOOD PRESSURE: 88 MMHG | HEIGHT: 68 IN

## 2023-03-27 DIAGNOSIS — K75.81 LIVER CIRRHOSIS SECONDARY TO NASH (HCC): ICD-10-CM

## 2023-03-27 DIAGNOSIS — I10 ESSENTIAL HYPERTENSION: ICD-10-CM

## 2023-03-27 DIAGNOSIS — E11.65 TYPE 2 DIABETES MELLITUS WITH HYPERGLYCEMIA, WITH LONG-TERM CURRENT USE OF INSULIN (HCC): ICD-10-CM

## 2023-03-27 DIAGNOSIS — R10.13 EPIGASTRIC ABDOMINAL PAIN: ICD-10-CM

## 2023-03-27 DIAGNOSIS — F33.0 MILD EPISODE OF RECURRENT MAJOR DEPRESSIVE DISORDER (HCC): ICD-10-CM

## 2023-03-27 DIAGNOSIS — K74.60 LIVER CIRRHOSIS SECONDARY TO NASH (HCC): ICD-10-CM

## 2023-03-27 DIAGNOSIS — Z79.4 TYPE 2 DIABETES MELLITUS WITH HYPERGLYCEMIA, WITH LONG-TERM CURRENT USE OF INSULIN (HCC): Primary | ICD-10-CM

## 2023-03-27 DIAGNOSIS — E11.65 TYPE 2 DIABETES MELLITUS WITH HYPERGLYCEMIA, WITH LONG-TERM CURRENT USE OF INSULIN (HCC): Primary | ICD-10-CM

## 2023-03-27 DIAGNOSIS — Z79.4 TYPE 2 DIABETES MELLITUS WITH HYPERGLYCEMIA, WITH LONG-TERM CURRENT USE OF INSULIN (HCC): ICD-10-CM

## 2023-03-27 DIAGNOSIS — I85.11 SECONDARY ESOPHAGEAL VARICES WITH BLEEDING (HCC): ICD-10-CM

## 2023-03-27 DIAGNOSIS — E66.01 SEVERE OBESITY (BMI 35.0-39.9) WITH COMORBIDITY (HCC): ICD-10-CM

## 2023-03-27 DIAGNOSIS — N18.31 STAGE 3A CHRONIC KIDNEY DISEASE (HCC): ICD-10-CM

## 2023-03-27 PROBLEM — E11.9 TYPE 2 DIABETES MELLITUS WITHOUT COMPLICATION, WITH LONG-TERM CURRENT USE OF INSULIN (HCC): Status: RESOLVED | Noted: 2017-05-30 | Resolved: 2023-03-27

## 2023-03-27 LAB
ALBUMIN SERPL-MCNC: 4.2 G/DL (ref 3.4–5)
ALBUMIN/GLOB SERPL: 1.4 {RATIO} (ref 1.1–2.2)
ALP SERPL-CCNC: 171 U/L (ref 40–129)
ALT SERPL-CCNC: 30 U/L (ref 10–40)
ANION GAP SERPL CALCULATED.3IONS-SCNC: 11 MMOL/L (ref 3–16)
AST SERPL-CCNC: 30 U/L (ref 15–37)
BASOPHILS # BLD: 0 K/UL (ref 0–0.2)
BASOPHILS NFR BLD: 0.5 %
BILIRUB SERPL-MCNC: 1.1 MG/DL (ref 0–1)
BUN SERPL-MCNC: 15 MG/DL (ref 7–20)
CALCIUM SERPL-MCNC: 10.1 MG/DL (ref 8.3–10.6)
CHLORIDE SERPL-SCNC: 102 MMOL/L (ref 99–110)
CHOLEST SERPL-MCNC: 189 MG/DL (ref 0–199)
CO2 SERPL-SCNC: 26 MMOL/L (ref 21–32)
CREAT SERPL-MCNC: 1 MG/DL (ref 0.8–1.3)
DEPRECATED RDW RBC AUTO: 13.2 % (ref 12.4–15.4)
EOSINOPHIL # BLD: 0 K/UL (ref 0–0.6)
EOSINOPHIL NFR BLD: 0.6 %
GFR SERPLBLD CREATININE-BSD FMLA CKD-EPI: >60 ML/MIN/{1.73_M2}
GLUCOSE SERPL-MCNC: 368 MG/DL (ref 70–99)
HBA1C MFR BLD: 11.6 %
HCT VFR BLD AUTO: 43.5 % (ref 40.5–52.5)
HDLC SERPL-MCNC: 73 MG/DL (ref 40–60)
HGB BLD-MCNC: 14.9 G/DL (ref 13.5–17.5)
LDLC SERPL CALC-MCNC: 100 MG/DL
LIPASE SERPL-CCNC: 23 U/L (ref 13–60)
LYMPHOCYTES # BLD: 1.3 K/UL (ref 1–5.1)
LYMPHOCYTES NFR BLD: 20.7 %
MCH RBC QN AUTO: 31.2 PG (ref 26–34)
MCHC RBC AUTO-ENTMCNC: 34.2 G/DL (ref 31–36)
MCV RBC AUTO: 91.2 FL (ref 80–100)
MONOCYTES # BLD: 0.5 K/UL (ref 0–1.3)
MONOCYTES NFR BLD: 7.9 %
NEUTROPHILS # BLD: 4.6 K/UL (ref 1.7–7.7)
NEUTROPHILS NFR BLD: 70.3 %
PLATELET # BLD AUTO: 122 K/UL (ref 135–450)
PMV BLD AUTO: 9.2 FL (ref 5–10.5)
POTASSIUM SERPL-SCNC: 4.3 MMOL/L (ref 3.5–5.1)
PROT SERPL-MCNC: 7.2 G/DL (ref 6.4–8.2)
RBC # BLD AUTO: 4.77 M/UL (ref 4.2–5.9)
SODIUM SERPL-SCNC: 139 MMOL/L (ref 136–145)
TRIGL SERPL-MCNC: 81 MG/DL (ref 0–150)
VLDLC SERPL CALC-MCNC: 16 MG/DL
WBC # BLD AUTO: 6.5 K/UL (ref 4–11)

## 2023-03-27 PROCEDURE — 3046F HEMOGLOBIN A1C LEVEL >9.0%: CPT | Performed by: PHYSICIAN ASSISTANT

## 2023-03-27 PROCEDURE — 3079F DIAST BP 80-89 MM HG: CPT | Performed by: PHYSICIAN ASSISTANT

## 2023-03-27 PROCEDURE — 83036 HEMOGLOBIN GLYCOSYLATED A1C: CPT | Performed by: PHYSICIAN ASSISTANT

## 2023-03-27 PROCEDURE — 1123F ACP DISCUSS/DSCN MKR DOCD: CPT | Performed by: PHYSICIAN ASSISTANT

## 2023-03-27 PROCEDURE — 99214 OFFICE O/P EST MOD 30 MIN: CPT | Performed by: PHYSICIAN ASSISTANT

## 2023-03-27 PROCEDURE — 3077F SYST BP >= 140 MM HG: CPT | Performed by: PHYSICIAN ASSISTANT

## 2023-03-27 RX ORDER — INSULIN ASPART 100 [IU]/ML
20 INJECTION, SOLUTION INTRAVENOUS; SUBCUTANEOUS
Qty: 5 ADJUSTABLE DOSE PRE-FILLED PEN SYRINGE | Refills: 3 | Status: SHIPPED | OUTPATIENT
Start: 2023-03-27

## 2023-03-27 RX ORDER — FLASH GLUCOSE SENSOR
1 KIT MISCELLANEOUS
Qty: 2 EACH | Refills: 5 | Status: SHIPPED | OUTPATIENT
Start: 2023-03-27

## 2023-03-27 RX ORDER — TIZANIDINE 4 MG/1
4 TABLET ORAL 3 TIMES DAILY PRN
Qty: 30 TABLET | Refills: 1 | Status: SHIPPED | OUTPATIENT
Start: 2023-03-27

## 2023-03-27 SDOH — ECONOMIC STABILITY: HOUSING INSECURITY
IN THE LAST 12 MONTHS, WAS THERE A TIME WHEN YOU DID NOT HAVE A STEADY PLACE TO SLEEP OR SLEPT IN A SHELTER (INCLUDING NOW)?: NO

## 2023-03-27 SDOH — ECONOMIC STABILITY: FOOD INSECURITY: WITHIN THE PAST 12 MONTHS, THE FOOD YOU BOUGHT JUST DIDN'T LAST AND YOU DIDN'T HAVE MONEY TO GET MORE.: NEVER TRUE

## 2023-03-27 SDOH — ECONOMIC STABILITY: INCOME INSECURITY: HOW HARD IS IT FOR YOU TO PAY FOR THE VERY BASICS LIKE FOOD, HOUSING, MEDICAL CARE, AND HEATING?: NOT HARD AT ALL

## 2023-03-27 SDOH — ECONOMIC STABILITY: FOOD INSECURITY: WITHIN THE PAST 12 MONTHS, YOU WORRIED THAT YOUR FOOD WOULD RUN OUT BEFORE YOU GOT MONEY TO BUY MORE.: NEVER TRUE

## 2023-03-27 ASSESSMENT — ENCOUNTER SYMPTOMS
SHORTNESS OF BREATH: 0
ABDOMINAL PAIN: 1
RHINORRHEA: 0
VOMITING: 0
DIARRHEA: 1
SORE THROAT: 0
CONSTIPATION: 0
NAUSEA: 1
COUGH: 0
ABDOMINAL DISTENTION: 1

## 2023-03-27 NOTE — Clinical Note
Kita Massed, if you could help me out with this patient. He needs a CGM as as well as different insulin regimen.

## 2023-03-27 NOTE — TELEPHONE ENCOUNTER
Pharmacy calling to clarify medication order/prescription:    Pharmacy:  [facility]     Medication:  novolog 100 unit   Sig:  NA   Quantity:  NA     Comments:  the prescribed medication is not covered by insurance.  Please consider changing to one of the suggested covered alternatives

## 2023-03-27 NOTE — PROGRESS NOTES
(FREESTYLE ELIZABETH 14 DAY READER) SOUMYA 1 Units by Does not apply route daily 1 Device 0    azelastine (ASTELIN) 0.1 % nasal spray 1 spray by Nasal route 2 times daily Use in each nostril as directed 1 Bottle 5    albuterol sulfate  (90 Base) MCG/ACT inhaler INHALE 2 PUFFS INTO THE LUNGS 4 TIMES DAILY AS NEEDED FOR WHEEZING 8.5 Inhaler 0    albuterol (PROVENTIL) (2.5 MG/3ML) 0.083% nebulizer solution Take 3 mLs by nebulization every 4 hours as needed for Wheezing 25 vial 3    Blood Glucose Monitoring Suppl (ONE TOUCH ULTRA 2) w/Device KIT 1 kit by Does not apply route daily 1 kit 0    Insulin Pen Needle (PEN NEEDLES) 31G X 6 MM MISC Use with insulin 4 times daily. 200 each 5    glucose blood VI test strips (ONE TOUCH ULTRA TEST) strip USE AS DIRECTED 3 TIMES A DAY Dx Code E11.9 100 strip 5    acetaminophen (TYLENOL) 500 MG tablet Take 500 mg by mouth every 6 hours as needed for Pain      ONETOUCH DELICA LANCETS 94Z MISC TEST 3 TIMES A  each 12    insulin 70-30 (NOVOLIN 70/30) (70-30) 100 UNIT per ML injection vial Inject 50 Units into the skin 3 times daily 135 mL 1     No current facility-administered medications for this visit. Vitals:    03/27/23 1050 03/27/23 1208   BP: (!) 156/82 (!) 148/88   Site: Right Upper Arm    Position: Sitting    Cuff Size: Medium Adult    Pulse: 95    SpO2: 95%    Weight: 235 lb (106.6 kg)    Height: 5' 8\" (1.727 m)      Estimated body mass index is 35.73 kg/m² as calculated from the following:    Height as of this encounter: 5' 8\" (1.727 m). Weight as of this encounter: 235 lb (106.6 kg). Physical Exam  Constitutional:       General: He is not in acute distress. Appearance: He is well-developed. He is obese. HENT:      Head: Normocephalic and atraumatic. Eyes:      Extraocular Movements: Extraocular movements intact. Conjunctiva/sclera: Conjunctivae normal.      Pupils: Pupils are equal, round, and reactive to light.    Cardiovascular:

## 2023-03-28 ENCOUNTER — CARE COORDINATION (OUTPATIENT)
Dept: CARE COORDINATION | Age: 68
End: 2023-03-28

## 2023-03-28 ENCOUNTER — TELEPHONE (OUTPATIENT)
Dept: ADMINISTRATIVE | Age: 68
End: 2023-03-28

## 2023-03-28 NOTE — CARE COORDINATION
ACM called but patient did not answer. ACM unable to leave message due to voicemail not being set up. Will send My Chart message. ACM will follow up at a later date.

## 2023-03-30 NOTE — TELEPHONE ENCOUNTER
Patients wife informed, but she believes they already picked it up and paid 70 dollars for it  But next time they will go with the Humalog

## 2023-03-31 ENCOUNTER — CARE COORDINATION (OUTPATIENT)
Dept: CARE COORDINATION | Age: 68
End: 2023-03-31

## 2023-03-31 NOTE — CARE COORDINATION
Attempted to contact patient via mobile number; unable to leave message d/t voice mail not set up yet  Second number listed went to spouse; she reports she is not at home but will have patient contact this ACM later today.     Update:  Patient did not return call  No further outreach schedule  FYI: Patient's spouse reported the best number to reach patient is [phone]

## 2023-04-05 ENCOUNTER — TELEPHONE (OUTPATIENT)
Dept: FAMILY MEDICINE CLINIC | Age: 68
End: 2023-04-05

## 2023-04-05 DIAGNOSIS — R10.13 EPIGASTRIC ABDOMINAL PAIN: Primary | ICD-10-CM

## 2023-04-05 NOTE — TELEPHONE ENCOUNTER
Please call to let patient/his wife know that based on his labs, I think a RUQ ultrasound would be more beneficial than a ct scan- staff, please call to cancel the ct scan that is currently scheduled. His total bilirubin and alkaline phosphatase were elevated suggesting gall bladder or biliary tree dysfunction. I have put in the order for RUQ US. Please have her call to schedule.

## 2023-04-05 NOTE — TELEPHONE ENCOUNTER
Patient's wife called requesting the results of recent labs. Please call patient's wife, Ellie Delgado, back at   [phone]. Patient will schedule CT for next week.

## 2023-05-10 DIAGNOSIS — K74.60 LIVER CIRRHOSIS SECONDARY TO NASH (HCC): Primary | ICD-10-CM

## 2023-05-10 DIAGNOSIS — K75.81 LIVER CIRRHOSIS SECONDARY TO NASH (HCC): Primary | ICD-10-CM

## 2023-05-13 DIAGNOSIS — K58.0 IRRITABLE BOWEL SYNDROME WITH DIARRHEA: ICD-10-CM

## 2023-05-15 RX ORDER — AMITRIPTYLINE HYDROCHLORIDE 10 MG/1
TABLET, FILM COATED ORAL
Qty: 90 TABLET | Refills: 0 | Status: SHIPPED | OUTPATIENT
Start: 2023-05-15

## 2023-05-19 DIAGNOSIS — E78.5 HYPERLIPIDEMIA, UNSPECIFIED HYPERLIPIDEMIA TYPE: ICD-10-CM

## 2023-05-19 RX ORDER — SIMVASTATIN 40 MG
TABLET ORAL
Qty: 90 TABLET | Refills: 1 | Status: SHIPPED | OUTPATIENT
Start: 2023-05-19

## 2023-05-19 NOTE — TELEPHONE ENCOUNTER
Refill Request     CONFIRM preferred pharmacy with the patient. If Mail Order Rx - Pend for 90 day refill. Last Seen: Last Seen Department: 3/27/2023  Last Seen by PCP: 8/8/2022    Last Written: 5/12/2023 #90 Refill 1    If no future appointment scheduled:  Review the last OV with PCP and review information for follow-up visit,  Route STAFF MESSAGE with patient name to the [facility] for scheduling with the following information:            -  Timing of next visit           -  Visit type ie Physical, OV, etc           -  Diagnoses/Reason ie. COPD, HTN - Do not use MEDICATION, Follow-up or CHECK UP - Give reason for visit      Next Appointment:   Future Appointments   Date Time Provider Dre Evans   7/11/2023  1:00 PM Funmilayo Fernandez MD [facility] [place] - DYD       Message sent to [address] [place] [address] to schedule appt with patient?   NO      Requested Prescriptions     Pending Prescriptions Disp Refills    simvastatin (ZOCOR) 40 MG tablet [Pharmacy Med Name: SIMVASTATIN 40 MG TABLET] 90 tablet 1     Sig: TAKE 1 TABLET BY MOUTH NIGHTLY

## 2023-06-01 ENCOUNTER — HOSPITAL ENCOUNTER (OUTPATIENT)
Age: 68
Discharge: HOME | End: 2023-06-01
Payer: MEDICARE

## 2023-06-01 DIAGNOSIS — K75.81 LIVER CIRRHOSIS SECONDARY TO NASH (HCC): ICD-10-CM

## 2023-06-01 DIAGNOSIS — K74.60 LIVER CIRRHOSIS SECONDARY TO NASH (HCC): ICD-10-CM

## 2023-06-01 LAB
ALBUMIN SERPL-MCNC: 4 G/DL (ref 3.4–5)
ALP SERPL-CCNC: 140 U/L (ref 40–129)
ALT SERPL-CCNC: 26 U/L (ref 10–40)
ANION GAP SERPL CALCULATED.3IONS-SCNC: 13 MMOL/L (ref 3–16)
AST SERPL-CCNC: 35 U/L (ref 15–37)
BILIRUB DIRECT SERPL-MCNC: 0.3 MG/DL (ref 0–0.3)
BILIRUB INDIRECT SERPL-MCNC: 0.8 MG/DL (ref 0–1)
BILIRUB SERPL-MCNC: 1.1 MG/DL (ref 0–1)
BUN SERPL-MCNC: 26 MG/DL (ref 7–20)
CALCIUM SERPL-MCNC: 9.6 MG/DL (ref 8.3–10.6)
CHLORIDE SERPL-SCNC: 103 MMOL/L (ref 99–110)
CO2 SERPL-SCNC: 23 MMOL/L (ref 21–32)
CREAT SERPL-MCNC: 1.2 MG/DL (ref 0.8–1.3)
GFR SERPLBLD CREATININE-BSD FMLA CKD-EPI: >60 ML/MIN/{1.73_M2}
GLUCOSE SERPL-MCNC: 320 MG/DL (ref 70–99)
INR PPP: 1.04 (ref 0.84–1.16)
POTASSIUM SERPL-SCNC: 3.9 MMOL/L (ref 3.5–5.1)
PROT SERPL-MCNC: 7 G/DL (ref 6.4–8.2)
PROTHROMBIN TIME: 13.6 SEC (ref 11.5–14.8)
SODIUM SERPL-SCNC: 139 MMOL/L (ref 136–145)

## 2023-06-01 PROCEDURE — 86706 HEP B SURFACE ANTIBODY: CPT

## 2023-06-01 PROCEDURE — 87340 HEPATITIS B SURFACE AG IA: CPT

## 2023-06-01 PROCEDURE — 86708 HEPATITIS A ANTIBODY: CPT

## 2023-06-01 PROCEDURE — 36415 COLL VENOUS BLD VENIPUNCTURE: CPT

## 2023-06-01 PROCEDURE — 85610 PROTHROMBIN TIME: CPT

## 2023-06-01 PROCEDURE — 80048 BASIC METABOLIC PNL TOTAL CA: CPT

## 2023-06-01 PROCEDURE — 80076 HEPATIC FUNCTION PANEL: CPT

## 2023-06-01 PROCEDURE — 82105 ALPHA-FETOPROTEIN SERUM: CPT

## 2023-06-02 LAB
HBV SURFACE AB SERPL IA-ACNC: <3.5 MIU/ML
HBV SURFACE AG SERPL QL IA: NORMAL

## 2023-06-05 LAB — HAV AB SER QL IA: POSITIVE

## 2023-06-06 LAB — AFP-TM SERPL-MCNC: 2 UG/L

## 2023-06-21 ENCOUNTER — ANESTHESIA EVENT (OUTPATIENT)
Dept: ENDOSCOPY | Age: 68
End: 2023-06-21
Payer: MEDICARE

## 2023-06-21 NOTE — H&P
Fredy [address]   Pre-operative History and Physical    Patient: Pascale Son  : 1955  Acct#:     HISTORY OF PRESENT ILLNESS:    Indications: LANG cirrhosis     68-year-old male with medical history of diabetes mellitus type 2, LANG related liver cirrhosis, and esophageal varices s/p banding 21, obesity (BMI 35.7), hypertension referred for abnormal US. Patient reported generalized abdominal pain about a month ago for which US RUQ obtained noted cirrhosis. He reports now improved abdominal pain. Reports associated bilateral leg swelling; worse after standing throughout the day. Denies abdominal pain, abdominal distension, shortness of breath, confusion, jaundice, lower extremity edema, dysphagia, odynophagia, nausea, vomiting, fever, chills,melena or hematochezia. Reports 2-3 soft bowel movements daily. Ultrasound gallbladder on 2023: Lobular contour of liver. Absent gallbladder. CBD normal 7 mm. Suspected recannulization of the umbilical vein. Last EGD 21: Normal upper esophagus. Three columns of small esophageal varices seen in the distal and mid esophagus, not amenable to band ligation. Irregular Z-line at 40 cm from incisors. Mild portal hypertensive gastropathy. Previously biopsied. Area of non bleeding punctate erythematous mucosa in the antrum suggestive gastric antral vascular ectasia (GAVE). Normal duodenal bulb and second portion of duodenum. EGD 21: Normal upper esophagus. Three columns of large esophageal varices seen in the distal and mid esophagus. Band ligation performed with a total of 4 bands placed with collapse of varices. No bleeding noted. Irregular Z-line at 40 cm from incisorsModerate portal hypertensive gastropathy. Biopsied (mild chronic gastritis, negative h. pylori). Area of non bleeding punctate erythematous mucosa in the antrum suggestive gastric antral vascular ectasia (GAVE).  Normal duodenal bulb and second portion of

## 2023-06-22 ENCOUNTER — ANESTHESIA (OUTPATIENT)
Dept: ENDOSCOPY | Age: 68
End: 2023-06-22
Payer: MEDICARE

## 2023-06-22 ENCOUNTER — HOSPITAL ENCOUNTER (OUTPATIENT)
Age: 68
Setting detail: OUTPATIENT SURGERY
Discharge: HOME | End: 2023-06-22
Attending: INTERNAL MEDICINE | Admitting: INTERNAL MEDICINE
Payer: MEDICARE

## 2023-06-22 VITALS
OXYGEN SATURATION: 97 % | RESPIRATION RATE: 18 BRPM | SYSTOLIC BLOOD PRESSURE: 151 MMHG | BODY MASS INDEX: 37.89 KG/M2 | DIASTOLIC BLOOD PRESSURE: 80 MMHG | HEART RATE: 95 BPM | TEMPERATURE: 98 F | WEIGHT: 250 LBS | HEIGHT: 68 IN

## 2023-06-22 LAB
GLUCOSE BLD-MCNC: 158 MG/DL (ref 70–99)
GLUCOSE BLD-MCNC: 159 MG/DL (ref 70–99)
PERFORMED ON: ABNORMAL
PERFORMED ON: ABNORMAL

## 2023-06-22 PROCEDURE — 7100000011 HC PHASE II RECOVERY - ADDTL 15 MIN: Performed by: INTERNAL MEDICINE

## 2023-06-22 PROCEDURE — 3700000001 HC ADD 15 MINUTES (ANESTHESIA): Performed by: INTERNAL MEDICINE

## 2023-06-22 PROCEDURE — 2709999900 HC NON-CHARGEABLE SUPPLY: Performed by: INTERNAL MEDICINE

## 2023-06-22 PROCEDURE — 3700000000 HC ANESTHESIA ATTENDED CARE: Performed by: INTERNAL MEDICINE

## 2023-06-22 PROCEDURE — 2720000010 HC SURG SUPPLY STERILE: Performed by: INTERNAL MEDICINE

## 2023-06-22 PROCEDURE — 2500000003 HC RX 250 WO HCPCS: Performed by: NURSE ANESTHETIST, CERTIFIED REGISTERED

## 2023-06-22 PROCEDURE — 3609012300 HC EGD BAND LIGATION ESOPHGEAL/GASTRIC VARICES: Performed by: INTERNAL MEDICINE

## 2023-06-22 PROCEDURE — 7100000010 HC PHASE II RECOVERY - FIRST 15 MIN: Performed by: INTERNAL MEDICINE

## 2023-06-22 PROCEDURE — 2580000003 HC RX 258: Performed by: NURSE ANESTHETIST, CERTIFIED REGISTERED

## 2023-06-22 PROCEDURE — 6360000002 HC RX W HCPCS: Performed by: NURSE ANESTHETIST, CERTIFIED REGISTERED

## 2023-06-22 RX ORDER — MEPERIDINE HYDROCHLORIDE 25 MG/ML
12.5 INJECTION INTRAMUSCULAR; INTRAVENOUS; SUBCUTANEOUS EVERY 5 MIN PRN
Status: CANCELLED | OUTPATIENT
Start: 2023-06-22

## 2023-06-22 RX ORDER — OXYCODONE HYDROCHLORIDE 5 MG/1
10 TABLET ORAL PRN
Status: CANCELLED | OUTPATIENT
Start: 2023-06-22 | End: 2023-06-22

## 2023-06-22 RX ORDER — OXYCODONE HYDROCHLORIDE 5 MG/1
5 TABLET ORAL PRN
Status: CANCELLED | OUTPATIENT
Start: 2023-06-22 | End: 2023-06-22

## 2023-06-22 RX ORDER — SODIUM CHLORIDE, SODIUM LACTATE, POTASSIUM CHLORIDE, CALCIUM CHLORIDE 600; 310; 30; 20 MG/100ML; MG/100ML; MG/100ML; MG/100ML
INJECTION, SOLUTION INTRAVENOUS CONTINUOUS
Status: DISCONTINUED | OUTPATIENT
Start: 2023-06-22 | End: 2023-06-22 | Stop reason: HOSPADM

## 2023-06-22 RX ORDER — SODIUM CHLORIDE 0.9 % (FLUSH) 0.9 %
5-40 SYRINGE (ML) INJECTION PRN
Status: CANCELLED | OUTPATIENT
Start: 2023-06-22

## 2023-06-22 RX ORDER — SODIUM CHLORIDE 0.9 % (FLUSH) 0.9 %
5-40 SYRINGE (ML) INJECTION PRN
Status: DISCONTINUED | OUTPATIENT
Start: 2023-06-22 | End: 2023-06-22 | Stop reason: HOSPADM

## 2023-06-22 RX ORDER — ONDANSETRON 2 MG/ML
4 INJECTION INTRAMUSCULAR; INTRAVENOUS
Status: CANCELLED | OUTPATIENT
Start: 2023-06-22 | End: 2023-06-23

## 2023-06-22 RX ORDER — SODIUM CHLORIDE 0.9 % (FLUSH) 0.9 %
5-40 SYRINGE (ML) INJECTION EVERY 12 HOURS SCHEDULED
Status: DISCONTINUED | OUTPATIENT
Start: 2023-06-22 | End: 2023-06-22 | Stop reason: HOSPADM

## 2023-06-22 RX ORDER — LIDOCAINE HYDROCHLORIDE 20 MG/ML
INJECTION, SOLUTION EPIDURAL; INFILTRATION; INTRACAUDAL; PERINEURAL PRN
Status: DISCONTINUED | OUTPATIENT
Start: 2023-06-22 | End: 2023-06-22 | Stop reason: SDUPTHER

## 2023-06-22 RX ORDER — GLYCOPYRROLATE 0.2 MG/ML
INJECTION INTRAMUSCULAR; INTRAVENOUS PRN
Status: DISCONTINUED | OUTPATIENT
Start: 2023-06-22 | End: 2023-06-22 | Stop reason: SDUPTHER

## 2023-06-22 RX ORDER — LABETALOL HYDROCHLORIDE 5 MG/ML
10 INJECTION, SOLUTION INTRAVENOUS
Status: CANCELLED | OUTPATIENT
Start: 2023-06-22

## 2023-06-22 RX ORDER — PROPOFOL 10 MG/ML
INJECTION, EMULSION INTRAVENOUS PRN
Status: DISCONTINUED | OUTPATIENT
Start: 2023-06-22 | End: 2023-06-22 | Stop reason: SDUPTHER

## 2023-06-22 RX ORDER — SODIUM CHLORIDE 9 MG/ML
INJECTION, SOLUTION INTRAVENOUS PRN
Status: CANCELLED | OUTPATIENT
Start: 2023-06-22

## 2023-06-22 RX ORDER — SODIUM CHLORIDE 9 MG/ML
INJECTION, SOLUTION INTRAVENOUS PRN
Status: DISCONTINUED | OUTPATIENT
Start: 2023-06-22 | End: 2023-06-22 | Stop reason: HOSPADM

## 2023-06-22 RX ORDER — SODIUM CHLORIDE, SODIUM LACTATE, POTASSIUM CHLORIDE, CALCIUM CHLORIDE 600; 310; 30; 20 MG/100ML; MG/100ML; MG/100ML; MG/100ML
INJECTION, SOLUTION INTRAVENOUS CONTINUOUS PRN
Status: DISCONTINUED | OUTPATIENT
Start: 2023-06-22 | End: 2023-06-22 | Stop reason: SDUPTHER

## 2023-06-22 RX ORDER — LIDOCAINE HYDROCHLORIDE 10 MG/ML
1 INJECTION, SOLUTION EPIDURAL; INFILTRATION; INTRACAUDAL; PERINEURAL
Status: DISCONTINUED | OUTPATIENT
Start: 2023-06-22 | End: 2023-06-22 | Stop reason: HOSPADM

## 2023-06-22 RX ORDER — DIPHENHYDRAMINE HYDROCHLORIDE 50 MG/ML
12.5 INJECTION INTRAMUSCULAR; INTRAVENOUS
Status: CANCELLED | OUTPATIENT
Start: 2023-06-22 | End: 2023-06-23

## 2023-06-22 RX ORDER — SODIUM CHLORIDE 0.9 % (FLUSH) 0.9 %
5-40 SYRINGE (ML) INJECTION EVERY 12 HOURS SCHEDULED
Status: CANCELLED | OUTPATIENT
Start: 2023-06-22

## 2023-06-22 RX ADMIN — PROPOFOL 30 MG: 10 INJECTION, EMULSION INTRAVENOUS at 08:59

## 2023-06-22 RX ADMIN — PROPOFOL 20 MG: 10 INJECTION, EMULSION INTRAVENOUS at 08:54

## 2023-06-22 RX ADMIN — PROPOFOL 20 MG: 10 INJECTION, EMULSION INTRAVENOUS at 08:53

## 2023-06-22 RX ADMIN — PROPOFOL 20 MG: 10 INJECTION, EMULSION INTRAVENOUS at 08:51

## 2023-06-22 RX ADMIN — PROPOFOL 30 MG: 10 INJECTION, EMULSION INTRAVENOUS at 09:00

## 2023-06-22 RX ADMIN — PROPOFOL 30 MG: 10 INJECTION, EMULSION INTRAVENOUS at 08:50

## 2023-06-22 RX ADMIN — LIDOCAINE HYDROCHLORIDE 60 MG: 20 INJECTION, SOLUTION EPIDURAL; INFILTRATION; INTRACAUDAL; PERINEURAL at 08:44

## 2023-06-22 RX ADMIN — SODIUM CHLORIDE, POTASSIUM CHLORIDE, SODIUM LACTATE AND CALCIUM CHLORIDE: 600; 310; 30; 20 INJECTION, SOLUTION INTRAVENOUS at 08:43

## 2023-06-22 RX ADMIN — PROPOFOL 60 MG: 10 INJECTION, EMULSION INTRAVENOUS at 08:55

## 2023-06-22 RX ADMIN — PROPOFOL 40 MG: 10 INJECTION, EMULSION INTRAVENOUS at 08:57

## 2023-06-22 RX ADMIN — PROPOFOL 70 MG: 10 INJECTION, EMULSION INTRAVENOUS at 08:48

## 2023-06-22 RX ADMIN — PROPOFOL 20 MG: 10 INJECTION, EMULSION INTRAVENOUS at 08:52

## 2023-06-22 RX ADMIN — PROPOFOL 20 MG: 10 INJECTION, EMULSION INTRAVENOUS at 08:49

## 2023-06-22 RX ADMIN — GLYCOPYRROLATE 0.2 MG: 0.2 INJECTION, SOLUTION INTRAMUSCULAR; INTRAVENOUS at 08:44

## 2023-06-22 ASSESSMENT — ENCOUNTER SYMPTOMS: SHORTNESS OF BREATH: 1

## 2023-06-22 ASSESSMENT — PAIN - FUNCTIONAL ASSESSMENT: PAIN_FUNCTIONAL_ASSESSMENT: NONE - DENIES PAIN

## 2023-06-22 NOTE — ANESTHESIA POSTPROCEDURE EVALUATION
Department of Anesthesiology  Postprocedure Note    Patient: Deacon Mcmahon  MRN: [id number]  YOB: 1955  Date of evaluation: 6/22/2023      Procedure Summary     Date: 06/22/23 Room / Location: [facility] ENDO 01 / [facility]    Anesthesia Start: 9861 Anesthesia Stop: 1614    Procedure: EGD BAND LIGATION Diagnosis:       Hepatic cirrhosis, unspecified hepatic cirrhosis type, unspecified whether ascites present ([address]      (Hepatic cirrhosis, unspecified hepatic cirrhosis type, unspecified whether ascites present ([facility] [address] [K74.60])    Surgeons: Loi Feldman MD Responsible Provider: Pricila Wright MD    Anesthesia Type: MAC ASA Status: 3          Anesthesia Type: No value filed. Fco Phase I: Fco Score: 10    Fco Phase II: Fco Score: 10      Anesthesia Post Evaluation    Patient location during evaluation: PACU  Patient participation: complete - patient participated  Level of consciousness: awake and alert  Airway patency: patent  Nausea & Vomiting: no nausea and no vomiting  Complications: no  Cardiovascular status: blood pressure returned to baseline  Respiratory status: acceptable  Hydration status: euvolemic  Comments: VSS on transfer to phase 2 recovery. No anesthetic complications.

## 2023-06-22 NOTE — OP NOTE
[facility] [address],  [address]  [address] [place] [address], [address] [place] [address]  Phone: [phone]   JUY:[phone]   [address]  [address] [place] [address]  Phone: [phone]   YCW:[phone]    EGD Procedure Note    Patient: Melody Mitchell  : 1955    Procedure: Esophagogastroduodenoscopy with variceal band ligation    Date:  2023     Endoscopist:  Chuck Finnegan MD    Referring Physician:  Allan López MD    Preoperative Diagnosis:  Hepatic cirrhosis, unspecified hepatic cirrhosis type, unspecified whether ascites present ([facility] [K74.60]    Postoperative Diagnosis: Esophageal varices, portal hypertensive gastropathy, gastric antral vascular ectasias    Anesthesia: Anesthesia: MAC  Sedation: Propofol per anesthesia  Start Time: 08:50  Stop Time: 09:01  ASA Class: 2  Mallampati: II (soft palate, uvula, fauces visible)    Indications: This is a 69y.o. year old male with medical history of diabetes mellitus type 2, LANG related liver cirrhosis, and esophageal varices s/p banding 21, obesity (BMI 35.7), hypertension presents for variceal screening. MELD Na 7 on 23    Procedure Details  Informed consent was obtained for the procedure, including conscious sedation. Risks of pancreatitis, infection, perforation, hemorrhage, adverse drug reaction and aspiration were discussed. The patient was placed in the left lateral decubitus position. Based on the pre-procedure assessment, including review of the patient's medical history, medications, allergies, and review of systems, he had been deemed to be an appropriate candidate for the above IV sedation; he was therefore sedated with the medications listed above. He was monitored continuously with ECG tracing, pulse oximetry, blood pressure monitoring, and direct observation. A gastroscope was inserted into the mouth and advanced under direct vision to second portion of the duodenum.   A careful inspection was made as the gastroscope was withdrawn,

## 2023-06-22 NOTE — PROGRESS NOTES
Pt arrived to PACU from [facility] in stable condition. Report received from Sathish Valencia. Phase II. Care of pt transferred at this time. Pt  placed on cont pulse ox and BP. Pt arrived to unit without oxygen requirements. SPO2 97% on RA.

## 2023-06-22 NOTE — PROGRESS NOTES
Called and spoke to pt wife Noelle Rubio. Discharge instructions explained in detail over the phone to wife while at bedside allowing pt to hear as well. Pt and wife received copy of discharge instructions. Pt  and wife deny having any questions about discharge.

## 2023-06-22 NOTE — ANESTHESIA PRE PROCEDURE
Department of Anesthesiology  Preprocedure Note       Name:  Jazz Brush   Age:  69 y.o.  :  1955                                          MRN:  [id number]         Date:  2023      Surgeon: Melissa Cunha):  Eliseo Velasquez MD    Procedure: Procedure(s):  EGD W/ANES. (9:00)    Medications prior to admission:   Prior to Admission medications    Medication Sig Start Date End Date Taking?  Authorizing Provider   losartan (COZAAR) 100 MG tablet TAKE 1 TABLET BY MOUTH EVERY DAY 23   Richard Stone DO   amitriptyline (ELAVIL) 10 MG tablet TAKE 1 TABLET BY MOUTH EVERY DAY AT NIGHT 23   Richard Stone DO   simvastatin (ZOCOR) 40 MG tablet TAKE 1 TABLET BY MOUTH NIGHTLY 23   [facility] Center[facility]y [facility]   tiZANidine (ZANAFLEX) 4 MG tablet Take 1 tablet by mouth 3 times daily as needed (back pain) 3/27/23   HO Sesay   insulin detemir (LEVEMIR FLEXPEN) 100 UNIT/ML injection pen Inject 50 Units into the skin 2 times daily 3/27/23   HO Sesay   insulin aspart (NOVOLOG FLEXPEN) 100 UNIT/ML injection pen Inject 20 Units into the skin 3 times daily (before meals)  Patient taking differently: Inject 20 Units into the skin 2 times daily 3/27/23   HO Sesay   montelukast (SINGULAIR) 10 MG tablet TAKE 1 TABLET BY MOUTH EVERY DAY AT NIGHT 23   Louise Ding MD   omeprazole (PRILOSEC) 40 MG delayed release capsule TAKE 1 CAPSULE BY MOUTH EVERY DAY 22   HO Sesay   albuterol sulfate  (90 Base) MCG/ACT inhaler INHALE 2 PUFFS INTO THE LUNGS 4 TIMES DAILY AS NEEDED FOR WHEEZING 20   HO Sesay   albuterol (PROVENTIL) (2.5 MG/3ML) 0.083% nebulizer solution Take 3 mLs by nebulization every 4 hours as needed for Wheezing 20   HO Sesay   acetaminophen (TYLENOL) 500 MG tablet Take 500 mg by mouth every 6 hours as needed for Pain    Historical Provider, MD       Current medications:    Current Facility-Administered Medications
full  Mouth opening: > = 3 FB   Dental:    (+) upper dentures      Pulmonary:   (+) shortness of breath:                             Cardiovascular:    (+) hypertension:, ARREDONDO:,         Rhythm: regular                      Neuro/Psych:   (+) neuromuscular disease:, psychiatric history:            GI/Hepatic/Renal:   (+) GERD:, liver disease:, renal disease: CRI,          ROS comment: GAVE. Endo/Other:    (+) DiabetesType II DM, well controlled, , .                 Abdominal:             Vascular: Other Findings:           Anesthesia Plan      MAC     ASA 3       Induction: intravenous. Anesthetic plan and risks discussed with patient. Use of blood products discussed with patient whom consented to blood products. Plan discussed with CRNA.     Attending anesthesiologist reviewed and agrees with Preprocedure content                KATIE Torrez - CRNA   6/22/2023

## 2023-06-22 NOTE — PROGRESS NOTES
IV x 1 removed per discharge hemostasis achieved, dressing applied, no complications noted. Patient denies further needs. Pt transported to private car via wheel chair. Vitals per doc flow, pt wife Kevin Garcia assumes responsibility.

## 2023-06-22 NOTE — DISCHARGE INSTRUCTIONS
PATIENT INSTRUCTIONS  POST-SEDATION    Dora Romero          IMMEDIATELY FOLLOWING PROCEDURE:    Do not drive or operate machinery for the first twenty four hours after surgery. Do not make any important decisions for twenty four hours after surgery or while taking narcotic pain medications or sedatives. You should NOT BE LEFT UNATTENDED OR ALONE. A responsible adult should be with you for the rest of the day of your procedure and also during the night for your protection and safety. You may experience some light headedness. Rest at home with activity as tolerated. You may not need to go to bed, but it is important to rest for the next 24 hours. You should not engage in athletic sports such as basketball, volleyball, jogging, skating, or activities requiring refined motor skills for 24 hours. If you develop intractable nausea and vomiting or a severe headache please notify your doctor immediately. You are not expected to have any fever, but if you feel warm, take your temperature. If you have a fever 101 degrees or higher, call your doctor. If you have had an Endoscopy:   *Eat lightly for your first meal and gradually resume your normal / prescribed diet. DO NOT eat or drink until your gag reflex returns. *If you have a sore throat you may use lozenges, or salt water gargles. ONCE YOU ARE HOME, IF YOU SHOULD HAVE:  Difficulty in breathing, persistent nausea or vomiting, bleeding you feel is excessive, or pain that is unusual, increased abdominal bloating, or any swelling, fever / chills, call your physician. If you cannot contact your physician, but feel that your signs and symptoms need a physician's attention, go to the Emergency Department. FOLLOW-UP:    Please follow up with Dr. Alanis Carcamo as scheduled or needed. Dr. Henriquez us office will call you with the biopsy findings. Call Dr. Tawana Jennings if there are any GI concerns.  [phone]    Repeat EGD in 4 weeks

## 2023-06-29 DIAGNOSIS — Z79.4 TYPE 2 DIABETES MELLITUS WITH HYPERGLYCEMIA, WITH LONG-TERM CURRENT USE OF INSULIN (HCC): ICD-10-CM

## 2023-06-29 DIAGNOSIS — E11.65 TYPE 2 DIABETES MELLITUS WITH HYPERGLYCEMIA, WITH LONG-TERM CURRENT USE OF INSULIN (HCC): ICD-10-CM

## 2023-06-29 RX ORDER — MONTELUKAST SODIUM 10 MG/1
TABLET ORAL
Qty: 90 TABLET | Refills: 1 | Status: SHIPPED | OUTPATIENT
Start: 2023-06-29

## 2023-06-29 RX ORDER — INSULIN LISPRO 100 [IU]/ML
INJECTION, SOLUTION INTRAVENOUS; SUBCUTANEOUS
COMMUNITY
Start: 2023-06-13

## 2023-06-29 RX ORDER — INSULIN DETEMIR 100 [IU]/ML
INJECTION, SOLUTION SUBCUTANEOUS
Qty: 15 ADJUSTABLE DOSE PRE-FILLED PEN SYRINGE | Refills: 3 | Status: SHIPPED | OUTPATIENT
Start: 2023-06-29

## 2023-06-29 RX ORDER — INSULIN LISPRO 100 [IU]/ML
INJECTION, SOLUTION INTRAVENOUS; SUBCUTANEOUS
Qty: 15 ADJUSTABLE DOSE PRE-FILLED PEN SYRINGE | Refills: 3 | Status: SHIPPED | OUTPATIENT
Start: 2023-06-29

## 2023-07-25 ENCOUNTER — ANESTHESIA EVENT (OUTPATIENT)
Dept: ENDOSCOPY | Age: 68
End: 2023-07-25
Payer: MEDICARE

## 2023-08-03 ENCOUNTER — OFFICE VISIT (OUTPATIENT)
Dept: FAMILY MEDICINE CLINIC | Age: 68
End: 2023-08-03
Payer: MEDICARE

## 2023-08-03 VITALS
DIASTOLIC BLOOD PRESSURE: 64 MMHG | WEIGHT: 247.4 LBS | OXYGEN SATURATION: 98 % | SYSTOLIC BLOOD PRESSURE: 138 MMHG | BODY MASS INDEX: 37.5 KG/M2 | HEART RATE: 79 BPM | HEIGHT: 68 IN | TEMPERATURE: 97.7 F

## 2023-08-03 DIAGNOSIS — I10 ESSENTIAL HYPERTENSION: ICD-10-CM

## 2023-08-03 DIAGNOSIS — Z12.5 PROSTATE CANCER SCREENING: ICD-10-CM

## 2023-08-03 DIAGNOSIS — Z79.4 TYPE 2 DIABETES MELLITUS WITH HYPERGLYCEMIA, WITH LONG-TERM CURRENT USE OF INSULIN (HCC): Primary | ICD-10-CM

## 2023-08-03 DIAGNOSIS — M54.50 LUMBAR BACK PAIN: ICD-10-CM

## 2023-08-03 DIAGNOSIS — E11.65 TYPE 2 DIABETES MELLITUS WITH HYPERGLYCEMIA, WITH LONG-TERM CURRENT USE OF INSULIN (HCC): Primary | ICD-10-CM

## 2023-08-03 DIAGNOSIS — I85.10 SECONDARY ESOPHAGEAL VARICES WITHOUT BLEEDING (HCC): ICD-10-CM

## 2023-08-03 DIAGNOSIS — K75.81 LIVER CIRRHOSIS SECONDARY TO NASH (HCC): ICD-10-CM

## 2023-08-03 DIAGNOSIS — E78.5 HYPERLIPIDEMIA, UNSPECIFIED HYPERLIPIDEMIA TYPE: ICD-10-CM

## 2023-08-03 DIAGNOSIS — J30.2 SEASONAL ALLERGIES: ICD-10-CM

## 2023-08-03 DIAGNOSIS — K74.60 LIVER CIRRHOSIS SECONDARY TO NASH (HCC): ICD-10-CM

## 2023-08-03 PROCEDURE — 1123F ACP DISCUSS/DSCN MKR DOCD: CPT | Performed by: PHYSICIAN ASSISTANT

## 2023-08-03 PROCEDURE — 83037 HB GLYCOSYLATED A1C HOME DEV: CPT | Performed by: PHYSICIAN ASSISTANT

## 2023-08-03 PROCEDURE — 3074F SYST BP LT 130 MM HG: CPT | Performed by: PHYSICIAN ASSISTANT

## 2023-08-03 PROCEDURE — 3078F DIAST BP <80 MM HG: CPT | Performed by: PHYSICIAN ASSISTANT

## 2023-08-03 PROCEDURE — 3046F HEMOGLOBIN A1C LEVEL >9.0%: CPT | Performed by: PHYSICIAN ASSISTANT

## 2023-08-03 PROCEDURE — 99214 OFFICE O/P EST MOD 30 MIN: CPT | Performed by: PHYSICIAN ASSISTANT

## 2023-08-03 RX ORDER — FUROSEMIDE 20 MG/1
20 TABLET ORAL DAILY
COMMUNITY
Start: 2023-06-05

## 2023-08-03 RX ORDER — CETIRIZINE HYDROCHLORIDE 10 MG/1
10 TABLET ORAL DAILY
Qty: 90 TABLET | Refills: 1 | Status: SHIPPED | OUTPATIENT
Start: 2023-08-03

## 2023-08-03 RX ORDER — TIZANIDINE 4 MG/1
4 TABLET ORAL 3 TIMES DAILY PRN
Qty: 30 TABLET | Refills: 1 | Status: SHIPPED | OUTPATIENT
Start: 2023-08-03

## 2023-08-03 RX ORDER — FLUTICASONE PROPIONATE 50 MCG
2 SPRAY, SUSPENSION (ML) NASAL DAILY
Qty: 48 G | Refills: 1 | Status: SHIPPED | OUTPATIENT
Start: 2023-08-03

## 2023-08-03 RX ORDER — SPIRONOLACTONE 25 MG/1
25 TABLET ORAL DAILY
COMMUNITY
Start: 2023-06-05 | End: 2023-08-03

## 2023-08-03 ASSESSMENT — ANXIETY QUESTIONNAIRES
7. FEELING AFRAID AS IF SOMETHING AWFUL MIGHT HAPPEN: 0
5. BEING SO RESTLESS THAT IT IS HARD TO SIT STILL: 1
6. BECOMING EASILY ANNOYED OR IRRITABLE: 1
GAD7 TOTAL SCORE: 8
1. FEELING NERVOUS, ANXIOUS, OR ON EDGE: 2
4. TROUBLE RELAXING: 3
2. NOT BEING ABLE TO STOP OR CONTROL WORRYING: 1
3. WORRYING TOO MUCH ABOUT DIFFERENT THINGS: 0
IF YOU CHECKED OFF ANY PROBLEMS ON THIS QUESTIONNAIRE, HOW DIFFICULT HAVE THESE PROBLEMS MADE IT FOR YOU TO DO YOUR WORK, TAKE CARE OF THINGS AT HOME, OR GET ALONG WITH OTHER PEOPLE: NOT DIFFICULT AT ALL

## 2023-08-03 ASSESSMENT — PATIENT HEALTH QUESTIONNAIRE - PHQ9
1. LITTLE INTEREST OR PLEASURE IN DOING THINGS: 2
10. IF YOU CHECKED OFF ANY PROBLEMS, HOW DIFFICULT HAVE THESE PROBLEMS MADE IT FOR YOU TO DO YOUR WORK, TAKE CARE OF THINGS AT HOME, OR GET ALONG WITH OTHER PEOPLE: 1
SUM OF ALL RESPONSES TO PHQ QUESTIONS 1-9: 8
SUM OF ALL RESPONSES TO PHQ9 QUESTIONS 1 & 2: 2
SUM OF ALL RESPONSES TO PHQ QUESTIONS 1-9: 8
2. FEELING DOWN, DEPRESSED OR HOPELESS: 0
SUM OF ALL RESPONSES TO PHQ QUESTIONS 1-9: 8
5. POOR APPETITE OR OVEREATING: 2
4. FEELING TIRED OR HAVING LITTLE ENERGY: 2
3. TROUBLE FALLING OR STAYING ASLEEP: 0
7. TROUBLE CONCENTRATING ON THINGS, SUCH AS READING THE NEWSPAPER OR WATCHING TELEVISION: 2
8. MOVING OR SPEAKING SO SLOWLY THAT OTHER PEOPLE COULD HAVE NOTICED. OR THE OPPOSITE, BEING SO FIGETY OR RESTLESS THAT YOU HAVE BEEN MOVING AROUND A LOT MORE THAN USUAL: 0
SUM OF ALL RESPONSES TO PHQ QUESTIONS 1-9: 8
6. FEELING BAD ABOUT YOURSELF - OR THAT YOU ARE A FAILURE OR HAVE LET YOURSELF OR YOUR FAMILY DOWN: 0
9. THOUGHTS THAT YOU WOULD BE BETTER OFF DEAD, OR OF HURTING YOURSELF: 0

## 2023-08-03 ASSESSMENT — ENCOUNTER SYMPTOMS
SHORTNESS OF BREATH: 0
SORE THROAT: 0
COUGH: 0
CONSTIPATION: 0
RHINORRHEA: 0
ABDOMINAL PAIN: 0
NAUSEA: 0
BACK PAIN: 1
DIARRHEA: 0
VOMITING: 0

## 2023-08-03 NOTE — PROGRESS NOTES
sounds are normal.      Palpations: Abdomen is soft. Tenderness: There is no abdominal tenderness. Musculoskeletal:      Cervical back: Neck supple. Lumbar back: Tenderness present. Decreased range of motion. Lymphadenopathy:      Cervical: No cervical adenopathy. Skin:     General: Skin is warm and dry. Findings: No rash. Neurological:      Mental Status: He is alert and oriented to person, place, and time.    Psychiatric:         Mood and Affect: Mood normal.         Behavior: Behavior normal.

## 2023-08-04 NOTE — PROGRESS NOTES
..1.  Do not eat or drink anything after 12 midnight prior to surgery. This includes no water, chewing gum or mints, except for bowel prep complete per MD.  2.  Take the following pills with a small sip of water on the morning of surgery 08/10/2023.  3.  Aspirin, Ibuprofen, Advil, Naproxen, Vitamin E and other Anti-inflammatory products should be stopped for 5 days before surgery or as directed by your physician. 4.  Check with your doctor regarding stopping Plavix, Coumadin, Lovenox, Fragmin or other blood thinners. 5.  Do not smoke and do not drink alcoholic beverages 24 hours prior to surgery. This includes NA Beer. 6.  You may brush your teeth and gargle the morning of surgery. DO NOT SWALLOW WATER.  7.  You MUST make arrangements for a responsible adult to take you home after your surgery. You will not be allowed to leave alone or drive yourself home. It is strongly suggested someone stay with you the first 24 hours. Your surgery will be cancelled if you do not have a ride home. 8.  A parent/legal guardian must accompany a child scheduled for surgery and plan to stay at the hospital until the child is discharged. Please do not bring other children with you. 9.  Please wear simple, loose fitting clothing to the hospital.  Renate Gastelum not bring valuables ( money, credit cards, checkbooks, etc.)  Do not wear any makeup (including no eye makeup) or nail polish on your fingers or toes. 10.  Do not wear any jewelry or piercing on the day of surgery. All body piercing jewelry must be removed. 11.  If you have dentures, they will be removed before going to the OR; we will provide you a container. If you wear contact lenses or glasses, they will be removed; please bring a case for them.   15.  Notify your Surgeon if you develop any illness between now and surgery time; cough, cold, fever, sore throat, nausea, vomiting, etc.  Please notify your surgeon if you experience dizziness, shortness of breath or blurred

## 2023-08-10 ENCOUNTER — ANESTHESIA (OUTPATIENT)
Dept: ENDOSCOPY | Age: 68
End: 2023-08-10
Payer: MEDICARE

## 2023-08-10 ENCOUNTER — HOSPITAL ENCOUNTER (OUTPATIENT)
Age: 68
Setting detail: OUTPATIENT SURGERY
Discharge: HOME | End: 2023-08-10
Attending: INTERNAL MEDICINE | Admitting: INTERNAL MEDICINE
Payer: MEDICARE

## 2023-08-10 VITALS
DIASTOLIC BLOOD PRESSURE: 72 MMHG | HEART RATE: 65 BPM | RESPIRATION RATE: 12 BRPM | SYSTOLIC BLOOD PRESSURE: 152 MMHG | TEMPERATURE: 97.3 F | OXYGEN SATURATION: 97 % | BODY MASS INDEX: 37.44 KG/M2 | HEIGHT: 68 IN | WEIGHT: 247 LBS

## 2023-08-10 LAB
GLUCOSE BLD-MCNC: 236 MG/DL (ref 70–99)
GLUCOSE BLD-MCNC: 258 MG/DL (ref 70–99)
PERFORMED ON: ABNORMAL
PERFORMED ON: ABNORMAL

## 2023-08-10 PROCEDURE — 3700000000 HC ANESTHESIA ATTENDED CARE: Performed by: INTERNAL MEDICINE

## 2023-08-10 PROCEDURE — 7100000010 HC PHASE II RECOVERY - FIRST 15 MIN: Performed by: INTERNAL MEDICINE

## 2023-08-10 PROCEDURE — 2709999900 HC NON-CHARGEABLE SUPPLY: Performed by: INTERNAL MEDICINE

## 2023-08-10 PROCEDURE — 6360000002 HC RX W HCPCS

## 2023-08-10 PROCEDURE — 2720000010 HC SURG SUPPLY STERILE: Performed by: INTERNAL MEDICINE

## 2023-08-10 PROCEDURE — 2500000003 HC RX 250 WO HCPCS: Performed by: ANESTHESIOLOGY

## 2023-08-10 PROCEDURE — 7100000011 HC PHASE II RECOVERY - ADDTL 15 MIN: Performed by: INTERNAL MEDICINE

## 2023-08-10 PROCEDURE — 3609012300 HC EGD BAND LIGATION ESOPHGEAL/GASTRIC VARICES: Performed by: INTERNAL MEDICINE

## 2023-08-10 PROCEDURE — 3700000001 HC ADD 15 MINUTES (ANESTHESIA): Performed by: INTERNAL MEDICINE

## 2023-08-10 PROCEDURE — 2500000003 HC RX 250 WO HCPCS

## 2023-08-10 RX ORDER — PROPOFOL 10 MG/ML
INJECTION, EMULSION INTRAVENOUS PRN
Status: DISCONTINUED | OUTPATIENT
Start: 2023-08-10 | End: 2023-08-10 | Stop reason: SDUPTHER

## 2023-08-10 RX ORDER — ONDANSETRON 4 MG/1
4 TABLET, ORALLY DISINTEGRATING ORAL 3 TIMES DAILY PRN
Qty: 21 TABLET | Refills: 0 | Status: SHIPPED | OUTPATIENT
Start: 2023-08-10

## 2023-08-10 RX ORDER — SODIUM CHLORIDE 9 MG/ML
INJECTION, SOLUTION INTRAVENOUS PRN
Status: DISCONTINUED | OUTPATIENT
Start: 2023-08-10 | End: 2023-08-10 | Stop reason: HOSPADM

## 2023-08-10 RX ORDER — SODIUM CHLORIDE 0.9 % (FLUSH) 0.9 %
5-40 SYRINGE (ML) INJECTION PRN
Status: DISCONTINUED | OUTPATIENT
Start: 2023-08-10 | End: 2023-08-10 | Stop reason: HOSPADM

## 2023-08-10 RX ORDER — SODIUM CHLORIDE 0.9 % (FLUSH) 0.9 %
5-40 SYRINGE (ML) INJECTION EVERY 12 HOURS SCHEDULED
Status: DISCONTINUED | OUTPATIENT
Start: 2023-08-10 | End: 2023-08-10 | Stop reason: HOSPADM

## 2023-08-10 RX ORDER — LIDOCAINE HYDROCHLORIDE 20 MG/ML
INJECTION, SOLUTION INFILTRATION; PERINEURAL PRN
Status: DISCONTINUED | OUTPATIENT
Start: 2023-08-10 | End: 2023-08-10 | Stop reason: SDUPTHER

## 2023-08-10 RX ORDER — ONDANSETRON 2 MG/ML
4 INJECTION INTRAMUSCULAR; INTRAVENOUS EVERY 6 HOURS PRN
Status: DISCONTINUED | OUTPATIENT
Start: 2023-08-10 | End: 2023-08-10 | Stop reason: HOSPADM

## 2023-08-10 RX ORDER — ONDANSETRON 2 MG/ML
4 INJECTION INTRAMUSCULAR; INTRAVENOUS ONCE
Status: CANCELLED | OUTPATIENT
Start: 2023-08-10

## 2023-08-10 RX ORDER — SODIUM CHLORIDE, SODIUM LACTATE, POTASSIUM CHLORIDE, CALCIUM CHLORIDE 600; 310; 30; 20 MG/100ML; MG/100ML; MG/100ML; MG/100ML
INJECTION, SOLUTION INTRAVENOUS CONTINUOUS
Status: DISCONTINUED | OUTPATIENT
Start: 2023-08-10 | End: 2023-08-10 | Stop reason: HOSPADM

## 2023-08-10 RX ORDER — FAMOTIDINE 10 MG/ML
20 INJECTION, SOLUTION INTRAVENOUS ONCE
Status: COMPLETED | OUTPATIENT
Start: 2023-08-10 | End: 2023-08-10

## 2023-08-10 RX ADMIN — PROPOFOL 90 MG: 10 INJECTION, EMULSION INTRAVENOUS at 08:30

## 2023-08-10 RX ADMIN — PROPOFOL 50 MG: 10 INJECTION, EMULSION INTRAVENOUS at 08:32

## 2023-08-10 RX ADMIN — PROPOFOL 30 MG: 10 INJECTION, EMULSION INTRAVENOUS at 08:37

## 2023-08-10 RX ADMIN — ONDANSETRON 4 MG: 2 INJECTION INTRAMUSCULAR; INTRAVENOUS at 09:24

## 2023-08-10 RX ADMIN — PROPOFOL 30 MG: 10 INJECTION, EMULSION INTRAVENOUS at 08:39

## 2023-08-10 RX ADMIN — PROPOFOL 50 MG: 10 INJECTION, EMULSION INTRAVENOUS at 08:34

## 2023-08-10 RX ADMIN — LIDOCAINE HYDROCHLORIDE 100 MG: 20 INJECTION, SOLUTION INFILTRATION; PERINEURAL at 08:30

## 2023-08-10 RX ADMIN — FAMOTIDINE 20 MG: 10 INJECTION INTRAVENOUS at 07:34

## 2023-08-10 RX ADMIN — ONDANSETRON 4 MG: 2 INJECTION INTRAMUSCULAR; INTRAVENOUS at 07:35

## 2023-08-10 ASSESSMENT — PAIN DESCRIPTION - ORIENTATION
ORIENTATION: RIGHT;LEFT
ORIENTATION: RIGHT;LEFT

## 2023-08-10 ASSESSMENT — PAIN DESCRIPTION - LOCATION
LOCATION: ABDOMEN
LOCATION: ABDOMEN

## 2023-08-10 ASSESSMENT — PAIN DESCRIPTION - ONSET
ONSET: ON-GOING
ONSET: ON-GOING

## 2023-08-10 ASSESSMENT — PAIN SCALES - GENERAL
PAINLEVEL_OUTOF10: 3
PAINLEVEL_OUTOF10: 3

## 2023-08-10 ASSESSMENT — PAIN DESCRIPTION - FREQUENCY
FREQUENCY: CONTINUOUS
FREQUENCY: CONTINUOUS

## 2023-08-10 ASSESSMENT — PAIN - FUNCTIONAL ASSESSMENT: PAIN_FUNCTIONAL_ASSESSMENT: NONE - DENIES PAIN

## 2023-08-10 ASSESSMENT — ENCOUNTER SYMPTOMS: SHORTNESS OF BREATH: 1

## 2023-08-10 ASSESSMENT — PAIN DESCRIPTION - DESCRIPTORS
DESCRIPTORS: PRESSURE;ACHING
DESCRIPTORS: PRESSURE;CRAMPING

## 2023-08-10 ASSESSMENT — PAIN DESCRIPTION - PAIN TYPE
TYPE: OTHER (COMMENT)
TYPE: OTHER (COMMENT)

## 2023-08-10 NOTE — DISCHARGE INSTRUCTIONS
Dr. Katrina Ga if there are any GI concerns. [phone]         ANESTHESIA DISCHARGE INSTRUCTIONS    You are under the influence of drugs- do not drink alcohol, drive a car, operate machinery(such as power tools, kitchen appliances, etc), sign legal documents, or make any important decisions for 24 hours (or while on pain medications). Children should not ride bikes or [place] or play on gym sets  for 24 hours after surgery. A responsible adult should be with you for 24 hours. Rest at home today- increase activity as tolerated. Progress slowly to a regular diet unless your physician has instructed you otherwise. Drink plenty of water. CALL YOUR DOCTOR IF YOU:  Have moderate to severe nausea or vomiting AND are unable to hold down fluids or prescribed medications. Have bright red bloody drainage from your dressing that won't stop oozing. Do not get relief with your pain medication    NORMAL (POSSIBLE) SIDE EFFECTS FROM ANESTHESIA:     Confusion, temporary memory loss, delayed reaction times in the first 24 hours  Lightheadedness, dizziness, difficulty focusing, blurred vision  Nausea/vomiting can happen  Shivering, feeling cold, sore throat, cough and muscle aches should stop within 24-48 hours  Trouble urinating - call your surgeon if it has been more than 8 hrs  Bruising or soreness at the IV site - call if it remains red, firm or there is drainage             FEMALES OF CHILDBEARING AGE WHO ARE TAKING BIRTH CONTROL PILLS:  You may have received a medication during your procedure that interferes with the   actions of birth control pills (Bridion or Emend). Use some other kind of birth control in addition to your pills, like a condom, for 1 month after your procedure to prevent unwanted pregnancy. The following instructions are to be followed if you have a known history or diagnosis of sleep apnea:   For all sleep apnea patients:  ? Sleep on your side or sitting up in a chair whenever possible,

## 2023-08-10 NOTE — OP NOTE
[facility],  [address] [place] [address], [facility],[address]  Phone: [phone]   CVW:[phone]   [address]  [facility], [address]  Phone: [phone]   FLW:[phone]    EGD Procedure Note    Patient: Gosia Garcia  : 1955    Procedure: Esophagogastroduodenoscopy with variceal band ligation    Date:  8/10/2023     Endoscopist:  Beverly Beard MD    Referring Physician:  Drake Rodas MD    Preoperative Diagnosis:  Esophageal varices without bleeding, unspecified esophageal varices type ([address]) [I85.00]  LANG (nonalcoholic steatohepatitis) [K75.81]  Hepatic cirrhosis, unspecified hepatic cirrhosis type, unspecified whether ascites present ([address]) [K74.60]    Postoperative Diagnosis:  esophageal varices,  portal hypertensive gastropathy    Anesthesia: Anesthesia: MAC  Sedation: Propofol per anesthesia  Start Time: 08:32  Stop Time: 08:41  ASA Class: 2  Mallampati: II (soft palate, uvula, fauces visible)    Indications: This is a with medical history of diabetes mellitus type 2, LANG related liver cirrhosis, and esophageal varices s/p banding 21, obesity (BMI 35.7), hypertension presents for repeat EGD with esophageal variceal banding    Procedure Details  Informed consent was obtained for the procedure, including conscious sedation. Risks of pancreatitis, infection, perforation, hemorrhage, adverse drug reaction and aspiration were discussed. The patient was placed in the left lateral decubitus position. Based on the pre-procedure assessment, including review of the patient's medical history, medications, allergies, and review of systems, he had been deemed to be an appropriate candidate for the above IV sedation; he was therefore sedated with the medications listed above. He was monitored continuously with ECG tracing, pulse oximetry, blood pressure monitoring, and direct observation.  A gastroscope was inserted into the mouth and advanced under direct vision to second

## 2023-08-10 NOTE — PROGRESS NOTES
Patient expressing fear of feeling nausea for multiple days after, he did las time. Patient asking for a zofran Rx for home. I called Dr. Mai Hernandez and he will give one when he comes out of surgery.

## 2023-08-10 NOTE — ANESTHESIA POSTPROCEDURE EVALUATION
Department of Anesthesiology  Postprocedure Note    Patient: Marilee Fuentes  MRN: [id number]  YOB: 1955  Date of evaluation: 8/10/2023      Procedure Summary     Date: 08/10/23 Room / Location: [address] [place] [address]'[facility]    Anesthesia Start: [facility] Anesthesia Stop: 0128    Procedure: EGD BAND LIGATION Diagnosis:       Esophageal varices without bleeding, unspecified esophageal varices type ([facility])      LANG (nonalcoholic steatohepatitis)      Hepatic cirrhosis, unspecified hepatic cirrhosis type, unspecified whether ascites present (HCC)      (Esophageal varices without bleeding, unspecified esophageal varices type ([address]) [I85.00])      (LANG (nonalcoholic steatohepatitis) [K75.81])      (Hepatic cirrhosis, unspecified hepatic cirrhosis type, unspecified whether ascites present ([address]) [K74.60])    Surgeons: Anjum Chen MD Responsible Provider: Wander León MD    Anesthesia Type: MAC ASA Status: 3          Anesthesia Type: No value filed.     Fco Phase I: Fco Score: 10    Fco Phase II: Fco Score: 10      Anesthesia Post Evaluation    Patient location during evaluation: PACU  Level of consciousness: awake  Airway patency: patent  Nausea & Vomiting: no nausea  Complications: no  Cardiovascular status: blood pressure returned to baseline  Respiratory status: acceptable  Hydration status: euvolemic  Pain management: adequate

## 2023-08-10 NOTE — PROGRESS NOTES
Called anesthesia to alert that patient's blood sugar was greater than 200. No new orders at this time.

## 2023-08-15 DIAGNOSIS — K58.0 IRRITABLE BOWEL SYNDROME WITH DIARRHEA: ICD-10-CM

## 2023-08-15 NOTE — TELEPHONE ENCOUNTER
Refill Request     CONFIRM preferred pharmacy with the patient. If Mail Order Rx - Pend for 90 day refill. Last Seen: Last Seen Department: 8/3/2023  Last Seen by PCP: Visit date not found    Last Written: 6/13/2023    If no future appointment scheduled:  Review the last OV with PCP and review information for follow-up visit,  Route STAFF MESSAGE with patient name to the [facility] for scheduling with the following information:            -  Timing of next visit           -  Visit type ie Physical, OV, etc           -  Diagnoses/Reason ie. COPD, HTN - Do not use MEDICATION, Follow-up or CHECK UP - Give reason for visit      Next Appointment:   Future Appointments   Date Time Provider [address]   11/3/2023  9:00 AM HO Solorzano - PAULINE       Message sent to [facility] to schedule appt with patient?   NO      Requested Prescriptions     Pending Prescriptions Disp Refills    amitriptyline (ELAVIL) 10 MG tablet [Pharmacy Med Name: AMITRIPTYLINE HCL 10 MG TAB] 90 tablet 0     Sig: TAKE 1 TABLET BY MOUTH EVERY DAY AT NIGHT

## 2023-08-17 RX ORDER — AMITRIPTYLINE HYDROCHLORIDE 10 MG/1
TABLET, FILM COATED ORAL
Qty: 90 TABLET | Refills: 0 | Status: SHIPPED | OUTPATIENT
Start: 2023-08-17

## 2023-08-24 NOTE — PROGRESS NOTES
..1.  Do not eat or drink anything after 12 midnight prior to surgery. This includes no water, chewing gum or mints, except for bowel prep complete per MD.  2.  Take the following pills with a small sip of water on the morning of surgery 09/01/2023.  3.  Aspirin, Ibuprofen, Advil, Naproxen, Vitamin E and other Anti-inflammatory products should be stopped for 5 days before surgery or as directed by your physician. 4.  Check with your doctor regarding stopping Plavix, Coumadin, Lovenox, Fragmin or other blood thinners. 5.  Do not smoke and do not drink alcoholic beverages 24 hours prior to surgery. This includes NA Beer. 6.  You may brush your teeth and gargle the morning of surgery. DO NOT SWALLOW WATER.  7.  You MUST make arrangements for a responsible adult to take you home after your surgery. You will not be allowed to leave alone or drive yourself home. It is strongly suggested someone stay with you the first 24 hours. Your surgery will be cancelled if you do not have a ride home. 8.  A parent/legal guardian must accompany a child scheduled for surgery and plan to stay at the hospital until the child is discharged. Please do not bring other children with you. 9.  Please wear simple, loose fitting clothing to the hospital.  Renate Gastelum not bring valuables ( money, credit cards, checkbooks, etc.)  Do not wear any makeup (including no eye makeup) or nail polish on your fingers or toes. 10.  Do not wear any jewelry or piercing on the day of surgery. All body piercing jewelry must be removed. 11.  If you have dentures, they will be removed before going to the OR; we will provide you a container. If you wear contact lenses or glasses, they will be removed; please bring a case for them.   15.  Notify your Surgeon if you develop any illness between now and surgery time; cough, cold, fever, sore throat, nausea, vomiting, etc.  Please notify your surgeon if you experience dizziness, shortness of breath or blurred

## 2023-08-31 ENCOUNTER — ANESTHESIA EVENT (OUTPATIENT)
Dept: ENDOSCOPY | Age: 68
End: 2023-08-31
Payer: MEDICARE

## 2023-08-31 RX ORDER — SODIUM CHLORIDE 9 MG/ML
INJECTION, SOLUTION INTRAVENOUS PRN
Status: CANCELLED | OUTPATIENT
Start: 2023-08-31

## 2023-08-31 RX ORDER — SODIUM CHLORIDE 0.9 % (FLUSH) 0.9 %
5-40 SYRINGE (ML) INJECTION EVERY 12 HOURS SCHEDULED
Status: CANCELLED | OUTPATIENT
Start: 2023-08-31

## 2023-08-31 RX ORDER — SODIUM CHLORIDE, SODIUM LACTATE, POTASSIUM CHLORIDE, CALCIUM CHLORIDE 600; 310; 30; 20 MG/100ML; MG/100ML; MG/100ML; MG/100ML
INJECTION, SOLUTION INTRAVENOUS CONTINUOUS
Status: CANCELLED | OUTPATIENT
Start: 2023-08-31

## 2023-08-31 RX ORDER — LIDOCAINE HYDROCHLORIDE 10 MG/ML
1 INJECTION, SOLUTION EPIDURAL; INFILTRATION; INTRACAUDAL; PERINEURAL
Status: CANCELLED | OUTPATIENT
Start: 2023-08-31 | End: 2023-09-01

## 2023-08-31 RX ORDER — SODIUM CHLORIDE 0.9 % (FLUSH) 0.9 %
5-40 SYRINGE (ML) INJECTION PRN
Status: CANCELLED | OUTPATIENT
Start: 2023-08-31

## 2023-09-01 ENCOUNTER — ANESTHESIA (OUTPATIENT)
Dept: ENDOSCOPY | Age: 68
End: 2023-09-01
Payer: MEDICARE

## 2023-09-01 ENCOUNTER — HOSPITAL ENCOUNTER (OUTPATIENT)
Age: 68
Setting detail: OUTPATIENT SURGERY
Discharge: HOME | End: 2023-09-01
Attending: INTERNAL MEDICINE | Admitting: INTERNAL MEDICINE
Payer: MEDICARE

## 2023-09-01 VITALS
SYSTOLIC BLOOD PRESSURE: 139 MMHG | TEMPERATURE: 97.5 F | WEIGHT: 240 LBS | DIASTOLIC BLOOD PRESSURE: 72 MMHG | OXYGEN SATURATION: 97 % | BODY MASS INDEX: 36.37 KG/M2 | HEIGHT: 68 IN | HEART RATE: 83 BPM | RESPIRATION RATE: 11 BRPM

## 2023-09-01 LAB
GLUCOSE BLD-MCNC: 124 MG/DL (ref 70–99)
GLUCOSE BLD-MCNC: 129 MG/DL (ref 70–99)
PERFORMED ON: ABNORMAL
PERFORMED ON: ABNORMAL

## 2023-09-01 PROCEDURE — 2709999900 HC NON-CHARGEABLE SUPPLY: Performed by: INTERNAL MEDICINE

## 2023-09-01 PROCEDURE — 3609017100 HC EGD: Performed by: INTERNAL MEDICINE

## 2023-09-01 PROCEDURE — 3700000000 HC ANESTHESIA ATTENDED CARE: Performed by: INTERNAL MEDICINE

## 2023-09-01 PROCEDURE — 7100000011 HC PHASE II RECOVERY - ADDTL 15 MIN: Performed by: INTERNAL MEDICINE

## 2023-09-01 PROCEDURE — 2580000003 HC RX 258: Performed by: NURSE ANESTHETIST, CERTIFIED REGISTERED

## 2023-09-01 PROCEDURE — 2500000003 HC RX 250 WO HCPCS: Performed by: NURSE ANESTHETIST, CERTIFIED REGISTERED

## 2023-09-01 PROCEDURE — 7100000010 HC PHASE II RECOVERY - FIRST 15 MIN: Performed by: INTERNAL MEDICINE

## 2023-09-01 PROCEDURE — 6360000002 HC RX W HCPCS: Performed by: NURSE ANESTHETIST, CERTIFIED REGISTERED

## 2023-09-01 RX ORDER — OXYCODONE HYDROCHLORIDE 5 MG/1
10 TABLET ORAL PRN
Status: CANCELLED | OUTPATIENT
Start: 2023-09-01 | End: 2023-09-01

## 2023-09-01 RX ORDER — OXYCODONE HYDROCHLORIDE 5 MG/1
5 TABLET ORAL PRN
Status: CANCELLED | OUTPATIENT
Start: 2023-09-01 | End: 2023-09-01

## 2023-09-01 RX ORDER — DEXAMETHASONE SODIUM PHOSPHATE 4 MG/ML
INJECTION, SOLUTION INTRA-ARTICULAR; INTRALESIONAL; INTRAMUSCULAR; INTRAVENOUS; SOFT TISSUE PRN
Status: DISCONTINUED | OUTPATIENT
Start: 2023-09-01 | End: 2023-09-01 | Stop reason: SDUPTHER

## 2023-09-01 RX ORDER — SODIUM CHLORIDE, SODIUM LACTATE, POTASSIUM CHLORIDE, CALCIUM CHLORIDE 600; 310; 30; 20 MG/100ML; MG/100ML; MG/100ML; MG/100ML
INJECTION, SOLUTION INTRAVENOUS CONTINUOUS PRN
Status: DISCONTINUED | OUTPATIENT
Start: 2023-09-01 | End: 2023-09-01 | Stop reason: SDUPTHER

## 2023-09-01 RX ORDER — ONDANSETRON 2 MG/ML
INJECTION INTRAMUSCULAR; INTRAVENOUS PRN
Status: DISCONTINUED | OUTPATIENT
Start: 2023-09-01 | End: 2023-09-01 | Stop reason: SDUPTHER

## 2023-09-01 RX ORDER — LIDOCAINE HYDROCHLORIDE 20 MG/ML
INJECTION, SOLUTION INFILTRATION; PERINEURAL PRN
Status: DISCONTINUED | OUTPATIENT
Start: 2023-09-01 | End: 2023-09-01 | Stop reason: SDUPTHER

## 2023-09-01 RX ORDER — ONDANSETRON 2 MG/ML
4 INJECTION INTRAMUSCULAR; INTRAVENOUS
Status: CANCELLED | OUTPATIENT
Start: 2023-09-01

## 2023-09-01 RX ORDER — MEPERIDINE HYDROCHLORIDE 25 MG/ML
12.5 INJECTION INTRAMUSCULAR; INTRAVENOUS; SUBCUTANEOUS EVERY 5 MIN PRN
Status: CANCELLED | OUTPATIENT
Start: 2023-09-01

## 2023-09-01 RX ORDER — SODIUM CHLORIDE 0.9 % (FLUSH) 0.9 %
5-40 SYRINGE (ML) INJECTION PRN
Status: CANCELLED | OUTPATIENT
Start: 2023-09-01

## 2023-09-01 RX ORDER — SODIUM CHLORIDE 0.9 % (FLUSH) 0.9 %
5-40 SYRINGE (ML) INJECTION EVERY 12 HOURS SCHEDULED
Status: CANCELLED | OUTPATIENT
Start: 2023-09-01

## 2023-09-01 RX ORDER — PROPOFOL 10 MG/ML
INJECTION, EMULSION INTRAVENOUS PRN
Status: DISCONTINUED | OUTPATIENT
Start: 2023-09-01 | End: 2023-09-01 | Stop reason: SDUPTHER

## 2023-09-01 RX ORDER — DIPHENHYDRAMINE HYDROCHLORIDE 50 MG/ML
12.5 INJECTION INTRAMUSCULAR; INTRAVENOUS
Status: CANCELLED | OUTPATIENT
Start: 2023-09-01 | End: 2023-09-02

## 2023-09-01 RX ORDER — SODIUM CHLORIDE 9 MG/ML
INJECTION, SOLUTION INTRAVENOUS PRN
Status: CANCELLED | OUTPATIENT
Start: 2023-09-01

## 2023-09-01 RX ORDER — LABETALOL HYDROCHLORIDE 5 MG/ML
5 INJECTION, SOLUTION INTRAVENOUS EVERY 10 MIN PRN
Status: CANCELLED | OUTPATIENT
Start: 2023-09-01

## 2023-09-01 RX ADMIN — DEXAMETHASONE SODIUM PHOSPHATE 4 MG: 4 INJECTION, SOLUTION INTRAMUSCULAR; INTRAVENOUS at 08:08

## 2023-09-01 RX ADMIN — PROPOFOL 200 MG: 10 INJECTION, EMULSION INTRAVENOUS at 08:13

## 2023-09-01 RX ADMIN — SODIUM CHLORIDE, POTASSIUM CHLORIDE, SODIUM LACTATE AND CALCIUM CHLORIDE: 600; 310; 30; 20 INJECTION, SOLUTION INTRAVENOUS at 08:08

## 2023-09-01 RX ADMIN — LIDOCAINE HYDROCHLORIDE 100 MG: 20 INJECTION, SOLUTION INFILTRATION; PERINEURAL at 08:13

## 2023-09-01 RX ADMIN — ONDANSETRON HYDROCHLORIDE 4 MG: 2 INJECTION, SOLUTION INTRAMUSCULAR; INTRAVENOUS at 08:08

## 2023-09-01 ASSESSMENT — PAIN DESCRIPTION - PAIN TYPE: TYPE: OTHER (COMMENT)

## 2023-09-01 ASSESSMENT — PAIN - FUNCTIONAL ASSESSMENT: PAIN_FUNCTIONAL_ASSESSMENT: NONE - DENIES PAIN

## 2023-09-01 ASSESSMENT — PAIN DESCRIPTION - ORIENTATION: ORIENTATION: OTHER (COMMENT)

## 2023-09-01 ASSESSMENT — PAIN DESCRIPTION - LOCATION: LOCATION: OTHER (COMMENT)

## 2023-09-01 ASSESSMENT — PAIN SCALES - GENERAL: PAINLEVEL_OUTOF10: 0

## 2023-09-01 ASSESSMENT — PAIN DESCRIPTION - FREQUENCY: FREQUENCY: OTHER (COMMENT)

## 2023-09-01 NOTE — OP NOTE
[facility],  [address] [place] [address], [address] [place] [address],[address]  Phone: [phone]   YLA:[phone]   [address]  [address] [facility], [address]  Phone: [phone]   XMO:[phone]    EGD Procedure Note    Patient: Alfonso Merritt  : 1955    Procedure: Esophagogastroduodenoscopy    Date:  2023     Endoscopist:  Jessie Rodgers MD    Referring Physician:  Baldomero Russell MD    Preoperative Diagnosis:  Esophageal varices without bleeding, unspecified esophageal varices type ([address]) [I85.00]  LANG (nonalcoholic steatohepatitis) [K75.81]  Hepatic cirrhosis, unspecified hepatic cirrhosis type, unspecified whether ascites present ([address]) [K74.60]    Postoperative Diagnosis:  small esophageal varices, portal hypertensive gastropathy. Anesthesia: Anesthesia: MAC  Sedation: Propofol per anesthesia  Start Time: 08:16  Stop Time: 08:21  ASA Class: 2  Mallampati: II (soft palate, uvula, fauces visible)    Indications: This is a 77y.o. year old male with medical history of diabetes mellitus type 2, LANG related liver cirrhosis, and esophageal varices s/p banding, obesity (BMI 35.7), hypertension presents for repeat EGD with esophageal variceal banding    Procedure Details  Informed consent was obtained for the procedure, including conscious sedation. Risks of pancreatitis, infection, perforation, hemorrhage, adverse drug reaction and aspiration were discussed. The patient was placed in the left lateral decubitus position. Based on the pre-procedure assessment, including review of the patient's medical history, medications, allergies, and review of systems, he had been deemed to be an appropriate candidate for the above IV sedation; he was therefore sedated with the medications listed above. He was monitored continuously with ECG tracing, pulse oximetry, blood pressure monitoring, and direct observation.  A gastroscope was inserted into the mouth and advanced under direct vision to second portion of the duodenum. A careful inspection was made as the gastroscope was withdrawn, including a retroflexed view of the proximal stomach including views of the incisura and cardia; findings and interventions are described below. Appropriate photodocumentation Was Obtained. If photos taken, they were ordered to be scanned into the medical record. Findings:  Normal upper esophagus. 4 columns of small non bleeding esophageal varices in the mid and distal esophagus which flattened on insufflation. No stigmata of bleed noted. Irregular Z-line at 40 cm from incisors  Moderate erythematous mucosa in antrum and body of stomach suggestive of moderate portal hypertensive gastropathy. No evidence of gastric varices. Normal duodenal bulb and second portion of duodenum. Specimens: Was Not Obtained    Complications:   None; patient tolerated the procedure well. Disposition:   PACU - hemodynamically stable. Estimated Blood loss:  none    Impression:   Normal upper esophagus. 4 columns of small non bleeding esophageal varices in the mid and distal esophagus which flattened on insufflation. No stigmata of bleed noted. Irregular Z-line at 40 cm from incisors  Moderate portal hypertensive gastropathy. No evidence of gastric varices. Normal duodenal bulb and second portion of duodenum. Recommendations:  -Continue acid suppression. ,   -Repeat EGD in 1 year for variceal surveillance  -Follow up with me in 3 months      Saji Bah MD   [facility] [address]  9/1/23 8:22 AM EDT    Please note that some or all of this record was generated using voice recognition software. If there are any questions about the content of this document, please contact the author as some errors in translation may have occurred.

## 2023-09-01 NOTE — ANESTHESIA POSTPROCEDURE EVALUATION
06/01/2023 02:02 PM        K                        3.9                 06/01/2023 02:02 PM        CL                       103                 06/01/2023 02:02 PM        CO2                      23                  06/01/2023 02:02 PM        BUN                      26 (H)              06/01/2023 02:02 PM        CREATININE               1.2                 06/01/2023 02:02 PM        GLUCOSE                  320 (H)             06/01/2023 02:02 PM   COAGS  Lab Results       Component                Value               Date/Time                  PROTIME                  13.6                06/01/2023 02:02 PM        INR                      1.04                06/01/2023 02:02 PM     Intake & Output:  @26UCHZ@    Nausea & Vomiting:  No    Level of Consciousness:  Awake    Pain Assessment:  Adequate analgesia    Anesthesia Complications:  No apparent anesthetic complications    SUMMARY      Vital signs stable  OK to discharge from Stage I post anesthesia care.   Care transferred from Anesthesiology department on discharge from perioperative area

## 2023-09-01 NOTE — DISCHARGE INSTRUCTIONS
PATIENT INSTRUCTIONS  POST-SEDATION    Tiny Wright          IMMEDIATELY FOLLOWING PROCEDURE:    Do not drive or operate machinery for the first twenty four hours after surgery. Do not make any important decisions for twenty four hours after surgery or while taking narcotic pain medications or sedatives. You should NOT BE LEFT UNATTENDED OR ALONE. A responsible adult should be with you for the rest of the day of your procedure and also during the night for your protection and safety. You may experience some light headedness. Rest at home with activity as tolerated. You may not need to go to bed, but it is important to rest for the next 24 hours. You should not engage in athletic sports such as basketball, volleyball, jogging, skating, or activities requiring refined motor skills for 24 hours. If you develop intractable nausea and vomiting or a severe headache please notify your doctor immediately. You are not expected to have any fever, but if you feel warm, take your temperature. If you have a fever 101 degrees or higher, call your doctor. If you have had an Endoscopy:   *Eat lightly for your first meal and gradually resume your normal / prescribed diet. DO NOT eat or drink until your gag reflex returns. *If you have a sore throat you may use lozenges, or salt water gargles. *If you have had a colonoscopy, do not expect a normal bowel movement for approximately three days due to the cleansing of the large intestine prior to colonoscopy. ONCE YOU ARE HOME, IF YOU SHOULD HAVE:  Difficulty in breathing, persistent nausea or vomiting, bleeding you feel is excessive, or pain that is unusual, increased abdominal bloating, or any swelling, fever / chills, call your physician. If you cannot contact your physician, but feel that your signs and symptoms need a physician's attention, go to the Emergency Department.       FOLLOW-UP:    Please follow up with Dr. Maxx Barr as scheduled or

## 2023-09-01 NOTE — ANESTHESIA PRE PROCEDURE
Department of Anesthesiology  Preprocedure Note       Name:  Christopher Neil   Age:  77 y.o.  :  1955                                          MRN:  [id number]         Date:  2023      Surgeon: Leoncio Ceballos):  Lorin Raines MD    Procedure: Procedure(s):  EGD W/ANES. (8:00)    Medications prior to admission:   Prior to Admission medications    Medication Sig Start Date End Date Taking?  Authorizing Provider   amitriptyline (ELAVIL) 10 MG tablet TAKE 1 TABLET BY MOUTH EVERY DAY AT NIGHT 23   KATIE Ho CNP   ondansetron (ZOFRAN-ODT) 4 MG disintegrating tablet Take 1 tablet by mouth 3 times daily as needed for Nausea or Vomiting 8/10/23   Lorin Raines MD   furosemide (LASIX) 20 MG tablet Take 1 tablet by mouth daily 23   Historical Provider, MD   tiZANidine (ZANAFLEX) 4 MG tablet Take 1 tablet by mouth 3 times daily as needed (back pain) 8/3/23   [facility] James   cetirizine (ZYRTEC) 10 MG tablet Take 1 tablet by mouth daily 8/3/23   [facility] James   fluticasone [facility]) 50 MCG/ACT nasal spray 2 sprays by Each Nostril route daily 8/3/23   [facility] James   LEVEMIR FLEXTOUCH 100 UNIT/ML injection pen INJECT 50 UNITS INTO THE SKIN 2 TIMES DAILY 23   [facility] James   HUMALOG KWIKPEN 100 UNIT/ML SOPN INJECT 20 UNITS 3 TIMES A DAY BEFORE MEALS 23   [facility] James   montelukast (SINGULAIR) 10 MG tablet TAKE 1 TABLET BY MOUTH EVERY DAY AT NIGHT 23   [facility] James   losartan (COZAAR) 100 MG tablet TAKE 1 TABLET BY MOUTH EVERY DAY 23   Richard Stone DO   simvastatin (ZOCOR) 40 MG tablet TAKE 1 TABLET BY MOUTH NIGHTLY 23   KATIE Ho CNP   omeprazole (PRILOSEC) 40 MG delayed release capsule TAKE 1 CAPSULE BY MOUTH EVERY DAY 22   [facility] James   acetaminophen (TYLENOL) 500 MG tablet Take 1 tablet by mouth every 6 hours as needed for Pain    Historical Provider, MD       Current medications:    No current

## 2023-09-08 ENCOUNTER — TELEPHONE (OUTPATIENT)
Dept: FAMILY MEDICINE CLINIC | Age: 68
End: 2023-09-08

## 2023-09-08 DIAGNOSIS — M54.50 ACUTE LOW BACK PAIN, UNSPECIFIED BACK PAIN LATERALITY, UNSPECIFIED WHETHER SCIATICA PRESENT: Primary | ICD-10-CM

## 2023-09-08 NOTE — TELEPHONE ENCOUNTER
Patient wife called in asking if you would call in Tizanidine for patient? Stated his sciatica is acting up and he can barely walk due to the pain. Please advise. Thanks Dilia!

## 2023-09-11 ENCOUNTER — E-VISIT (OUTPATIENT)
Dept: FAMILY MEDICINE CLINIC | Age: 68
End: 2023-09-11
Payer: MEDICARE

## 2023-09-11 DIAGNOSIS — M54.16 LUMBAR BACK PAIN WITH RADICULOPATHY AFFECTING LEFT LOWER EXTREMITY: Primary | ICD-10-CM

## 2023-09-11 PROCEDURE — 99421 OL DIG E/M SVC 5-10 MIN: CPT | Performed by: PHYSICIAN ASSISTANT

## 2023-09-12 RX ORDER — TIZANIDINE 4 MG/1
4 TABLET ORAL 3 TIMES DAILY PRN
Qty: 30 TABLET | Refills: 0 | Status: SHIPPED | OUTPATIENT
Start: 2023-09-12

## 2023-09-12 NOTE — PROGRESS NOTES
HPI: as per patient provided history  Exam: N/A (electronic visit)  ASSESSMENT/PLAN:  1. Lumbar back pain with radiculopathy affecting left lower extremity    - tiZANidine (ZANAFLEX) 4 MG tablet; Take 1 tablet by mouth 3 times daily as needed (back pain)  Dispense: 30 tablet; Refill: 0       Patient instructed to call the office if worsens, or fails to improve as anticipated. 5-10 minutes were spent on the digital evaluation and management of this patient.

## 2023-09-18 DIAGNOSIS — Z79.4 TYPE 2 DIABETES MELLITUS WITH HYPERGLYCEMIA, WITH LONG-TERM CURRENT USE OF INSULIN (HCC): ICD-10-CM

## 2023-09-18 DIAGNOSIS — E11.65 TYPE 2 DIABETES MELLITUS WITH HYPERGLYCEMIA, WITH LONG-TERM CURRENT USE OF INSULIN (HCC): ICD-10-CM

## 2023-09-18 RX ORDER — INSULIN DETEMIR 100 [IU]/ML
INJECTION, SOLUTION SUBCUTANEOUS
Qty: 15 ADJUSTABLE DOSE PRE-FILLED PEN SYRINGE | Refills: 2 | Status: SHIPPED | OUTPATIENT
Start: 2023-09-18 | End: 2023-10-23 | Stop reason: SDUPTHER

## 2023-09-18 NOTE — TELEPHONE ENCOUNTER
Refill Request     CONFIRM preferred pharmacy with the patient. If Mail Order Rx - Pend for 90 day refill. Last Seen: Last Seen Department: 9/11/2023  Last Seen by PCP: 8/3/2023    Last Written: 6/29/2023, #15, 3 refills    If no future appointment scheduled:  Review the last OV with PCP and review information for follow-up visit,  Route STAFF MESSAGE with patient name to the [facility] for scheduling with the following information:            -  Timing of next visit           -  Visit type ie Physical, OV, etc           -  Diagnoses/Reason ie. COPD, HTN - Do not use MEDICATION, Follow-up or CHECK UP - Give reason for visit      Next Appointment:   Future Appointments   Date Time Provider [facility] [address]   11/3/2023  9:00 AM HO Leiva  [place] - PAULINE       Message sent to [address] [place] [address] to schedule appt with patient?   N/A      Requested Prescriptions     Pending Prescriptions Disp Refills    LEVEMIR FLEXPEN 100 UNIT/ML injection pen [Pharmacy Med Name: An Acostach 100 UNIT/ML]  3     Sig: INJECT 50 UNITS INTO THE SKIN 2 TIMES DAILY

## 2023-10-18 ENCOUNTER — TELEPHONE (OUTPATIENT)
Dept: FAMILY MEDICINE CLINIC | Age: 68
End: 2023-10-18

## 2023-10-18 DIAGNOSIS — E11.65 TYPE 2 DIABETES MELLITUS WITH HYPERGLYCEMIA, WITH LONG-TERM CURRENT USE OF INSULIN (HCC): ICD-10-CM

## 2023-10-18 DIAGNOSIS — Z79.4 TYPE 2 DIABETES MELLITUS WITH HYPERGLYCEMIA, WITH LONG-TERM CURRENT USE OF INSULIN (HCC): ICD-10-CM

## 2023-10-18 NOTE — TELEPHONE ENCOUNTER
Received phone call from pts spouse in regards to place a order to [facility] for pt to receive his medical supplies or sensors for his freestyle mariam 2    They provided this phone number to the pt spouse she is thinking this is the fax #  [phone]

## 2023-10-19 NOTE — TELEPHONE ENCOUNTER
Renewal form received, no active Prescription on file, pended new order for Grandy 2 sensors to print & fax with order form.

## 2023-10-20 DIAGNOSIS — Z79.4 TYPE 2 DIABETES MELLITUS WITH HYPERGLYCEMIA, WITH LONG-TERM CURRENT USE OF INSULIN (HCC): ICD-10-CM

## 2023-10-20 DIAGNOSIS — E11.65 TYPE 2 DIABETES MELLITUS WITH HYPERGLYCEMIA, WITH LONG-TERM CURRENT USE OF INSULIN (HCC): ICD-10-CM

## 2023-10-20 DIAGNOSIS — I10 ESSENTIAL HYPERTENSION: ICD-10-CM

## 2023-10-20 NOTE — TELEPHONE ENCOUNTER
Refill Request     CONFIRM preferred pharmacy with the patient. If Mail Order Rx - Pend for 90 day refill. Last Seen: Last Seen Department: 9/11/2023    Last Seen by PCP: 2/7/2022    Last Written: Levemir: 9/18/23 15 pen 2 refills                      Losartan: 6/13/23 90 tablet 1 refill                           If no future appointment scheduled:  Review the last OV with PCP and review information for follow-up visit,  Route STAFF MESSAGE with patient name to the [facility] for scheduling with the following information:            -  Timing of next visit           -  Visit type ie Physical, OV, etc           -  Diagnoses/Reason ie. COPD, HTN - Do not use MEDICATION, Follow-up or CHECK UP - Give reason for visit      Next Appointment: 11/3/23  Future Appointments   Date Time Provider [address] [place] [address]   11/3/2023  9:00 AM HO Russell  [place] - DYD       Message sent to [address] [place] [address] to schedule appt with patient?   NO      Requested Prescriptions     Pending Prescriptions Disp Refills    insulin detemir (LEVEMIR FLEXPEN) 100 UNIT/ML injection pen 15 Adjustable Dose Pre-filled Pen Syringe 2    losartan (COZAAR) 100 MG tablet 90 tablet 1     Sig: Take 1 tablet by mouth daily

## 2023-10-23 RX ORDER — LOSARTAN POTASSIUM 100 MG/1
100 TABLET ORAL DAILY
Qty: 90 TABLET | Refills: 1 | Status: SHIPPED | OUTPATIENT
Start: 2023-10-23

## 2023-11-03 ENCOUNTER — OFFICE VISIT (OUTPATIENT)
Dept: FAMILY MEDICINE CLINIC | Age: 68
End: 2023-11-03

## 2023-11-03 VITALS
HEART RATE: 89 BPM | TEMPERATURE: 97 F | OXYGEN SATURATION: 98 % | DIASTOLIC BLOOD PRESSURE: 64 MMHG | BODY MASS INDEX: 36.31 KG/M2 | WEIGHT: 239.6 LBS | SYSTOLIC BLOOD PRESSURE: 138 MMHG | HEIGHT: 68 IN

## 2023-11-03 DIAGNOSIS — E11.65 TYPE 2 DIABETES MELLITUS WITH HYPERGLYCEMIA, WITH LONG-TERM CURRENT USE OF INSULIN (HCC): Primary | ICD-10-CM

## 2023-11-03 DIAGNOSIS — Z79.4 TYPE 2 DIABETES MELLITUS WITH HYPERGLYCEMIA, WITH LONG-TERM CURRENT USE OF INSULIN (HCC): Primary | ICD-10-CM

## 2023-11-03 DIAGNOSIS — D69.6 THROMBOCYTOPENIA, UNSPECIFIED (HCC): ICD-10-CM

## 2023-11-03 DIAGNOSIS — Z23 NEED FOR INFLUENZA VACCINATION: ICD-10-CM

## 2023-11-03 LAB — HBA1C MFR BLD: 9.7 %

## 2023-11-03 RX ORDER — SPIRONOLACTONE 25 MG/1
25 TABLET ORAL DAILY
COMMUNITY
Start: 2023-08-12

## 2023-11-03 RX ORDER — INSULIN ASPART 100 [IU]/ML
20 INJECTION, SOLUTION INTRAVENOUS; SUBCUTANEOUS
Qty: 15 ADJUSTABLE DOSE PRE-FILLED PEN SYRINGE | Refills: 3 | Status: SHIPPED | OUTPATIENT
Start: 2023-11-03

## 2023-11-03 NOTE — PROGRESS NOTES
11/15/2023  Jennifer De La O (: 1955)  77 y.o.    ASSESSMENT and PLAN:  Juan R Arce was seen today for diabetes. Diagnoses and all orders for this visit:    Type 2 diabetes mellitus with hyperglycemia, with long-term current use of insulin (HCC)  -     POCT glycosylated hemoglobin (Hb A1C)  -     MICROALBUMIN / CREATININE URINE RATIO; Future  -     Diabetic Foot Exam  -     Gracie Salvador MD, Endocrinology, [facility]  -     insulin aspart (NOVOLOG FLEXPEN) 100 UNIT/ML injection pen; Inject 20 Units into the skin 3 times daily (before meals)  -     Microalbumin / Creatinine Urine Ratio  - remains uncontrolled, instructed to continue 50 units of lantus nightly and increase am dose to 60 units. Continue novolog 20 units with meals. - encouraged to try CGM, but declines today. Agreeable to follow up with endo, referral placed. - follow up in 6 weeks for labs and a1c check. Need for influenza vaccination  -     Influenza, FLUAD, (age 72 y+), IM, Preservative Free, 0.5 mL      Return in about 6 weeks (around 12/15/2023) for Diabetes Mellitus and fasting labs . HPI    3 month DM follow up- all other chronic conditions addressed at previous appointment. DM: uncontrolled. Not monitoring regularly. Has been utilizing CGM, has reader available today. BG reviewed. Non adherent to low carb diet. Current regimen includes levemir and humalog at last appointment. A1c 9.7 today  A1c 9.0% on 2023  Denies vision changes, polyuria, polydipsia, hyper/hypoglycemic episodes, SOB, chest pain. Mild neuropathy- on elavil  Last eye exam: 3/2021-due  Last foot exam:  today   Last microalbumin: 2022  Last monofilament: 2022   On ARB/Statin. BMP wnl aside from Glu 320 and BUN 26. CBC 3/27 with plt of 122 otherwise normal.     Review of Systems   Constitutional:  Negative for activity change, chills and fever. HENT:  Negative for congestion, ear pain, rhinorrhea and sore throat.     Eyes:

## 2023-11-03 NOTE — PATIENT INSTRUCTIONS
Continue 50 units of lantus nightly. Increase morning lantus to 60 units if evening blood sugars are not coming down, call me and we can discuss increasing it further or increasing your meal time insulin.

## 2023-11-04 LAB
CREAT UR-MCNC: 54.8 MG/DL (ref 39–259)
MICROALBUMIN UR DL<=1MG/L-MCNC: <1.2 MG/DL
MICROALBUMIN/CREAT UR: NORMAL MG/G (ref 0–30)

## 2023-11-07 DIAGNOSIS — J30.2 SEASONAL ALLERGIES: ICD-10-CM

## 2023-11-07 RX ORDER — FLUTICASONE PROPIONATE 50 MCG
2 SPRAY, SUSPENSION (ML) NASAL DAILY
Qty: 48 G | Refills: 1 | Status: SHIPPED | OUTPATIENT
Start: 2023-11-07

## 2023-11-07 RX ORDER — OMEPRAZOLE 40 MG/1
CAPSULE, DELAYED RELEASE ORAL
Qty: 90 CAPSULE | Refills: 2 | Status: SHIPPED | OUTPATIENT
Start: 2023-11-07

## 2023-11-07 RX ORDER — MONTELUKAST SODIUM 10 MG/1
10 TABLET ORAL NIGHTLY
Qty: 90 TABLET | Refills: 1 | Status: SHIPPED | OUTPATIENT
Start: 2023-11-07

## 2023-11-07 NOTE — TELEPHONE ENCOUNTER
Refill Request     CONFIRM preferred pharmacy with the patient. If Mail Order Rx - Pend for 90 day refill. Last Seen: Last Seen Department: 11/3/2023  Last Seen by PCP: 2022    Last Written:   Omeprazole-2022 90 cap 2 refills   Flonase-2023 48 g 1 refills   Singulair-2023 90 tab 1 refills     If no future appointment scheduled:  Review the last OV with PCP and review information for follow-up visit,  Route STAFF MESSAGE with patient name to the [facility] for scheduling with the following information:            -  Timing of next visit           -  Visit type ie Physical, OV, etc           -  Diagnoses/Reason ie. COPD, HTN - Do not use MEDICATION, Follow-up or CHECK UP - Give reason for visit      Next Appointment:   Future Appointments   Date Time Provider [address]   2024  9:00 AM Luigi Petty MD [facility]   2024  9:00 [facility]  [place] - [facility]       Message sent to  to schedule appt with patient?   NO      Requested Prescriptions     Pending Prescriptions Disp Refills    fluticasone (FLONASE) 50 MCG/ACT nasal spray 48 g 1     Si sprays by Each Nostril route daily    montelukast (SINGULAIR) 10 MG tablet 90 tablet 1     Sig: Take 1 tablet by mouth nightly    omeprazole (PRILOSEC) 40 MG delayed release capsule 90 capsule 2     Sig: TAKE 1 CAPSULE BY MOUTH EVERY DAY

## 2023-11-15 PROBLEM — D69.6 THROMBOCYTOPENIA, UNSPECIFIED (HCC): Status: ACTIVE | Noted: 2023-11-15

## 2023-11-15 ASSESSMENT — ENCOUNTER SYMPTOMS
ABDOMINAL PAIN: 0
SORE THROAT: 0
SHORTNESS OF BREATH: 0
COUGH: 0
CONSTIPATION: 0
NAUSEA: 0
VOMITING: 0
DIARRHEA: 0
RHINORRHEA: 0

## 2023-11-19 DIAGNOSIS — Z79.4 TYPE 2 DIABETES MELLITUS WITH HYPERGLYCEMIA, WITH LONG-TERM CURRENT USE OF INSULIN (HCC): ICD-10-CM

## 2023-11-19 DIAGNOSIS — E11.65 TYPE 2 DIABETES MELLITUS WITH HYPERGLYCEMIA, WITH LONG-TERM CURRENT USE OF INSULIN (HCC): ICD-10-CM

## 2023-11-21 ENCOUNTER — TELEPHONE (OUTPATIENT)
Dept: FAMILY MEDICINE CLINIC | Age: 68
End: 2023-11-21

## 2023-11-21 NOTE — TELEPHONE ENCOUNTER
PA needed for Novolog, pharmacy stated Humalog is preferred medication however pt had an allergic reaction to Humalog    Please advise ASAP

## 2023-11-21 NOTE — TELEPHONE ENCOUNTER
Submitted PA for NovoLOG FlexPen 100UNIT/ML pen-injectors  Via CMM Key: [id number] STATUS: PENDING. Follow up done daily; if no response in three days we will refax for status check. If another three days goes by with no response we will call the insurance for status.

## 2023-11-21 NOTE — TELEPHONE ENCOUNTER
Received phone call from Spouse and pt in regards to she stated that she called pharmacy to get the Novolog and the pharmacy advised pt spouse that the Novolog is not covered and that the pharmacy has sent over 6 requests with a list of alternatives and I advised and apologized to pt and spouse that we have not received these requests. I advised I would call the pharmacy myself and get the list of alternatives and see what we can get completed. Called pharmacy and asked for list of alternatives. Spoke with Kristopher Vaughn at the pharmacy. He stated that the preferred medication the insurance is requesting is for pt to be on the Humalog, advised tai that pt had an allergic reaction to the Humalog. Sent over an encounter to start a PA on the pts Novolog. Will await called pt and advised of information so far.      Sheryl Roach

## 2023-11-22 NOTE — TELEPHONE ENCOUNTER
The medication is APPROVED. If this requires a response please respond to the pool ( [phone]). Thank you please advise patient.

## 2023-11-24 DIAGNOSIS — E78.5 HYPERLIPIDEMIA, UNSPECIFIED HYPERLIPIDEMIA TYPE: ICD-10-CM

## 2023-11-24 NOTE — TELEPHONE ENCOUNTER
Refill Request     CONFIRM preferred pharmacy with the patient. If Mail Order Rx - Pend for 90 day refill. Last Seen: Last Seen Department: 11/3/2023  Last Seen by PCP: 8/8/2022    Last Written: 5/19/2023    If no future appointment scheduled:  Review the last OV with PCP and review information for follow-up visit,  Route STAFF MESSAGE with patient name to the [facility] for scheduling with the following information:            -  Timing of next visit           -  Visit type ie Physical, OV, etc           -  Diagnoses/Reason ie. COPD, HTN - Do not use MEDICATION, Follow-up or CHECK UP - Give reason for visit      Next Appointment:   Future Appointments   Date Time Provider [address]   2/6/2024  9:00 AM Franklin Reyes MD AND [facility]   2/9/2024  9:00 AM Lawson Dyson APRN - CNP [facility]  [place] - DYD       Message sent to  to schedule appt with patient?   NO      Requested Prescriptions     Pending Prescriptions Disp Refills    simvastatin (ZOCOR) 40 MG tablet [Pharmacy Med Name: SIMVASTATIN 40 MG TABLET] 90 tablet 1     Sig: TAKE 1 TABLET BY MOUTH NIGHTLY

## 2023-11-26 RX ORDER — SIMVASTATIN 40 MG
TABLET ORAL
Qty: 90 TABLET | Refills: 1 | Status: SHIPPED | OUTPATIENT
Start: 2023-11-26

## 2023-11-27 NOTE — TELEPHONE ENCOUNTER
Spoke with the patient and his wife. The patient has not received this yet. They did however receive a text from the insurance that it was approved. I advised I would call to ensure [facility] had this on file so they could begin to fill his Novolog flex pen. The pharmacy ran it through while I was on the phone with them and stated they would call the patient when it is ready for .  YORDANI to See Addison

## 2023-12-03 DIAGNOSIS — R10.13 EPIGASTRIC ABDOMINAL PAIN: Primary | ICD-10-CM

## 2023-12-03 NOTE — TELEPHONE ENCOUNTER
Refill Request     CONFIRM preferred pharmacy with the patient.    If Mail Order Rx - Pend for 90 day refill.      Last Seen: Last Seen Department: 11/3/2023  Last Seen by PCP: 11/3/2023    Last Written: 11/7/2023    If no future appointment scheduled:  Review the last OV with PCP and review information for follow-up visit,  Route STAFF MESSAGE with patient name to the  Pool for scheduling with the following information:            -  Timing of next visit           -  Visit type ie Physical, OV, etc           -  Diagnoses/Reason ie. COPD, HTN - Do not use MEDICATION, Follow-up or CHECK UP - Give reason for visit      Next Appointment:   Future Appointments   Date Time Provider Department Center   2/6/2024  9:00 AM Ximena Wilhelm MD AND [facility]   2/9/2024  9:00 AM Anne Dyson, APRN - CNP [facility]  [place] - DYD       Message sent to  to schedule appt with patient?  NO      Requested Prescriptions     Pending Prescriptions Disp Refills    omeprazole (PRILOSEC) 40 MG delayed release capsule [Pharmacy Med Name: OMEPRAZOLE DR 40 MG CAPSULE] 90 capsule 2     Sig: TAKE 1 CAPSULE BY MOUTH EVERY DAY

## 2023-12-04 RX ORDER — OMEPRAZOLE 40 MG/1
CAPSULE, DELAYED RELEASE ORAL
Qty: 90 CAPSULE | Refills: 1 | Status: SHIPPED | OUTPATIENT
Start: 2023-12-04

## 2023-12-05 DIAGNOSIS — M54.50 LUMBAR BACK PAIN: ICD-10-CM

## 2023-12-05 NOTE — TELEPHONE ENCOUNTER
Refill Request     CONFIRM preferred pharmacy with the patient. If Mail Order Rx - Pend for 90 day refill. Last Seen: Last Seen Department: 11/3/2023  Last Seen by PCP: 2/7/2022    Last Written: 08/03/2023 30 tab 1 refills     If no future appointment scheduled:  Review the last OV with PCP and review information for follow-up visit,  Route STAFF MESSAGE with patient name to the [facility] for scheduling with the following information:            -  Timing of next visit           -  Visit type ie Physical, OV, etc           -  Diagnoses/Reason ie. COPD, HTN - Do not use MEDICATION, Follow-up or CHECK UP - Give reason for visit      Next Appointment:   Future Appointments   Date Time Provider [address]   2/6/2024  9:00 AM Fabricio Gudino MD AND [facility]   2/9/2024  9:00 AM Rossi Dyson APRN - CNP [facility]  [place] - DYD       Message sent to  to schedule appt with patient?   NO      Requested Prescriptions     Pending Prescriptions Disp Refills    tiZANidine (ZANAFLEX) 4 MG tablet 30 tablet 1     Sig: Take 1 tablet by mouth 3 times daily as needed (back pain)

## 2023-12-06 RX ORDER — TIZANIDINE 4 MG/1
4 TABLET ORAL 3 TIMES DAILY PRN
Qty: 30 TABLET | Refills: 1 | Status: SHIPPED | OUTPATIENT
Start: 2023-12-06

## 2023-12-26 ENCOUNTER — TELEPHONE (OUTPATIENT)
Dept: PHARMACY | Facility: CLINIC | Age: 68
End: 2023-12-26

## 2023-12-26 NOTE — TELEPHONE ENCOUNTER
POPULATION HEALTH CLINICAL PHARMACY REVIEW: ADHERENCE  Identified care gap per [employer]: Statin adherence    ASSESSMENT  [facility] Records claims through 12/11/2023 (Prior Year [address] = not reported; YTD [address] = 81%; Potential Fail Date: 12/29/2023):   simvastatin 40mg daily last filled on 09/02/2023 for 90 day supply. Next refill due: 12/01/2023    Last Rx sent to [facility] on 11/26/2023 for 90ds with 1 refill    Lab Results   Component Value Date    CHOL 189 03/27/2023    TRIG 81 03/27/2023    HDL 73 (H) 03/27/2023    LDLCALC 100 (H) 03/27/2023     ALT   Date Value Ref Range Status   06/01/2023 26 10 - 40 U/L Final     AST   Date Value Ref Range Status   06/01/2023 35 15 - 37 U/L Final     PLAN  The following are interventions that have been identified:   Patient overdue refilling simvastatin and active on home medication list.     Per chart review, patient has most recently filled medications with [employer] [facility]. Additionally, [facility] had filled simvastatin and it was returned to stock so possible that patient no longer uses [facility] pharmacy. Attempting to reach patient to review adherence and if he changed pharmacies. Left message asking for return call. Will also send [facility] message.       Recent Visits  Date Type Provider Dept   11/03/23 Office Visit HO Nicole    08/03/23 Office Visit Una Nicole   03/27/23 Office Visit Una Nicole   08/08/22 Office Visit Etienne Dyson APRN - CNP [facility]    Showing recent visits within past 540 days with a meds authorizing provider and meeting all other requirements  Future Appointments  Date Type Provider Dept   02/09/24 Appointment Etienne Dyson APRN - CNP [facility]   Showing future appointments within next 150 days with a meds authorizing provider and meeting all other requirements    Darwin Vickers, PharmD, [facility], [address] [place] [address]

## 2023-12-27 ENCOUNTER — HOSPITAL ENCOUNTER (EMERGENCY)
Age: 68
Discharge: HOME | End: 2023-12-27
Payer: MEDICARE

## 2023-12-27 ENCOUNTER — HOSPITAL ENCOUNTER (OUTPATIENT)
Dept: CT IMAGING | Age: 68
Discharge: HOME | End: 2023-12-27
Payer: MEDICARE

## 2023-12-27 ENCOUNTER — APPOINTMENT (OUTPATIENT)
Dept: GENERAL RADIOLOGY | Age: 68
End: 2023-12-27
Payer: MEDICARE

## 2023-12-27 ENCOUNTER — OFFICE VISIT (OUTPATIENT)
Dept: FAMILY MEDICINE CLINIC | Age: 68
End: 2023-12-27
Payer: MEDICARE

## 2023-12-27 VITALS
TEMPERATURE: 98.5 F | BODY MASS INDEX: 38.95 KG/M2 | DIASTOLIC BLOOD PRESSURE: 87 MMHG | SYSTOLIC BLOOD PRESSURE: 158 MMHG | HEIGHT: 68 IN | WEIGHT: 257 LBS | OXYGEN SATURATION: 99 % | HEART RATE: 88 BPM | RESPIRATION RATE: 15 BRPM

## 2023-12-27 VITALS
OXYGEN SATURATION: 95 % | WEIGHT: 257 LBS | HEART RATE: 106 BPM | TEMPERATURE: 98.6 F | SYSTOLIC BLOOD PRESSURE: 156 MMHG | BODY MASS INDEX: 39.09 KG/M2 | DIASTOLIC BLOOD PRESSURE: 76 MMHG

## 2023-12-27 DIAGNOSIS — I10 ESSENTIAL HYPERTENSION: ICD-10-CM

## 2023-12-27 DIAGNOSIS — K74.60 LIVER CIRRHOSIS SECONDARY TO NASH (HCC): ICD-10-CM

## 2023-12-27 DIAGNOSIS — K70.31 ALCOHOLIC CIRRHOSIS OF LIVER WITH ASCITES (HCC): ICD-10-CM

## 2023-12-27 DIAGNOSIS — K75.81 LIVER CIRRHOSIS SECONDARY TO NASH (HCC): ICD-10-CM

## 2023-12-27 DIAGNOSIS — R10.31 RIGHT LOWER QUADRANT ABDOMINAL PAIN: Primary | ICD-10-CM

## 2023-12-27 DIAGNOSIS — R10.31 RIGHT LOWER QUADRANT ABDOMINAL PAIN: ICD-10-CM

## 2023-12-27 DIAGNOSIS — R18.8 ASCITES OF LIVER: ICD-10-CM

## 2023-12-27 DIAGNOSIS — R60.1 ANASARCA: Primary | ICD-10-CM

## 2023-12-27 LAB
ALBUMIN SERPL-MCNC: 3.6 G/DL (ref 3.4–5)
ALBUMIN SERPL-MCNC: 3.6 G/DL (ref 3.4–5)
ALBUMIN/GLOB SERPL: 1.1 {RATIO} (ref 1.1–2.2)
ALBUMIN/GLOB SERPL: 1.3 {RATIO} (ref 1.1–2.2)
ALP SERPL-CCNC: 120 U/L (ref 40–129)
ALP SERPL-CCNC: 127 U/L (ref 40–129)
ALT SERPL-CCNC: 18 U/L (ref 10–40)
ALT SERPL-CCNC: 20 U/L (ref 10–40)
ANION GAP SERPL CALCULATED.3IONS-SCNC: 10 MMOL/L (ref 3–16)
ANION GAP SERPL CALCULATED.3IONS-SCNC: 12 MMOL/L (ref 3–16)
AST SERPL-CCNC: 29 U/L (ref 15–37)
AST SERPL-CCNC: 30 U/L (ref 15–37)
BASOPHILS # BLD: 0 K/UL (ref 0–0.2)
BASOPHILS # BLD: 0 K/UL (ref 0–0.2)
BASOPHILS NFR BLD: 0.6 %
BASOPHILS NFR BLD: 0.7 %
BILIRUB SERPL-MCNC: 1.2 MG/DL (ref 0–1)
BILIRUB SERPL-MCNC: 1.2 MG/DL (ref 0–1)
BUN SERPL-MCNC: 14 MG/DL (ref 7–20)
BUN SERPL-MCNC: 15 MG/DL (ref 7–20)
CALCIUM SERPL-MCNC: 9.4 MG/DL (ref 8.3–10.6)
CALCIUM SERPL-MCNC: 9.8 MG/DL (ref 8.3–10.6)
CHLORIDE SERPL-SCNC: 101 MMOL/L (ref 99–110)
CHLORIDE SERPL-SCNC: 103 MMOL/L (ref 99–110)
CO2 SERPL-SCNC: 26 MMOL/L (ref 21–32)
CO2 SERPL-SCNC: 26 MMOL/L (ref 21–32)
CREAT SERPL-MCNC: 0.8 MG/DL (ref 0.8–1.3)
CREAT SERPL-MCNC: 0.8 MG/DL (ref 0.8–1.3)
DEPRECATED RDW RBC AUTO: 14.4 % (ref 12.4–15.4)
DEPRECATED RDW RBC AUTO: 14.6 % (ref 12.4–15.4)
EKG ATRIAL RATE: 103 BPM
EKG DIAGNOSIS: NORMAL
EKG P AXIS: 31 DEGREES
EKG P-R INTERVAL: 234 MS
EKG Q-T INTERVAL: 344 MS
EKG QRS DURATION: 98 MS
EKG QTC CALCULATION (BAZETT): 450 MS
EKG R AXIS: -44 DEGREES
EKG T AXIS: 87 DEGREES
EKG VENTRICULAR RATE: 103 BPM
EOSINOPHIL # BLD: 0.1 K/UL (ref 0–0.6)
EOSINOPHIL # BLD: 0.1 K/UL (ref 0–0.6)
EOSINOPHIL NFR BLD: 0.8 %
EOSINOPHIL NFR BLD: 1.2 %
FLUAV RNA RESP QL NAA+PROBE: NOT DETECTED
FLUBV RNA RESP QL NAA+PROBE: NOT DETECTED
GFR SERPLBLD CREATININE-BSD FMLA CKD-EPI: >60 ML/MIN/{1.73_M2}
GFR SERPLBLD CREATININE-BSD FMLA CKD-EPI: >60 ML/MIN/{1.73_M2}
GGT SERPL-CCNC: 37 U/L (ref 8–61)
GLUCOSE BLD-MCNC: 223 MG/DL (ref 70–99)
GLUCOSE SERPL-MCNC: 207 MG/DL (ref 70–99)
GLUCOSE SERPL-MCNC: 257 MG/DL (ref 70–99)
HCT VFR BLD AUTO: 38.7 % (ref 40.5–52.5)
HCT VFR BLD AUTO: 39.7 % (ref 40.5–52.5)
HGB BLD-MCNC: 13.3 G/DL (ref 13.5–17.5)
HGB BLD-MCNC: 13.5 G/DL (ref 13.5–17.5)
LIPASE SERPL-CCNC: 15 U/L (ref 13–60)
LYMPHOCYTES # BLD: 1.2 K/UL (ref 1–5.1)
LYMPHOCYTES # BLD: 1.2 K/UL (ref 1–5.1)
LYMPHOCYTES NFR BLD: 16.6 %
LYMPHOCYTES NFR BLD: 19.4 %
MCH RBC QN AUTO: 30.8 PG (ref 26–34)
MCH RBC QN AUTO: 31.4 PG (ref 26–34)
MCHC RBC AUTO-ENTMCNC: 33.9 G/DL (ref 31–36)
MCHC RBC AUTO-ENTMCNC: 34.3 G/DL (ref 31–36)
MCV RBC AUTO: 90.9 FL (ref 80–100)
MCV RBC AUTO: 91.7 FL (ref 80–100)
MONOCYTES # BLD: 0.8 K/UL (ref 0–1.3)
MONOCYTES # BLD: 0.9 K/UL (ref 0–1.3)
MONOCYTES NFR BLD: 11.9 %
MONOCYTES NFR BLD: 12.4 %
NEUTROPHILS # BLD: 4.3 K/UL (ref 1.7–7.7)
NEUTROPHILS # BLD: 5.2 K/UL (ref 1.7–7.7)
NEUTROPHILS NFR BLD: 66.8 %
NEUTROPHILS NFR BLD: 69.6 %
NT-PROBNP SERPL-MCNC: 61 PG/ML (ref 0–124)
PERFORMED ON: ABNORMAL
PERFORMED ON: NORMAL
PLATELET # BLD AUTO: 148 K/UL (ref 135–450)
PLATELET # BLD AUTO: 158 K/UL (ref 135–450)
PMV BLD AUTO: 7.9 FL (ref 5–10.5)
PMV BLD AUTO: 8.5 FL (ref 5–10.5)
POC CREATININE: 1 MG/DL (ref 0.8–1.3)
POC SAMPLE TYPE: NORMAL
POTASSIUM SERPL-SCNC: 3.6 MMOL/L (ref 3.5–5.1)
POTASSIUM SERPL-SCNC: 3.7 MMOL/L (ref 3.5–5.1)
PROT SERPL-MCNC: 6.3 G/DL (ref 6.4–8.2)
PROT SERPL-MCNC: 6.8 G/DL (ref 6.4–8.2)
RBC # BLD AUTO: 4.22 M/UL (ref 4.2–5.9)
RBC # BLD AUTO: 4.37 M/UL (ref 4.2–5.9)
SARS-COV-2 RNA RESP QL NAA+PROBE: NOT DETECTED
SODIUM SERPL-SCNC: 139 MMOL/L (ref 136–145)
SODIUM SERPL-SCNC: 139 MMOL/L (ref 136–145)
TROPONIN, HIGH SENSITIVITY: 29 NG/L (ref 0–22)
WBC # BLD AUTO: 6.4 K/UL (ref 4–11)
WBC # BLD AUTO: 7.5 K/UL (ref 4–11)

## 2023-12-27 PROCEDURE — 84484 ASSAY OF TROPONIN QUANT: CPT

## 2023-12-27 PROCEDURE — 6360000004 HC RX CONTRAST MEDICATION: Performed by: NURSE PRACTITIONER

## 2023-12-27 PROCEDURE — 1123F ACP DISCUSS/DSCN MKR DOCD: CPT | Performed by: NURSE PRACTITIONER

## 2023-12-27 PROCEDURE — 96374 THER/PROPH/DIAG INJ IV PUSH: CPT

## 2023-12-27 PROCEDURE — 87636 SARSCOV2 & INF A&B AMP PRB: CPT

## 2023-12-27 PROCEDURE — 71045 X-RAY EXAM CHEST 1 VIEW: CPT

## 2023-12-27 PROCEDURE — 82565 ASSAY OF CREATININE: CPT

## 2023-12-27 PROCEDURE — 3078F DIAST BP <80 MM HG: CPT | Performed by: NURSE PRACTITIONER

## 2023-12-27 PROCEDURE — 93005 ELECTROCARDIOGRAM TRACING: CPT | Performed by: EMERGENCY MEDICINE

## 2023-12-27 PROCEDURE — 6360000002 HC RX W HCPCS: Performed by: PHYSICIAN ASSISTANT

## 2023-12-27 PROCEDURE — 99214 OFFICE O/P EST MOD 30 MIN: CPT | Performed by: NURSE PRACTITIONER

## 2023-12-27 PROCEDURE — 93010 ELECTROCARDIOGRAM REPORT: CPT | Performed by: INTERNAL MEDICINE

## 2023-12-27 PROCEDURE — 3077F SYST BP >= 140 MM HG: CPT | Performed by: NURSE PRACTITIONER

## 2023-12-27 PROCEDURE — 85025 COMPLETE CBC W/AUTO DIFF WBC: CPT

## 2023-12-27 PROCEDURE — 80053 COMPREHEN METABOLIC PANEL: CPT

## 2023-12-27 PROCEDURE — 36415 COLL VENOUS BLD VENIPUNCTURE: CPT

## 2023-12-27 PROCEDURE — 99285 EMERGENCY DEPT VISIT HI MDM: CPT

## 2023-12-27 PROCEDURE — 74177 CT ABD & PELVIS W/CONTRAST: CPT

## 2023-12-27 RX ORDER — FUROSEMIDE 40 MG/1
40 TABLET ORAL 2 TIMES DAILY
Qty: 40 TABLET | Refills: 0 | Status: SHIPPED | OUTPATIENT
Start: 2023-12-27

## 2023-12-27 RX ORDER — FUROSEMIDE 20 MG/1
40 TABLET ORAL DAILY
Qty: 60 TABLET | Refills: 1 | Status: SHIPPED | OUTPATIENT
Start: 2023-12-27

## 2023-12-27 RX ORDER — SPIRONOLACTONE 100 MG/1
100 TABLET, FILM COATED ORAL DAILY
Qty: 20 TABLET | Refills: 0 | Status: SHIPPED | OUTPATIENT
Start: 2023-12-27

## 2023-12-27 RX ORDER — FUROSEMIDE 10 MG/ML
40 INJECTION INTRAMUSCULAR; INTRAVENOUS ONCE
Status: COMPLETED | OUTPATIENT
Start: 2023-12-27 | End: 2023-12-27

## 2023-12-27 RX ADMIN — IOPAMIDOL 75 ML: 755 INJECTION, SOLUTION INTRAVENOUS at 15:38

## 2023-12-27 RX ADMIN — FUROSEMIDE 40 MG: 10 INJECTION, SOLUTION INTRAMUSCULAR; INTRAVENOUS at 19:59

## 2023-12-28 ENCOUNTER — TELEPHONE (OUTPATIENT)
Dept: INTERVENTIONAL RADIOLOGY/VASCULAR | Age: 68
End: 2023-12-28

## 2023-12-28 ENCOUNTER — TELEPHONE (OUTPATIENT)
Dept: FAMILY MEDICINE CLINIC | Age: 68
End: 2023-12-28

## 2023-12-28 NOTE — DISCHARGE INSTRUCTIONS
Please follow-up closely with your regular doctor and GI. Please wear compression socks and keep your legs elevated. Please switch your  medications to: 2 tablets of your Lasix twice a day. And 4 tablets of your spironolactone daily. If you develop any worsening abdominal pain fever or worsening shortness of breath please return here.

## 2023-12-28 NOTE — ED PROVIDER NOTES
21 - 32 mmol/L    Anion Gap 10 3 - 16    Glucose 207 (H) 70 - 99 mg/dL    BUN 14 7 - 20 mg/dL    Creatinine 0.8 0.8 - 1.3 mg/dL    Est, Glom Filt Rate >60 >60    Calcium 9.8 8.3 - 10.6 mg/dL    Total Protein 6.8 6.4 - 8.2 g/dL    Albumin 3.6 3.4 - 5.0 g/dL    Albumin/Globulin Ratio 1.1 1.1 - 2.2    Total Bilirubin 1.2 (H) 0.0 - 1.0 mg/dL    Alkaline Phosphatase 127 40 - 129 U/L    ALT 20 10 - 40 U/L    AST 30 15 - 37 U/L   POCT Glucose   Result Value Ref Range    POC Glucose 223 (H) 70 - 99 mg/dl    Performed on ACCU-CHEK    EKG 12 Lead   Result Value Ref Range    Ventricular Rate 103 BPM    Atrial Rate 103 BPM    P-R Interval 234 ms    QRS Duration 98 ms    Q-T Interval 344 ms    QTc Calculation (Bazett) 450 ms    P Axis 31 degrees    R Axis -44 degrees    T Axis 87 degrees    Diagnosis       Sinus tachycardia with 1st degree A-V blockLeft axis deviationLeft anterior fascicular blockCannot rule out Septal infarct , age undeterminedAbnormal ECGConfirmed by Baron Brar ([id number]) on 12/27/2023 7:34:41 PM       XR CHEST PORTABLE   Final Result   No acute airspace disease identified. US GUIDED PARACENTESIS    (Results Pending)         EKG  The Ekg interpreted by myself  sinus tachycardia, [id number]=[id number]  with a rate of 103 with first-degree AV block  [place] is   Left axis deviation  QTc is  normal  Incomplete right bundle branch block no specific ST-T wave changes appreciated. No evidence of acute ischemia. The incomplete right bundle branch block is new when compared to the EKG from October 17, 2019    MDM:    I discussed this patient's care with the [facility]. I spoke with GI and arrange for outpatient therapeutic paracentesis. He does not have any reported abdominal tenderness to suggest SBP. Lab work is otherwise reassuring. GI recommended Aldactone and Lasix. He was given IV Lasix in the ER.        I personally saw the patient and independently provided 0 minutes of nonconcurrent critical care out of the total
disease), Hyperlipidemia, Hypertension, Liver disease, Mild episode of recurrent major depressive disorder ([address]) (2/8/2018), Morbidly obese ([address]) (8/25/2020), PONV (postoperative nausea and vomiting), and Type II or unspecified type diabetes mellitus without mention of complication, not stated as uncontrolled. CONSULTS: (Who and What was discussed)  IP CONSULT TO GI      Records Reviewed (External and Source) CT abdomen pelvis with contrast today reveals cirrhosis with portal hypertension, with progressive liver atrophy and new moderate ascites. Progression of fat stranding in the anterior abdominal wall differential including cellulitis versus edema. With interval progression of generalized anasarca. Seen today by his PCP for abdominal pain and ascites. CC/HPI Summary, DDx, ED Course, and Reassessment: Patient is a 17-year-old diabetic with liver cirrhosis and follows with Dr. Harriett Dakins. He has had 3 weeks of peripheral edema abdominal pain and swelling diarrhea shortness of breath and weight gain. He has evidence of new ascites on his CT scan. Laboratory studies reveal no significant anemia or leukocytosis, mild elevation in his troponin of 29 without ischemia on his EKG. COVID and flu are negative. No acute pleural effusion on his chest x-ray. He has no significant electrolyte derangement or LFT elevation or renal failure. I spoke with GI about patient's clinical presentation and diagnosis of liver cirrhosis with ascites now and we did consider admission. But do feel that with urgent paracentesis and GI follow-up he can be discharged. Patient and family would like to go home. We did change his medications to spironolactone 100 mg daily and Lasix 40 mg twice daily. He received IV Lasix through the IV with improvement. He has no hypoxia and his tachycardia has improved. We discussed if he develops any worsening abdominal pain fever shortness of breath or chest pain to return here.   The patient

## 2023-12-28 NOTE — TELEPHONE ENCOUNTER
Called and spoke to Rosa Maria about upcoming procedure. Phone number used: [phone]  Procedure:  para  Approving Radiologist: not needed    Pt informed of the following:  Date of procedure: 12/29/23  Arrival time of procedure: 1200  Time of procedure: 1300

## 2023-12-28 NOTE — TELEPHONE ENCOUNTER
Outgoing call to wife Ivelisse Willson, to see if scheduled f/u with Dr. Adelaida Esparza at [facility]. Patient is now scheduled at [facility] for paracentesis procedure at [facility] 12/29/2023. She left message with Dr. Adelaida Esparza office this morning with update & request scheduling f/u. Informed to let us know if able to get scheduled and if needing any help to get scheduled I am happy to help.

## 2023-12-28 NOTE — ED NOTES
2016: called Marlene Varghese for consult     2034: Dr. Caitlyn Villafana returned the page and spoke to [address]

## 2023-12-29 ENCOUNTER — HOSPITAL ENCOUNTER (OUTPATIENT)
Dept: ULTRASOUND IMAGING | Age: 68
Discharge: HOME | End: 2023-12-29
Payer: MEDICARE

## 2023-12-29 ENCOUNTER — HOSPITAL ENCOUNTER (OUTPATIENT)
Age: 68
Discharge: HOME | End: 2023-12-29
Payer: MEDICARE

## 2023-12-29 VITALS
HEART RATE: 88 BPM | SYSTOLIC BLOOD PRESSURE: 146 MMHG | DIASTOLIC BLOOD PRESSURE: 74 MMHG | OXYGEN SATURATION: 97 % | RESPIRATION RATE: 17 BRPM

## 2023-12-29 DIAGNOSIS — R60.1 ANASARCA: ICD-10-CM

## 2023-12-29 LAB
ALBUMIN FLD-MCNC: 0.7 G/DL
AMYLASE FLD-CCNC: 11 U/L
APPEARANCE FLUID: NORMAL
BASOPHILS # BLD: 0 K/UL (ref 0–0.2)
BASOPHILS NFR BLD: 0.6 %
BDY FLUID QUALITY: NORMAL
CELL COUNT FLUID TYPE: NORMAL
COLOR FLUID: NORMAL
DEPRECATED RDW RBC AUTO: 14.6 % (ref 12.4–15.4)
EOSINOPHIL # BLD: 0.1 K/UL (ref 0–0.6)
EOSINOPHIL NFR BLD: 1.4 %
HCT VFR BLD AUTO: 40.1 % (ref 40.5–52.5)
HGB BLD-MCNC: 13.3 G/DL (ref 13.5–17.5)
INR PPP: 1.13 (ref 0.84–1.16)
LYMPHOCYTES # BLD: 1.2 K/UL (ref 1–5.1)
LYMPHOCYTES NFR BLD: 18.1 %
LYMPHOCYTES NFR FLD: 53 %
MACROPHAGES # FLD: 27 %
MCH RBC QN AUTO: 30.8 PG (ref 26–34)
MCHC RBC AUTO-ENTMCNC: 33.3 G/DL (ref 31–36)
MCV RBC AUTO: 92.5 FL (ref 80–100)
MESOTHL CELL NFR FLD: 14 %
MONOCYTES # BLD: 0.9 K/UL (ref 0–1.3)
MONOCYTES NFR BLD: 13.3 %
NEUTROPHIL, FLUID: 6 %
NEUTROPHILS # BLD: 4.4 K/UL (ref 1.7–7.7)
NEUTROPHILS NFR BLD: 66.6 %
NUC CELL # FLD: 269 /CUMM
PATH REV: YES
PLATELET # BLD AUTO: 155 K/UL (ref 135–450)
PMV BLD AUTO: 8.3 FL (ref 5–10.5)
PROTHROMBIN TIME: 14.6 SEC (ref 11.5–14.8)
RBC # BLD AUTO: 4.33 M/UL (ref 4.2–5.9)
RBC FLUID: 200 /CUMM
SPECIMEN SOURCE FLD: NORMAL
TOTAL CELLS COUNTED FLD: 100
WBC # BLD AUTO: 6.6 K/UL (ref 4–11)

## 2023-12-29 PROCEDURE — 82042 OTHER SOURCE ALBUMIN QUAN EA: CPT

## 2023-12-29 PROCEDURE — 36415 COLL VENOUS BLD VENIPUNCTURE: CPT

## 2023-12-29 PROCEDURE — 87205 SMEAR GRAM STAIN: CPT

## 2023-12-29 PROCEDURE — 89051 BODY FLUID CELL COUNT: CPT

## 2023-12-29 PROCEDURE — 85610 PROTHROMBIN TIME: CPT

## 2023-12-29 PROCEDURE — 49083 ABD PARACENTESIS W/IMAGING: CPT

## 2023-12-29 PROCEDURE — 87070 CULTURE OTHR SPECIMN AEROBIC: CPT

## 2023-12-29 PROCEDURE — 82150 ASSAY OF AMYLASE: CPT

## 2023-12-29 PROCEDURE — 85025 COMPLETE CBC W/AUTO DIFF WBC: CPT

## 2023-12-29 NOTE — PROGRESS NOTES
Pt arrived for image guided Paracentesis. Dr. Elmer Mohan explained the procedure including the risk and benefits of the procedure. All questions answered. Pt verbalizes understanding of the procedure and states no more questions. Consent confirmed. Vital signs stable. Labs, allergies, medications, and code status reviewed. No contraindications noted. Time out completed prior to procedure.     Vital Signs  Vitals:    12/29/23 1325   BP: (!) 171/89   Pulse: (!) 106   Resp: 17   SpO2: 96%    (vital signs in table format)      Allergies  Aspirin and Oxycontin [oxycodone hcl] (allergies)    Labs  Lab Results   Component Value Date    INR 1.13 12/29/2023    PROTIME 14.6 12/29/2023     Lab Results   Component Value Date    CREATININE 0.8 12/27/2023    BUN 14 12/27/2023     12/27/2023    GFR >60 06/10/2013    K 3.6 12/27/2023     12/27/2023    CO2 26 12/27/2023     Lab Results   Component Value Date    WBC 6.6 12/29/2023    HGB 13.3 (L) 12/29/2023    HCT 40.1 (L) 12/29/2023    MCV 92.5 12/29/2023     12/29/2023

## 2023-12-31 LAB
BACTERIA FLD AEROBE CULT: NORMAL
GRAM STN SPEC: NORMAL

## 2024-01-01 LAB
BACTERIA FLD AEROBE CULT: NORMAL
GRAM STN SPEC: NORMAL

## 2024-01-02 LAB — PATH CONSULT FLUID: NORMAL

## 2024-01-03 DIAGNOSIS — K74.60 LIVER CIRRHOSIS SECONDARY TO NASH (HCC): Primary | ICD-10-CM

## 2024-01-03 DIAGNOSIS — K75.81 LIVER CIRRHOSIS SECONDARY TO NASH (HCC): Primary | ICD-10-CM

## 2024-01-05 ENCOUNTER — HOSPITAL ENCOUNTER (OUTPATIENT)
Age: 69
Discharge: HOME | End: 2024-01-05
Payer: COMMERCIAL

## 2024-01-05 DIAGNOSIS — K74.60 LIVER CIRRHOSIS SECONDARY TO NASH (HCC): ICD-10-CM

## 2024-01-05 DIAGNOSIS — K75.81 LIVER CIRRHOSIS SECONDARY TO NASH (HCC): ICD-10-CM

## 2024-01-05 LAB
ALBUMIN SERPL-MCNC: 3.5 G/DL (ref 3.4–5)
ALP SERPL-CCNC: 115 U/L (ref 40–129)
ALT SERPL-CCNC: 18 U/L (ref 10–40)
ANION GAP SERPL CALCULATED.3IONS-SCNC: 12 MMOL/L (ref 3–16)
AST SERPL-CCNC: 30 U/L (ref 15–37)
BASOPHILS # BLD: 0 K/UL (ref 0–0.2)
BASOPHILS NFR BLD: 0.6 %
BILIRUB DIRECT SERPL-MCNC: 0.3 MG/DL (ref 0–0.3)
BILIRUB INDIRECT SERPL-MCNC: 0.8 MG/DL (ref 0–1)
BILIRUB SERPL-MCNC: 1.1 MG/DL (ref 0–1)
BUN SERPL-MCNC: 23 MG/DL (ref 7–20)
CALCIUM SERPL-MCNC: 10.1 MG/DL (ref 8.3–10.6)
CHLORIDE SERPL-SCNC: 102 MMOL/L (ref 99–110)
CO2 SERPL-SCNC: 28 MMOL/L (ref 21–32)
CREAT SERPL-MCNC: 1.1 MG/DL (ref 0.8–1.3)
DEPRECATED RDW RBC AUTO: 14.2 % (ref 12.4–15.4)
EOSINOPHIL # BLD: 0.1 K/UL (ref 0–0.6)
EOSINOPHIL NFR BLD: 2 %
GFR SERPLBLD CREATININE-BSD FMLA CKD-EPI: >60 ML/MIN/{1.73_M2}
GLUCOSE SERPL-MCNC: 214 MG/DL (ref 70–99)
HCT VFR BLD AUTO: 39.1 % (ref 40.5–52.5)
HGB BLD-MCNC: 13.2 G/DL (ref 13.5–17.5)
INR PPP: 1.06 (ref 0.84–1.16)
LYMPHOCYTES # BLD: 1.4 K/UL (ref 1–5.1)
LYMPHOCYTES NFR BLD: 21.6 %
MCH RBC QN AUTO: 30.6 PG (ref 26–34)
MCHC RBC AUTO-ENTMCNC: 33.7 G/DL (ref 31–36)
MCV RBC AUTO: 90.7 FL (ref 80–100)
MONOCYTES # BLD: 0.6 K/UL (ref 0–1.3)
MONOCYTES NFR BLD: 9.4 %
NEUTROPHILS # BLD: 4.4 K/UL (ref 1.7–7.7)
NEUTROPHILS NFR BLD: 66.4 %
PLATELET # BLD AUTO: 159 K/UL (ref 135–450)
PMV BLD AUTO: 8.9 FL (ref 5–10.5)
POTASSIUM SERPL-SCNC: 4 MMOL/L (ref 3.5–5.1)
PROT SERPL-MCNC: 6.8 G/DL (ref 6.4–8.2)
PROTHROMBIN TIME: 13.8 SEC (ref 11.5–14.8)
RBC # BLD AUTO: 4.31 M/UL (ref 4.2–5.9)
SODIUM SERPL-SCNC: 142 MMOL/L (ref 136–145)
WBC # BLD AUTO: 6.6 K/UL (ref 4–11)

## 2024-01-05 PROCEDURE — 85025 COMPLETE CBC W/AUTO DIFF WBC: CPT

## 2024-01-05 PROCEDURE — 80048 BASIC METABOLIC PNL TOTAL CA: CPT

## 2024-01-05 PROCEDURE — 36415 COLL VENOUS BLD VENIPUNCTURE: CPT

## 2024-01-05 PROCEDURE — 80076 HEPATIC FUNCTION PANEL: CPT

## 2024-01-05 PROCEDURE — 85610 PROTHROMBIN TIME: CPT

## 2024-01-08 DIAGNOSIS — J30.2 SEASONAL ALLERGIES: ICD-10-CM

## 2024-01-08 DIAGNOSIS — Z79.4 TYPE 2 DIABETES MELLITUS WITH HYPERGLYCEMIA, WITH LONG-TERM CURRENT USE OF INSULIN (HCC): ICD-10-CM

## 2024-01-08 DIAGNOSIS — E11.65 TYPE 2 DIABETES MELLITUS WITH HYPERGLYCEMIA, WITH LONG-TERM CURRENT USE OF INSULIN (HCC): ICD-10-CM

## 2024-01-08 NOTE — TELEPHONE ENCOUNTER
Refill Request     CONFIRM preferred pharmacy with the patient.    If Mail Order Rx - Pend for 90 day refill.      Last Seen: Last Seen Department: 2023  Last Seen by PCP: 2022    Last Written:     If no future appointment scheduled:  Review the last OV with PCP and review information for follow-up visit,  Route STAFF MESSAGE with patient name to the  Pool for scheduling with the following information:            -  Timing of next visit           -  Visit type ie Physical, OV, etc           -  Diagnoses/Reason ie. COPD, HTN - Do not use MEDICATION, Follow-up or CHECK UP - Give reason for visit      Next Appointment:   Future Appointments   Date Time Provider Department Center   2024  9:00 AM Anne Dyson APRN - CNP [facility]   2024 12:40 PM Ximena Wilhelm MD [facility]       Message sent to  to schedule appt with patient?  NO      Requested Prescriptions     Pending Prescriptions Disp Refills    montelukast (SINGULAIR) 10 MG tablet 90 tablet 1     Sig: Take 1 tablet by mouth nightly    insulin aspart (NOVOLOG FLEXPEN) 100 UNIT/ML injection pen 15 Adjustable Dose Pre-filled Pen Syringe 3     Sig: Inject 20 Units into the skin 3 times daily (before meals)    fluticasone (FLONASE) 50 MCG/ACT nasal spray 48 g 1     Si sprays by Each Nostril route daily

## 2024-01-10 RX ORDER — FLUTICASONE PROPIONATE 50 MCG
2 SPRAY, SUSPENSION (ML) NASAL DAILY
Qty: 48 G | Refills: 1 | Status: SHIPPED | OUTPATIENT
Start: 2024-01-10

## 2024-01-10 RX ORDER — MONTELUKAST SODIUM 10 MG/1
10 TABLET ORAL NIGHTLY
Qty: 90 TABLET | Refills: 1 | Status: SHIPPED | OUTPATIENT
Start: 2024-01-10

## 2024-01-10 RX ORDER — INSULIN ASPART 100 [IU]/ML
20 INJECTION, SOLUTION INTRAVENOUS; SUBCUTANEOUS
Qty: 15 ADJUSTABLE DOSE PRE-FILLED PEN SYRINGE | Refills: 1 | Status: SHIPPED | OUTPATIENT
Start: 2024-01-10

## 2024-01-15 DIAGNOSIS — E11.65 TYPE 2 DIABETES MELLITUS WITH HYPERGLYCEMIA, WITH LONG-TERM CURRENT USE OF INSULIN (HCC): ICD-10-CM

## 2024-01-15 DIAGNOSIS — Z79.4 TYPE 2 DIABETES MELLITUS WITH HYPERGLYCEMIA, WITH LONG-TERM CURRENT USE OF INSULIN (HCC): ICD-10-CM

## 2024-01-15 NOTE — TELEPHONE ENCOUNTER
Refill Request     CONFIRM preferred pharmacy with the patient.    If Mail Order Rx - Pend for 90 day refill.      Last Seen: Last Seen Department: 12/27/2023  Last Seen by PCP: 12/27/2023    Last Written: 11/19/2023 15 mL 2 refills     If no future appointment scheduled:  Review the last OV with PCP and review information for follow-up visit,  Route STAFF MESSAGE with patient name to the  Pool for scheduling with the following information:            -  Timing of next visit           -  Visit type ie Physical, OV, etc           -  Diagnoses/Reason ie. COPD, HTN - Do not use MEDICATION, Follow-up or CHECK UP - Give reason for visit      Next Appointment:   Future Appointments   Date Time Provider Department Center   2/9/2024  9:00 AM Anne Dyson APRN - CNP [facility] [place]   4/30/2024 12:40 PM Ximena Wilhelm MD [facility]       Message sent to  to schedule appt with patient?  NO      Requested Prescriptions     Pending Prescriptions Disp Refills    insulin detemir (LEVEMIR FLEXPEN) 100 UNIT/ML injection pen [Pharmacy Med Name: Levemir FlexPen 100 unit/mL (3 mL) solution subcutaneous insulin pen] 15 mL 2     Sig: INJECT 50 UNITS UNDER THE SKIN TWICE DAILY (BULK)

## 2024-01-16 ENCOUNTER — TELEPHONE (OUTPATIENT)
Dept: FAMILY MEDICINE CLINIC | Age: 69
End: 2024-01-16

## 2024-01-16 NOTE — TELEPHONE ENCOUNTER
Incoming fax scanned into media.    New insurance [employer] does NOT cover Levemir Flexpen. Temporary supply will be given for patient. This is non-formulary prescription.    Please advise on alternative or Prior Auth submission?

## 2024-01-19 RX ORDER — INSULIN GLARGINE 100 [IU]/ML
50 INJECTION, SOLUTION SUBCUTANEOUS 2 TIMES DAILY
Qty: 15 ADJUSTABLE DOSE PRE-FILLED PEN SYRINGE | Refills: 1 | Status: SHIPPED | OUTPATIENT
Start: 2024-01-19

## 2024-01-22 NOTE — TELEPHONE ENCOUNTER
Pt and spouse advised. Pt has enough levemir to last until his new insurance does kick in with [employer]. They are going to cancel the enrollement and they are going to sign back up with [facility] on Feb 1st. And will call back to get the levemir once the new [facility] kicks back in on 02/1/24

## 2024-01-25 LAB — AFP-TM SERPL-MCNC: 2.9 UG/L

## 2024-02-09 ENCOUNTER — OFFICE VISIT (OUTPATIENT)
Dept: FAMILY MEDICINE CLINIC | Age: 69
End: 2024-02-09

## 2024-02-09 VITALS
BODY MASS INDEX: 36.37 KG/M2 | DIASTOLIC BLOOD PRESSURE: 80 MMHG | OXYGEN SATURATION: 91 % | SYSTOLIC BLOOD PRESSURE: 128 MMHG | HEIGHT: 68 IN | HEART RATE: 72 BPM | WEIGHT: 240 LBS

## 2024-02-09 DIAGNOSIS — N18.31 STAGE 3A CHRONIC KIDNEY DISEASE (HCC): ICD-10-CM

## 2024-02-09 DIAGNOSIS — E11.65 TYPE 2 DIABETES MELLITUS WITH HYPERGLYCEMIA, WITH LONG-TERM CURRENT USE OF INSULIN (HCC): Primary | ICD-10-CM

## 2024-02-09 DIAGNOSIS — F33.0 MILD EPISODE OF RECURRENT MAJOR DEPRESSIVE DISORDER (HCC): ICD-10-CM

## 2024-02-09 DIAGNOSIS — I85.00 ESOPHAGEAL VARICES WITHOUT BLEEDING, UNSPECIFIED ESOPHAGEAL VARICES TYPE (HCC): ICD-10-CM

## 2024-02-09 DIAGNOSIS — D69.6 THROMBOCYTOPENIA, UNSPECIFIED (HCC): ICD-10-CM

## 2024-02-09 DIAGNOSIS — Z79.4 TYPE 2 DIABETES MELLITUS WITH HYPERGLYCEMIA, WITH LONG-TERM CURRENT USE OF INSULIN (HCC): Primary | ICD-10-CM

## 2024-02-09 LAB — HBA1C MFR BLD: 8.9 %

## 2024-02-09 RX ORDER — INSULIN GLARGINE 100 [IU]/ML
35 INJECTION, SOLUTION SUBCUTANEOUS 2 TIMES DAILY
Qty: 15 ADJUSTABLE DOSE PRE-FILLED PEN SYRINGE | Refills: 1 | Status: SHIPPED | OUTPATIENT
Start: 2024-02-09

## 2024-02-09 NOTE — PROGRESS NOTES
2024  Carroll Wells (: 1955)  68 y.o.    ASSESSMENT and PLAN:  Carroll was seen today for diabetes and medication check.    Diagnoses and all orders for this visit:    Type 2 diabetes mellitus with hyperglycemia, with long-term current use of insulin (HCC)  -     POCT glycosylated hemoglobin (Hb A1C)  -     insulin glargine (LANTUS SOLOSTAR) 100 UNIT/ML injection pen; Inject 35 Units into the skin 2 times daily  -improving but not at goal.   -due to potential for lows will titrate conservatively.   -increase to 35 units, increase by 3 units every 3 days until 35 bid.   --A1c goal is 7 or less. Fasting AM glucose goal-<130.   -Lifestyle recommendations include weight loss, reducing carbohydrates (pasta, potatoes, bread, crackers/chips), and sugars (candy, sweets, regular soda, fruits) in diet, and increasing regular exercise to most days of the week.    -f/u in 3 months or earlier if sugars trending up.     Esophageal varices without bleeding, unspecified esophageal varices type (HCC)  -stable.     Mild episode of recurrent major depressive disorder (HCC)  -The current medical regimen is effective;  continue present plan and medications.    Thrombocytopenia, unspecified (HCC)  -The current medical regimen is effective;  continue present plan and medications.    Stage 3a chronic kidney disease (HCC)  -The current medical regimen is effective;  continue present plan and medications.  -Explained risk factors for progression of CKD.  No NSAIDs - e.g. Advil, motrin, ibuprofen, aleve, diclofenac, mobic, celebrex, nabumetone.etc.  Consume more plant-based proteins  avoid smoking  Avoid sodas  Avoid IV contrast  Exercise daily.  Take your blood pressure medications on time.     Liver disease  -following with gi.   Needs to verify dosing of lasix and spironolactone with gi. Reviewed most recent recommendations from ER visit.     Return in about 3 months (around 2024), or if symptoms worsen or fail to

## 2024-02-09 NOTE — PATIENT INSTRUCTIONS
Please verify Medications and doses with Dr. Velasquez.    Furosemide (Lasix) 40 mg po twice daily, and spironolactone 100 mg twice daily ??

## 2024-02-14 DIAGNOSIS — K74.60 LIVER CIRRHOSIS SECONDARY TO NASH (HCC): Primary | ICD-10-CM

## 2024-02-14 DIAGNOSIS — K75.81 LIVER CIRRHOSIS SECONDARY TO NASH (HCC): Primary | ICD-10-CM

## 2024-04-26 ENCOUNTER — OFFICE VISIT (OUTPATIENT)
Dept: FAMILY MEDICINE CLINIC | Age: 69
End: 2024-04-26

## 2024-04-26 ENCOUNTER — HOSPITAL ENCOUNTER (OUTPATIENT)
Dept: GENERAL RADIOLOGY | Age: 69
Discharge: HOME | End: 2024-04-26
Payer: MEDICARE

## 2024-04-26 VITALS
SYSTOLIC BLOOD PRESSURE: 120 MMHG | BODY MASS INDEX: 36.98 KG/M2 | DIASTOLIC BLOOD PRESSURE: 60 MMHG | HEART RATE: 90 BPM | OXYGEN SATURATION: 97 % | WEIGHT: 243.2 LBS

## 2024-04-26 DIAGNOSIS — K74.60 LIVER CIRRHOSIS SECONDARY TO NASH (HCC): ICD-10-CM

## 2024-04-26 DIAGNOSIS — R53.83 OTHER FATIGUE: Primary | ICD-10-CM

## 2024-04-26 DIAGNOSIS — K75.81 LIVER CIRRHOSIS SECONDARY TO NASH (HCC): ICD-10-CM

## 2024-04-26 DIAGNOSIS — M54.42 ACUTE MIDLINE LOW BACK PAIN WITH BILATERAL SCIATICA: ICD-10-CM

## 2024-04-26 DIAGNOSIS — M62.838 MUSCLE SPASM: ICD-10-CM

## 2024-04-26 DIAGNOSIS — R53.83 OTHER FATIGUE: ICD-10-CM

## 2024-04-26 DIAGNOSIS — M54.41 ACUTE MIDLINE LOW BACK PAIN WITH BILATERAL SCIATICA: ICD-10-CM

## 2024-04-26 LAB
ALBUMIN SERPL-MCNC: 3.7 G/DL (ref 3.4–5)
ALBUMIN/GLOB SERPL: 1.3 {RATIO} (ref 1.1–2.2)
ALP SERPL-CCNC: 166 U/L (ref 40–129)
ALT SERPL-CCNC: 28 U/L (ref 10–40)
AMMONIA PLAS-SCNC: 76 UMOL/L (ref 16–60)
ANION GAP SERPL CALCULATED.3IONS-SCNC: 12 MMOL/L (ref 3–16)
AST SERPL-CCNC: 36 U/L (ref 15–37)
BASOPHILS # BLD: 0 K/UL (ref 0–0.2)
BASOPHILS NFR BLD: 0.5 %
BILIRUB SERPL-MCNC: 1.1 MG/DL (ref 0–1)
BILIRUBIN, POC: NEGATIVE
BLOOD URINE, POC: NEGATIVE
BUN SERPL-MCNC: 20 MG/DL (ref 7–20)
CALCIUM SERPL-MCNC: 9.3 MG/DL (ref 8.3–10.6)
CHLORIDE SERPL-SCNC: 100 MMOL/L (ref 99–110)
CLARITY, POC: CLEAR
CO2 SERPL-SCNC: 25 MMOL/L (ref 21–32)
COLOR, POC: YELLOW
CREAT SERPL-MCNC: 1 MG/DL (ref 0.8–1.3)
DEPRECATED RDW RBC AUTO: 14.3 % (ref 12.4–15.4)
EOSINOPHIL # BLD: 0.1 K/UL (ref 0–0.6)
EOSINOPHIL NFR BLD: 0.9 %
GFR SERPLBLD CREATININE-BSD FMLA CKD-EPI: 81 ML/MIN/{1.73_M2}
GLUCOSE SERPL-MCNC: 285 MG/DL (ref 70–99)
GLUCOSE URINE, POC: 100
HCT VFR BLD AUTO: 33.9 % (ref 40.5–52.5)
HGB BLD-MCNC: 11.4 G/DL (ref 13.5–17.5)
KETONES, POC: NEGATIVE
LEUKOCYTE EST, POC: NEGATIVE
LYMPHOCYTES # BLD: 1.2 K/UL (ref 1–5.1)
LYMPHOCYTES NFR BLD: 19.6 %
MAGNESIUM SERPL-MCNC: 1.6 MG/DL (ref 1.8–2.4)
MCH RBC QN AUTO: 30.5 PG (ref 26–34)
MCHC RBC AUTO-ENTMCNC: 33.7 G/DL (ref 31–36)
MCV RBC AUTO: 90.5 FL (ref 80–100)
MONOCYTES # BLD: 0.6 K/UL (ref 0–1.3)
MONOCYTES NFR BLD: 9.6 %
NEUTROPHILS # BLD: 4.4 K/UL (ref 1.7–7.7)
NEUTROPHILS NFR BLD: 69.4 %
NITRITE, POC: NEGATIVE
PH, POC: 5.5
PLATELET # BLD AUTO: 113 K/UL (ref 135–450)
PMV BLD AUTO: 8.8 FL (ref 5–10.5)
POTASSIUM SERPL-SCNC: 4.4 MMOL/L (ref 3.5–5.1)
PROT SERPL-MCNC: 6.5 G/DL (ref 6.4–8.2)
PROTEIN, POC: NEGATIVE
RBC # BLD AUTO: 3.74 M/UL (ref 4.2–5.9)
SODIUM SERPL-SCNC: 137 MMOL/L (ref 136–145)
SPECIFIC GRAVITY, POC: 1.02
TSH SERPL DL<=0.005 MIU/L-ACNC: 3.14 UIU/ML (ref 0.27–4.2)
UROBILINOGEN, POC: 0.2
WBC # BLD AUTO: 6.3 K/UL (ref 4–11)

## 2024-04-26 PROCEDURE — 72100 X-RAY EXAM L-S SPINE 2/3 VWS: CPT

## 2024-04-26 PROCEDURE — 72220 X-RAY EXAM SACRUM TAILBONE: CPT

## 2024-04-26 RX ORDER — IBUPROFEN 800 MG/1
800 TABLET ORAL EVERY 8 HOURS PRN
Qty: 21 TABLET | Refills: 0 | Status: SHIPPED | OUTPATIENT
Start: 2024-04-26 | End: 2024-05-03

## 2024-04-26 SDOH — ECONOMIC STABILITY: FOOD INSECURITY: WITHIN THE PAST 12 MONTHS, YOU WORRIED THAT YOUR FOOD WOULD RUN OUT BEFORE YOU GOT MONEY TO BUY MORE.: NEVER TRUE

## 2024-04-26 SDOH — ECONOMIC STABILITY: FOOD INSECURITY: WITHIN THE PAST 12 MONTHS, THE FOOD YOU BOUGHT JUST DIDN'T LAST AND YOU DIDN'T HAVE MONEY TO GET MORE.: NEVER TRUE

## 2024-04-26 SDOH — ECONOMIC STABILITY: INCOME INSECURITY: HOW HARD IS IT FOR YOU TO PAY FOR THE VERY BASICS LIKE FOOD, HOUSING, MEDICAL CARE, AND HEATING?: NOT HARD AT ALL

## 2024-04-26 ASSESSMENT — ENCOUNTER SYMPTOMS
SORE THROAT: 0
SINUS PRESSURE: 0
SINUS PAIN: 0
BACK PAIN: 1
NAUSEA: 0
DIARRHEA: 0
SHORTNESS OF BREATH: 0
RHINORRHEA: 0
COUGH: 0
VOMITING: 0

## 2024-04-26 NOTE — PROGRESS NOTES
Carroll Wells (:  1955) is a 68 y.o. male,Established patient, here for evaluation of the following chief complaint(s):  Altered Mental Status, Fatigue, Spasms (Bilateral legs and arms and back x3 weeks ago), and Back Pain (HAS TAKEN THE TIZANIDINE FOR THE BACK PAIN)      Assessment & Plan   ASSESSMENT/PLAN:  1. Other fatigue  -     POCT Urinalysis no Micro  -     Comprehensive Metabolic Panel; Future  -     CBC with Auto Differential; Future  -     TSH with Reflex; Future  2. Muscle spasm  -     Comprehensive Metabolic Panel; Future  -     TSH with Reflex; Future  3. Acute midline low back pain with bilateral sciatica  -     XR LUMBAR SPINE (2-3 VIEWS); Future  -     External Referral To Spine Surgery  -     XR SACRUM COCCYX (MIN 2 VIEWS); Future  -     ibuprofen (ADVIL;MOTRIN) 800 MG tablet; Take 1 tablet by mouth every 8 hours as needed for Pain, Disp-21 tablet, R-0Normal  4. Liver cirrhosis secondary to LANG (HCC)  -     Comprehensive Metabolic Panel; Future  -     CBC with Auto Differential; Future  -     Magnesium; Future  -     Ammonia; Future      -Weight gain of  only 3lbs over last two months, no jaundice, A & O x 4, good long and short term memory.  Suspect lethargy and drowsiness may be from Tizandine, patient to stop taking these. Discussed pain medications but with his known liver issues would need to avoid anything with Tylenol and Alleve. Will send some Ibuprofen but advised to take with food and to take sparingly.   -Urine dip negative,  -Labs pending   -Recommend keeping scheduled follow up with PCP on   -Reviewed allergies, discussed medication risks, benefits, side effects. Take medication with food.   -Reviewed symptom management   -Reviewed alarm symptoms. Low threshold for going to ER. Patient and his wife verbalized understanding       Patient Instructions   Heat or ice. Be sure to put something between your skin and the heat/ice. No longer than 15 minutes at at time

## 2024-04-26 NOTE — PATIENT INSTRUCTIONS
Heat or ice. Be sure to put something between your skin and the heat/ice. No longer than 15 minutes at at time   Tylenol/Ibuprofen as needed per package directions   Gentle stretching   Muscle relaxants can cause drowsiness, do not drive or operate machinery while taking. Do not take any other medications that can cause drowsiness while taking   Go to ER for significant worsening of symptoms, chest pain, shortness of breath, facial drooping, slurring of words, inability to form words, one sided weakness.

## 2024-04-29 ENCOUNTER — TELEPHONE (OUTPATIENT)
Dept: FAMILY MEDICINE CLINIC | Age: 69
End: 2024-04-29

## 2024-04-29 NOTE — TELEPHONE ENCOUNTER
I spoke with patient and gave him his Xray results. I wasn't able to addend the lab result message with that info in it.

## 2024-04-30 ENCOUNTER — TELEPHONE (OUTPATIENT)
Dept: ENDOCRINOLOGY | Age: 69
End: 2024-04-30

## 2024-04-30 NOTE — TELEPHONE ENCOUNTER
Pt wife called in to same day cancel NP appt today. Said pt was not feeling well. I offered vv and they declined. Let them know 2 other NP appt have been canceled and they would be dismissed. Pt and spouse understood.

## 2024-05-16 ENCOUNTER — OFFICE VISIT (OUTPATIENT)
Dept: FAMILY MEDICINE CLINIC | Age: 69
End: 2024-05-16

## 2024-05-16 VITALS
HEIGHT: 68 IN | OXYGEN SATURATION: 98 % | DIASTOLIC BLOOD PRESSURE: 56 MMHG | HEART RATE: 87 BPM | SYSTOLIC BLOOD PRESSURE: 128 MMHG | BODY MASS INDEX: 36.98 KG/M2

## 2024-05-16 DIAGNOSIS — Z79.4 TYPE 2 DIABETES MELLITUS WITH HYPERGLYCEMIA, WITH LONG-TERM CURRENT USE OF INSULIN (HCC): Primary | ICD-10-CM

## 2024-05-16 DIAGNOSIS — Z00.00 MEDICARE ANNUAL WELLNESS VISIT, SUBSEQUENT: ICD-10-CM

## 2024-05-16 DIAGNOSIS — M54.41 ACUTE MIDLINE LOW BACK PAIN WITH BILATERAL SCIATICA: ICD-10-CM

## 2024-05-16 DIAGNOSIS — E83.42 HYPOMAGNESEMIA: ICD-10-CM

## 2024-05-16 DIAGNOSIS — E78.2 MIXED HYPERLIPIDEMIA: ICD-10-CM

## 2024-05-16 DIAGNOSIS — Z12.5 PROSTATE CANCER SCREENING: ICD-10-CM

## 2024-05-16 DIAGNOSIS — M54.42 ACUTE MIDLINE LOW BACK PAIN WITH BILATERAL SCIATICA: ICD-10-CM

## 2024-05-16 DIAGNOSIS — I10 ESSENTIAL HYPERTENSION: ICD-10-CM

## 2024-05-16 DIAGNOSIS — E11.65 TYPE 2 DIABETES MELLITUS WITH HYPERGLYCEMIA, WITH LONG-TERM CURRENT USE OF INSULIN (HCC): Primary | ICD-10-CM

## 2024-05-16 DIAGNOSIS — M54.50 LUMBAR BACK PAIN: ICD-10-CM

## 2024-05-16 LAB
CHOLEST SERPL-MCNC: 170 MG/DL (ref 0–199)
HBA1C MFR BLD: 11.1 %
HDLC SERPL-MCNC: 77 MG/DL (ref 40–60)
LDLC SERPL CALC-MCNC: 80 MG/DL
MAGNESIUM SERPL-MCNC: 1.9 MG/DL (ref 1.8–2.4)
PSA SERPL DL<=0.01 NG/ML-MCNC: 0.1 NG/ML (ref 0–4)
TRIGL SERPL-MCNC: 65 MG/DL (ref 0–150)
VLDLC SERPL CALC-MCNC: 13 MG/DL

## 2024-05-16 RX ORDER — MAGNESIUM OXIDE 400 MG/1
400 TABLET ORAL DAILY
Qty: 30 TABLET | Refills: 1 | Status: SHIPPED | OUTPATIENT
Start: 2024-05-16

## 2024-05-16 RX ORDER — TIZANIDINE 4 MG/1
4 TABLET ORAL 3 TIMES DAILY PRN
Qty: 30 TABLET | Refills: 1 | Status: SHIPPED | OUTPATIENT
Start: 2024-05-16

## 2024-05-16 RX ORDER — SEMAGLUTIDE 0.68 MG/ML
0.25 INJECTION, SOLUTION SUBCUTANEOUS WEEKLY
Qty: 3 ML | Refills: 2 | Status: SHIPPED | OUTPATIENT
Start: 2024-05-16

## 2024-05-16 ASSESSMENT — LIFESTYLE VARIABLES
HOW OFTEN DO YOU HAVE A DRINK CONTAINING ALCOHOL: 2-4 TIMES A MONTH
HOW MANY STANDARD DRINKS CONTAINING ALCOHOL DO YOU HAVE ON A TYPICAL DAY: 1 OR 2

## 2024-05-16 NOTE — PATIENT INSTRUCTIONS
Preventive Plan for Carroll Wells - 5/16/2024  Medicare offers a range of preventive health benefits. Some of the tests and screenings are paid in full while other may be subject to a deductible, co-insurance, and/or copay.    Some of these benefits include a comprehensive review of your medical history including lifestyle, illnesses that may run in your family, and various assessments and screenings as appropriate.    After reviewing your medical record and screening and assessments performed today your provider may have ordered immunizations, labs, imaging, and/or referrals for you.  A list of these orders (if applicable) as well as your Preventive Care list are included within your After Visit Summary for your review.    Other Preventive Recommendations:    A preventive eye exam performed by an eye specialist is recommended every 1-2 years to screen for glaucoma; cataracts, macular degeneration, and other eye disorders.  A preventive dental visit is recommended every 6 months.  Try to get at least 150 minutes of exercise per week or 10,000 steps per day on a pedometer .  Order or download the FREE \"Exercise & Physical Activity: Your Everyday Guide\" from [employer]. Call 1-975.645.2295 or search [employer] [place] [employer] online.  You need 0405-2590 mg of calcium and 2552-0416 IU of vitamin D per day. It is possible to meet your calcium requirement with diet alone, but a vitamin D supplement is usually necessary to meet this goal.  When exposed to the sun, use a sunscreen that protects against both UVA and UVB radiation with an SPF of 30 or greater. Reapply every 2 to 3 hours or after sweating, drying off with a towel, or swimming.  Always wear a seat belt when traveling in a car. Always wear a helmet when riding a bicycle or motorcycle.

## 2024-05-16 NOTE — PROGRESS NOTES
A, APRN - CNP   simvastatin (ZOCOR) 40 MG tablet TAKE 1 TABLET BY MOUTH NIGHTLY  Richard Stone DO   losartan (COZAAR) 100 MG tablet Take 1 tablet by mouth daily  Richard Stone DO   Continuous Blood Gluc Sensor (FREESTYLE ELIZABETH 2 SENSOR) MISC 1 Units by Does not apply route every 14 days  Anne Dyson APRN - CNP   amitriptyline (ELAVIL) 10 MG tablet TAKE 1 TABLET BY MOUTH EVERY DAY AT NIGHT  Patient not taking: Reported on 2/9/2024  Anne Dyson APRN - CNP   ondansetron (ZOFRAN-ODT) 4 MG disintegrating tablet Take 1 tablet by mouth 3 times daily as needed for Nausea or Vomiting  Dennis Velasquez MD   cetirizine (ZYRTEC) 10 MG tablet Take 1 tablet by mouth daily  Karina Mendoza PA   acetaminophen (TYLENOL) 500 MG tablet Take 1 tablet by mouth every 6 hours as needed for Pain  Patient not taking: Reported on 2/9/2024  Provider, MD Shun Mcgarry (Including outside providers/suppliers regularly involved in providing care):   Patient Care Team:  Anne Dyson APRN - CNP as PCP - General (Family Nurse Practitioner)  Anne Dyson APRN - CNP as PCP - Empaneled Provider     Reviewed and updated this visit:  Allergies  Meds

## 2024-05-17 ENCOUNTER — HOSPITAL ENCOUNTER (EMERGENCY)
Age: 69
Discharge: HOME | End: 2024-05-17
Attending: STUDENT IN AN ORGANIZED HEALTH CARE EDUCATION/TRAINING PROGRAM
Payer: MEDICARE

## 2024-05-17 ENCOUNTER — APPOINTMENT (OUTPATIENT)
Dept: CT IMAGING | Age: 69
End: 2024-05-17
Payer: MEDICARE

## 2024-05-17 VITALS
SYSTOLIC BLOOD PRESSURE: 125 MMHG | DIASTOLIC BLOOD PRESSURE: 69 MMHG | HEART RATE: 77 BPM | TEMPERATURE: 97.1 F | BODY MASS INDEX: 36.37 KG/M2 | WEIGHT: 240 LBS | RESPIRATION RATE: 16 BRPM | OXYGEN SATURATION: 97 % | HEIGHT: 68 IN

## 2024-05-17 DIAGNOSIS — I95.9 TRANSIENT HYPOTENSION: Primary | ICD-10-CM

## 2024-05-17 LAB
ALBUMIN SERPL-MCNC: 3.3 G/DL (ref 3.4–5)
ALBUMIN/GLOB SERPL: 1 {RATIO} (ref 1.1–2.2)
ALP SERPL-CCNC: 142 U/L (ref 40–129)
ALT SERPL-CCNC: 21 U/L (ref 10–40)
ANION GAP SERPL CALCULATED.3IONS-SCNC: 10 MMOL/L (ref 3–16)
AST SERPL-CCNC: 32 U/L (ref 15–37)
BASOPHILS # BLD: 0 K/UL (ref 0–0.2)
BASOPHILS NFR BLD: 0.5 %
BILIRUB SERPL-MCNC: 0.9 MG/DL (ref 0–1)
BUN SERPL-MCNC: 23 MG/DL (ref 7–20)
CALCIUM SERPL-MCNC: 9.2 MG/DL (ref 8.3–10.6)
CHLORIDE SERPL-SCNC: 101 MMOL/L (ref 99–110)
CO2 SERPL-SCNC: 28 MMOL/L (ref 21–32)
CREAT SERPL-MCNC: 1.2 MG/DL (ref 0.8–1.3)
DEPRECATED RDW RBC AUTO: 14.2 % (ref 12.4–15.4)
EKG ATRIAL RATE: 55 BPM
EKG DIAGNOSIS: NORMAL
EKG P AXIS: 64 DEGREES
EKG P-R INTERVAL: 262 MS
EKG Q-T INTERVAL: 500 MS
EKG QRS DURATION: 112 MS
EKG QTC CALCULATION (BAZETT): 478 MS
EKG R AXIS: -22 DEGREES
EKG T AXIS: 59 DEGREES
EKG VENTRICULAR RATE: 55 BPM
EOSINOPHIL # BLD: 0.2 K/UL (ref 0–0.6)
EOSINOPHIL NFR BLD: 3.1 %
GFR SERPLBLD CREATININE-BSD FMLA CKD-EPI: 65 ML/MIN/{1.73_M2}
GLUCOSE SERPL-MCNC: 142 MG/DL (ref 70–99)
HCT VFR BLD AUTO: 31.1 % (ref 40.5–52.5)
HGB BLD-MCNC: 10.3 G/DL (ref 13.5–17.5)
LYMPHOCYTES # BLD: 1.5 K/UL (ref 1–5.1)
LYMPHOCYTES NFR BLD: 28.8 %
MCH RBC QN AUTO: 29.7 PG (ref 26–34)
MCHC RBC AUTO-ENTMCNC: 33.2 G/DL (ref 31–36)
MCV RBC AUTO: 89.5 FL (ref 80–100)
MONOCYTES # BLD: 0.7 K/UL (ref 0–1.3)
MONOCYTES NFR BLD: 13.1 %
NEUTROPHILS # BLD: 2.8 K/UL (ref 1.7–7.7)
NEUTROPHILS NFR BLD: 54.5 %
NT-PROBNP SERPL-MCNC: 226 PG/ML (ref 0–124)
PLATELET # BLD AUTO: 122 K/UL (ref 135–450)
PMV BLD AUTO: 8.6 FL (ref 5–10.5)
POTASSIUM SERPL-SCNC: 3.6 MMOL/L (ref 3.5–5.1)
PROT SERPL-MCNC: 6.5 G/DL (ref 6.4–8.2)
RBC # BLD AUTO: 3.47 M/UL (ref 4.2–5.9)
SODIUM SERPL-SCNC: 139 MMOL/L (ref 136–145)
TROPONIN, HIGH SENSITIVITY: 22 NG/L (ref 0–22)
TROPONIN, HIGH SENSITIVITY: 30 NG/L (ref 0–22)
WBC # BLD AUTO: 5.2 K/UL (ref 4–11)

## 2024-05-17 PROCEDURE — 93005 ELECTROCARDIOGRAM TRACING: CPT | Performed by: STUDENT IN AN ORGANIZED HEALTH CARE EDUCATION/TRAINING PROGRAM

## 2024-05-17 PROCEDURE — P9047 ALBUMIN (HUMAN), 25%, 50ML: HCPCS | Performed by: STUDENT IN AN ORGANIZED HEALTH CARE EDUCATION/TRAINING PROGRAM

## 2024-05-17 PROCEDURE — 99285 EMERGENCY DEPT VISIT HI MDM: CPT

## 2024-05-17 PROCEDURE — 6360000004 HC RX CONTRAST MEDICATION: Performed by: STUDENT IN AN ORGANIZED HEALTH CARE EDUCATION/TRAINING PROGRAM

## 2024-05-17 PROCEDURE — 85025 COMPLETE CBC W/AUTO DIFF WBC: CPT

## 2024-05-17 PROCEDURE — 36415 COLL VENOUS BLD VENIPUNCTURE: CPT

## 2024-05-17 PROCEDURE — 74177 CT ABD & PELVIS W/CONTRAST: CPT

## 2024-05-17 PROCEDURE — 80053 COMPREHEN METABOLIC PANEL: CPT

## 2024-05-17 PROCEDURE — 84484 ASSAY OF TROPONIN QUANT: CPT

## 2024-05-17 PROCEDURE — 6360000002 HC RX W HCPCS: Performed by: STUDENT IN AN ORGANIZED HEALTH CARE EDUCATION/TRAINING PROGRAM

## 2024-05-17 PROCEDURE — 83880 ASSAY OF NATRIURETIC PEPTIDE: CPT

## 2024-05-17 PROCEDURE — 93010 ELECTROCARDIOGRAM REPORT: CPT | Performed by: INTERNAL MEDICINE

## 2024-05-17 PROCEDURE — 96365 THER/PROPH/DIAG IV INF INIT: CPT

## 2024-05-17 RX ORDER — ALBUMIN (HUMAN) 12.5 G/50ML
25 SOLUTION INTRAVENOUS ONCE
Status: COMPLETED | OUTPATIENT
Start: 2024-05-17 | End: 2024-05-17

## 2024-05-17 RX ORDER — LIDOCAINE 50 MG/G
1 PATCH TOPICAL DAILY
Qty: 10 PATCH | Refills: 0 | Status: SHIPPED | OUTPATIENT
Start: 2024-05-17 | End: 2024-05-27

## 2024-05-17 RX ADMIN — ALBUMIN (HUMAN) 25 G: 0.25 INJECTION, SOLUTION INTRAVENOUS at 21:16

## 2024-05-17 RX ADMIN — IOPAMIDOL 75 ML: 755 INJECTION, SOLUTION INTRAVENOUS at 19:49

## 2024-05-17 ASSESSMENT — PAIN - FUNCTIONAL ASSESSMENT: PAIN_FUNCTIONAL_ASSESSMENT: NONE - DENIES PAIN

## 2024-05-18 NOTE — ED PROVIDER NOTES
Motor normal  PSYCH: Mood normal. Affect normal.  SKIN: Color normal. No rash. Warm, Dry    LABS  I have reviewed all labs for this visit.   Results for orders placed or performed during the hospital encounter of 05/17/24   CBC with Auto Differential   Result Value Ref Range    WBC 5.2 4.0 - 11.0 K/uL    RBC 3.47 (L) 4.20 - 5.90 M/uL    Hemoglobin 10.3 (L) 13.5 - 17.5 g/dL    Hematocrit 31.1 (L) 40.5 - 52.5 %    MCV 89.5 80.0 - 100.0 fL    MCH 29.7 26.0 - 34.0 pg    MCHC 33.2 31.0 - 36.0 g/dL    RDW 14.2 12.4 - 15.4 %    Platelets 122 (L) 135 - 450 K/uL    MPV 8.6 5.0 - 10.5 fL    Neutrophils % 54.5 %    Lymphocytes % 28.8 %    Monocytes % 13.1 %    Eosinophils % 3.1 %    Basophils % 0.5 %    Neutrophils Absolute 2.8 1.7 - 7.7 K/uL    Lymphocytes Absolute 1.5 1.0 - 5.1 K/uL    Monocytes Absolute 0.7 0.0 - 1.3 K/uL    Eosinophils Absolute 0.2 0.0 - 0.6 K/uL    Basophils Absolute 0.0 0.0 - 0.2 K/uL   Comprehensive Metabolic Panel w/ Reflex to MG   Result Value Ref Range    Sodium 139 136 - 145 mmol/L    Potassium reflex Magnesium 3.6 3.5 - 5.1 mmol/L    Chloride 101 99 - 110 mmol/L    CO2 28 21 - 32 mmol/L    Anion Gap 10 3 - 16    Glucose 142 (H) 70 - 99 mg/dL    BUN 23 (H) 7 - 20 mg/dL    Creatinine 1.2 0.8 - 1.3 mg/dL    Est, Glom Filt Rate 65 >60    Calcium 9.2 8.3 - 10.6 mg/dL    Total Protein 6.5 6.4 - 8.2 g/dL    Albumin 3.3 (L) 3.4 - 5.0 g/dL    Albumin/Globulin Ratio 1.0 (L) 1.1 - 2.2    Total Bilirubin 0.9 0.0 - 1.0 mg/dL    Alkaline Phosphatase 142 (H) 40 - 129 U/L    ALT 21 10 - 40 U/L    AST 32 15 - 37 U/L   Troponin   Result Value Ref Range    Troponin, High Sensitivity 30 (H) 0 - 22 ng/L   Brain Natriuretic Peptide   Result Value Ref Range    Pro- (H) 0 - 124 pg/mL   Troponin   Result Value Ref Range    Troponin, High Sensitivity 22 0 - 22 ng/L   EKG 12 Lead   Result Value Ref Range    Ventricular Rate 55 BPM    Atrial Rate 55 BPM    P-R Interval 262 ms    QRS Duration 112 ms    Q-T Interval 500

## 2024-05-18 NOTE — DISCHARGE INSTRUCTIONS
Return the nearest ED if you are having worsening dizziness, low blood pressures, fevers, abdominal pain, nausea vomiting, other concerning symptoms.  Follow-up with your PCP and your liver doctor in the next 3 to 5 days.

## 2024-05-20 ENCOUNTER — TELEPHONE (OUTPATIENT)
Dept: FAMILY MEDICINE CLINIC | Age: 69
End: 2024-05-20

## 2024-05-20 DIAGNOSIS — E83.42 HYPOMAGNESEMIA: Primary | ICD-10-CM

## 2024-05-20 NOTE — TELEPHONE ENCOUNTER
ED Follow Up Call/ Schedule appt   ED: [facility]  Reason: Hypotension  Date:05/17/2024    Appt scheduled:       Comments:   LMOM for pt to return phone call to the office to sched an ED follow up appointment within 3 days.     Future Appointments   Date Time Provider Department Center   5/23/2024 11:00 AM Seda Moreno, PT [facility]   5/29/2024  5:00 PM Isabelle Abel [facility]   6/3/2024  4:20 PM Isabelle Abel [facility]   6/7/2024  1:00 PM Seda Moreno, PT [facility]   6/11/2024  1:40 PM Isabelle Abel [facility]   6/13/2024  1:00 PM Seda Moreno, PT [facility]   6/27/2024 11:00 AM Anne Dyson APRN - [facility]

## 2024-05-21 ENCOUNTER — TELEPHONE (OUTPATIENT)
Dept: FAMILY MEDICINE CLINIC | Age: 69
End: 2024-05-21

## 2024-05-21 NOTE — TELEPHONE ENCOUNTER
ED Follow Up Call/ Schedule appt   ED: [facility]   Reason: Hypotension  Date:5/17/24    Appt scheduled: n/a      Comments: Patient's wife states that he is doing much better and will follow up with Dr. Velasquez as his albumin was to low.       Future Appointments   Date Time Provider Department Center   5/23/2024 11:00 AM [facility]   5/29/2024  5:00 PM [facility]   6/3/2024  4:20 PM Isabelle [facility]   6/7/2024  1:00 PM Seda Moreno, PT [place]   6/11/2024  1:40 PM [facility]   6/13/2024  1:00 PM Seda Moreno, PT [place]   6/27/2024 11:00 AM Anne Dyson APRN - [facility]

## 2024-05-22 DIAGNOSIS — K75.81 LIVER CIRRHOSIS SECONDARY TO NASH (HCC): ICD-10-CM

## 2024-05-22 DIAGNOSIS — I10 ESSENTIAL HYPERTENSION: ICD-10-CM

## 2024-05-22 DIAGNOSIS — K74.60 LIVER CIRRHOSIS SECONDARY TO NASH (HCC): ICD-10-CM

## 2024-05-22 RX ORDER — INSULIN GLARGINE-YFGN 100 [IU]/ML
INJECTION, SOLUTION SUBCUTANEOUS
COMMUNITY
Start: 2024-03-19 | End: 2024-07-18

## 2024-05-22 NOTE — TELEPHONE ENCOUNTER
Refill Request     CONFIRM preferred pharmacy with the patient.    If Mail Order Rx - Pend for 90 day refill.      Last Seen: Last Seen Department: 5/16/2024  Last Seen by PCP: 5/16/2024    Last Written: 12/27/23 40 tabs with 0 refills    If no future appointment scheduled:  Review the last OV with PCP and review information for follow-up visit,  Route STAFF MESSAGE with patient name to the  Pool for scheduling with the following information:            -  Timing of next visit           -  Visit type ie Physical, OV, etc           -  Diagnoses/Reason ie. COPD, HTN - Do not use MEDICATION, Follow-up or CHECK UP - Give reason for visit      Next Appointment:   Future Appointments   Date Time Provider Department Center   5/23/2024 11:00 AM [facility]no[facility]   5/29/2024  5:00 PM Isabelle Abel [facility]   6/3/2024  4:20 PM Isabelle Abel [facility]   6/7/2024  1:00 PM [facility]eda [facility]   6/11/2024  1:40 PM Isabelle Abel [facility]   6/13/2024  1:00 PM [facility]   6/27/2024 11:00 AM Anne Dyson APRN - CNP [facility]       Message sent to  to schedule appt with patient?  NO      Requested Prescriptions     Pending Prescriptions Disp Refills    furosemide (LASIX) 20 MG tablet [Pharmacy Med Name: FUROSEMIDE 20 MG TABLET] 60 tablet 1     Sig: TAKE 2 TABLETS BY MOUTH EVERY DAY

## 2024-05-23 ENCOUNTER — HOSPITAL ENCOUNTER (OUTPATIENT)
Dept: PHYSICAL THERAPY | Age: 69
Setting detail: THERAPIES SERIES
Discharge: HOME | End: 2024-05-23
Payer: MEDICARE

## 2024-05-23 PROCEDURE — 97161 PT EVAL LOW COMPLEX 20 MIN: CPT

## 2024-05-23 PROCEDURE — 97110 THERAPEUTIC EXERCISES: CPT

## 2024-05-23 PROCEDURE — 97140 MANUAL THERAPY 1/> REGIONS: CPT

## 2024-05-23 RX ORDER — FUROSEMIDE 20 MG/1
40 TABLET ORAL DAILY
Qty: 60 TABLET | Refills: 1 | OUTPATIENT
Start: 2024-05-23

## 2024-05-23 NOTE — PLAN OF CARE
Adjusted     Overall Progression Towards Functional goals/ Treatment Progress Update:  [] Patient is progressing as expected towards functional goals listed.    [] Progression is slowed due to complexities/Impairments listed.  [] Progression has been slowed due to co-morbidities.  [x] Plan just implemented, too soon (<30days) to assess goals progression   [] Goals require adjustment due to lack of progress  [] Patient is not progressing as expected and requires additional follow up with physician  [] Other:     TREATMENT PLAN     Frequency/Duration: 1-2x/week for 8 weeks for the following treatment interventions:    Interventions:  Therapeutic Exercise (27151) including: strength training, ROM, and functional mobility  Therapeutic Activities (88557) including: functional mobility training and education.  Neuromuscular Re-education (68703) activation and proprioception, including postural re-education.    Gait Training (23228) for normalization of ambulation patterns and AD training.   Manual Therapy (38212) as indicated to include: Gr I-IV mobilizations, Grade V Manipulation, Soft Tissue Mobilization, Dry Needling/IASTM, Trigger Point Release, and Myofascial Release  Patient education on joint protection, postural re-education, activity modification, and progression of HEP    Plan: POC initiated as per evaluation    Electronically Signed by Johana Moreno PT  Date: 05/23/2024     Note: Portions of this note have been templated and/or copied from initial evaluation, reassessments and prior notes for documentation efficiency.    Note: If patient does not return for scheduled/recommended follow up visits, this note will serve as a discharge from care along with the most recent update on progress.    Ortho Evaluation

## 2024-05-29 ENCOUNTER — HOSPITAL ENCOUNTER (OUTPATIENT)
Dept: PHYSICAL THERAPY | Age: 69
Setting detail: THERAPIES SERIES
Discharge: HOME | End: 2024-05-29
Payer: MEDICARE

## 2024-05-29 PROCEDURE — 97110 THERAPEUTIC EXERCISES: CPT

## 2024-05-29 PROCEDURE — 97140 MANUAL THERAPY 1/> REGIONS: CPT

## 2024-05-29 NOTE — FLOWSHEET NOTE
to return to walking up stairs.   [] Progressing: [] Met: [] Not Met: [] Adjusted  4. Patient will ambulate community distances on level and varied surfaces without AD, and going up/down stairs with an alt pattern and unilateral rail independently with safe technique.  [] Progressing: [] Met: [] Not Met: [] Adjusted     Overall Progression Towards Functional goals/ Treatment Progress Update:  [] Patient is progressing as expected towards functional goals listed.    [] Progression is slowed due to complexities/Impairments listed.  [] Progression has been slowed due to co-morbidities.  [x] Plan just implemented, too soon (<30days) to assess goals progression   [] Goals require adjustment due to lack of progress  [] Patient is not progressing as expected and requires additional follow up with physician  [] Other:     TREATMENT PLAN     Frequency/Duration: 5/23/24  1-2x/week for 8 weeks for the following treatment interventions:    Interventions:  Therapeutic Exercise (36264) including: strength training, ROM, and functional mobility  Therapeutic Activities (00957) including: functional mobility training and education.  Neuromuscular Re-education (47011) activation and proprioception, including postural re-education.    Gait Training (28604) for normalization of ambulation patterns and AD training.   Manual Therapy (49764) as indicated to include: Gr I-IV mobilizations, Grade V Manipulation, Soft Tissue Mobilization, Dry Needling/IASTM, Trigger Point Release, and Myofascial Release  Patient education on joint protection, postural re-education, activity modification, and progression of HEP    Plan: POC initiated as per evaluation    Electronically Signed by DAVID COOPER  [id number]  Date: 05/29/2024     Note: Portions of this note have been templated and/or copied from initial evaluation, reassessments and prior notes for documentation efficiency.    Note: If patient does not return for scheduled/recommended follow up

## 2024-06-03 ENCOUNTER — HOSPITAL ENCOUNTER (OUTPATIENT)
Dept: PHYSICAL THERAPY | Age: 69
Setting detail: THERAPIES SERIES
Discharge: HOME | End: 2024-06-03
Payer: MEDICARE

## 2024-06-03 PROCEDURE — 97140 MANUAL THERAPY 1/> REGIONS: CPT

## 2024-06-03 PROCEDURE — 97110 THERAPEUTIC EXERCISES: CPT

## 2024-06-03 NOTE — FLOWSHEET NOTE
Adjusted  2. Patient will have a decrease in pain to <3/10 to facilitate improvement in movement, function, and ADLs as indicated by Functional Deficits.  [] Progressing: [] Met: [] Not Met: [] Adjusted    Long Term Goals: To be achieved in: 8 weeks  1. Disability index score of 20% or less for the Modified Oswestry to assist with reaching prior level of function with activities such as with donning shoes, socks and pants.  [] Progressing: [] Met: [] Not Met: [] Adjusted  2. Patient will demonstrate increased AROM of lumbar and hamstrings to WFL without pain to allow for proper joint functioning to enable patient to  objects from the floor.   [] Progressing: [] Met: [] Not Met: [] Adjusted  3. Patient will demonstrate increased Strength of B LE's to at least 4+/5 throughout without pain to allow for proper functional mobility to enable patient to return to walking up stairs.   [] Progressing: [] Met: [] Not Met: [] Adjusted  4. Patient will ambulate community distances on level and varied surfaces without AD, and going up/down stairs with an alt pattern and unilateral rail independently with safe technique.  [] Progressing: [] Met: [] Not Met: [] Adjusted     Overall Progression Towards Functional goals/ Treatment Progress Update:  [] Patient is progressing as expected towards functional goals listed.    [] Progression is slowed due to complexities/Impairments listed.  [] Progression has been slowed due to co-morbidities.  [x] Plan just implemented, too soon (<30days) to assess goals progression   [] Goals require adjustment due to lack of progress  [] Patient is not progressing as expected and requires additional follow up with physician  [] Other:     TREATMENT PLAN     Frequency/Duration: 5/23/24  1-2x/week for 8 weeks for the following treatment interventions:    Interventions:  Therapeutic Exercise (26548) including: strength training, ROM, and functional mobility  Therapeutic Activities (41938)

## 2024-06-07 ENCOUNTER — HOSPITAL ENCOUNTER (OUTPATIENT)
Dept: PHYSICAL THERAPY | Age: 69
Setting detail: THERAPIES SERIES
End: 2024-06-07
Payer: MEDICARE

## 2024-06-11 ENCOUNTER — APPOINTMENT (OUTPATIENT)
Dept: PHYSICAL THERAPY | Age: 69
End: 2024-06-11
Payer: MEDICARE

## 2024-06-13 ENCOUNTER — APPOINTMENT (OUTPATIENT)
Dept: PHYSICAL THERAPY | Age: 69
End: 2024-06-13
Payer: MEDICARE

## 2024-06-13 DIAGNOSIS — K75.81 LIVER CIRRHOSIS SECONDARY TO NASH (HCC): Primary | ICD-10-CM

## 2024-06-13 DIAGNOSIS — K74.60 LIVER CIRRHOSIS SECONDARY TO NASH (HCC): Primary | ICD-10-CM

## 2024-06-13 DIAGNOSIS — K31.819 GAVE (GASTRIC ANTRAL VASCULAR ECTASIA): ICD-10-CM

## 2024-06-18 ENCOUNTER — APPOINTMENT (OUTPATIENT)
Dept: PHYSICAL THERAPY | Age: 69
End: 2024-06-18
Payer: MEDICARE

## 2024-06-20 ENCOUNTER — APPOINTMENT (OUTPATIENT)
Dept: PHYSICAL THERAPY | Age: 69
End: 2024-06-20
Payer: MEDICARE

## 2024-06-25 ENCOUNTER — APPOINTMENT (OUTPATIENT)
Dept: PHYSICAL THERAPY | Age: 69
End: 2024-06-25
Payer: MEDICARE

## 2024-06-27 ENCOUNTER — APPOINTMENT (OUTPATIENT)
Dept: PHYSICAL THERAPY | Age: 69
End: 2024-06-27
Payer: MEDICARE

## 2024-06-27 ENCOUNTER — OFFICE VISIT (OUTPATIENT)
Dept: FAMILY MEDICINE CLINIC | Age: 69
End: 2024-06-27

## 2024-06-27 VITALS
SYSTOLIC BLOOD PRESSURE: 110 MMHG | BODY MASS INDEX: 34.21 KG/M2 | OXYGEN SATURATION: 98 % | HEART RATE: 68 BPM | DIASTOLIC BLOOD PRESSURE: 50 MMHG | WEIGHT: 225 LBS

## 2024-06-27 DIAGNOSIS — E78.2 MIXED HYPERLIPIDEMIA: Primary | ICD-10-CM

## 2024-06-27 DIAGNOSIS — I10 ESSENTIAL HYPERTENSION: ICD-10-CM

## 2024-06-27 DIAGNOSIS — M54.50 LUMBAR BACK PAIN: ICD-10-CM

## 2024-06-27 RX ORDER — TIZANIDINE 4 MG/1
2 TABLET ORAL 3 TIMES DAILY PRN
Qty: 30 TABLET | Refills: 1 | Status: SHIPPED | OUTPATIENT
Start: 2024-06-27

## 2024-06-27 RX ORDER — MIDODRINE HYDROCHLORIDE 5 MG/1
5 TABLET ORAL DAILY
COMMUNITY
Start: 2024-06-18

## 2024-06-27 RX ORDER — SPIRONOLACTONE 50 MG/1
50 TABLET, FILM COATED ORAL
COMMUNITY
Start: 2024-06-12 | End: 2024-09-10

## 2024-06-27 ASSESSMENT — PATIENT HEALTH QUESTIONNAIRE - PHQ9
SUM OF ALL RESPONSES TO PHQ9 QUESTIONS 1 & 2: 0
10. IF YOU CHECKED OFF ANY PROBLEMS, HOW DIFFICULT HAVE THESE PROBLEMS MADE IT FOR YOU TO DO YOUR WORK, TAKE CARE OF THINGS AT HOME, OR GET ALONG WITH OTHER PEOPLE: NOT DIFFICULT AT ALL
8. MOVING OR SPEAKING SO SLOWLY THAT OTHER PEOPLE COULD HAVE NOTICED. OR THE OPPOSITE, BEING SO FIGETY OR RESTLESS THAT YOU HAVE BEEN MOVING AROUND A LOT MORE THAN USUAL: NOT AT ALL
2. FEELING DOWN, DEPRESSED OR HOPELESS: NOT AT ALL
9. THOUGHTS THAT YOU WOULD BE BETTER OFF DEAD, OR OF HURTING YOURSELF: NOT AT ALL
SUM OF ALL RESPONSES TO PHQ QUESTIONS 1-9: 1
6. FEELING BAD ABOUT YOURSELF - OR THAT YOU ARE A FAILURE OR HAVE LET YOURSELF OR YOUR FAMILY DOWN: NOT AT ALL
3. TROUBLE FALLING OR STAYING ASLEEP: NOT AT ALL
SUM OF ALL RESPONSES TO PHQ QUESTIONS 1-9: 1
1. LITTLE INTEREST OR PLEASURE IN DOING THINGS: NOT AT ALL
7. TROUBLE CONCENTRATING ON THINGS, SUCH AS READING THE NEWSPAPER OR WATCHING TELEVISION: NOT AT ALL
SUM OF ALL RESPONSES TO PHQ QUESTIONS 1-9: 1
SUM OF ALL RESPONSES TO PHQ QUESTIONS 1-9: 1
5. POOR APPETITE OR OVEREATING: NOT AT ALL
4. FEELING TIRED OR HAVING LITTLE ENERGY: SEVERAL DAYS

## 2024-06-27 ASSESSMENT — ENCOUNTER SYMPTOMS
ABDOMINAL PAIN: 0
VOMITING: 0
ABDOMINAL DISTENTION: 0
NAUSEA: 0
DIARRHEA: 0
WHEEZING: 0
CHEST TIGHTNESS: 0
CONSTIPATION: 0
SHORTNESS OF BREATH: 0
COUGH: 0

## 2024-06-27 ASSESSMENT — ANXIETY QUESTIONNAIRES
4. TROUBLE RELAXING: NOT AT ALL
7. FEELING AFRAID AS IF SOMETHING AWFUL MIGHT HAPPEN: NOT AT ALL
IF YOU CHECKED OFF ANY PROBLEMS ON THIS QUESTIONNAIRE, HOW DIFFICULT HAVE THESE PROBLEMS MADE IT FOR YOU TO DO YOUR WORK, TAKE CARE OF THINGS AT HOME, OR GET ALONG WITH OTHER PEOPLE: NOT DIFFICULT AT ALL
GAD7 TOTAL SCORE: 0
2. NOT BEING ABLE TO STOP OR CONTROL WORRYING: NOT AT ALL
5. BEING SO RESTLESS THAT IT IS HARD TO SIT STILL: NOT AT ALL
3. WORRYING TOO MUCH ABOUT DIFFERENT THINGS: NOT AT ALL
6. BECOMING EASILY ANNOYED OR IRRITABLE: NOT AT ALL
1. FEELING NERVOUS, ANXIOUS, OR ON EDGE: NOT AT ALL

## 2024-06-27 NOTE — PROGRESS NOTES
2024  Carroll Wells (: 1955)  68 y.o.    ASSESSMENT and PLAN:  Carroll was seen today for diabetes.    Diagnoses and all orders for this visit:    Mixed hyperlipidemia    Essential hypertension    Lumbar back pain  -     tiZANidine (ZANAFLEX) 4 MG tablet; Take 0.5 tablets by mouth 3 times daily as needed (back pain)    Continue ozempic 0.25 mg, avoiding increasing due to risk for s/e. needs to monitor fluid balance/constipation closely. If any worsening would discontinue.   Pt states he is only taking one insulin, confirmed it was semglee. Updated list, pt will call in confirming med/dose  Sugars improving, monitor for lows closely.   Has f/u egd, and colonoscopy in August with Dr. Velasquez  Discussed only to use zanaflex daily 1/2 tab prn, discussed risk of overuse and caution with liver dysfunction.     Return in about 7 weeks (around 8/15/2024) for dm.    HPI    DM: using cgm  AM sugars are running about 120-150.   Taking semglee 25 units twice-Had gi s/e with januvia previously. Started ozempic 0.25.  Denies vision changes, polyuria, polydipsia, hyper/hypoglycemic episodes, SOB, chest pain. Mild neuropathy- on gabapentin. On ARB/Statin.      HTN-bp stable. On losartan 100 mg daily. Stopped hctz 1 month ago.  Bp's at home are running 120/60s; was having low bps.  Tolerating well. Denies any highs/lows. Denies cp, sob, palpitations.     HLD-On zocor.     Very rarely using gabapentin. Takes zanaflex as needed, lower back/neck discomfort.      Following with Dr. Velasquez. Lasix 40 mg po twice daily, and spironolactone 100 mg twice daily. Discussed that gi needs to be prescriber of these meds due to frequent changes.     Colon cancer screen-due      Review of Systems   Constitutional:  Negative for activity change, appetite change, chills, fatigue, fever and unexpected weight change.   HENT: Negative.     Respiratory:  Negative for cough, chest tightness, shortness of breath and wheezing.

## 2024-07-01 ENCOUNTER — TELEPHONE (OUTPATIENT)
Dept: FAMILY MEDICINE CLINIC | Age: 69
End: 2024-07-01

## 2024-07-01 NOTE — TELEPHONE ENCOUNTER
Called patient to discuss enrollment for DM kar. Patient feels comfortable managing his diet and knows what he should and shouldn't eat, doesn't wish to see RDN at this time. Patient also reports he is doing well with his medications. Not in the \"donut whole\" yet but will be soon.  The most expensive is Ozempic, I recommend applying for NovoNordisk PAP. Patient's wife will be in the office tomorrow to sign application on behalf of patient.  Once application is completed, I will fax to [facility].

## 2024-07-02 NOTE — TELEPHONE ENCOUNTER
Patient spouse came by office and signed application. Needs PCP signature and then I will fax. Informed his wife that I will be in touch about the decision once we hear back

## 2024-07-07 DIAGNOSIS — I10 ESSENTIAL HYPERTENSION: ICD-10-CM

## 2024-07-07 NOTE — TELEPHONE ENCOUNTER
.Refill Request     CONFIRM preferred pharmacy with the patient.    If Mail Order Rx - Pend for 90 day refill.      Last Seen: Last Seen Department: 6/27/2024  Last Seen by PCP: Visit date not found    Last Written: 10/23/23 90 with 1     If no future appointment scheduled:  Review the last OV with PCP and review information for follow-up visit,  Route STAFF MESSAGE with patient name to the  Pool for scheduling with the following information:            -  Timing of next visit           -  Visit type ie Physical, OV, etc           -  Diagnoses/Reason ie. COPD, HTN - Do not use MEDICATION, Follow-up or CHECK UP - Give reason for visit      Next Appointment:   Future Appointments   Date Time Provider Department Center   8/16/2024  1:30 PM Anne Dyson, APRN - CNP [facility]       Message sent to  to schedule appt with patient?  NO      Requested Prescriptions     Pending Prescriptions Disp Refills    losartan (COZAAR) 100 MG tablet [Pharmacy Med Name: LOSARTAN POTASSIUM 100 MG TAB] 90 tablet 1     Sig: TAKE ONE TAB BY MOUTH DAILY AT 9AM

## 2024-07-08 RX ORDER — LOSARTAN POTASSIUM 100 MG/1
TABLET ORAL
Qty: 90 TABLET | Refills: 1 | Status: SHIPPED | OUTPATIENT
Start: 2024-07-08

## 2024-07-09 ENCOUNTER — TELEPHONE (OUTPATIENT)
Dept: FAMILY MEDICINE CLINIC | Age: 69
End: 2024-07-09

## 2024-07-09 NOTE — TELEPHONE ENCOUNTER
Patients wife calling asking if Dilia would be able to call in a medication for nausea and constipation due to patient taking Ozempic. Please send to [facility] in [place]

## 2024-07-10 RX ORDER — ONDANSETRON 4 MG/1
4 TABLET, ORALLY DISINTEGRATING ORAL 3 TIMES DAILY PRN
Qty: 21 TABLET | Refills: 0 | Status: SHIPPED | OUTPATIENT
Start: 2024-07-10

## 2024-07-10 NOTE — TELEPHONE ENCOUNTER
Please let pt know that I would recommend taking miralax or colace (over the counter) DAILY for constipation. I would also recommend increasing fiber in diet. If no bm for >3 days please let office know, would consider trial off medication at that time. If symptoms worsen will need appointment and would hold next dose of ozempic.

## 2024-07-18 ENCOUNTER — ANESTHESIA EVENT (OUTPATIENT)
Dept: ENDOSCOPY | Age: 69
End: 2024-07-18
Payer: MEDICARE

## 2024-07-31 NOTE — H&P
[facility]   Pre-operative History and Physical    Patient: Carroll Wells  : 1955  Acct#:     HISTORY OF PRESENT ILLNESS:    Indications: anemia    The patient is a -68 year old male with medical history of diabetes mellitus type 2, LANG related liver cirrhosis, ascites and esophageal varices s/p banding, obesity (BMI 35.7), hypertension presents for follow up. Patient was [facility] [place] for hypotension and near syncope. Vitals in the ED noted blood pressure 91/48.         Labs on 2024 reviewed with unremarkable BMP except BUN 23, creatinine 1.2. Sodium 139. Unremarkable liver test except low albumin 3.3, elevated . CBC noted normocytic anemia with hemoglobin 10.3, hematocrit 31.1. Previous hemoglobin 13.2 and hematocrit 39.1 on 2024.         CT abdomen and pelvis on 2024 noted cirrhosis with portal venous hypertension, splenomegaly and recanalized umbilical vein. Small volume ascites. Gastric wall thickening. Subcutaneous varices extending from the right inguinal region to the umbilicus.         Treated with IV albumin 25 g once with improved hemodynamics. Currently reports feeling improved. Reports mild to moderate bilateral leg swelling on good adherence to diuretics lasix 20mg BID and Aldactone 50mg daily. Denies abdominal pain, abdominal distension, confusion, jaundice, nausea, vomiting, fever, chills,melena or hematochezia. Report 2-3 soft bowel movements on lactulose BID.         Last EGD 23: 4 columns of small non bleeding esophageal varices in the mid and distal esophagus which flattened on insufflation. No stigmata of bleed noted.Irregular Z-line at 40 cm from incisors. Moderate portal hypertensive gastropathy. No evidence of gastric varices. Normal duodenal bulb and second portion of duodenum. Repeat in 1 year.         EGD 8/10/23: Normal upper esophagus.4 columns of large non bleeding esophageal varices in the mid esophagus. No stigmata of

## 2024-08-01 ENCOUNTER — HOSPITAL ENCOUNTER (OUTPATIENT)
Age: 69
Setting detail: OUTPATIENT SURGERY
Discharge: HOME | End: 2024-08-01
Attending: INTERNAL MEDICINE | Admitting: INTERNAL MEDICINE
Payer: MEDICARE

## 2024-08-01 ENCOUNTER — ANESTHESIA (OUTPATIENT)
Dept: ENDOSCOPY | Age: 69
End: 2024-08-01
Payer: MEDICARE

## 2024-08-01 VITALS
TEMPERATURE: 97.3 F | DIASTOLIC BLOOD PRESSURE: 83 MMHG | RESPIRATION RATE: 18 BRPM | WEIGHT: 220 LBS | HEIGHT: 68 IN | BODY MASS INDEX: 33.34 KG/M2 | HEART RATE: 95 BPM | SYSTOLIC BLOOD PRESSURE: 159 MMHG | OXYGEN SATURATION: 94 %

## 2024-08-01 DIAGNOSIS — R10.13 EPIGASTRIC ABDOMINAL PAIN: ICD-10-CM

## 2024-08-01 DIAGNOSIS — D64.9 ANEMIA, UNSPECIFIED TYPE: ICD-10-CM

## 2024-08-01 DIAGNOSIS — K74.60 HEPATIC CIRRHOSIS, UNSPECIFIED HEPATIC CIRRHOSIS TYPE, UNSPECIFIED WHETHER ASCITES PRESENT (HCC): ICD-10-CM

## 2024-08-01 LAB
GLUCOSE BLD-MCNC: 126 MG/DL (ref 70–99)
GLUCOSE BLD-MCNC: 141 MG/DL (ref 70–99)
PERFORMED ON: ABNORMAL
PERFORMED ON: ABNORMAL

## 2024-08-01 PROCEDURE — 7100000011 HC PHASE II RECOVERY - ADDTL 15 MIN: Performed by: INTERNAL MEDICINE

## 2024-08-01 PROCEDURE — 2709999900 HC NON-CHARGEABLE SUPPLY: Performed by: INTERNAL MEDICINE

## 2024-08-01 PROCEDURE — 3609010600 HC COLONOSCOPY POLYPECTOMY SNARE/COLD BIOPSY: Performed by: INTERNAL MEDICINE

## 2024-08-01 PROCEDURE — 3700000000 HC ANESTHESIA ATTENDED CARE: Performed by: INTERNAL MEDICINE

## 2024-08-01 PROCEDURE — 6360000002 HC RX W HCPCS

## 2024-08-01 PROCEDURE — 2720000010 HC SURG SUPPLY STERILE: Performed by: INTERNAL MEDICINE

## 2024-08-01 PROCEDURE — 2580000003 HC RX 258: Performed by: ANESTHESIOLOGY

## 2024-08-01 PROCEDURE — 3609012400 HC EGD TRANSORAL BIOPSY SINGLE/MULTIPLE: Performed by: INTERNAL MEDICINE

## 2024-08-01 PROCEDURE — 3700000001 HC ADD 15 MINUTES (ANESTHESIA): Performed by: INTERNAL MEDICINE

## 2024-08-01 PROCEDURE — 3609012300 HC EGD BAND LIGATION ESOPHGEAL/GASTRIC VARICES: Performed by: INTERNAL MEDICINE

## 2024-08-01 PROCEDURE — 2500000003 HC RX 250 WO HCPCS: Performed by: NURSE ANESTHETIST, CERTIFIED REGISTERED

## 2024-08-01 PROCEDURE — 2500000003 HC RX 250 WO HCPCS: Performed by: ANESTHESIOLOGY

## 2024-08-01 PROCEDURE — 6360000002 HC RX W HCPCS: Performed by: NURSE ANESTHETIST, CERTIFIED REGISTERED

## 2024-08-01 PROCEDURE — 88305 TISSUE EXAM BY PATHOLOGIST: CPT

## 2024-08-01 PROCEDURE — 7100000010 HC PHASE II RECOVERY - FIRST 15 MIN: Performed by: INTERNAL MEDICINE

## 2024-08-01 RX ORDER — SODIUM CHLORIDE 0.9 % (FLUSH) 0.9 %
5-40 SYRINGE (ML) INJECTION PRN
Status: DISCONTINUED | OUTPATIENT
Start: 2024-08-01 | End: 2024-08-01 | Stop reason: HOSPADM

## 2024-08-01 RX ORDER — OXYCODONE HYDROCHLORIDE 5 MG/1
10 TABLET ORAL PRN
Status: DISCONTINUED | OUTPATIENT
Start: 2024-08-01 | End: 2024-08-01 | Stop reason: HOSPADM

## 2024-08-01 RX ORDER — LIDOCAINE HYDROCHLORIDE 20 MG/ML
INJECTION, SOLUTION INFILTRATION; PERINEURAL PRN
Status: DISCONTINUED | OUTPATIENT
Start: 2024-08-01 | End: 2024-08-01 | Stop reason: SDUPTHER

## 2024-08-01 RX ORDER — ONDANSETRON 2 MG/ML
INJECTION INTRAMUSCULAR; INTRAVENOUS
Status: COMPLETED
Start: 2024-08-01 | End: 2024-08-01

## 2024-08-01 RX ORDER — NALOXONE HYDROCHLORIDE 0.4 MG/ML
INJECTION, SOLUTION INTRAMUSCULAR; INTRAVENOUS; SUBCUTANEOUS PRN
Status: DISCONTINUED | OUTPATIENT
Start: 2024-08-01 | End: 2024-08-01 | Stop reason: HOSPADM

## 2024-08-01 RX ORDER — SODIUM CHLORIDE 0.9 % (FLUSH) 0.9 %
5-40 SYRINGE (ML) INJECTION EVERY 12 HOURS SCHEDULED
Status: DISCONTINUED | OUTPATIENT
Start: 2024-08-01 | End: 2024-08-01 | Stop reason: HOSPADM

## 2024-08-01 RX ORDER — PROPOFOL 10 MG/ML
INJECTION, EMULSION INTRAVENOUS PRN
Status: DISCONTINUED | OUTPATIENT
Start: 2024-08-01 | End: 2024-08-01 | Stop reason: SDUPTHER

## 2024-08-01 RX ORDER — SODIUM CHLORIDE, SODIUM LACTATE, POTASSIUM CHLORIDE, CALCIUM CHLORIDE 600; 310; 30; 20 MG/100ML; MG/100ML; MG/100ML; MG/100ML
INJECTION, SOLUTION INTRAVENOUS CONTINUOUS
Status: DISCONTINUED | OUTPATIENT
Start: 2024-08-01 | End: 2024-08-01 | Stop reason: HOSPADM

## 2024-08-01 RX ORDER — GLYCOPYRROLATE 0.2 MG/ML
INJECTION INTRAMUSCULAR; INTRAVENOUS PRN
Status: DISCONTINUED | OUTPATIENT
Start: 2024-08-01 | End: 2024-08-01 | Stop reason: SDUPTHER

## 2024-08-01 RX ORDER — SODIUM CHLORIDE 9 MG/ML
INJECTION, SOLUTION INTRAVENOUS PRN
Status: DISCONTINUED | OUTPATIENT
Start: 2024-08-01 | End: 2024-08-01 | Stop reason: HOSPADM

## 2024-08-01 RX ORDER — LABETALOL HYDROCHLORIDE 5 MG/ML
10 INJECTION, SOLUTION INTRAVENOUS
Status: DISCONTINUED | OUTPATIENT
Start: 2024-08-01 | End: 2024-08-01 | Stop reason: HOSPADM

## 2024-08-01 RX ORDER — DIPHENHYDRAMINE HYDROCHLORIDE 50 MG/ML
12.5 INJECTION INTRAMUSCULAR; INTRAVENOUS
Status: DISCONTINUED | OUTPATIENT
Start: 2024-08-01 | End: 2024-08-01 | Stop reason: HOSPADM

## 2024-08-01 RX ORDER — ONDANSETRON 2 MG/ML
4 INJECTION INTRAMUSCULAR; INTRAVENOUS
Status: COMPLETED | OUTPATIENT
Start: 2024-08-01 | End: 2024-08-01

## 2024-08-01 RX ORDER — OXYCODONE HYDROCHLORIDE 5 MG/1
5 TABLET ORAL PRN
Status: DISCONTINUED | OUTPATIENT
Start: 2024-08-01 | End: 2024-08-01 | Stop reason: HOSPADM

## 2024-08-01 RX ORDER — MEPERIDINE HYDROCHLORIDE 25 MG/ML
12.5 INJECTION INTRAMUSCULAR; INTRAVENOUS; SUBCUTANEOUS EVERY 5 MIN PRN
Status: DISCONTINUED | OUTPATIENT
Start: 2024-08-01 | End: 2024-08-01 | Stop reason: HOSPADM

## 2024-08-01 RX ORDER — OMEPRAZOLE 40 MG/1
40 CAPSULE, DELAYED RELEASE ORAL 2 TIMES DAILY
Qty: 60 CAPSULE | Refills: 1 | Status: SHIPPED | OUTPATIENT
Start: 2024-08-01 | End: 2024-08-31

## 2024-08-01 RX ORDER — LACTULOSE 10 G/15ML
10 SOLUTION ORAL 3 TIMES DAILY
Qty: 1350 ML | Refills: 5 | Status: SHIPPED | OUTPATIENT
Start: 2024-08-01 | End: 2024-08-31

## 2024-08-01 RX ADMIN — GLYCOPYRROLATE 0.2 MG: 0.2 INJECTION, SOLUTION INTRAMUSCULAR; INTRAVENOUS at 08:46

## 2024-08-01 RX ADMIN — ONDANSETRON 4 MG: 2 INJECTION INTRAMUSCULAR; INTRAVENOUS at 10:16

## 2024-08-01 RX ADMIN — SODIUM CHLORIDE, POTASSIUM CHLORIDE, SODIUM LACTATE AND CALCIUM CHLORIDE: 600; 310; 30; 20 INJECTION, SOLUTION INTRAVENOUS at 08:46

## 2024-08-01 RX ADMIN — PROPOFOL 630 MG: 10 INJECTION, EMULSION INTRAVENOUS at 08:46

## 2024-08-01 RX ADMIN — LIDOCAINE HYDROCHLORIDE 80 MG: 20 INJECTION, SOLUTION INFILTRATION; PERINEURAL at 08:46

## 2024-08-01 RX ADMIN — Medication 20 MG: at 07:59

## 2024-08-01 ASSESSMENT — PAIN - FUNCTIONAL ASSESSMENT: PAIN_FUNCTIONAL_ASSESSMENT: NONE - DENIES PAIN

## 2024-08-01 ASSESSMENT — ENCOUNTER SYMPTOMS: SHORTNESS OF BREATH: 1

## 2024-08-01 NOTE — PROGRESS NOTES
Discharge instructions given to patient's wife Rosa Maria at this time, she states understanding and denies any questions.

## 2024-08-01 NOTE — DISCHARGE INSTRUCTIONS
PATIENT INSTRUCTIONS  POST-SEDATION    Carroll Wells          IMMEDIATELY FOLLOWING PROCEDURE:    Do not drive or operate machinery for the first twenty four hours after surgery.     Do not make any important decisions for twenty four hours after surgery or while taking narcotic pain medications or sedatives.     You should NOT BE LEFT UNATTENDED OR ALONE. A responsible adult should be with you for the rest of the day of your procedure and also during the night for your protection and safety.    You may experience some light headedness. Rest at home with activity as tolerated. You may not need to go to bed, but it is important to rest for the next 24 hours. You should not engage in athletic sports such as basketball, volleyball, jogging, skating, or activities requiring refined motor skills for 24 hours.   If you develop intractable nausea and vomiting or a severe headache please notify your doctor immediately.   You are not expected to have any fever, but if you feel warm, take your temperature. If you have a fever 101 degrees or higher, call your doctor.     If you have had an Endoscopy:   *Eat lightly for your first meal and gradually resume your normal / prescribed diet. DO NOT eat or drink until your gag reflex returns.   *If you have a sore throat you may use lozenges, or salt water gargles.   *If you have had a colonoscopy, do not expect a normal bowel movement for approximately three days due to the cleansing of the large intestine prior to colonoscopy.    ONCE YOU ARE HOME, IF YOU SHOULD HAVE:  Difficulty in breathing, persistent nausea or vomiting, bleeding you feel is excessive, or pain that is unusual, increased abdominal bloating, or any swelling, fever / chills, call your physician. If you cannot contact your physician, but feel that your signs and symptoms need a physician's attention, go to the Emergency Department.      FOLLOW-UP:    Please follow up with your primary care provider as scheduled

## 2024-08-01 NOTE — ANESTHESIA POSTPROCEDURE EVALUATION
Department of Anesthesiology  Postprocedure Note    Patient: Carroll Wells  MRN: [id number]  YOB: 1955  Date of evaluation: 8/1/2024    Procedure Summary       Date: 08/01/24 Room / Location: [address]    Anesthesia Start: 0835 Anesthesia Stop: 0928    Procedures:       ESOPHAGOGASTRODUODENOSCOPY BAND LIGATION      COLONOSCOPY POLYPECTOMY SNARE/BIOPSY      ESOPHAGOGASTRODUODENOSCOPY BIOPSY Diagnosis:       Anemia, unspecified type      Hepatic cirrhosis, unspecified hepatic cirrhosis type, unspecified whether ascites present (HCC)      (Anemia, unspecified type [D64.9])      (Hepatic cirrhosis, unspecified hepatic cirrhosis type, unspecified whether ascites present (HCC) [K74.60])    Surgeons: Dennis Velasquez MD Responsible Provider: Mykel So MD    Anesthesia Type: MAC ASA Status: 4            Anesthesia Type: MAC    Fco Phase I: Fco Score: 10    Fco Phase II: Fco Score: 10    Anesthesia Post Evaluation    Patient location during evaluation: PACU  Patient participation: complete - patient participated  Level of consciousness: awake and alert  Airway patency: patent  Nausea & Vomiting: no nausea and no vomiting  Cardiovascular status: blood pressure returned to baseline  Respiratory status: acceptable  Hydration status: euvolemic  Comments: VSS on transfer to phase 2 recovery.  No anesthetic complications.  Pain management: adequate    No notable events documented.

## 2024-08-01 NOTE — ANESTHESIA PRE PROCEDURE
Department of Anesthesiology  Preprocedure Note       Name:  Carroll Wells   Age:  68 y.o.  :  1955                                          MRN:  [id number]         Date:  2024      Surgeon: Surgeon(s):  Dennis Velasquez MD    Procedure: Procedure(s):  EGD W/ANES. (8:30)  COLON W/ANES.    Medications prior to admission:   Prior to Admission medications    Medication Sig Start Date End Date Taking? Authorizing Provider   ondansetron (ZOFRAN-ODT) 4 MG disintegrating tablet Take 1 tablet by mouth 3 times daily as needed for Nausea or Vomiting 7/10/24   Anne Dyson APRN - CNP   losartan (COZAAR) 100 MG tablet TAKE ONE TAB BY MOUTH DAILY AT 9AM 24   Anne Dyson APRN - CNP   spironolactone (ALDACTONE) 50 MG tablet Take 1 tablet by mouth 6/12/24 9/10/24  ProviderZeferino MD   midodrine (PROAMATINE) 5 MG tablet Take 1 tablet by mouth daily 24   ProviderZeferino MD   tiZANidine (ZANAFLEX) 4 MG tablet Take 0.5 tablets by mouth 3 times daily as needed (back pain) 24   Anne Dyson APRN - CNP   Semaglutide,0.25 or 0.5MG/DOS, (OZEMPIC, 0.25 OR 0.5 MG/DOSE,) 2 MG/3ML SOPN Inject 0.25 mg into the skin once a week 24   Anne Dyson APRN - CNP   magnesium oxide (MAG-OX) 400 MG tablet Take 1 tablet by mouth daily 24   Anne Dyson APRN - CNP   insulin glargine (LANTUS SOLOSTAR) 100 UNIT/ML injection pen Inject 35 Units into the skin 2 times daily 24   Anne Dyson APRN - CNP   montelukast (SINGULAIR) 10 MG tablet Take 1 tablet by mouth nightly 1/10/24   Anne Dyson APRN - CNP   fluticasone (FLONASE) 50 MCG/ACT nasal spray 2 sprays by Each Nostril route daily 1/10/24   Anne Dyson APRN - CNP   furosemide (LASIX) 40 MG tablet Take 1 tablet by mouth 2 times daily 23   Marisela Cho, SHEMAR   omeprazole (PRILOSEC) 40 MG delayed release capsule TAKE 1 CAPSULE BY MOUTH EVERY DAY 23

## 2024-08-12 RX ORDER — INSULIN GLARGINE 100 [IU]/ML
INJECTION, SOLUTION SUBCUTANEOUS
Qty: 15 ADJUSTABLE DOSE PRE-FILLED PEN SYRINGE | Refills: 1 | Status: SHIPPED | OUTPATIENT
Start: 2024-08-12

## 2024-08-12 NOTE — TELEPHONE ENCOUNTER
Refill Request     CONFIRM preferred pharmacy with the patient.    If Mail Order Rx - Pend for 90 day refill.      Last Seen: Last Seen Department: 6/27/2024  Last Seen by PCP: 6/27/2024    Last Written: 02/09/2024 15 pens 1 refills     If no future appointment scheduled:  Review the last OV with PCP and review information for follow-up visit,  Route STAFF MESSAGE with patient name to the  Pool for scheduling with the following information:            -  Timing of next visit           -  Visit type ie Physical, OV, etc           -  Diagnoses/Reason ie. COPD, HTN - Do not use MEDICATION, Follow-up or CHECK UP - Give reason for visit      Next Appointment:   Future Appointments   Date Time Provider Department Center   8/16/2024  1:30 PM Anne Dyson APRN - CNP [facility]       Message sent to  to schedule appt with patient?  NO      Requested Prescriptions     Pending Prescriptions Disp Refills    LANTUS SOLOSTAR 100 UNIT/ML injection pen [Pharmacy Med Name: LANTUS SOLOSTAR 100 UNIT/ML]  1     Sig: INJECT 50 UNITS INTO THE SKIN 2 TIMES DAILY

## 2024-08-15 ENCOUNTER — TELEPHONE (OUTPATIENT)
Dept: FAMILY MEDICINE CLINIC | Age: 69
End: 2024-08-15

## 2024-08-15 NOTE — TELEPHONE ENCOUNTER
Incoming call from [facility] with [employer]    Confirmed keren received Ozempic.  Refill expected Oct 9, will ship to office within 10-14 days    If dose increas is needed there is a form to complete, they will fax a blank form to keep on file.    Enrollment ends Dec 31st 2024.    Re-enrollment can begin Oct 15th for the 2025 year.

## 2024-08-23 ENCOUNTER — OFFICE VISIT (OUTPATIENT)
Dept: FAMILY MEDICINE CLINIC | Age: 69
End: 2024-08-23

## 2024-08-23 VITALS
HEART RATE: 72 BPM | OXYGEN SATURATION: 99 % | TEMPERATURE: 96.9 F | HEIGHT: 68 IN | SYSTOLIC BLOOD PRESSURE: 136 MMHG | WEIGHT: 222.4 LBS | BODY MASS INDEX: 33.71 KG/M2 | DIASTOLIC BLOOD PRESSURE: 58 MMHG

## 2024-08-23 DIAGNOSIS — K75.81 LIVER CIRRHOSIS SECONDARY TO NASH (HCC): ICD-10-CM

## 2024-08-23 DIAGNOSIS — K74.60 LIVER CIRRHOSIS SECONDARY TO NASH (HCC): ICD-10-CM

## 2024-08-23 DIAGNOSIS — I85.11 SECONDARY ESOPHAGEAL VARICES WITH BLEEDING (HCC): ICD-10-CM

## 2024-08-23 DIAGNOSIS — Z79.4 TYPE 2 DIABETES MELLITUS WITH HYPERGLYCEMIA, WITH LONG-TERM CURRENT USE OF INSULIN (HCC): Primary | ICD-10-CM

## 2024-08-23 DIAGNOSIS — E11.65 TYPE 2 DIABETES MELLITUS WITH HYPERGLYCEMIA, WITH LONG-TERM CURRENT USE OF INSULIN (HCC): Primary | ICD-10-CM

## 2024-08-23 LAB — HBA1C MFR BLD: 11.9 %

## 2024-08-23 RX ORDER — INSULIN LISPRO 100 [IU]/ML
30 INJECTION, SOLUTION INTRAVENOUS; SUBCUTANEOUS 2 TIMES DAILY WITH MEALS
Qty: 15 ML | Refills: 1 | Status: SHIPPED | OUTPATIENT
Start: 2024-08-23

## 2024-08-23 RX ORDER — FUROSEMIDE 20 MG/1
TABLET ORAL
COMMUNITY
Start: 2024-08-21 | End: 2024-08-23

## 2024-08-23 RX ORDER — INSULIN GLARGINE 100 [IU]/ML
50 INJECTION, SOLUTION SUBCUTANEOUS 2 TIMES DAILY
Qty: 15 ADJUSTABLE DOSE PRE-FILLED PEN SYRINGE | Refills: 1
Start: 2024-08-23

## 2024-08-23 SDOH — ECONOMIC STABILITY: FOOD INSECURITY: WITHIN THE PAST 12 MONTHS, THE FOOD YOU BOUGHT JUST DIDN'T LAST AND YOU DIDN'T HAVE MONEY TO GET MORE.: NEVER TRUE

## 2024-08-23 SDOH — ECONOMIC STABILITY: FOOD INSECURITY: WITHIN THE PAST 12 MONTHS, YOU WORRIED THAT YOUR FOOD WOULD RUN OUT BEFORE YOU GOT MONEY TO BUY MORE.: NEVER TRUE

## 2024-08-23 SDOH — ECONOMIC STABILITY: INCOME INSECURITY: HOW HARD IS IT FOR YOU TO PAY FOR THE VERY BASICS LIKE FOOD, HOUSING, MEDICAL CARE, AND HEATING?: NOT HARD AT ALL

## 2024-08-23 ASSESSMENT — PATIENT HEALTH QUESTIONNAIRE - PHQ9: DEPRESSION UNABLE TO ASSESS: PT REFUSES

## 2024-08-23 ASSESSMENT — ENCOUNTER SYMPTOMS
SORE THROAT: 0
DIARRHEA: 0
CONSTIPATION: 0
RHINORRHEA: 0
COUGH: 0
VOMITING: 0
ABDOMINAL PAIN: 0
SHORTNESS OF BREATH: 0
NAUSEA: 0

## 2024-08-23 NOTE — PATIENT INSTRUCTIONS
Do the humalog 15 units twice daily with meals. It is written for 30 units twice daily, this is just to give you some wiggle room to increase the dose. Please start with 15 units twice daily.   Continue the long acting insulin - lantus - 50 units twice daily.     If you have any questions regarding what to take and when, please contact the office.

## 2024-08-23 NOTE — PROGRESS NOTES
2024  Carroll Wells (: 1955)  68 y.o.    ASSESSMENT and PLAN:  Carroll \"Garry\" was seen today for diabetes.    Diagnoses and all orders for this visit:    Type 2 diabetes mellitus with hyperglycemia, with long-term current use of insulin (HCC)  -     POCT glycosylated hemoglobin (Hb A1C)11.9  -     insulin lispro, 1 Unit Dial, (HUMALOG KWIKPEN) 100 UNIT/ML SOPN; Inject 30 Units into the skin with breakfast and with evening meal  -     insulin glargine (LANTUS SOLOSTAR) 100 UNIT/ML injection pen; Inject 50 Units into the skin 2 times daily  - remains uncontrolled, unable to tolerate GLP1 2/2 to side effects. Discussed trial of mounjaro but patient declined. Restart meal time insulin. Will see if we can get him plugged back in the the diabetes kar program- the pharmacist at least. Pt agreeable.     Secondary esophageal varices with bleeding (HCC)  Liver cirrhosis secondary to LANG (HCC)  -     Handicap Placard MIS; by Does not apply route Exp : 2029  - recent note and endoscopy reviewed       Return in about 3 months (around 2024) for Diabetes Mellitus.    Diabetes  Pertinent negatives for hypoglycemia include no dizziness. Pertinent negatives for diabetes include no chest pain and no weakness.       Presents for 6 week follow up on DM; all other chronic conditions addressed at last appointment.     Current regimen includes semglee 25 units BID and lantus 50 units BID.   Previously on ozempic but had to stop 2/2 to constipation.   Utilizing CGM  On statin and arb.       LANG cirrhosis s/p esophageal banding  Following with Dr. Velasquez every 3 months   Had egd and colonoscopy recently 2024- repeat egd in 4 weeks for additional esophageal banding. Repeat colonoscopy 5 years    Review of Systems   Constitutional:  Negative for activity change, chills and fever.   HENT:  Negative for congestion, ear pain, rhinorrhea and sore throat.    Eyes:  Negative for visual disturbance.   Respiratory:

## 2024-09-03 ENCOUNTER — ANESTHESIA EVENT (OUTPATIENT)
Dept: ENDOSCOPY | Age: 69
End: 2024-09-03
Payer: MEDICARE

## 2024-09-04 NOTE — H&P
[facility]   Pre-operative History and Physical    Patient: Carroll Wells  : 1955  Acct#:     HISTORY OF PRESENT ILLNESS:    Indications: esophageal varices    The patient is a 68 year old male with medical history of diabetes mellitus type 2, LANG related liver cirrhosis, ascites and esophageal varices s/p banding, obesity (BMI 35.7), hypertension presents for EGD with esophageal banding.     EGD 24:  Normal upper third of esophagus. 4 columns of large (> 5 mm) esophageal varices.  Completely eradicated with 6 bands placed. Z-line irregular, 42 cm from the incisors.Portal hypertensive gastropathy. 10 mm linear non-bleeding gastric ulcer with a clean ulcer base (Yoan Class III) at the pylorus.  Biopsied (Benign pyloric mucosa with superficial mucosal erosion and scant inflammatory exudate, negative H. Pylori) .A 12 mm non-bleeding duodenal ulcer with a clean ulcer base (Yoan Class III).Duodenal erosions without bleeding.         Last EGD 23: 4 columns of small non bleeding esophageal varices in the mid and distal esophagus which flattened on insufflation. No stigmata of bleed noted.Irregular Z-line at 40 cm from incisors. Moderate portal hypertensive gastropathy. No evidence of gastric varices. Normal duodenal bulb and second portion of duodenum. Repeat in 1 year.         EGD 8/10/23: Normal upper esophagus.4 columns of large non bleeding esophageal varices in the mid esophagus. No stigmata of bleed noted. 3 bands applied. Irregular Z-line at 40 cm from incisors. Moderate portal hypertensive gastropathy. No evidence of gastric varices. Moderate amount of solid food debris in the fundus and body of stomach precluding adequate visualization.Normal duodenal bulb and second portion of duodenum.          EGD 23: Normal upper esophagus.4 columns of large non bleeding esophageal varices in the mid and distal esophagus. 4 bands applied. Irregular Z-line at 40 cm from incisors.

## 2024-09-05 ENCOUNTER — HOSPITAL ENCOUNTER (OUTPATIENT)
Age: 69
Setting detail: OUTPATIENT SURGERY
Discharge: HOME | End: 2024-09-05
Attending: INTERNAL MEDICINE | Admitting: INTERNAL MEDICINE
Payer: MEDICARE

## 2024-09-05 ENCOUNTER — ANESTHESIA (OUTPATIENT)
Dept: ENDOSCOPY | Age: 69
End: 2024-09-05
Payer: MEDICARE

## 2024-09-05 VITALS
OXYGEN SATURATION: 97 % | RESPIRATION RATE: 16 BRPM | DIASTOLIC BLOOD PRESSURE: 93 MMHG | TEMPERATURE: 97.5 F | WEIGHT: 222 LBS | HEART RATE: 84 BPM | BODY MASS INDEX: 33.65 KG/M2 | SYSTOLIC BLOOD PRESSURE: 165 MMHG | HEIGHT: 68 IN

## 2024-09-05 DIAGNOSIS — R10.13 EPIGASTRIC ABDOMINAL PAIN: ICD-10-CM

## 2024-09-05 LAB
GLUCOSE BLD-MCNC: 207 MG/DL (ref 70–99)
GLUCOSE BLD-MCNC: 217 MG/DL (ref 70–99)
PERFORMED ON: ABNORMAL
PERFORMED ON: ABNORMAL

## 2024-09-05 PROCEDURE — 6360000002 HC RX W HCPCS

## 2024-09-05 PROCEDURE — 7100000010 HC PHASE II RECOVERY - FIRST 15 MIN: Performed by: INTERNAL MEDICINE

## 2024-09-05 PROCEDURE — 3609012300 HC EGD BAND LIGATION ESOPHGEAL/GASTRIC VARICES: Performed by: INTERNAL MEDICINE

## 2024-09-05 PROCEDURE — 7100000011 HC PHASE II RECOVERY - ADDTL 15 MIN: Performed by: INTERNAL MEDICINE

## 2024-09-05 PROCEDURE — 2500000003 HC RX 250 WO HCPCS

## 2024-09-05 PROCEDURE — 2709999900 HC NON-CHARGEABLE SUPPLY: Performed by: INTERNAL MEDICINE

## 2024-09-05 PROCEDURE — 2580000003 HC RX 258: Performed by: ANESTHESIOLOGY

## 2024-09-05 PROCEDURE — 2720000010 HC SURG SUPPLY STERILE: Performed by: INTERNAL MEDICINE

## 2024-09-05 PROCEDURE — 2500000003 HC RX 250 WO HCPCS: Performed by: ANESTHESIOLOGY

## 2024-09-05 PROCEDURE — 3700000000 HC ANESTHESIA ATTENDED CARE: Performed by: INTERNAL MEDICINE

## 2024-09-05 PROCEDURE — 3700000001 HC ADD 15 MINUTES (ANESTHESIA): Performed by: INTERNAL MEDICINE

## 2024-09-05 RX ORDER — SODIUM CHLORIDE, SODIUM LACTATE, POTASSIUM CHLORIDE, CALCIUM CHLORIDE 600; 310; 30; 20 MG/100ML; MG/100ML; MG/100ML; MG/100ML
INJECTION, SOLUTION INTRAVENOUS CONTINUOUS
Status: DISCONTINUED | OUTPATIENT
Start: 2024-09-05 | End: 2024-09-05 | Stop reason: HOSPADM

## 2024-09-05 RX ORDER — SODIUM CHLORIDE 9 MG/ML
INJECTION, SOLUTION INTRAVENOUS PRN
Status: DISCONTINUED | OUTPATIENT
Start: 2024-09-05 | End: 2024-09-05 | Stop reason: HOSPADM

## 2024-09-05 RX ORDER — SODIUM CHLORIDE 0.9 % (FLUSH) 0.9 %
5-40 SYRINGE (ML) INJECTION EVERY 12 HOURS SCHEDULED
Status: DISCONTINUED | OUTPATIENT
Start: 2024-09-05 | End: 2024-09-05 | Stop reason: HOSPADM

## 2024-09-05 RX ORDER — OXYCODONE HYDROCHLORIDE 5 MG/1
10 TABLET ORAL PRN
Status: DISCONTINUED | OUTPATIENT
Start: 2024-09-05 | End: 2024-09-05 | Stop reason: HOSPADM

## 2024-09-05 RX ORDER — NALOXONE HYDROCHLORIDE 0.4 MG/ML
INJECTION, SOLUTION INTRAMUSCULAR; INTRAVENOUS; SUBCUTANEOUS PRN
Status: DISCONTINUED | OUTPATIENT
Start: 2024-09-05 | End: 2024-09-05 | Stop reason: HOSPADM

## 2024-09-05 RX ORDER — OMEPRAZOLE 40 MG/1
40 CAPSULE, DELAYED RELEASE ORAL 2 TIMES DAILY
Qty: 60 CAPSULE | Refills: 0 | Status: SHIPPED | OUTPATIENT
Start: 2024-09-05 | End: 2024-10-05

## 2024-09-05 RX ORDER — MEPERIDINE HYDROCHLORIDE 25 MG/ML
12.5 INJECTION INTRAMUSCULAR; INTRAVENOUS; SUBCUTANEOUS EVERY 5 MIN PRN
Status: DISCONTINUED | OUTPATIENT
Start: 2024-09-05 | End: 2024-09-05 | Stop reason: HOSPADM

## 2024-09-05 RX ORDER — LIDOCAINE HYDROCHLORIDE 20 MG/ML
INJECTION, SOLUTION INFILTRATION; PERINEURAL PRN
Status: DISCONTINUED | OUTPATIENT
Start: 2024-09-05 | End: 2024-09-05 | Stop reason: SDUPTHER

## 2024-09-05 RX ORDER — PROPOFOL 10 MG/ML
INJECTION, EMULSION INTRAVENOUS PRN
Status: DISCONTINUED | OUTPATIENT
Start: 2024-09-05 | End: 2024-09-05 | Stop reason: SDUPTHER

## 2024-09-05 RX ORDER — OXYCODONE HYDROCHLORIDE 5 MG/1
5 TABLET ORAL PRN
Status: DISCONTINUED | OUTPATIENT
Start: 2024-09-05 | End: 2024-09-05 | Stop reason: HOSPADM

## 2024-09-05 RX ORDER — ONDANSETRON 2 MG/ML
4 INJECTION INTRAMUSCULAR; INTRAVENOUS
Status: COMPLETED | OUTPATIENT
Start: 2024-09-05 | End: 2024-09-05

## 2024-09-05 RX ORDER — ONDANSETRON 2 MG/ML
INJECTION INTRAMUSCULAR; INTRAVENOUS
Status: COMPLETED
Start: 2024-09-05 | End: 2024-09-05

## 2024-09-05 RX ORDER — SODIUM CHLORIDE 0.9 % (FLUSH) 0.9 %
5-40 SYRINGE (ML) INJECTION PRN
Status: DISCONTINUED | OUTPATIENT
Start: 2024-09-05 | End: 2024-09-05 | Stop reason: HOSPADM

## 2024-09-05 RX ADMIN — ONDANSETRON 4 MG: 2 INJECTION INTRAMUSCULAR; INTRAVENOUS at 11:40

## 2024-09-05 RX ADMIN — PROPOFOL 50 MG: 10 INJECTION, EMULSION INTRAVENOUS at 11:09

## 2024-09-05 RX ADMIN — FAMOTIDINE 20 MG: 10 INJECTION, SOLUTION INTRAVENOUS at 09:15

## 2024-09-05 RX ADMIN — PROPOFOL 50 MG: 10 INJECTION, EMULSION INTRAVENOUS at 11:04

## 2024-09-05 RX ADMIN — PROPOFOL 50 MG: 10 INJECTION, EMULSION INTRAVENOUS at 11:06

## 2024-09-05 RX ADMIN — SODIUM CHLORIDE, POTASSIUM CHLORIDE, SODIUM LACTATE AND CALCIUM CHLORIDE: 600; 310; 30; 20 INJECTION, SOLUTION INTRAVENOUS at 10:37

## 2024-09-05 RX ADMIN — PROPOFOL 50 MG: 10 INJECTION, EMULSION INTRAVENOUS at 11:12

## 2024-09-05 RX ADMIN — LIDOCAINE HYDROCHLORIDE 60 MG: 20 INJECTION, SOLUTION INFILTRATION; PERINEURAL at 11:04

## 2024-09-05 ASSESSMENT — ENCOUNTER SYMPTOMS: SHORTNESS OF BREATH: 1

## 2024-09-05 ASSESSMENT — PAIN - FUNCTIONAL ASSESSMENT
PAIN_FUNCTIONAL_ASSESSMENT: NONE - DENIES PAIN

## 2024-09-05 ASSESSMENT — LIFESTYLE VARIABLES: SMOKING_STATUS: 0

## 2024-09-05 NOTE — DISCHARGE INSTRUCTIONS
PATIENT INSTRUCTIONS  POST-SEDATION    Carroll Wells          IMMEDIATELY FOLLOWING PROCEDURE:    Do not drive or operate machinery for the first twenty four hours after surgery.     Do not make any important decisions for twenty four hours after surgery or while taking narcotic pain medications or sedatives.     You should NOT BE LEFT UNATTENDED OR ALONE. A responsible adult should be with you for the rest of the day of your procedure and also during the night for your protection and safety.    You may experience some light headedness. Rest at home with activity as tolerated. You may not need to go to bed, but it is important to rest for the next 24 hours. You should not engage in athletic sports such as basketball, volleyball, jogging, skating, or activities requiring refined motor skills for 24 hours.   If you develop intractable nausea and vomiting or a severe headache please notify your doctor immediately.   You are not expected to have any fever, but if you feel warm, take your temperature. If you have a fever 101 degrees or higher, call your doctor.     If you have had an Endoscopy:   *Eat lightly for your first meal and gradually resume your normal / prescribed diet. DO NOT eat or drink until your gag reflex returns.   *If you have a sore throat you may use lozenges, or salt water gargles.      ONCE YOU ARE HOME, IF YOU SHOULD HAVE:  Difficulty in breathing, persistent nausea or vomiting, bleeding you feel is excessive, or pain that is unusual, increased abdominal bloating, or any swelling, fever / chills, call your physician. If you cannot contact your physician, but feel that your signs and symptoms need a physician's attention, go to the Emergency Department.      FOLLOW-UP:    Please follow up with your primary care provider as scheduled or needed.    Call Dr. Velasquez if there are any GI concerns. [phone]      **Clear liquids for 2 hours. Soft foods for 24 hours.**       Variceal Banding: What to

## 2024-09-05 NOTE — PROGRESS NOTES
.1.  Do not eat or drink anything after 12 midnight prior to surgery.  This includes no water, chewing gum,mints or chewing tobacco, except for bowel prep complete per MD.  2.  Take the following pills with a small sip of water on the morning of surgery.  3.  Aspirin, Ibuprofen, Advil, Naproxen, Vitamin E and other Anti-inflammatory products should be stopped for 5 days before surgery or as directed by your physician.  4.  Check with your doctor regarding stopping Plavix, Coumadin, Lovenox, Fragmin or other blood thinners.  5.  Do not smoke and do not drink alcoholic beverages 24 hours prior to surgery.  This includes NA Beer.  6.  You may brush your teeth and gargle the morning of surgery.  DO NOT SWALLOW WATER.  7.  You MUST make arrangements for a responsible adult to take you home after your surgery.  You will not be allowed to leave alone or drive yourself home.  It is strongly suggested someone stay with you the first 24 hours.  Your surgery will be cancelled if you do not have a ride home.  8.  A parent/legal guardian must accompany a child scheduled for surgery and plan to stay at the hospital until the child is discharged.  Please do not bring other children with you.  9.  Please wear simple, loose fitting clothing to the hospital.  Do not bring valuables ( money, credit cards, checkbooks, etc.)  Do not wear any makeup (including no eye makeup) or nail polish on your fingers or toes.  10.  Do not wear any jewelry or piercing on the day of surgery.  All body piercing jewelry must be removed.  11.  If you have dentures, they will be removed before going to the OR; we will provide you a container.  If you wear contact lenses or glasses, they will be removed; please bring a case for them.  12.  Notify your Surgeon if you develop any illness between now and surgery time; cough, cold, fever, sore throat, nausea, vomiting, etc.  Please notify your surgeon if you experience dizziness, shortness of breath or blurred 
LM about time change to 0915 arrival on 09/05/2024.Zuleika Vera RN    
No further nausea at this time.   
Phase II:  1.  Patient is identified using name and the date of birth.  2.  The patient is free from signs and symptoms of chemical, electrical, laser, radiation, positioning, or transfer/transport injury.  3.  The patient receives appropriate medication(s), safely administered during the Perioperative period.  4.  The patient has wound/tissue perfusion consistent with or improved from baseline levels established preoperatively.  5.  The patient is at or returning to normothermia at the conclusion of the immediate postoperative period.  6.  The patient's fluid, electrolyte, and acid base balances are consistent with or improved from baseline levels established preoperatively.  7.  The patient's pulmonary function is consistent with or improved from baseline levels established preoperatively.  8.  The patient's cardiovascular status is consistent with or improved from baseline levels established preoperatively.  9.  The patient/caregiver demonstrates knowledge of nutritional management related to the operative or other invasive procedure.  10.  The patient/caregiver demonstrates knowledge of medication, pain, and wound management.  11.  The patient participates in the rehabilitation process as applicable.  12.  The patient/caregiver participates in decisions affection his or her Perioperative plan of care.  13.  The patient's care is consistent with the individualized Perioperative plan of care.  14.  The patient's right to privacy is maintained.  15.  The patient is the recipient of competent and ethical care within legal standards of practice.  16.  The patient's value system, lifestyle, ethnicity, and culture are considered, respected, and incorporated in the Perioperative plan of care and understands special services available.  17.  The patient demonstrates and/or reports adequate pain control throughout the the Perioperative period.  18.  The patient's neurological status is consistent with or improved from 
Pt discharged to home in stable condition to the care of his wife Rosa Maria.   
phase.  Interventions- orient the patient to the environment, especially the location of the bathroom; provide treaded socks/non-skid footwear; demonstrate and teach back use of the nurse's call system; instruct the patient to call for help before getting out of bed; lock all movable equipment before transferring patient; keep bed in lowest position possible.

## 2024-09-05 NOTE — ANESTHESIA POSTPROCEDURE EVALUATION
Department of Anesthesiology  Postprocedure Note    Patient: Carroll Wells  MRN: [id number]  YOB: 1955  Date of evaluation: 9/5/2024    Procedure Summary       Date: 09/05/24 Room / Location: [address]    Anesthesia Start: 1102 Anesthesia Stop: 1118    Procedure: ESOPHAGOGASTRODUODENOSCOPY BAND LIGATION Diagnosis:       Esophageal varices without bleeding, unspecified esophageal varices type (HCC)      (Esophageal varices without bleeding, unspecified esophageal varices type (HCC) [I85.00])    Surgeons: Dennis Velasquez MD Responsible Provider: Josue Rock MD    Anesthesia Type: TIVA ASA Status: 3            Anesthesia Type: No value filed.    Fco Phase I: Fco Score: 10    Fco Phase II: Fco Score: 10    Anesthesia Post Evaluation    Patient location during evaluation: bedside  Patient participation: complete - patient participated  Level of consciousness: awake and alert  Airway patency: patent  Nausea & Vomiting: no nausea (TREATED IN PACU)  Cardiovascular status: hemodynamically stable  Respiratory status: acceptable  Hydration status: euvolemic  Pain management: adequate      Vitals:    08/26/24 1027 09/05/24 0912 09/05/24 1122 09/05/24 1211   BP:  (!) 166/81 (!) 165/93 (!) 165/93   Pulse:  76 89 84   Resp:  17 16 16   Temp:  98 °F (36.7 °C) 97.5 °F (36.4 °C) 97.5 °F (36.4 °C)   TempSrc:  Infrared Temporal Temporal   SpO2:  98% 97% 97%   Weight: 100.7 kg (222 lb)      Height: 1.727 m (5' 8\")         No notable events documented.

## 2024-09-16 ENCOUNTER — TELEPHONE (OUTPATIENT)
Dept: FAMILY MEDICINE CLINIC | Age: 69
End: 2024-09-16

## 2024-09-16 NOTE — TELEPHONE ENCOUNTER
Called patient to discuss scheduling and DM kar participation. If patient does not want to enroll in kar program, we can discuss referral to outpatient pharmacy and they can assist with medication as well. LVM for patient to return call

## 2024-11-12 DIAGNOSIS — Z79.4 TYPE 2 DIABETES MELLITUS WITH HYPERGLYCEMIA, WITH LONG-TERM CURRENT USE OF INSULIN (HCC): ICD-10-CM

## 2024-11-12 DIAGNOSIS — E11.65 TYPE 2 DIABETES MELLITUS WITH HYPERGLYCEMIA, WITH LONG-TERM CURRENT USE OF INSULIN (HCC): ICD-10-CM

## 2024-11-12 NOTE — TELEPHONE ENCOUNTER
Refill Request     CONFIRM preferred pharmacy with the patient.    If Mail Order Rx - Pend for 90 day refill.      Last Seen: Last Seen Department: 8/23/2024  Last Seen by PCP: 8/23/2024    Last Written:     If no future appointment scheduled:  Review the last OV with PCP and review information for follow-up visit,  Route STAFF MESSAGE with patient name to the  Pool for scheduling with the following information:            -  Timing of next visit           -  Visit type ie Physical, OV, etc           -  Diagnoses/Reason ie. COPD, HTN - Do not use MEDICATION, Follow-up or CHECK UP - Give reason for visit      Next Appointment:   Future Appointments   Date Time Provider Department Center   12/9/2024 10:30 AM Anne Dyson APRN - CNP [facility] DEP       Message sent to  to schedule appt with patient?  NO      Requested Prescriptions     Pending Prescriptions Disp Refills    NOVOLOG FLEXPEN 100 UNIT/ML injection pen [Pharmacy Med Name: NOVOLOG 100 UNIT/ML FLEXPEN]  14     Sig: INJECT 20 UNITS INTO THE SKIN 3 TIMES DAILY (BEFORE MEALS)

## 2024-11-13 RX ORDER — INSULIN ASPART 100 [IU]/ML
20 INJECTION, SOLUTION INTRAVENOUS; SUBCUTANEOUS
Qty: 15 ADJUSTABLE DOSE PRE-FILLED PEN SYRINGE | Refills: 5 | Status: SHIPPED | OUTPATIENT
Start: 2024-11-13

## 2024-12-02 ENCOUNTER — TELEPHONE (OUTPATIENT)
Dept: FAMILY MEDICINE CLINIC | Age: 69
End: 2024-12-02

## 2024-12-02 NOTE — TELEPHONE ENCOUNTER
Patient wife Rosa Maria chacko (Ok per HIPAA) stating they have noticed patient's provider has been changed and should be Karina Mendoza. Spoke with Karina - Per Karina bellamy to change pcp.

## 2025-01-10 SDOH — ECONOMIC STABILITY: FOOD INSECURITY: WITHIN THE PAST 12 MONTHS, THE FOOD YOU BOUGHT JUST DIDN'T LAST AND YOU DIDN'T HAVE MONEY TO GET MORE.: NEVER TRUE

## 2025-01-10 SDOH — ECONOMIC STABILITY: INCOME INSECURITY: IN THE LAST 12 MONTHS, WAS THERE A TIME WHEN YOU WERE NOT ABLE TO PAY THE MORTGAGE OR RENT ON TIME?: NO

## 2025-01-10 SDOH — ECONOMIC STABILITY: FOOD INSECURITY: WITHIN THE PAST 12 MONTHS, YOU WORRIED THAT YOUR FOOD WOULD RUN OUT BEFORE YOU GOT MONEY TO BUY MORE.: NEVER TRUE

## 2025-01-10 ASSESSMENT — PATIENT HEALTH QUESTIONNAIRE - PHQ9
SUM OF ALL RESPONSES TO PHQ QUESTIONS 1-9: 7
6. FEELING BAD ABOUT YOURSELF - OR THAT YOU ARE A FAILURE OR HAVE LET YOURSELF OR YOUR FAMILY DOWN: SEVERAL DAYS
7. TROUBLE CONCENTRATING ON THINGS, SUCH AS READING THE NEWSPAPER OR WATCHING TELEVISION: SEVERAL DAYS
1. LITTLE INTEREST OR PLEASURE IN DOING THINGS: SEVERAL DAYS
9. THOUGHTS THAT YOU WOULD BE BETTER OFF DEAD, OR OF HURTING YOURSELF: NOT AT ALL
4. FEELING TIRED OR HAVING LITTLE ENERGY: MORE THAN HALF THE DAYS
3. TROUBLE FALLING OR STAYING ASLEEP: NOT AT ALL
SUM OF ALL RESPONSES TO PHQ QUESTIONS 1-9: 7
5. POOR APPETITE OR OVEREATING: SEVERAL DAYS
7. TROUBLE CONCENTRATING ON THINGS, SUCH AS READING THE NEWSPAPER OR WATCHING TELEVISION: SEVERAL DAYS
SUM OF ALL RESPONSES TO PHQ9 QUESTIONS 1 & 2: 2
SUM OF ALL RESPONSES TO PHQ QUESTIONS 1-9: 7
2. FEELING DOWN, DEPRESSED OR HOPELESS: SEVERAL DAYS
8. MOVING OR SPEAKING SO SLOWLY THAT OTHER PEOPLE COULD HAVE NOTICED. OR THE OPPOSITE, BEING SO FIGETY OR RESTLESS THAT YOU HAVE BEEN MOVING AROUND A LOT MORE THAN USUAL: NOT AT ALL
SUM OF ALL RESPONSES TO PHQ QUESTIONS 1-9: 7
SUM OF ALL RESPONSES TO PHQ QUESTIONS 1-9: 7
10. IF YOU CHECKED OFF ANY PROBLEMS, HOW DIFFICULT HAVE THESE PROBLEMS MADE IT FOR YOU TO DO YOUR WORK, TAKE CARE OF THINGS AT HOME, OR GET ALONG WITH OTHER PEOPLE: SOMEWHAT DIFFICULT
9. THOUGHTS THAT YOU WOULD BE BETTER OFF DEAD, OR OF HURTING YOURSELF: NOT AT ALL
4. FEELING TIRED OR HAVING LITTLE ENERGY: MORE THAN HALF THE DAYS
5. POOR APPETITE OR OVEREATING: SEVERAL DAYS
8. MOVING OR SPEAKING SO SLOWLY THAT OTHER PEOPLE COULD HAVE NOTICED. OR THE OPPOSITE - BEING SO FIDGETY OR RESTLESS THAT YOU HAVE BEEN MOVING AROUND A LOT MORE THAN USUAL: NOT AT ALL
2. FEELING DOWN, DEPRESSED OR HOPELESS: SEVERAL DAYS
3. TROUBLE FALLING OR STAYING ASLEEP: NOT AT ALL
1. LITTLE INTEREST OR PLEASURE IN DOING THINGS: SEVERAL DAYS
6. FEELING BAD ABOUT YOURSELF - OR THAT YOU ARE A FAILURE OR HAVE LET YOURSELF OR YOUR FAMILY DOWN: SEVERAL DAYS
10. IF YOU CHECKED OFF ANY PROBLEMS, HOW DIFFICULT HAVE THESE PROBLEMS MADE IT FOR YOU TO DO YOUR WORK, TAKE CARE OF THINGS AT HOME, OR GET ALONG WITH OTHER PEOPLE: SOMEWHAT DIFFICULT

## 2025-01-13 ENCOUNTER — HOSPITAL ENCOUNTER (OUTPATIENT)
Dept: GENERAL RADIOLOGY | Age: 70
Discharge: HOME | End: 2025-01-13
Payer: MEDICARE

## 2025-01-13 ENCOUNTER — OFFICE VISIT (OUTPATIENT)
Dept: FAMILY MEDICINE CLINIC | Age: 70
End: 2025-01-13

## 2025-01-13 VITALS
HEIGHT: 68 IN | TEMPERATURE: 97.3 F | SYSTOLIC BLOOD PRESSURE: 120 MMHG | DIASTOLIC BLOOD PRESSURE: 60 MMHG | BODY MASS INDEX: 34.43 KG/M2 | WEIGHT: 227.2 LBS | OXYGEN SATURATION: 99 % | HEART RATE: 94 BPM

## 2025-01-13 DIAGNOSIS — R09.81 CONGESTION OF NASAL SINUS: ICD-10-CM

## 2025-01-13 DIAGNOSIS — Z79.4 TYPE 2 DIABETES MELLITUS WITH HYPERGLYCEMIA, WITH LONG-TERM CURRENT USE OF INSULIN (HCC): ICD-10-CM

## 2025-01-13 DIAGNOSIS — I10 ESSENTIAL HYPERTENSION: ICD-10-CM

## 2025-01-13 DIAGNOSIS — M21.921 ACQUIRED DEFORMITY OF RIGHT SHOULDER: ICD-10-CM

## 2025-01-13 DIAGNOSIS — R05.9 COUGH, UNSPECIFIED TYPE: ICD-10-CM

## 2025-01-13 DIAGNOSIS — K75.81 LIVER CIRRHOSIS SECONDARY TO NASH (HCC): ICD-10-CM

## 2025-01-13 DIAGNOSIS — J22 LOWER RESPIRATORY TRACT INFECTION: ICD-10-CM

## 2025-01-13 DIAGNOSIS — E78.2 MIXED HYPERLIPIDEMIA: ICD-10-CM

## 2025-01-13 DIAGNOSIS — E11.65 TYPE 2 DIABETES MELLITUS WITH HYPERGLYCEMIA, WITH LONG-TERM CURRENT USE OF INSULIN (HCC): ICD-10-CM

## 2025-01-13 DIAGNOSIS — E11.65 TYPE 2 DIABETES MELLITUS WITH HYPERGLYCEMIA, WITH LONG-TERM CURRENT USE OF INSULIN (HCC): Primary | ICD-10-CM

## 2025-01-13 DIAGNOSIS — K74.60 LIVER CIRRHOSIS SECONDARY TO NASH (HCC): ICD-10-CM

## 2025-01-13 DIAGNOSIS — Z79.4 TYPE 2 DIABETES MELLITUS WITH HYPERGLYCEMIA, WITH LONG-TERM CURRENT USE OF INSULIN (HCC): Primary | ICD-10-CM

## 2025-01-13 LAB
ALBUMIN SERPL-MCNC: 3.7 G/DL (ref 3.4–5)
ALBUMIN/GLOB SERPL: 1.2 {RATIO} (ref 1.1–2.2)
ALP SERPL-CCNC: 161 U/L (ref 40–129)
ALT SERPL-CCNC: 20 U/L (ref 10–40)
ANION GAP SERPL CALCULATED.3IONS-SCNC: 12 MMOL/L (ref 3–16)
AST SERPL-CCNC: 28 U/L (ref 15–37)
BILIRUB SERPL-MCNC: 0.9 MG/DL (ref 0–1)
BUN SERPL-MCNC: 14 MG/DL (ref 7–20)
CALCIUM SERPL-MCNC: 9.9 MG/DL (ref 8.3–10.6)
CHLORIDE SERPL-SCNC: 99 MMOL/L (ref 99–110)
CHOLEST SERPL-MCNC: 153 MG/DL (ref 0–199)
CO2 SERPL-SCNC: 25 MMOL/L (ref 21–32)
CREAT SERPL-MCNC: 1 MG/DL (ref 0.8–1.3)
DEPRECATED RDW RBC AUTO: 16 % (ref 12.4–15.4)
GFR SERPLBLD CREATININE-BSD FMLA CKD-EPI: 80 ML/MIN/{1.73_M2}
GLUCOSE SERPL-MCNC: 291 MG/DL (ref 70–99)
HBA1C MFR BLD: 10.7 %
HCT VFR BLD AUTO: 29.2 % (ref 40.5–52.5)
HDLC SERPL-MCNC: 50 MG/DL (ref 40–60)
HGB BLD-MCNC: 9.4 G/DL (ref 13.5–17.5)
INFLUENZA A ANTIBODY: NEGATIVE
INFLUENZA B ANTIBODY: NEGATIVE
LDLC SERPL CALC-MCNC: 88 MG/DL
MCH RBC QN AUTO: 25 PG (ref 26–34)
MCHC RBC AUTO-ENTMCNC: 32.3 G/DL (ref 31–36)
MCV RBC AUTO: 77.3 FL (ref 80–100)
PLATELET # BLD AUTO: 173 K/UL (ref 135–450)
PMV BLD AUTO: 8.3 FL (ref 5–10.5)
POTASSIUM SERPL-SCNC: 4.2 MMOL/L (ref 3.5–5.1)
PROT SERPL-MCNC: 6.9 G/DL (ref 6.4–8.2)
RBC # BLD AUTO: 3.78 M/UL (ref 4.2–5.9)
SODIUM SERPL-SCNC: 136 MMOL/L (ref 136–145)
TRIGL SERPL-MCNC: 73 MG/DL (ref 0–150)
TSH SERPL DL<=0.005 MIU/L-ACNC: 1.97 UIU/ML (ref 0.27–4.2)
VLDLC SERPL CALC-MCNC: 15 MG/DL
WBC # BLD AUTO: 8.1 K/UL (ref 4–11)

## 2025-01-13 PROCEDURE — 73030 X-RAY EXAM OF SHOULDER: CPT

## 2025-01-13 RX ORDER — BENZONATATE 100 MG/1
100 CAPSULE ORAL 3 TIMES DAILY PRN
Qty: 30 CAPSULE | Refills: 0 | Status: SHIPPED | OUTPATIENT
Start: 2025-01-13 | End: 2025-01-23

## 2025-01-13 RX ORDER — DOXYCYCLINE HYCLATE 100 MG
100 TABLET ORAL 2 TIMES DAILY
Qty: 14 TABLET | Refills: 0 | Status: SHIPPED | OUTPATIENT
Start: 2025-01-13 | End: 2025-01-20

## 2025-01-13 RX ORDER — CLOTRIMAZOLE 1 %
CREAM (GRAM) TOPICAL
Qty: 12 G | Refills: 1 | Status: SHIPPED | OUTPATIENT
Start: 2025-01-13 | End: 2025-01-20

## 2025-01-13 RX ORDER — ONDANSETRON 4 MG/1
4 TABLET, ORALLY DISINTEGRATING ORAL 3 TIMES DAILY PRN
Qty: 21 TABLET | Refills: 0 | Status: SHIPPED | OUTPATIENT
Start: 2025-01-13

## 2025-01-13 SDOH — ECONOMIC STABILITY: FOOD INSECURITY: WITHIN THE PAST 12 MONTHS, YOU WORRIED THAT YOUR FOOD WOULD RUN OUT BEFORE YOU GOT MONEY TO BUY MORE.: NEVER TRUE

## 2025-01-13 SDOH — ECONOMIC STABILITY: FOOD INSECURITY: WITHIN THE PAST 12 MONTHS, THE FOOD YOU BOUGHT JUST DIDN'T LAST AND YOU DIDN'T HAVE MONEY TO GET MORE.: NEVER TRUE

## 2025-01-13 ASSESSMENT — ENCOUNTER SYMPTOMS
RHINORRHEA: 0
CONSTIPATION: 0
COUGH: 1
ABDOMINAL PAIN: 0
SHORTNESS OF BREATH: 1
NAUSEA: 0
SORE THROAT: 0
VOMITING: 0
CHEST TIGHTNESS: 1
DIARRHEA: 0

## 2025-01-13 NOTE — PROGRESS NOTES
\"Have you been to the ER, urgent care clinic since your last visit?  Hospitalized since your last visit?\"    NO    “Have you seen or consulted any other health care providers outside our system since your last visit?”    NO    “Have you had a diabetic eye exam?”    NO     Date of last diabetic eye exam: 3/30/2021

## 2025-01-13 NOTE — PROGRESS NOTES
1/15/2025  Carroll Wells (: 1955)  69 y.o.    ASSESSMENT and PLAN:  Carroll \"Garry\" was seen today for diabetes.    Diagnoses and all orders for this visit:    Type 2 diabetes mellitus with hyperglycemia, with long-term current use of insulin (HCC)  -     Microalbumin / creatinine urine ratio; Future  -     POCT glycosylated hemoglobin (Hb A1C) 10.7  -     HM DIABETES FOOT EXAM  -     CBC; Future  -     Comprehensive Metabolic Panel; Future  -     LIPID PANEL; Future  -     TSH reflex to FT4; Future  -     [facility] - Ximena Wilhelm MD, Endocrinology, [facility]  - remains uncontrolled, unable to tolerate GLP1 2/2 to side effects. Endorses non adherence to low carb diet. Aware of risks associated with chronically elevated blood sugars, especially in light of comorbid conditions. Recommend follow up with endo to see if any additional help in bg control.     Cough, unspecified type  -     POCT Influenza A/B- neg  -      COVID-19pending    Lower respiratory tract infection  -     doxycycline hyclate (VIBRA-TABS) 100 MG tablet; Take 1 tablet by mouth 2 times daily for 7 days  -     benzonatate (TESSALON) 100 MG capsule; Take 1 capsule by mouth 3 times daily as needed for Cough  - continue with mucinex, flonase and hydration .     Liver cirrhosis secondary to LANG (HCC)  - stressed the importance of follow up with GI. Pt to call and schedule.   -   Essential hypertension  -     CBC; Future  -     Comprehensive Metabolic Panel; Future  - well controlled on current regimen, continue.     Mixed hyperlipidemia  -     Comprehensive Metabolic Panel; Future  -     LIPID PANEL; Future  - on statin, update labs.     Acquired deformity of right shoulder  -     XR SHOULDER RIGHT (MIN 2 VIEWS); Future  - unclear etiology, nontender on exam and fixed, xray for further evaluation.     Other orders  -     ondansetron (ZOFRAN-ODT) 4 MG disintegrating tablet; Take 1 tablet by mouth 3 times daily as needed for Nausea or

## 2025-01-15 LAB — SARS-COV-2 RNA RESP QL NAA+PROBE: NOT DETECTED

## 2025-01-15 ASSESSMENT — ENCOUNTER SYMPTOMS: ABDOMINAL DISTENTION: 1

## 2025-01-29 DIAGNOSIS — E11.65 TYPE 2 DIABETES MELLITUS WITH HYPERGLYCEMIA, WITH LONG-TERM CURRENT USE OF INSULIN (HCC): ICD-10-CM

## 2025-01-29 DIAGNOSIS — Z79.4 TYPE 2 DIABETES MELLITUS WITH HYPERGLYCEMIA, WITH LONG-TERM CURRENT USE OF INSULIN (HCC): ICD-10-CM

## 2025-01-29 RX ORDER — INSULIN LISPRO 100 [IU]/ML
30 INJECTION, SOLUTION INTRAVENOUS; SUBCUTANEOUS 2 TIMES DAILY WITH MEALS
Qty: 15 ML | Refills: 1 | Status: SHIPPED | OUTPATIENT
Start: 2025-01-29

## 2025-01-29 RX ORDER — MAGNESIUM OXIDE 400 MG/1
1 TABLET ORAL DAILY
Qty: 30 TABLET | Refills: 1 | Status: SHIPPED | OUTPATIENT
Start: 2025-01-29

## 2025-01-29 NOTE — TELEPHONE ENCOUNTER
Refill Request     CONFIRM preferred pharmacy with the patient.    If Mail Order Rx - Pend for 90 day refill.      Last Seen: Last Seen Department: 1/13/2025  Last Seen by PCP: 1/13/2025    Last Written: 08/23/2024 15 mL 1 refills     If no future appointment scheduled:  Review the last OV with PCP and review information for follow-up visit,  Route STAFF MESSAGE with patient name to the  Pool for scheduling with the following information:            -  Timing of next visit           -  Visit type ie Physical, OV, etc           -  Diagnoses/Reason ie. COPD, HTN - Do not use MEDICATION, Follow-up or CHECK UP - Give reason for visit      Next Appointment:   Future Appointments   Date Time Provider Department Center   4/14/2025 11:00 AM Karina Mendoza PA [facility] DEP       Message sent to  to schedule appt with patient?  NO      Requested Prescriptions     Pending Prescriptions Disp Refills    insulin lispro, 1 Unit Dial, (HUMALOG/ADMELOG) 100 UNIT/ML SOPN [Pharmacy Med Name: INSULIN LISPRO 100 UNIT/ML PEN]  1     Sig: INJECT 30 UNITS INTO THE SKIN WITH BREAKFAST AND WITH EVENING MEAL

## 2025-01-29 NOTE — TELEPHONE ENCOUNTER
Refill Request     CONFIRM preferred pharmacy with the patient.    If Mail Order Rx - Pend for 90 day refill.      Last Seen: Last Seen Department: 1/13/2025  Last Seen by PCP: 6/27/2024    Last Written: 05/16/2024 30 tab 1 refills     If no future appointment scheduled:  Review the last OV with PCP and review information for follow-up visit,  Route STAFF MESSAGE with patient name to the  Pool for scheduling with the following information:            -  Timing of next visit           -  Visit type ie Physical, OV, etc           -  Diagnoses/Reason ie. COPD, HTN - Do not use MEDICATION, Follow-up or CHECK UP - Give reason for visit      Next Appointment:   Future Appointments   Date Time Provider Department Center   4/14/2025 11:00 AM Karina Mendoza PA [facility] DEP       Message sent to  to schedule appt with patient?  NO      Requested Prescriptions     Pending Prescriptions Disp Refills    magnesium oxide (MAG-OX) 400 MG tablet [Pharmacy Med Name: MAGNESIUM OXIDE 400 MG TABLET] 30 tablet 1     Sig: TAKE 1 TABLET BY MOUTH EVERY DAY

## 2025-02-17 ENCOUNTER — TELEPHONE (OUTPATIENT)
Dept: GASTROENTEROLOGY | Age: 70
End: 2025-02-17

## 2025-02-17 NOTE — TELEPHONE ENCOUNTER
Patient called and discussed.  He reports worsening abdominal pain and distention over the past 1 month.  Associated with the increased lethargy and 15 pound subjective weight gain.   he reports continued compliance with Lasix 40 mg twice daily.  Recommended he presents to the ED for evaluation of suspected decompensated cirrhosis with labs, abdominal imaging and possible paracentesis.

## 2025-02-18 ENCOUNTER — ANCILLARY PROCEDURE (OUTPATIENT)
Dept: EMERGENCY DEPT | Age: 70
DRG: 433 | End: 2025-02-18
Attending: STUDENT IN AN ORGANIZED HEALTH CARE EDUCATION/TRAINING PROGRAM
Payer: MEDICARE

## 2025-02-18 ENCOUNTER — APPOINTMENT (OUTPATIENT)
Dept: CT IMAGING | Age: 70
DRG: 433 | End: 2025-02-18
Payer: MEDICARE

## 2025-02-18 ENCOUNTER — HOSPITAL ENCOUNTER (INPATIENT)
Age: 70
LOS: 1 days | Discharge: HOME | DRG: 433 | End: 2025-02-19
Attending: STUDENT IN AN ORGANIZED HEALTH CARE EDUCATION/TRAINING PROGRAM | Admitting: STUDENT IN AN ORGANIZED HEALTH CARE EDUCATION/TRAINING PROGRAM
Payer: MEDICARE

## 2025-02-18 ENCOUNTER — APPOINTMENT (OUTPATIENT)
Dept: GENERAL RADIOLOGY | Age: 70
DRG: 433 | End: 2025-02-18
Payer: MEDICARE

## 2025-02-18 DIAGNOSIS — R06.02 SHORTNESS OF BREATH: ICD-10-CM

## 2025-02-18 DIAGNOSIS — K74.60 LIVER CIRRHOSIS SECONDARY TO NASH (HCC): ICD-10-CM

## 2025-02-18 DIAGNOSIS — K75.81 LIVER CIRRHOSIS SECONDARY TO NASH (HCC): ICD-10-CM

## 2025-02-18 DIAGNOSIS — R00.0 TACHYCARDIA: ICD-10-CM

## 2025-02-18 DIAGNOSIS — R18.8 OTHER ASCITES: ICD-10-CM

## 2025-02-18 DIAGNOSIS — R10.84 GENERALIZED ABDOMINAL PAIN: ICD-10-CM

## 2025-02-18 DIAGNOSIS — R06.00 DYSPNEA, UNSPECIFIED TYPE: Primary | ICD-10-CM

## 2025-02-18 LAB
ALBUMIN FLD-MCNC: 0.8 G/DL
ALBUMIN SERPL-MCNC: 3.5 G/DL (ref 3.4–5)
ALBUMIN/GLOB SERPL: 1 {RATIO} (ref 1.1–2.2)
ALP SERPL-CCNC: 152 U/L (ref 40–129)
ALT SERPL-CCNC: 16 U/L (ref 10–40)
AMMONIA PLAS-SCNC: 30 UMOL/L (ref 16–60)
AMYLASE FLD-CCNC: 14 U/L
ANION GAP SERPL CALCULATED.3IONS-SCNC: 13 MMOL/L (ref 3–16)
APPEARANCE FLUID: NORMAL
AST SERPL-CCNC: 26 U/L (ref 15–37)
BASE EXCESS BLDV CALC-SCNC: -0.5 MMOL/L (ref -3–3)
BASOPHILS # BLD: 0 K/UL (ref 0–0.2)
BASOPHILS NFR BLD: 0.4 %
BDY FLUID QUALITY: NORMAL
BILIRUB SERPL-MCNC: 0.9 MG/DL (ref 0–1)
BUN SERPL-MCNC: 14 MG/DL (ref 7–20)
CALCIUM SERPL-MCNC: 9.3 MG/DL (ref 8.3–10.6)
CELL COUNT FLUID TYPE: NORMAL
CHLORIDE SERPL-SCNC: 102 MMOL/L (ref 99–110)
CO2 BLDV-SCNC: 27 MMOL/L
CO2 SERPL-SCNC: 25 MMOL/L (ref 21–32)
COHGB MFR BLDV: 3 % (ref 0–1.5)
COLOR FLUID: NORMAL
CREAT SERPL-MCNC: 1 MG/DL (ref 0.8–1.3)
DEPRECATED RDW RBC AUTO: 17.9 % (ref 12.4–15.4)
EKG ATRIAL RATE: 92 BPM
EKG DIAGNOSIS: NORMAL
EKG P AXIS: 84 DEGREES
EKG P-R INTERVAL: 250 MS
EKG Q-T INTERVAL: 388 MS
EKG QRS DURATION: 98 MS
EKG QTC CALCULATION (BAZETT): 479 MS
EKG R AXIS: -37 DEGREES
EKG T AXIS: 70 DEGREES
EKG VENTRICULAR RATE: 92 BPM
EOSINOPHIL # BLD: 0.1 K/UL (ref 0–0.6)
EOSINOPHIL NFR BLD: 1 %
GFR SERPLBLD CREATININE-BSD FMLA CKD-EPI: 80 ML/MIN/{1.73_M2}
GLUCOSE BLD-MCNC: 157 MG/DL (ref 70–99)
GLUCOSE BLD-MCNC: 159 MG/DL (ref 70–99)
GLUCOSE BLD-MCNC: 212 MG/DL (ref 70–99)
GLUCOSE FLD-MCNC: 137 MG/DL
GLUCOSE SERPL-MCNC: 147 MG/DL (ref 70–99)
HCO3 BLDV-SCNC: 25.6 MMOL/L (ref 23–29)
HCT VFR BLD AUTO: 26.8 % (ref 40.5–52.5)
HGB BLD-MCNC: 8.7 G/DL (ref 13.5–17.5)
INR PPP: 1.15 (ref 0.85–1.15)
LDH FLD L TO P-CCNC: 58 U/L
LIPASE SERPL-CCNC: 19 U/L (ref 13–60)
LYMPHOCYTES # BLD: 1 K/UL (ref 1–5.1)
LYMPHOCYTES NFR BLD: 12.3 %
LYMPHOCYTES NFR FLD: 23 %
MACROPHAGES # FLD: 36 %
MCH RBC QN AUTO: 23.9 PG (ref 26–34)
MCHC RBC AUTO-ENTMCNC: 32.4 G/DL (ref 31–36)
MCV RBC AUTO: 73.9 FL (ref 80–100)
MESOTHL CELL NFR FLD: 30 %
METHGB MFR BLDV: 0.2 %
MONOCYTES # BLD: 1 K/UL (ref 0–1.3)
MONOCYTES NFR BLD: 12.7 %
MONONUCLEAR UNIDENTIFIED CELLS FLUID: 5 %
NEUTROPHIL, FLUID: 6 %
NEUTROPHILS # BLD: 6 K/UL (ref 1.7–7.7)
NEUTROPHILS NFR BLD: 73.6 %
NT-PROBNP SERPL-MCNC: 118 PG/ML (ref 0–124)
NUC CELL # FLD: 409 /CUMM
O2 THERAPY: ABNORMAL
PATH CONSULT FLUID: NORMAL
PATH REV: YES
PCO2 BLDV: 48.7 MMHG (ref 40–50)
PERFORMED ON: ABNORMAL
PH BLDV: 7.34 [PH] (ref 7.35–7.45)
PLATELET # BLD AUTO: 163 K/UL (ref 135–450)
PMV BLD AUTO: 7.4 FL (ref 5–10.5)
PO2 BLDV: 53.6 MMHG (ref 25–40)
POTASSIUM SERPL-SCNC: 3.6 MMOL/L (ref 3.5–5.1)
PROT FLD-MCNC: 1.5 G/DL
PROT SERPL-MCNC: 7 G/DL (ref 6.4–8.2)
PROTHROMBIN TIME: 14.9 SEC (ref 11.9–14.9)
RBC # BLD AUTO: 3.62 M/UL (ref 4.2–5.9)
RBC FLUID: 300 /CUMM
SAO2 % BLDV: 84 %
SODIUM SERPL-SCNC: 140 MMOL/L (ref 136–145)
SPECIMEN SOURCE FLD: NORMAL
TOTAL CELLS COUNTED FLD: 100
TROPONIN, HIGH SENSITIVITY: 24 NG/L (ref 0–22)
TROPONIN, HIGH SENSITIVITY: 26 NG/L (ref 0–22)
WBC # BLD AUTO: 8.2 K/UL (ref 4–11)

## 2025-02-18 PROCEDURE — 49082 ABD PARACENTESIS: CPT

## 2025-02-18 PROCEDURE — 93010 ELECTROCARDIOGRAM REPORT: CPT | Performed by: INTERNAL MEDICINE

## 2025-02-18 PROCEDURE — 0W9G3ZZ DRAINAGE OF PERITONEAL CAVITY, PERCUTANEOUS APPROACH: ICD-10-PCS | Performed by: STUDENT IN AN ORGANIZED HEALTH CARE EDUCATION/TRAINING PROGRAM

## 2025-02-18 PROCEDURE — 6370000000 HC RX 637 (ALT 250 FOR IP): Performed by: INTERNAL MEDICINE

## 2025-02-18 PROCEDURE — 82042 OTHER SOURCE ALBUMIN QUAN EA: CPT

## 2025-02-18 PROCEDURE — 82150 ASSAY OF AMYLASE: CPT

## 2025-02-18 PROCEDURE — 85025 COMPLETE CBC W/AUTO DIFF WBC: CPT

## 2025-02-18 PROCEDURE — 80053 COMPREHEN METABOLIC PANEL: CPT

## 2025-02-18 PROCEDURE — 82945 GLUCOSE OTHER FLUID: CPT

## 2025-02-18 PROCEDURE — 76705 ECHO EXAM OF ABDOMEN: CPT

## 2025-02-18 PROCEDURE — 6360000002 HC RX W HCPCS: Performed by: NURSE PRACTITIONER

## 2025-02-18 PROCEDURE — 36415 COLL VENOUS BLD VENIPUNCTURE: CPT

## 2025-02-18 PROCEDURE — 88305 TISSUE EXAM BY PATHOLOGIST: CPT

## 2025-02-18 PROCEDURE — 87070 CULTURE OTHR SPECIMN AEROBIC: CPT

## 2025-02-18 PROCEDURE — 1200000000 HC SEMI PRIVATE

## 2025-02-18 PROCEDURE — 83615 LACTATE (LD) (LDH) ENZYME: CPT

## 2025-02-18 PROCEDURE — 88112 CYTOPATH CELL ENHANCE TECH: CPT

## 2025-02-18 PROCEDURE — 83880 ASSAY OF NATRIURETIC PEPTIDE: CPT

## 2025-02-18 PROCEDURE — 2500000003 HC RX 250 WO HCPCS

## 2025-02-18 PROCEDURE — 74177 CT ABD & PELVIS W/CONTRAST: CPT

## 2025-02-18 PROCEDURE — 89051 BODY FLUID CELL COUNT: CPT

## 2025-02-18 PROCEDURE — 6370000000 HC RX 637 (ALT 250 FOR IP): Performed by: NURSE PRACTITIONER

## 2025-02-18 PROCEDURE — 83690 ASSAY OF LIPASE: CPT

## 2025-02-18 PROCEDURE — 84484 ASSAY OF TROPONIN QUANT: CPT

## 2025-02-18 PROCEDURE — 99285 EMERGENCY DEPT VISIT HI MDM: CPT

## 2025-02-18 PROCEDURE — 85610 PROTHROMBIN TIME: CPT

## 2025-02-18 PROCEDURE — 93308 TTE F-UP OR LMTD: CPT

## 2025-02-18 PROCEDURE — 6360000002 HC RX W HCPCS: Performed by: STUDENT IN AN ORGANIZED HEALTH CARE EDUCATION/TRAINING PROGRAM

## 2025-02-18 PROCEDURE — 2500000003 HC RX 250 WO HCPCS: Performed by: STUDENT IN AN ORGANIZED HEALTH CARE EDUCATION/TRAINING PROGRAM

## 2025-02-18 PROCEDURE — 93005 ELECTROCARDIOGRAM TRACING: CPT | Performed by: STUDENT IN AN ORGANIZED HEALTH CARE EDUCATION/TRAINING PROGRAM

## 2025-02-18 PROCEDURE — 96375 TX/PRO/DX INJ NEW DRUG ADDON: CPT

## 2025-02-18 PROCEDURE — 96374 THER/PROPH/DIAG INJ IV PUSH: CPT

## 2025-02-18 PROCEDURE — 6360000004 HC RX CONTRAST MEDICATION: Performed by: STUDENT IN AN ORGANIZED HEALTH CARE EDUCATION/TRAINING PROGRAM

## 2025-02-18 PROCEDURE — 71045 X-RAY EXAM CHEST 1 VIEW: CPT

## 2025-02-18 PROCEDURE — 87205 SMEAR GRAM STAIN: CPT

## 2025-02-18 PROCEDURE — 93005 ELECTROCARDIOGRAM TRACING: CPT | Performed by: NURSE PRACTITIONER

## 2025-02-18 PROCEDURE — 82140 ASSAY OF AMMONIA: CPT

## 2025-02-18 PROCEDURE — 82803 BLOOD GASES ANY COMBINATION: CPT

## 2025-02-18 PROCEDURE — 84157 ASSAY OF PROTEIN OTHER: CPT

## 2025-02-18 RX ORDER — MAGNESIUM SULFATE IN WATER 40 MG/ML
2000 INJECTION, SOLUTION INTRAVENOUS PRN
Status: CANCELLED | OUTPATIENT
Start: 2025-02-18

## 2025-02-18 RX ORDER — POTASSIUM CHLORIDE 7.45 MG/ML
10 INJECTION INTRAVENOUS PRN
Status: CANCELLED | OUTPATIENT
Start: 2025-02-18

## 2025-02-18 RX ORDER — INSULIN LISPRO 100 [IU]/ML
0-8 INJECTION, SOLUTION INTRAVENOUS; SUBCUTANEOUS
Status: DISCONTINUED | OUTPATIENT
Start: 2025-02-18 | End: 2025-02-19 | Stop reason: HOSPADM

## 2025-02-18 RX ORDER — ATORVASTATIN CALCIUM 10 MG/1
20 TABLET, FILM COATED ORAL DAILY
Status: CANCELLED | OUTPATIENT
Start: 2025-02-18

## 2025-02-18 RX ORDER — SODIUM CHLORIDE 0.9 % (FLUSH) 0.9 %
5-40 SYRINGE (ML) INJECTION EVERY 12 HOURS SCHEDULED
Status: CANCELLED | OUTPATIENT
Start: 2025-02-18

## 2025-02-18 RX ORDER — ACETAMINOPHEN 325 MG/1
650 TABLET ORAL EVERY 6 HOURS PRN
Status: CANCELLED | OUTPATIENT
Start: 2025-02-18

## 2025-02-18 RX ORDER — ONDANSETRON 2 MG/ML
4 INJECTION INTRAMUSCULAR; INTRAVENOUS EVERY 6 HOURS PRN
Status: CANCELLED | OUTPATIENT
Start: 2025-02-18

## 2025-02-18 RX ORDER — MORPHINE SULFATE 2 MG/ML
2 INJECTION, SOLUTION INTRAMUSCULAR; INTRAVENOUS EVERY 4 HOURS PRN
Status: DISCONTINUED | OUTPATIENT
Start: 2025-02-18 | End: 2025-02-19 | Stop reason: HOSPADM

## 2025-02-18 RX ORDER — DEXTROSE MONOHYDRATE 100 MG/ML
INJECTION, SOLUTION INTRAVENOUS CONTINUOUS PRN
Status: DISCONTINUED | OUTPATIENT
Start: 2025-02-18 | End: 2025-02-19 | Stop reason: HOSPADM

## 2025-02-18 RX ORDER — TRAMADOL HYDROCHLORIDE 50 MG/1
25 TABLET ORAL EVERY 4 HOURS PRN
Status: DISPENSED | OUTPATIENT
Start: 2025-02-18 | End: 2025-02-19

## 2025-02-18 RX ORDER — MAGNESIUM OXIDE 400 MG/1
400 TABLET ORAL DAILY
Status: CANCELLED | OUTPATIENT
Start: 2025-02-18

## 2025-02-18 RX ORDER — POTASSIUM CHLORIDE 1500 MG/1
40 TABLET, EXTENDED RELEASE ORAL PRN
Status: CANCELLED | OUTPATIENT
Start: 2025-02-18

## 2025-02-18 RX ORDER — SODIUM CHLORIDE 9 MG/ML
INJECTION, SOLUTION INTRAVENOUS PRN
Status: CANCELLED | OUTPATIENT
Start: 2025-02-18

## 2025-02-18 RX ORDER — SODIUM CHLORIDE 0.9 % (FLUSH) 0.9 %
5-40 SYRINGE (ML) INJECTION EVERY 12 HOURS SCHEDULED
Status: DISCONTINUED | OUTPATIENT
Start: 2025-02-18 | End: 2025-02-19 | Stop reason: HOSPADM

## 2025-02-18 RX ORDER — SODIUM CHLORIDE 0.9 % (FLUSH) 0.9 %
5-40 SYRINGE (ML) INJECTION PRN
Status: CANCELLED | OUTPATIENT
Start: 2025-02-18

## 2025-02-18 RX ORDER — IOPAMIDOL 755 MG/ML
75 INJECTION, SOLUTION INTRAVASCULAR
Status: COMPLETED | OUTPATIENT
Start: 2025-02-18 | End: 2025-02-18

## 2025-02-18 RX ORDER — LOSARTAN POTASSIUM 25 MG/1
100 TABLET ORAL DAILY
Status: CANCELLED | OUTPATIENT
Start: 2025-02-18

## 2025-02-18 RX ORDER — LABETALOL 100 MG/1
50 TABLET, FILM COATED ORAL EVERY 12 HOURS SCHEDULED
Status: DISCONTINUED | OUTPATIENT
Start: 2025-02-18 | End: 2025-02-19 | Stop reason: HOSPADM

## 2025-02-18 RX ORDER — PANTOPRAZOLE SODIUM 40 MG/1
40 TABLET, DELAYED RELEASE ORAL
Status: CANCELLED | OUTPATIENT
Start: 2025-02-19

## 2025-02-18 RX ORDER — GLUCAGON 1 MG/ML
1 KIT INJECTION PRN
Status: DISCONTINUED | OUTPATIENT
Start: 2025-02-18 | End: 2025-02-19 | Stop reason: HOSPADM

## 2025-02-18 RX ORDER — MONTELUKAST SODIUM 10 MG/1
10 TABLET ORAL NIGHTLY
Status: CANCELLED | OUTPATIENT
Start: 2025-02-18

## 2025-02-18 RX ORDER — ACETAMINOPHEN 650 MG/1
650 SUPPOSITORY RECTAL EVERY 6 HOURS PRN
Status: CANCELLED | OUTPATIENT
Start: 2025-02-18

## 2025-02-18 RX ORDER — FUROSEMIDE 10 MG/ML
40 INJECTION INTRAMUSCULAR; INTRAVENOUS ONCE
Status: COMPLETED | OUTPATIENT
Start: 2025-02-18 | End: 2025-02-18

## 2025-02-18 RX ORDER — POLYETHYLENE GLYCOL 3350 17 G/17G
17 POWDER, FOR SOLUTION ORAL DAILY PRN
Status: CANCELLED | OUTPATIENT
Start: 2025-02-18

## 2025-02-18 RX ORDER — ONDANSETRON 4 MG/1
4 TABLET, ORALLY DISINTEGRATING ORAL EVERY 8 HOURS PRN
Status: CANCELLED | OUTPATIENT
Start: 2025-02-18

## 2025-02-18 RX ORDER — FUROSEMIDE 40 MG/1
40 TABLET ORAL 2 TIMES DAILY
Status: CANCELLED | OUTPATIENT
Start: 2025-02-18

## 2025-02-18 RX ADMIN — MORPHINE SULFATE 2 MG: 2 INJECTION, SOLUTION INTRAMUSCULAR; INTRAVENOUS at 20:51

## 2025-02-18 RX ADMIN — TRAMADOL HYDROCHLORIDE 25 MG: 50 TABLET, COATED ORAL at 19:48

## 2025-02-18 RX ADMIN — LABETALOL HYDROCHLORIDE 50 MG: 100 TABLET, FILM COATED ORAL at 19:48

## 2025-02-18 RX ADMIN — WATER 1000 MG: 1 INJECTION INTRAMUSCULAR; INTRAVENOUS; SUBCUTANEOUS at 13:09

## 2025-02-18 RX ADMIN — SODIUM CHLORIDE, PRESERVATIVE FREE 10 ML: 5 INJECTION INTRAVENOUS at 20:51

## 2025-02-18 RX ADMIN — IOPAMIDOL 75 ML: 755 INJECTION, SOLUTION INTRAVENOUS at 10:09

## 2025-02-18 RX ADMIN — FUROSEMIDE 40 MG: 10 INJECTION, SOLUTION INTRAMUSCULAR; INTRAVENOUS at 09:09

## 2025-02-18 RX ADMIN — INSULIN LISPRO 2 UNITS: 100 INJECTION, SOLUTION INTRAVENOUS; SUBCUTANEOUS at 21:01

## 2025-02-18 ASSESSMENT — PAIN SCALES - GENERAL
PAINLEVEL_OUTOF10: 6
PAINLEVEL_OUTOF10: 7
PAINLEVEL_OUTOF10: 9
PAINLEVEL_OUTOF10: 7
PAINLEVEL_OUTOF10: 4
PAINLEVEL_OUTOF10: 10

## 2025-02-18 ASSESSMENT — PAIN DESCRIPTION - ORIENTATION: ORIENTATION: UPPER;MID

## 2025-02-18 ASSESSMENT — PAIN DESCRIPTION - LOCATION
LOCATION: ABDOMEN

## 2025-02-18 ASSESSMENT — PAIN DESCRIPTION - ONSET: ONSET: ON-GOING

## 2025-02-18 ASSESSMENT — PAIN - FUNCTIONAL ASSESSMENT
PAIN_FUNCTIONAL_ASSESSMENT: NONE - DENIES PAIN
PAIN_FUNCTIONAL_ASSESSMENT: NONE - DENIES PAIN
PAIN_FUNCTIONAL_ASSESSMENT: 0-10
PAIN_FUNCTIONAL_ASSESSMENT: PREVENTS OR INTERFERES SOME ACTIVE ACTIVITIES AND ADLS

## 2025-02-18 ASSESSMENT — PAIN DESCRIPTION - PAIN TYPE
TYPE: ACUTE PAIN;CHRONIC PAIN
TYPE: ACUTE PAIN

## 2025-02-18 ASSESSMENT — PAIN DESCRIPTION - DESCRIPTORS
DESCRIPTORS: SHARP
DESCRIPTORS: SHARP

## 2025-02-18 ASSESSMENT — PAIN DESCRIPTION - FREQUENCY: FREQUENCY: INTERMITTENT

## 2025-02-18 NOTE — ED PROVIDER NOTES
[facility] [place] EMERGENCY DEPARTMENT     EMERGENCY DEPARTMENT ENCOUNTER         Pt Name: Carroll Wells   MRN: [id number]   Birthdate 1955   Date of evaluation: 2/18/2025   Provider: Jaxon Moses MD   PCP: Karina Mendoza PA   Note Started: 7:59 AM EST 2/18/25       Chief Complaint     Weight Gain and Shortness of Breath (Patient reporting he's been feeling bad for more than a month. Says he's gained about fifteen pounds over the past thirty days. Says he's had an increase in SOB and has had a decrease in appetite Reports he called his doctor yesterday and was told to go to the ED. Pt has a long medical hx including cirrhosis and diabetes. )      History of Present Illness     Carroll Wells is a 69 y.o. male who presents with progressive weight gain and shortness of breath over the past few weeks if not months.  The patient has been gaining weight up to 15 pounds he has a history of nonalcoholic fatty liver disease with cirrhosis and has required paracentesis in the past.  He discussed his condition with his gastroenterologist and was referred to the emerge department for further evaluation.  He has no fevers or chills though he does note that he has had some pain in the right lower quadrant he was fearful of an acute appendicitis.  He has no other acute complaints.      Review of Systems     Positives and pertinent negatives as per HPI.    Past Medical, Surgical, Family, and Social History     He has a past medical history of Cervical radiculopathy, Chronic pain, Chronic renal disease, stage III (HCC) [156765], GERD (gastroesophageal reflux disease), Hyperlipidemia, Hypertension, Irritable bowel syndrome, Liver disease, Mild episode of recurrent major depressive disorder (HCC), Morbidly obese, PONV (postoperative nausea and vomiting), and Type II or unspecified type diabetes mellitus without mention of complication, not stated as uncontrolled.  He has a past surgical history that includes

## 2025-02-18 NOTE — PROGRESS NOTES
4 Eyes Skin Assessment     NAME:  Carroll Wells  YOB: 1955  MEDICAL RECORD NUMBER:  [id number]    The patient is being assessed for  Admission    I agree that at least one RN has performed a thorough Head to Toe Skin Assessment on the patient. ALL assessment sites listed below have been assessed.      Areas assessed by both nurses:    Head, Face, Ears, Shoulders, Back, Chest, Arms, Elbows, Hands, Sacrum. Buttock, Coccyx, Ischium, Legs. Feet and Heels, and Under Medical Devices         Does the Patient have a Wound? No noted wound(s)       Deni Prevention initiated by RN: No  Wound Care Orders initiated by RN: No    Pressure Injury (Stage 3,4, Unstageable, DTI, NWPT, and Complex wounds) if present, place Wound referral order by RN under : No    New Ostomies, if present place, Ostomy referral order under : No     Nurse 1 eSignature: Electronically signed by Cathleen Enriquez RN on 2/18/25 at 6:49 PM EST    **SHARE this note so that the co-signing nurse can place an eSignature**    Nurse 2 eSignature: Electronically signed by Kiara Almonte RN on 2/18/25 at 7:08 PM EST

## 2025-02-18 NOTE — PLAN OF CARE
Received ED page for admission. Sent to JOANN DOMINGUEZ admitting hospitalist.     Electronically signed by Ian Chinchilla DO on 2/18/2025 at 11:35 AM

## 2025-02-18 NOTE — PLAN OF CARE
Note--     Received perfect serve message for possible admission . Called and discussed with the ER attending.  Admitting orders placed after discussing with the ER physician and as per data provided by ER physician,  patient will be seen and full H&P to follow, per protocol.

## 2025-02-18 NOTE — PROGRESS NOTES
CGMs are commonly used devices for people with diabetes. CGMs can be damaged by all forms of radiation, and this may lead to inaccurate glucose readings. Glucose Monitor- can be damaged by all forms of radiation, and this may lead to inaccurate glucose readings.  The patient has been provided education by the Radiologic Technologist and the monitor was either removed or shielded during exam.

## 2025-02-18 NOTE — CONSULTS
Consult Placed   Who: CARDIOLOGY, [facility]  Date:2/18/2025  Time:1816     Electronically signed by Bipin Quan on 2/18/2025 at 6:15 PM

## 2025-02-18 NOTE — ED NOTES
Carroll Wells is a 69 y.o. male admitted for  Principal Problem:    Dyspnea  Resolved Problems:    * No resolved hospital problems. *  .   Patient Home via self with   Chief Complaint   Patient presents with    Weight Gain    Shortness of Breath     Patient reporting he's been feeling bad for more than a month. Says he's gained about fifteen pounds over the past thirty days. Says he's had an increase in SOB and has had a decrease in appetite Reports he called his doctor yesterday and was told to go to the ED. Pt has a long medical hx including cirrhosis and diabetes.    .  Patient is alert and Person, Place, Time, and Situation  Patient's baseline mobility: Baseline Mobility: Independent   Code Status: Prior   Cardiac Rhythm:       Is patient on baseline Oxygen: no how many Liters:   Abnormal Assessment Findings: ascites    Isolation: None      NIH Score:    C-SSRS: Risk of Suicide: No Risk  Bedside swallow:        Active LDA's:   Peripheral IV 02/18/25 Proximal;Right;Dorsal Forearm (Active)     Patient admitted with a dias: no If the dias is chronic was it exchanged:  Reason for dias:   Patient admitted with Central Line:  . PICC line placement confirmed: YES OR NO:[id number]}   Reason for Central line:   Was central line Inserted from an outside facility:        Family/Caregiver Present yes Any Concerns: no   Restraints no  Sitter no         Vitals: MEWS Score: 4    Vitals:    02/18/25 1118 02/18/25 1330 02/18/25 1443 02/18/25 1544   BP: (!) 145/67 (!) 159/78 (!) 144/81 (!) 159/74   Pulse: (!) 104 94 92 89   Resp: 16 16 16 16   Temp:       TempSrc:       SpO2: 98% 98% 96% 94%   Weight:           Last documented pain score (0-10 scale) Pain Level: 6  Pain medication administered Yes- see MAR.    Pertinent or High Risk Medications/Drips: No.    Pending Blood Product Administration: no    Abnormal labs:   Abnormal Labs Reviewed   CBC WITH AUTO DIFFERENTIAL - Abnormal; Notable for the following components:        Result Value    RBC 3.62 (*)     Hemoglobin 8.7 (*)     Hematocrit 26.8 (*)     MCV 73.9 (*)     MCH 23.9 (*)     RDW 17.9 (*)     All other components within normal limits   COMPREHENSIVE METABOLIC PANEL W/ REFLEX TO MG FOR LOW K - Abnormal; Notable for the following components:    Glucose 147 (*)     Albumin/Globulin Ratio 1.0 (*)     Alkaline Phosphatase 152 (*)     All other components within normal limits   BLOOD GAS, VENOUS - Abnormal; Notable for the following components:    pH, Wm 7.338 (*)     PO2, Wm 53.6 (*)     Carboxyhemoglobin 3.0 (*)     All other components within normal limits   TROPONIN - Abnormal; Notable for the following components:    Troponin, High Sensitivity 26 (*)     All other components within normal limits   TROPONIN - Abnormal; Notable for the following components:    Troponin, High Sensitivity 24 (*)     All other components within normal limits   POCT GLUCOSE - Abnormal; Notable for the following components:    POC Glucose 159 (*)     All other components within normal limits     Critical values: no  Intervention for critical value(s):     Abnormal Imaging: yes, ascites CT scan            You may also review the ED PT Care Timeline found under the Summary Tab, ED Encounter Summary, Timeline Reports, ED Patient Care Timeline.     Recommendation    Pending orders/Uncompleted orders to hand off:  ba    Additional Comments: er  attempted to  If any further questions, please call Sending RN at [phone]

## 2025-02-18 NOTE — CARE COORDINATION
Case Management Assessment  Initial Evaluation    Date/Time of Evaluation: 2/18/2025 3:17 PM  Assessment Completed by: GALA Tellez    If patient is discharged prior to next notation, then this note serves as note for discharge by case management.    Patient Name: Carroll Wells                   YOB: 1955  Diagnosis: No admission diagnoses are documented for this encounter.                   Date / Time: 2/18/2025  7:40 AM    Patient Admission Status: Emergency   Readmission Risk (Low < 19, Mod (19-27), High > 27): No data recorded  Current PCP: Karina Mendoza PA  PCP verified by CM? (P) Yes    Chart Reviewed: Yes      History Provided by: (P) Patient  Patient Orientation: (P) Alert and Oriented    Patient Cognition: (P) Alert    Hospitalization in the last 30 days (Readmission):  No    If yes, Readmission Assessment in  Navigator will be completed.    Advance Directives:      Code Status: Prior   Patient's Primary Decision Maker is: (P) Legal Next of Kin    Primary Decision Maker: Rosa Maria Wells - Spouse - [phone]    Discharge Planning:    Patient lives with: (P) Spouse/Significant Other Type of Home: (P) House  Primary Care Giver: (P) Self  Patient Support Systems include: (P) Spouse/Significant Other, Children   Current Financial resources: (P) None  Current community resources: (P) None  Current services prior to admission: (P) Durable Medical Equipment            Current DME: (P) Cane            Type of Home Care services:  (P) None    ADLS  Prior functional level: (P) Independent in ADLs/IADLs  Current functional level: (P) Independent in ADLs/IADLs    PT AM-PAC:   /24  OT AM-PAC:   /24    Family can provide assistance at DC: (P) Yes  Would you like Case Management to discuss the discharge plan with any other family members/significant others, and if so, who? (P) No  Plans to Return to Present Housing: (P) Yes  Other Identified Issues/Barriers to RETURNING to current

## 2025-02-18 NOTE — H&P
Hospital Medicine History & Physical    V 1.6    Date of Admission: 2/18/2025    Date of Service:  Pt seen/examined on 2/18     [x]Admitted to Inpatient with expected LOS greater than two midnights due to medical therapy.  []Placed in Observation status.    Chief Admission Complaint:    Weight Gain and Shortness of Breath (Patient reporting he's been feeling bad for more than a month. Says he's gained about fifteen pounds over the past thirty days. Says he's had an increase in SOB and has had a decrease in appetite Reports he called his doctor yesterday and was told to go to the ED. Pt has a long medical hx including cirrhosis and diabetes. )     Presenting Admission History:      69 y.o. male who presented to [facility] with progressive weight gain and shortness of breath over the past few weeks if not months. The patient has been gaining weight up to 15 pounds he has a history of nonalcoholic fatty liver disease with cirrhosis and has required paracentesis in the past. He discussed his condition with his gastroenterologist and was referred to the emerge department for further evaluation. .  PMHx significant for chronic pain, LANG, GERD, HLD, HTN, IBS, depression DM.      ED course; Presented afebrile with normal resp, tachycardic 120's and -160's on RA. LAbs reviewed CMP WNL aside from , trop 24, Hepatic bland, CBC anemia 8.7/26, BNP WNL. Underwent fast ECHo and US, CT of abd with large fluid, paracentesis in the ED only able to remove 200 Ml. Inintally low concern for SBP but then received Rocephin.   ECG with junctional tach, remained tachycardic and despite treatment     Hospital medicine was consulted for admission         Assessment/Plan:      Current Principal Problem:  <principal problem not specified>    Dyspnea and weight gain: suspect fluid overload/ liver pathology in the setting of LANG and ascites  - 200 ml fluids removed from paracentesis in the ED, felt non

## 2025-02-19 ENCOUNTER — APPOINTMENT (OUTPATIENT)
Age: 70
DRG: 433 | End: 2025-02-19
Payer: MEDICARE

## 2025-02-19 ENCOUNTER — APPOINTMENT (OUTPATIENT)
Dept: ULTRASOUND IMAGING | Age: 70
DRG: 433 | End: 2025-02-19
Payer: MEDICARE

## 2025-02-19 VITALS
HEART RATE: 79 BPM | BODY MASS INDEX: 36.68 KG/M2 | DIASTOLIC BLOOD PRESSURE: 68 MMHG | TEMPERATURE: 98.6 F | RESPIRATION RATE: 18 BRPM | WEIGHT: 242 LBS | OXYGEN SATURATION: 100 % | HEIGHT: 68 IN | SYSTOLIC BLOOD PRESSURE: 133 MMHG

## 2025-02-19 LAB
ALBUMIN SERPL-MCNC: 2.8 G/DL (ref 3.4–5)
ALBUMIN/GLOB SERPL: 1 {RATIO} (ref 1.1–2.2)
ALP SERPL-CCNC: 116 U/L (ref 40–129)
ALT SERPL-CCNC: 13 U/L (ref 10–40)
ANION GAP SERPL CALCULATED.3IONS-SCNC: 8 MMOL/L (ref 3–16)
AST SERPL-CCNC: 22 U/L (ref 15–37)
BASOPHILS # BLD: 0 K/UL (ref 0–0.2)
BASOPHILS NFR BLD: 0.9 %
BILIRUB SERPL-MCNC: 0.7 MG/DL (ref 0–1)
BUN SERPL-MCNC: 18 MG/DL (ref 7–20)
CALCIUM SERPL-MCNC: 8.8 MG/DL (ref 8.3–10.6)
CHLORIDE SERPL-SCNC: 104 MMOL/L (ref 99–110)
CO2 SERPL-SCNC: 27 MMOL/L (ref 21–32)
CREAT SERPL-MCNC: 0.9 MG/DL (ref 0.8–1.3)
DEPRECATED RDW RBC AUTO: 18.5 % (ref 12.4–15.4)
ECHO AO ASC DIAM: 3.1 CM
ECHO AO ASCENDING AORTA INDEX: 1.4 CM/M2
ECHO AO ROOT DIAM: 3.4 CM
ECHO AO ROOT INDEX: 1.53 CM/M2
ECHO AV AREA PEAK VELOCITY: 2.4 CM2
ECHO AV AREA VTI: 2.3 CM2
ECHO AV AREA/BSA PEAK VELOCITY: 1.1 CM2/M2
ECHO AV AREA/BSA VTI: 1 CM2/M2
ECHO AV MEAN GRADIENT: 7 MMHG
ECHO AV MEAN VELOCITY: 1.2 M/S
ECHO AV PEAK GRADIENT: 12 MMHG
ECHO AV PEAK VELOCITY: 1.7 M/S
ECHO AV VELOCITY RATIO: 0.76
ECHO AV VTI: 38.9 CM
ECHO BSA: 2.29 M2
ECHO LA AREA 2C: 25.9 CM2
ECHO LA AREA 4C: 26 CM2
ECHO LA DIAMETER INDEX: 1.85 CM/M2
ECHO LA DIAMETER: 4.1 CM
ECHO LA MAJOR AXIS: 7.3 CM
ECHO LA MINOR AXIS: 6.9 CM
ECHO LA TO AORTIC ROOT RATIO: 1.21
ECHO LA VOL BP: 80 ML (ref 18–58)
ECHO LA VOL MOD A2C: 81 ML (ref 18–58)
ECHO LA VOL MOD A4C: 75 ML (ref 18–58)
ECHO LA VOL/BSA BIPLANE: 36 ML/M2 (ref 16–34)
ECHO LA VOLUME INDEX MOD A2C: 36 ML/M2 (ref 16–34)
ECHO LA VOLUME INDEX MOD A4C: 34 ML/M2 (ref 16–34)
ECHO LV E' LATERAL VELOCITY: 14.1 CM/S
ECHO LV E' SEPTAL VELOCITY: 8.16 CM/S
ECHO LV EF PHYSICIAN: 60 %
ECHO LV FRACTIONAL SHORTENING: 39 % (ref 28–44)
ECHO LV GLOBAL LONGITUDINAL STRAIN (GLS): -22.2 %
ECHO LV GLOBAL LONGITUDINAL STRAIN (GLS): -22.6 %
ECHO LV GLOBAL LONGITUDINAL STRAIN (GLS): -22.6 %
ECHO LV GLOBAL LONGITUDINAL STRAIN (GLS): -23.2 %
ECHO LV INTERNAL DIMENSION DIASTOLE INDEX: 2.52 CM/M2
ECHO LV INTERNAL DIMENSION DIASTOLIC: 5.6 CM (ref 4.2–5.9)
ECHO LV INTERNAL DIMENSION SYSTOLIC INDEX: 1.53 CM/M2
ECHO LV INTERNAL DIMENSION SYSTOLIC: 3.4 CM
ECHO LV IVSD: 1.1 CM (ref 0.6–1)
ECHO LV MASS 2D: 249.3 G (ref 88–224)
ECHO LV MASS INDEX 2D: 112.3 G/M2 (ref 49–115)
ECHO LV POSTERIOR WALL DIASTOLIC: 1.1 CM (ref 0.6–1)
ECHO LV RELATIVE WALL THICKNESS RATIO: 0.39
ECHO LVOT AREA: 3.1 CM2
ECHO LVOT AV VTI INDEX: 0.72
ECHO LVOT DIAM: 2 CM
ECHO LVOT MEAN GRADIENT: 4 MMHG
ECHO LVOT PEAK GRADIENT: 7 MMHG
ECHO LVOT PEAK VELOCITY: 1.3 M/S
ECHO LVOT STROKE VOLUME INDEX: 39.9 ML/M2
ECHO LVOT SV: 88.5 ML
ECHO LVOT VTI: 28.2 CM
ECHO MV A VELOCITY: 0.76 M/S
ECHO MV E DECELERATION TIME (DT): 266 MS
ECHO MV E VELOCITY: 0.87 M/S
ECHO MV E/A RATIO: 1.14
ECHO MV E/E' LATERAL: 6.17
ECHO MV E/E' RATIO (AVERAGED): 8.42
ECHO MV E/E' SEPTAL: 10.66
ECHO RA AREA 4C: 13.3 CM2
ECHO RA END SYSTOLIC VOLUME APICAL 4 CHAMBER INDEX BSA: 15 ML/M2
ECHO RA VOLUME: 34 ML
ECHO RV BASAL DIMENSION: 2.9 CM
ECHO RV FREE WALL PEAK S': 14.8 CM/S
ECHO RV GLOBAL SYSTOLIC STRAIN (GLS): -25.2 %
ECHO RV LONGITUDINAL DIMENSION: 9.3 CM
ECHO RV MID DIMENSION: 2.7 CM
ECHO RV TAPSE: 2.4 CM (ref 1.7–?)
EKG DIAGNOSIS: NORMAL
EKG Q-T INTERVAL: 422 MS
EKG QRS DURATION: 102 MS
EKG QTC CALCULATION (BAZETT): 552 MS
EKG R AXIS: -36 DEGREES
EKG T AXIS: 79 DEGREES
EKG VENTRICULAR RATE: 103 BPM
EOSINOPHIL # BLD: 0.1 K/UL (ref 0–0.6)
EOSINOPHIL NFR BLD: 2.3 %
EST. AVERAGE GLUCOSE BLD GHB EST-MCNC: 246 MG/DL
GFR SERPLBLD CREATININE-BSD FMLA CKD-EPI: >90 ML/MIN/{1.73_M2}
GLUCOSE BLD-MCNC: 149 MG/DL (ref 70–99)
GLUCOSE BLD-MCNC: 168 MG/DL (ref 70–99)
GLUCOSE BLD-MCNC: 279 MG/DL (ref 70–99)
GLUCOSE SERPL-MCNC: 141 MG/DL (ref 70–99)
HBA1C MFR BLD: 10.2 %
HCT VFR BLD AUTO: 23.9 % (ref 40.5–52.5)
HGB BLD-MCNC: 7.6 G/DL (ref 13.5–17.5)
LYMPHOCYTES # BLD: 0.9 K/UL (ref 1–5.1)
LYMPHOCYTES NFR BLD: 22.1 %
MCH RBC QN AUTO: 23.3 PG (ref 26–34)
MCHC RBC AUTO-ENTMCNC: 31.7 G/DL (ref 31–36)
MCV RBC AUTO: 73.4 FL (ref 80–100)
MONOCYTES # BLD: 0.7 K/UL (ref 0–1.3)
MONOCYTES NFR BLD: 16.7 %
NEUTROPHILS # BLD: 2.4 K/UL (ref 1.7–7.7)
NEUTROPHILS NFR BLD: 58 %
PERFORMED ON: ABNORMAL
PLATELET # BLD AUTO: 138 K/UL (ref 135–450)
PMV BLD AUTO: 8.3 FL (ref 5–10.5)
POTASSIUM SERPL-SCNC: 4.1 MMOL/L (ref 3.5–5.1)
PROT SERPL-MCNC: 5.6 G/DL (ref 6.4–8.2)
RBC # BLD AUTO: 3.26 M/UL (ref 4.2–5.9)
SODIUM SERPL-SCNC: 139 MMOL/L (ref 136–145)
WBC # BLD AUTO: 4.1 K/UL (ref 4–11)

## 2025-02-19 PROCEDURE — 49083 ABD PARACENTESIS W/IMAGING: CPT

## 2025-02-19 PROCEDURE — 6360000002 HC RX W HCPCS: Performed by: NURSE PRACTITIONER

## 2025-02-19 PROCEDURE — 6370000000 HC RX 637 (ALT 250 FOR IP): Performed by: PHYSICIAN ASSISTANT

## 2025-02-19 PROCEDURE — 99222 1ST HOSP IP/OBS MODERATE 55: CPT | Performed by: STUDENT IN AN ORGANIZED HEALTH CARE EDUCATION/TRAINING PROGRAM

## 2025-02-19 PROCEDURE — 83036 HEMOGLOBIN GLYCOSYLATED A1C: CPT

## 2025-02-19 PROCEDURE — P9047 ALBUMIN (HUMAN), 25%, 50ML: HCPCS | Performed by: NURSE PRACTITIONER

## 2025-02-19 PROCEDURE — 6370000000 HC RX 637 (ALT 250 FOR IP): Performed by: NURSE PRACTITIONER

## 2025-02-19 PROCEDURE — 93306 TTE W/DOPPLER COMPLETE: CPT

## 2025-02-19 PROCEDURE — 80053 COMPREHEN METABOLIC PANEL: CPT

## 2025-02-19 PROCEDURE — 93356 MYOCRD STRAIN IMG SPCKL TRCK: CPT | Performed by: INTERNAL MEDICINE

## 2025-02-19 PROCEDURE — 36415 COLL VENOUS BLD VENIPUNCTURE: CPT

## 2025-02-19 PROCEDURE — 2500000003 HC RX 250 WO HCPCS

## 2025-02-19 PROCEDURE — 93306 TTE W/DOPPLER COMPLETE: CPT | Performed by: INTERNAL MEDICINE

## 2025-02-19 PROCEDURE — 0W9G3ZZ DRAINAGE OF PERITONEAL CAVITY, PERCUTANEOUS APPROACH: ICD-10-PCS | Performed by: RADIOLOGY

## 2025-02-19 PROCEDURE — 93010 ELECTROCARDIOGRAM REPORT: CPT | Performed by: INTERNAL MEDICINE

## 2025-02-19 PROCEDURE — 85025 COMPLETE CBC W/AUTO DIFF WBC: CPT

## 2025-02-19 RX ORDER — ALBUMIN (HUMAN) 12.5 G/50ML
25 SOLUTION INTRAVENOUS ONCE
Status: COMPLETED | OUTPATIENT
Start: 2025-02-19 | End: 2025-02-19

## 2025-02-19 RX ORDER — SPIRONOLACTONE 100 MG/1
100 TABLET, FILM COATED ORAL DAILY
Status: DISCONTINUED | OUTPATIENT
Start: 2025-02-19 | End: 2025-02-19 | Stop reason: HOSPADM

## 2025-02-19 RX ORDER — FUROSEMIDE 40 MG/1
40 TABLET ORAL DAILY
Status: DISCONTINUED | OUTPATIENT
Start: 2025-02-19 | End: 2025-02-19 | Stop reason: HOSPADM

## 2025-02-19 RX ORDER — SPIRONOLACTONE 100 MG/1
100 TABLET, FILM COATED ORAL DAILY
Qty: 30 TABLET | Refills: 3 | Status: SHIPPED | OUTPATIENT
Start: 2025-02-19

## 2025-02-19 RX ADMIN — SPIRONOLACTONE 100 MG: 100 TABLET ORAL at 16:58

## 2025-02-19 RX ADMIN — ALBUMIN (HUMAN) 25 G: 0.25 INJECTION, SOLUTION INTRAVENOUS at 14:21

## 2025-02-19 RX ADMIN — SODIUM CHLORIDE, PRESERVATIVE FREE 10 ML: 5 INJECTION INTRAVENOUS at 09:35

## 2025-02-19 RX ADMIN — INSULIN LISPRO 4 UNITS: 100 INJECTION, SOLUTION INTRAVENOUS; SUBCUTANEOUS at 17:03

## 2025-02-19 RX ADMIN — LABETALOL HYDROCHLORIDE 50 MG: 100 TABLET, FILM COATED ORAL at 09:35

## 2025-02-19 RX ADMIN — FUROSEMIDE 40 MG: 40 TABLET ORAL at 16:58

## 2025-02-19 ASSESSMENT — PAIN DESCRIPTION - ORIENTATION: ORIENTATION: UPPER;MID;LEFT

## 2025-02-19 ASSESSMENT — PAIN SCALES - GENERAL
PAINLEVEL_OUTOF10: 3
PAINLEVEL_OUTOF10: 4

## 2025-02-19 ASSESSMENT — PAIN - FUNCTIONAL ASSESSMENT: PAIN_FUNCTIONAL_ASSESSMENT: ACTIVITIES ARE NOT PREVENTED

## 2025-02-19 ASSESSMENT — PAIN DESCRIPTION - PAIN TYPE: TYPE: ACUTE PAIN;CHRONIC PAIN

## 2025-02-19 ASSESSMENT — PAIN DESCRIPTION - LOCATION
LOCATION: ABDOMEN
LOCATION: ABDOMEN

## 2025-02-19 ASSESSMENT — PAIN DESCRIPTION - DESCRIPTORS: DESCRIPTORS: SHARP

## 2025-02-19 ASSESSMENT — PAIN DESCRIPTION - FREQUENCY: FREQUENCY: INTERMITTENT

## 2025-02-19 NOTE — PROCEDURES
PROCEDURE NOTE  Date: 2/19/2025   Name: Carroll Wells  YOB: 1955    Procedures  Pt arrived for image guided right Paracentesis.  explained the procedure including the risk and benefits of the procedure. All questions answered. Pt verbalizes understanding of the procedure and states no more questions. Consent confirmed. Vital signs stable. Labs, allergies, medications, and code status reviewed. No contraindications noted. Time out completed prior to procedure.    Vital Signs  Vitals:    02/19/25 1144   BP: 135/79   Pulse:    Resp:    Temp:    SpO2:     (vital signs in table format)      Allergies  Aspirin and Oxycontin [oxycodone hcl] (allergies)    Labs  Lab Results   Component Value Date    INR 1.15 02/18/2025    PROTIME 14.9 02/18/2025     Lab Results   Component Value Date    CREATININE 0.9 02/19/2025    BUN 18 02/19/2025     02/19/2025    K 4.1 02/19/2025     02/19/2025    CO2 27 02/19/2025     Lab Results   Component Value Date    WBC 8.2 02/18/2025    HGB 8.7 (L) 02/18/2025    HCT 26.8 (L) 02/18/2025    MCV 73.9 (L) 02/18/2025     02/18/2025

## 2025-02-19 NOTE — PLAN OF CARE
Problem: Chronic Conditions and Co-morbidities  Goal: Patient's chronic conditions and co-morbidity symptoms are monitored and maintained or improved  2/19/2025 1136 by Cathleen Enriquez RN  Outcome: Progressing  2/19/2025 0348 by Lata Sheehan RN  Outcome: Progressing     Problem: Discharge Planning  Goal: Discharge to home or other facility with appropriate resources  Outcome: Progressing     Problem: Safety - Adult  Goal: Free from fall injury  Outcome: Progressing     Problem: Skin/Tissue Integrity  Goal: Skin integrity remains intact  Description: 1.  Monitor for areas of redness and/or skin breakdown  2.  Assess vascular access sites hourly  3.  Every 4-6 hours minimum:  Change oxygen saturation probe site  4.  Every 4-6 hours:  If on nasal continuous positive airway pressure, respiratory therapy assess nares and determine need for appliance change or resting period  2/19/2025 1136 by Cathleen Enriquez RN  Outcome: Progressing  2/19/2025 0348 by Lata Sheehan RN  Outcome: Progressing     Problem: Pain  Goal: Verbalizes/displays adequate comfort level or baseline comfort level  2/19/2025 1136 by Cathleen Enriquez RN  Outcome: Progressing  2/19/2025 0348 by Lata Sheehan RN  Outcome: Progressing     Problem: Cardiovascular - Adult  Goal: Maintains optimal cardiac output and hemodynamic stability  2/19/2025 1136 by Cathleen Enriquez RN  Outcome: Progressing  2/19/2025 0348 by Lata Sheehan RN  Outcome: Progressing  Goal: Absence of cardiac dysrhythmias or at baseline  Outcome: Progressing     Problem: Skin/Tissue Integrity - Adult  Goal: Skin integrity remains intact  Description: 1.  Monitor for areas of redness and/or skin breakdown  2.  Assess vascular access sites hourly  3.  Every 4-6 hours minimum:  Change oxygen saturation probe site  4.  Every 4-6 hours:  If on nasal continuous positive airway pressure, respiratory therapy assess nares and determine need for appliance change or resting period  2/19/2025 1136 by

## 2025-02-19 NOTE — PROGRESS NOTES
Hospital Medicine Progress Note  V 1.6      Date of Admission: 2/18/2025    Hospital Day: 2      Chief Admission Complaint:  ***    Subjective:  ***    Presenting Admission History:       ***    Assessment/Plan:      Current Principal Problem:  Dyspnea    ***    Ongoing threat to life and/or bodily function without ongoing treatment due to:  ***    Consults:      IP CONSULT TO CARDIOLOGY  IP CONSULT TO GI  IP CONSULT TO GI        --------------------------------------------------      Medications:        Infusion Medications    dextrose       Scheduled Medications    albumin human 25%  25 g IntraVENous Once    sodium chloride flush  5-40 mL IntraVENous 2 times per day    insulin lispro  0-8 Units SubCUTAneous 4x Daily AC & HS    labetalol  50 mg Oral 2 times per day     PRN Meds: glucose, dextrose bolus **OR** dextrose bolus, glucagon (rDNA), dextrose, morphine      Physical Exam Performed:      General appearance:  No apparent distress  Respiratory:  Normal respiratory effort.   Cardiovascular:  Regular rate and rhythm.  Abdomen:  Soft, non-tender, non-distended.  Musculoskeletal:  No edema  Neurologic:  Non-focal  Psychiatric:  Alert and oriented    BP (!) 112/95   Pulse 66   Temp 98.4 °F (36.9 °C) (Oral)   Resp 16   Ht 1.727 m (5' 8\")   Wt 109.8 kg (242 lb)   SpO2 100%   BMI 36.80 kg/m²     Telemetry:      Personally reviewed and interpreted telemetry (Rhythm Strip) on 2/19/2025 with the following findings: ***    Diet: Diet NPO Exceptions are: Sips of Water with Meds    DVT Prophylaxis: []PPx LMWH  []SQ Heparin  []IPC/SCDs  []Eliquis  []Xarelto  []Coumadin  [] Heparin Drip  []Other -      Code status: Full Code    PT/OT Eval Status:   []NOT yet ordered  []Ordered and Pending   []Seen with Recommendations for:   []Home independently  []Home w/ assist  []HHC  []SNF  []Acute Rehab    Multi-Disciplinary Rounds with Case Management completed on 2/19/2025 with the following recommendations:  Anticipated

## 2025-02-19 NOTE — DISCHARGE SUMMARY
Hospital Medicine Discharge Summary    Patient: Carroll Wells   : 1955     Hospital:  [facility]  Admit Date: 2025   Discharge Date:   25   Disposition:  [x]Home   []HHC  []SNF  []Acute Rehab  []LTAC  []Hospice  Code status:  [x]Full  []DNR/CCA  []Limited (DNR/CCA with Do Not Intubate)  []DNRCC  Condition at Discharge: Stable  Primary Care Provider: Karina Mendoza PA    Admitting Provider: Ian Chinchilla DO  Discharge Provider: Paloma Deshpande, APRN - CNP     Discharge Diagnoses:      Active Hospital Problems    Diagnosis     Dyspnea [R06.00]        Presenting Admission History:      ***     Assessment/Plan:      ***    Physical Exam Performed:      /68   Pulse 79   Temp 98.6 °F (37 °C) (Oral)   Resp 18   Ht 1.727 m (5' 8\")   Wt 109.8 kg (242 lb)   SpO2 100%   BMI 36.80 kg/m²       General appearance:  No apparent distress, appears stated age and cooperative.  Respiratory:  Normal respiratory effort.   Cardiovascular:  Regular rate and rhythm.  Abdomen:  Soft, non-tender, non-distended.  Musculoskeletal:  No edema  Neurologic:  Non-focal  Psychiatric:  Alert and oriented    Patient Discharge Instructions:      Follow up:    1.  Primary Care Provider Karina Mendoza PA in the next 1-2 weeks.      The patient was seen and examined on day of discharge and this discharge summary is in conjunction with any daily progress note from day of discharge. Time spent on discharge: 3*** minutes in the examination, evaluation, counseling and review of medications and discharge plan.    ------------------------------------------------------------------------------------------------------------------------------------------------------    Discharge Medications:   Current Discharge Medication List        Current Discharge Medication List        Current Discharge Medication List        CONTINUE these medications which have NOT CHANGED    Details   magnesium oxide (MAG-OX) 400

## 2025-02-19 NOTE — PLAN OF CARE
Problem: Skin/Tissue Integrity - Adult  Goal: Skin integrity remains intact  Description: 1.  Monitor for areas of redness and/or skin breakdown  2.  Assess vascular access sites hourly  3.  Every 4-6 hours minimum:  Change oxygen saturation probe site  4.  Every 4-6 hours:  If on nasal continuous positive airway pressure, respiratory therapy assess nares and determine need for appliance change or resting period  Outcome: Progressing     Problem: Cardiovascular - Adult  Goal: Maintains optimal cardiac output and hemodynamic stability  Outcome: Progressing     Problem: Pain  Goal: Verbalizes/displays adequate comfort level or baseline comfort level  Outcome: Progressing     Problem: Chronic Conditions and Co-morbidities  Goal: Patient's chronic conditions and co-morbidity symptoms are monitored and maintained or improved  Outcome: Progressing

## 2025-02-19 NOTE — CONSULTS
Consult Placed, per staff  is on call    Who: TAL MEJIA  Date:2/19/2025  Time:1003     Electronically signed by Bipin Quan on 2/19/2025 at 10:03 AM

## 2025-02-19 NOTE — CARE COORDINATION
Chart reviewed day 1. Care provided by cards, GI, EP and IM. Pt is from home with his wife. He is IPTA. Pt had a paracentesis today. Will continue to follow course for needs.

## 2025-02-19 NOTE — CONSULTS
GASTROENTEROLOGY INPATIENT CONSULTATION        IDENTIFYING DATA/REASON FOR CONSULTATION   PATIENT:  Carroll Wells  MRN:  [id number]  ADMIT DATE: 2/18/2025  TIME OF EVALUATION: 2/19/2025 4:26 PM  HOSPITAL STAY:   LOS: 1 day     REASON FOR CONSULTATION:  LANG cirrhosis    HISTORY OF PRESENT ILLNESS   Carroll Wells is a 69 y.o. male with a PMH of diabetes mellitus type 2, LANG related liver cirrhosis, ascites and esophageal varices s/p banding, hypertension  who presented on 2/18/2025 with progressive weight gain and shortness of breath. S/p paracentesis with 6.8L removed. No fluid studies obtained. He reports feeling much better since the paracentesis. He has some lower extremity swelling that has not been responding to his outpatient diuretic regimen. No abdominal pain, fevers.He is not always diligent with his salt intake at home.    Normal renal function and LFTs. Hgb down to 8.7. Platelets 163k.     CT A/P with IV contrast with no acute process in the abdomen or pelvis. Cirrhosis with moderate amount of ascites and sequelae of portal hypertension including interval enlargement of a recanalized paraumbilical vein draining via collaterals to the right saphenofemoral confluence.     Prior Endoscopic Evaluations:  EGD 9/2024 with Dr. Velasquez:  Normal upper third of esophagus. Grade II esophageal varices. Completely eradicated with 4 bands. Z-line irregular, 42 cm from the incisors. Portal hypertensive gastropathy. 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 Normal duodenal bulb and second portion of the duodenum. No specimens collected. Repeat EGD in 4 weeks for further esophageal variceal banding     EGD and Colonoscopy 8/1/24:    -EGD: Normal upper third of esophagus. 4 columns of large (> 5 mm) esophageal varices.  Completely eradicated with 6 bands placed. Z-line irregular, 42 cm from the incisors.Portal hypertensive gastropathy. 10 mm linear non-bleeding gastric ulcer with a clean ulcer base (Yoan

## 2025-02-19 NOTE — PROCEDURES
PROCEDURE NOTE  Date: 2/19/2025   Name: Carroll Wells  YOB: 1955    Procedures  Image guided right Paracentesis completed.  6.8 liters of cloudy yellow colored withdrawn.  Pt tolerated procedure without any signs or symptoms of distress. Vital signs stable.     DISCHARGED:  ADMITTED: Pt transferred back to room 364. Report called to nurse.     SPECIMEN SENT:  No    Vital Signs  Vitals:    02/19/25 1215   BP: 128/65   Pulse:    Resp:    Temp:    SpO2:     (vital signs in table format)

## 2025-02-19 NOTE — CONSULTS
daily as needed for Nausea or Vomiting 1/13/25  Yes Karina Mendoza PA   insulin aspart (NOVOLOG FLEXPEN) 100 UNIT/ML injection pen INJECT 20 UNITS INTO THE SKIN 3 TIMES DAILY (BEFORE MEALS) 11/13/24  Yes Karina Mendoza PA   insulin glargine (LANTUS SOLOSTAR) 100 UNIT/ML injection pen Inject 50 Units into the skin 2 times daily 8/23/24  Yes Karina Mendoza PA   midodrine (PROAMATINE) 5 MG tablet Take 1 tablet by mouth daily 6/18/24  Yes ProviderZeferino MD   tiZANidine (ZANAFLEX) 4 MG tablet Take 0.5 tablets by mouth 3 times daily as needed (back pain) 6/27/24  Yes Anne Dyson APRN - CNP   furosemide (LASIX) 40 MG tablet Take 1 tablet by mouth 2 times daily 12/27/23  Yes Marisela Cho PA-C   cetirizine (ZYRTEC) 10 MG tablet Take 1 tablet by mouth daily 8/3/23  Yes Karina Mendoza PA   omeprazole (PRILOSEC) 40 MG delayed release capsule Take 1 capsule by mouth in the morning and at bedtime 9/5/24 10/5/24  Dennis Velasquez MD   montelukast (SINGULAIR) 10 MG tablet Take 1 tablet by mouth nightly  Patient not taking: Reported on 2/18/2025 1/10/24   Anne Dyson APRN - CNP        CURRENT Medications:  sodium chloride flush 0.9 % injection 5-40 mL, 2 times per day  glucose chewable tablet 16 g, PRN  dextrose bolus 10% 125 mL, PRN   Or  dextrose bolus 10% 250 mL, PRN  glucagon injection 1 mg, PRN  dextrose 10 % infusion, Continuous PRN  insulin lispro (HUMALOG,ADMELOG) injection vial 0-8 Units, 4x Daily AC & HS  labetalol (NORMODYNE) tablet 50 mg, 2 times per day  morphine (PF) injection 2 mg, Q4H PRN        Allergies:  Aspirin and Oxycontin [oxycodone hcl]     Review of Systems:   A 14 point review of symptoms completed. Pertinent positives identified in the HPI, all other review of symptoms negative as below.      Objective:     Vitals:    02/19/25 0927   BP: 138/72   Pulse: 79   Resp: 16   Temp: 98.4 °F (36.9 °C)   SpO2: 99%    Weight - Scale: 109.9 kg (242 lb 3.2 oz)       PHYSICAL        Additional studies:     2/18/25 CT Abdomen Pelvis  IMPRESSION:  1.  No acute process in the abdomen or pelvis.  2.  Cirrhosis with moderate amount of ascites and sequelae of portal  hypertension including interval enlargement of a recanalized paraumbilical vein  draining via collaterals to the right saphenofemoral confluence.     CXR 2/18/25  IMPRESSION:  1. No findings for acute cardiopulmonary disease.    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 Preserved LV function.   -patient wants to go home. No further in patient cardiac work up indicated.   -no statin 2/2 decom/cirrhosis  -agree with lasix/aldactone  -pt reports allergy to aspirin  -cariology will sign off. Will arrange follow up.     Patient Active Problem List   Diagnosis    Hyperlipidemia    ARREDONDO (dyspnea on exertion)    Left arm numbness    Essential hypertension    Mild episode of recurrent major depressive disorder    Cervical radiculopathy    Morbidly obese    Type 2 diabetes mellitus with hyperglycemia    Secondary esophageal varices with bleeding (HCC)    Portal hypertensive gastropathy (HCC)    GAVE (gastric antral vascular ectasia)    Liver cirrhosis secondary to LANG (HCC)    Chronic renal disease, stage III (HCC) [195624]    Severe obesity (BMI 35.0-39.9) with comorbidity    Thrombocytopenia, unspecified    Dyspnea         I will address the patient's cardiac risk factors and adjusted pharmacologic treatment as needed. In addition, I have reinforced the need for patient directed risk factor modification.  All questions and concerns were addressed to the patient/family. Alternatives to my treatment were discussed.     Thank you for allowing us

## 2025-02-20 ENCOUNTER — TELEPHONE (OUTPATIENT)
Dept: FAMILY MEDICINE CLINIC | Age: 70
End: 2025-02-20

## 2025-02-20 NOTE — TELEPHONE ENCOUNTER
Care Transitions Initial Follow Up Call    Outreach made within 2 business days of discharge: Yes    Patient: Carroll Wells Patient : 1955   MRN: [id number]  Reason for Admission: Dyspnea  Discharge Date: 25       Spoke with: Patient     Discharge department/facility: [facility] Interactive Patient Contact:  Was patient able to fill all prescriptions: Yes  Was patient instructed to bring all medications to the follow-up visit: Yes  Is patient taking all medications as directed in the discharge summary? Yes  Does patient understand their discharge instructions: Yes  Does patient have questions or concerns that need addressed prior to 7-14 day follow up office visit: no        Scheduled appointment with PCP within 7-14 days    Follow Up  Future Appointments   Date Time Provider Department Center   4/10/2025 11:00 AM Bon Reece DO [facility]   2025 11:00 AM Karina Mendoza PA [facility] DE[facility]       Betzy Oakley LPN

## 2025-02-21 LAB
BACTERIA FLD AEROBE CULT: NORMAL
GRAM STN SPEC: NORMAL

## 2025-02-27 ENCOUNTER — OFFICE VISIT (OUTPATIENT)
Dept: FAMILY MEDICINE CLINIC | Age: 70
End: 2025-02-27

## 2025-02-27 ENCOUNTER — HOSPITAL ENCOUNTER (OUTPATIENT)
Dept: VASCULAR LAB | Age: 70
Discharge: HOME | End: 2025-03-01
Payer: MEDICARE

## 2025-02-27 ENCOUNTER — HOSPITAL ENCOUNTER (INPATIENT)
Age: 70
LOS: 5 days | Discharge: HOME | DRG: 300 | End: 2025-03-04
Attending: STUDENT IN AN ORGANIZED HEALTH CARE EDUCATION/TRAINING PROGRAM | Admitting: STUDENT IN AN ORGANIZED HEALTH CARE EDUCATION/TRAINING PROGRAM
Payer: MEDICARE

## 2025-02-27 VITALS
SYSTOLIC BLOOD PRESSURE: 108 MMHG | DIASTOLIC BLOOD PRESSURE: 44 MMHG | BODY MASS INDEX: 35.58 KG/M2 | OXYGEN SATURATION: 100 % | HEART RATE: 83 BPM | WEIGHT: 234 LBS

## 2025-02-27 DIAGNOSIS — R22.1 NECK SWELLING: ICD-10-CM

## 2025-02-27 DIAGNOSIS — M79.602 PAIN AND SWELLING OF LEFT UPPER EXTREMITY: ICD-10-CM

## 2025-02-27 DIAGNOSIS — K75.81 LIVER CIRRHOSIS SECONDARY TO NASH (HCC): ICD-10-CM

## 2025-02-27 DIAGNOSIS — M79.89 PAIN AND SWELLING OF LEFT UPPER EXTREMITY: ICD-10-CM

## 2025-02-27 DIAGNOSIS — K75.81 LIVER CIRRHOSIS SECONDARY TO NASH (HCC): Primary | ICD-10-CM

## 2025-02-27 DIAGNOSIS — Z79.4 TYPE 2 DIABETES MELLITUS WITH HYPERGLYCEMIA, WITH LONG-TERM CURRENT USE OF INSULIN (HCC): ICD-10-CM

## 2025-02-27 DIAGNOSIS — E11.65 TYPE 2 DIABETES MELLITUS WITH HYPERGLYCEMIA, WITH LONG-TERM CURRENT USE OF INSULIN (HCC): ICD-10-CM

## 2025-02-27 DIAGNOSIS — Z09 HOSPITAL DISCHARGE FOLLOW-UP: ICD-10-CM

## 2025-02-27 DIAGNOSIS — K74.60 LIVER CIRRHOSIS SECONDARY TO NASH (HCC): ICD-10-CM

## 2025-02-27 DIAGNOSIS — K74.60 LIVER CIRRHOSIS SECONDARY TO NASH (HCC): Primary | ICD-10-CM

## 2025-02-27 PROBLEM — I82.B19: Status: ACTIVE | Noted: 2025-02-27

## 2025-02-27 PROBLEM — I82.622 ACUTE DEEP VEIN THROMBOSIS (DVT) OF LEFT UPPER EXTREMITY, UNSPECIFIED VEIN (HCC): Status: ACTIVE | Noted: 2025-02-27

## 2025-02-27 LAB
ALBUMIN SERPL-MCNC: 3.2 G/DL (ref 3.4–5)
ALBUMIN/GLOB SERPL: 0.9 {RATIO} (ref 1.1–2.2)
ALP SERPL-CCNC: 114 U/L (ref 40–129)
ALT SERPL-CCNC: 14 U/L (ref 10–40)
ANION GAP SERPL CALCULATED.3IONS-SCNC: 13 MMOL/L (ref 3–16)
AST SERPL-CCNC: 23 U/L (ref 15–37)
BASOPHILS # BLD: 0.1 K/UL (ref 0–0.2)
BASOPHILS NFR BLD: 0.8 %
BILIRUB SERPL-MCNC: 0.9 MG/DL (ref 0–1)
BUN SERPL-MCNC: 31 MG/DL (ref 7–20)
CALCIUM SERPL-MCNC: 9.2 MG/DL (ref 8.3–10.6)
CHLORIDE SERPL-SCNC: 100 MMOL/L (ref 99–110)
CO2 SERPL-SCNC: 23 MMOL/L (ref 21–32)
CREAT SERPL-MCNC: 1.2 MG/DL (ref 0.8–1.3)
DEPRECATED RDW RBC AUTO: 18.3 % (ref 12.4–15.4)
EOSINOPHIL # BLD: 0.1 K/UL (ref 0–0.6)
EOSINOPHIL NFR BLD: 0.9 %
GFR SERPLBLD CREATININE-BSD FMLA CKD-EPI: 64 ML/MIN/{1.73_M2}
GLUCOSE BLD-MCNC: 238 MG/DL (ref 70–99)
GLUCOSE SERPL-MCNC: 228 MG/DL (ref 70–99)
HCT VFR BLD AUTO: 26.7 % (ref 40.5–52.5)
HGB BLD-MCNC: 8.6 G/DL (ref 13.5–17.5)
INR PPP: 1.2 (ref 0.85–1.15)
LYMPHOCYTES # BLD: 1.1 K/UL (ref 1–5.1)
LYMPHOCYTES NFR BLD: 13.5 %
MCH RBC QN AUTO: 23.8 PG (ref 26–34)
MCHC RBC AUTO-ENTMCNC: 32.3 G/DL (ref 31–36)
MCV RBC AUTO: 73.7 FL (ref 80–100)
MONOCYTES # BLD: 1.1 K/UL (ref 0–1.3)
MONOCYTES NFR BLD: 14.4 %
NEUTROPHILS # BLD: 5.6 K/UL (ref 1.7–7.7)
NEUTROPHILS NFR BLD: 70.4 %
PERFORMED ON: ABNORMAL
PLATELET # BLD AUTO: 219 K/UL (ref 135–450)
PMV BLD AUTO: 7.7 FL (ref 5–10.5)
POTASSIUM SERPL-SCNC: 4.4 MMOL/L (ref 3.5–5.1)
PROT SERPL-MCNC: 6.6 G/DL (ref 6.4–8.2)
PROTHROMBIN TIME: 15.4 SEC (ref 11.9–14.9)
RBC # BLD AUTO: 3.63 M/UL (ref 4.2–5.9)
SODIUM SERPL-SCNC: 136 MMOL/L (ref 136–145)
WBC # BLD AUTO: 8 K/UL (ref 4–11)

## 2025-02-27 PROCEDURE — 36415 COLL VENOUS BLD VENIPUNCTURE: CPT

## 2025-02-27 PROCEDURE — 2500000003 HC RX 250 WO HCPCS: Performed by: STUDENT IN AN ORGANIZED HEALTH CARE EDUCATION/TRAINING PROGRAM

## 2025-02-27 PROCEDURE — 80053 COMPREHEN METABOLIC PANEL: CPT

## 2025-02-27 PROCEDURE — 1200000000 HC SEMI PRIVATE

## 2025-02-27 PROCEDURE — 6360000002 HC RX W HCPCS: Performed by: STUDENT IN AN ORGANIZED HEALTH CARE EDUCATION/TRAINING PROGRAM

## 2025-02-27 PROCEDURE — 6370000000 HC RX 637 (ALT 250 FOR IP): Performed by: STUDENT IN AN ORGANIZED HEALTH CARE EDUCATION/TRAINING PROGRAM

## 2025-02-27 PROCEDURE — 85025 COMPLETE CBC W/AUTO DIFF WBC: CPT

## 2025-02-27 PROCEDURE — 85610 PROTHROMBIN TIME: CPT

## 2025-02-27 PROCEDURE — 93971 EXTREMITY STUDY: CPT

## 2025-02-27 RX ORDER — ENOXAPARIN SODIUM 100 MG/ML
30 INJECTION SUBCUTANEOUS 2 TIMES DAILY
Status: DISCONTINUED | OUTPATIENT
Start: 2025-02-27 | End: 2025-02-28

## 2025-02-27 RX ORDER — FUROSEMIDE 40 MG/1
40 TABLET ORAL 2 TIMES DAILY
Status: DISCONTINUED | OUTPATIENT
Start: 2025-02-27 | End: 2025-03-04 | Stop reason: HOSPADM

## 2025-02-27 RX ORDER — ACETAMINOPHEN 650 MG/1
325 SUPPOSITORY RECTAL EVERY 6 HOURS PRN
Status: DISCONTINUED | OUTPATIENT
Start: 2025-02-27 | End: 2025-03-04 | Stop reason: HOSPADM

## 2025-02-27 RX ORDER — INSULIN LISPRO 100 [IU]/ML
0-16 INJECTION, SOLUTION INTRAVENOUS; SUBCUTANEOUS
Status: DISCONTINUED | OUTPATIENT
Start: 2025-02-27 | End: 2025-03-04 | Stop reason: HOSPADM

## 2025-02-27 RX ORDER — SODIUM CHLORIDE 0.9 % (FLUSH) 0.9 %
5-40 SYRINGE (ML) INJECTION EVERY 12 HOURS SCHEDULED
Status: DISCONTINUED | OUTPATIENT
Start: 2025-02-27 | End: 2025-03-04 | Stop reason: HOSPADM

## 2025-02-27 RX ORDER — MAGNESIUM SULFATE IN WATER 40 MG/ML
2000 INJECTION, SOLUTION INTRAVENOUS PRN
Status: DISCONTINUED | OUTPATIENT
Start: 2025-02-27 | End: 2025-03-04 | Stop reason: HOSPADM

## 2025-02-27 RX ORDER — ONDANSETRON 2 MG/ML
4 INJECTION INTRAMUSCULAR; INTRAVENOUS EVERY 6 HOURS PRN
Status: DISCONTINUED | OUTPATIENT
Start: 2025-02-27 | End: 2025-03-04 | Stop reason: HOSPADM

## 2025-02-27 RX ORDER — POTASSIUM CHLORIDE 1500 MG/1
40 TABLET, EXTENDED RELEASE ORAL PRN
Status: DISCONTINUED | OUTPATIENT
Start: 2025-02-27 | End: 2025-03-04 | Stop reason: HOSPADM

## 2025-02-27 RX ORDER — TRAMADOL HYDROCHLORIDE 50 MG/1
50 TABLET ORAL EVERY 8 HOURS PRN
Status: DISCONTINUED | OUTPATIENT
Start: 2025-02-27 | End: 2025-03-04 | Stop reason: HOSPADM

## 2025-02-27 RX ORDER — ACETAMINOPHEN 500 MG
500 TABLET ORAL EVERY 6 HOURS PRN
Status: DISCONTINUED | OUTPATIENT
Start: 2025-02-27 | End: 2025-03-04 | Stop reason: HOSPADM

## 2025-02-27 RX ORDER — ONDANSETRON 4 MG/1
4 TABLET, ORALLY DISINTEGRATING ORAL EVERY 8 HOURS PRN
Status: DISCONTINUED | OUTPATIENT
Start: 2025-02-27 | End: 2025-03-04 | Stop reason: HOSPADM

## 2025-02-27 RX ORDER — POLYETHYLENE GLYCOL 3350 17 G/17G
17 POWDER, FOR SOLUTION ORAL DAILY PRN
Status: DISCONTINUED | OUTPATIENT
Start: 2025-02-27 | End: 2025-03-04 | Stop reason: HOSPADM

## 2025-02-27 RX ORDER — GLUCAGON 1 MG/ML
1 KIT INJECTION PRN
Status: DISCONTINUED | OUTPATIENT
Start: 2025-02-27 | End: 2025-03-04 | Stop reason: HOSPADM

## 2025-02-27 RX ORDER — POTASSIUM CHLORIDE 7.45 MG/ML
10 INJECTION INTRAVENOUS PRN
Status: DISCONTINUED | OUTPATIENT
Start: 2025-02-27 | End: 2025-03-04 | Stop reason: HOSPADM

## 2025-02-27 RX ORDER — SPIRONOLACTONE 100 MG/1
100 TABLET, FILM COATED ORAL DAILY
Status: DISCONTINUED | OUTPATIENT
Start: 2025-02-28 | End: 2025-03-04 | Stop reason: HOSPADM

## 2025-02-27 RX ORDER — SODIUM CHLORIDE 9 MG/ML
INJECTION, SOLUTION INTRAVENOUS PRN
Status: DISCONTINUED | OUTPATIENT
Start: 2025-02-27 | End: 2025-03-04 | Stop reason: HOSPADM

## 2025-02-27 RX ORDER — TRAMADOL HYDROCHLORIDE 50 MG/1
50 TABLET ORAL EVERY 8 HOURS PRN
Qty: 12 TABLET | Refills: 0 | Status: ON HOLD | OUTPATIENT
Start: 2025-02-27 | End: 2025-03-03

## 2025-02-27 RX ORDER — SODIUM CHLORIDE 0.9 % (FLUSH) 0.9 %
5-40 SYRINGE (ML) INJECTION PRN
Status: DISCONTINUED | OUTPATIENT
Start: 2025-02-27 | End: 2025-03-04 | Stop reason: HOSPADM

## 2025-02-27 RX ORDER — INSULIN GLARGINE 100 [IU]/ML
50 INJECTION, SOLUTION SUBCUTANEOUS 2 TIMES DAILY
Status: DISCONTINUED | OUTPATIENT
Start: 2025-02-27 | End: 2025-03-04 | Stop reason: HOSPADM

## 2025-02-27 RX ORDER — DEXTROSE MONOHYDRATE 100 MG/ML
INJECTION, SOLUTION INTRAVENOUS CONTINUOUS PRN
Status: DISCONTINUED | OUTPATIENT
Start: 2025-02-27 | End: 2025-03-04 | Stop reason: HOSPADM

## 2025-02-27 RX ADMIN — TIZANIDINE 2 MG: 4 TABLET ORAL at 23:02

## 2025-02-27 RX ADMIN — SODIUM CHLORIDE, PRESERVATIVE FREE 10 ML: 5 INJECTION INTRAVENOUS at 20:27

## 2025-02-27 RX ADMIN — INSULIN GLARGINE 50 UNITS: 100 INJECTION, SOLUTION SUBCUTANEOUS at 20:26

## 2025-02-27 RX ADMIN — FUROSEMIDE 40 MG: 40 TABLET ORAL at 20:26

## 2025-02-27 RX ADMIN — INSULIN LISPRO 4 UNITS: 100 INJECTION, SOLUTION INTRAVENOUS; SUBCUTANEOUS at 20:26

## 2025-02-27 RX ADMIN — ENOXAPARIN SODIUM 30 MG: 100 INJECTION SUBCUTANEOUS at 20:26

## 2025-02-27 ASSESSMENT — PAIN DESCRIPTION - LOCATION: LOCATION: ARM;NECK

## 2025-02-27 ASSESSMENT — PAIN DESCRIPTION - ORIENTATION: ORIENTATION: LEFT

## 2025-02-27 ASSESSMENT — PAIN SCALES - GENERAL
PAINLEVEL_OUTOF10: 6
PAINLEVEL_OUTOF10: 5

## 2025-02-27 NOTE — PROGRESS NOTES
or rhonchi, normal air movement, no respiratory distress  Cardiovascular: normal rate, regular rhythm, normal S1 and S2, no murmurs, rubs, clicks, or gallops, distal pulses intact, no carotid bruits  Abdomen: soft, non-tender, non-distended, normal bowel sounds, no masses or organomegaly  Extremities: no cyanosis, clubbing or edema  Musculoskeletal: notable swelling to LUE, 2+ pitting in the hand.  Neurologic: reflexes normal and symmetric, no cranial nerve deficit, gait, coordination and speech normal      An electronic signature was used to authenticate this note.  --HO Morrison

## 2025-02-28 ENCOUNTER — ANESTHESIA EVENT (OUTPATIENT)
Dept: ENDOSCOPY | Age: 70
End: 2025-02-28
Payer: MEDICARE

## 2025-02-28 ENCOUNTER — ANESTHESIA (OUTPATIENT)
Dept: ENDOSCOPY | Age: 70
End: 2025-02-28
Payer: MEDICARE

## 2025-02-28 ENCOUNTER — APPOINTMENT (OUTPATIENT)
Dept: ULTRASOUND IMAGING | Age: 70
DRG: 300 | End: 2025-02-28
Attending: STUDENT IN AN ORGANIZED HEALTH CARE EDUCATION/TRAINING PROGRAM
Payer: MEDICARE

## 2025-02-28 LAB
ALBUMIN SERPL-MCNC: 2.7 G/DL (ref 3.4–5)
ALBUMIN/GLOB SERPL: 1 {RATIO} (ref 1.1–2.2)
ALP SERPL-CCNC: 91 U/L (ref 40–129)
ALT SERPL-CCNC: 12 U/L (ref 10–40)
ANION GAP SERPL CALCULATED.3IONS-SCNC: 14 MMOL/L (ref 3–16)
ANION GAP SERPL CALCULATED.3IONS-SCNC: 8 MMOL/L (ref 3–16)
ANTI-XA UNFRAC HEPARIN: <0.1 IU/ML (ref 0.3–0.7)
APPEARANCE FLUID: NORMAL
APTT BLD: 32.8 SEC (ref 22.1–36.4)
AST SERPL-CCNC: 19 U/L (ref 15–37)
BASOPHILS # BLD: 0 K/UL (ref 0–0.2)
BASOPHILS NFR BLD: 0.9 %
BDY FLUID QUALITY: NORMAL
BILIRUB SERPL-MCNC: 0.6 MG/DL (ref 0–1)
BUN SERPL-MCNC: 28 MG/DL (ref 7–20)
BUN SERPL-MCNC: 29 MG/DL (ref 7–20)
CALCIUM SERPL-MCNC: 8.6 MG/DL (ref 8.3–10.6)
CALCIUM SERPL-MCNC: 8.9 MG/DL (ref 8.3–10.6)
CELL COUNT FLUID TYPE: NORMAL
CHLORIDE SERPL-SCNC: 100 MMOL/L (ref 99–110)
CHLORIDE SERPL-SCNC: 104 MMOL/L (ref 99–110)
CO2 SERPL-SCNC: 23 MMOL/L (ref 21–32)
CO2 SERPL-SCNC: 25 MMOL/L (ref 21–32)
COLOR FLUID: NORMAL
CREAT SERPL-MCNC: 1.1 MG/DL (ref 0.8–1.3)
CREAT SERPL-MCNC: 1.3 MG/DL (ref 0.8–1.3)
DEPRECATED RDW RBC AUTO: 17.9 % (ref 12.4–15.4)
EOSINOPHIL # BLD: 0.1 K/UL (ref 0–0.6)
EOSINOPHIL NFR BLD: 2.3 %
FERRITIN SERPL IA-MCNC: 25.5 NG/ML (ref 30–400)
FOLATE SERPL-MCNC: 4.99 NG/ML (ref 4.78–24.2)
GFR SERPLBLD CREATININE-BSD FMLA CKD-EPI: 59 ML/MIN/{1.73_M2}
GFR SERPLBLD CREATININE-BSD FMLA CKD-EPI: 72 ML/MIN/{1.73_M2}
GLUCOSE BLD-MCNC: 180 MG/DL (ref 70–99)
GLUCOSE BLD-MCNC: 182 MG/DL (ref 70–99)
GLUCOSE BLD-MCNC: 205 MG/DL (ref 70–99)
GLUCOSE BLD-MCNC: 227 MG/DL (ref 70–99)
GLUCOSE BLD-MCNC: 297 MG/DL (ref 70–99)
GLUCOSE SERPL-MCNC: 193 MG/DL (ref 70–99)
GLUCOSE SERPL-MCNC: 206 MG/DL (ref 70–99)
HAPTOGLOB SERPL-MCNC: 168 MG/DL (ref 30–200)
HCT VFR BLD AUTO: 21.9 % (ref 40.5–52.5)
HCT VFR BLD AUTO: 25.5 % (ref 40.5–52.5)
HEMOCCULT SP1 STL QL: NORMAL
HGB BLD-MCNC: 7 G/DL (ref 13.5–17.5)
HGB BLD-MCNC: 8.3 G/DL (ref 13.5–17.5)
IRON SATN MFR SERPL: 4 % (ref 20–50)
IRON SERPL-MCNC: 12 UG/DL (ref 59–158)
LDH SERPL L TO P-CCNC: 265 U/L (ref 100–190)
LYMPHOCYTES # BLD: 1 K/UL (ref 1–5.1)
LYMPHOCYTES NFR BLD: 23.4 %
LYMPHOCYTES NFR FLD: 46 %
MACROPHAGES # FLD: 23 %
MCH RBC QN AUTO: 23.4 PG (ref 26–34)
MCHC RBC AUTO-ENTMCNC: 32.1 G/DL (ref 31–36)
MCV RBC AUTO: 73 FL (ref 80–100)
MESOTHL CELL NFR FLD: 20 %
MONOCYTES # BLD: 0.7 K/UL (ref 0–1.3)
MONOCYTES NFR BLD: 15.3 %
MONOCYTES NFR FLD: 7 %
NEUTROPHIL, FLUID: 4 %
NEUTROPHILS # BLD: 2.5 K/UL (ref 1.7–7.7)
NEUTROPHILS NFR BLD: 58.1 %
NUC CELL # FLD: 413 /CUMM
PERFORMED ON: ABNORMAL
PLATELET # BLD AUTO: 172 K/UL (ref 135–450)
PMV BLD AUTO: 7.7 FL (ref 5–10.5)
POTASSIUM SERPL-SCNC: 4.1 MMOL/L (ref 3.5–5.1)
POTASSIUM SERPL-SCNC: 4.1 MMOL/L (ref 3.5–5.1)
PROT SERPL-MCNC: 5.3 G/DL (ref 6.4–8.2)
RBC # BLD AUTO: 3 M/UL (ref 4.2–5.9)
RBC FLUID: 600 /CUMM
SODIUM SERPL-SCNC: 137 MMOL/L (ref 136–145)
SODIUM SERPL-SCNC: 137 MMOL/L (ref 136–145)
TIBC SERPL-MCNC: 270 UG/DL (ref 260–445)
TOTAL CELLS COUNTED FLD: 100
TRANSFERRIN SERPL-MCNC: 226 MG/DL (ref 200–360)
VIT B12 SERPL-MCNC: 1742 PG/ML (ref 211–911)
WBC # BLD AUTO: 4.4 K/UL (ref 4–11)

## 2025-02-28 PROCEDURE — 82607 VITAMIN B-12: CPT

## 2025-02-28 PROCEDURE — 3700000001 HC ADD 15 MINUTES (ANESTHESIA): Performed by: INTERNAL MEDICINE

## 2025-02-28 PROCEDURE — 85014 HEMATOCRIT: CPT

## 2025-02-28 PROCEDURE — 82042 OTHER SOURCE ALBUMIN QUAN EA: CPT

## 2025-02-28 PROCEDURE — 2709999900 HC NON-CHARGEABLE SUPPLY: Performed by: INTERNAL MEDICINE

## 2025-02-28 PROCEDURE — 83540 ASSAY OF IRON: CPT

## 2025-02-28 PROCEDURE — 3609017100 HC EGD: Performed by: INTERNAL MEDICINE

## 2025-02-28 PROCEDURE — 83615 LACTATE (LD) (LDH) ENZYME: CPT

## 2025-02-28 PROCEDURE — 89051 BODY FLUID CELL COUNT: CPT

## 2025-02-28 PROCEDURE — 80053 COMPREHEN METABOLIC PANEL: CPT

## 2025-02-28 PROCEDURE — 85025 COMPLETE CBC W/AUTO DIFF WBC: CPT

## 2025-02-28 PROCEDURE — 83010 ASSAY OF HAPTOGLOBIN QUANT: CPT

## 2025-02-28 PROCEDURE — 0W9G3ZZ DRAINAGE OF PERITONEAL CAVITY, PERCUTANEOUS APPROACH: ICD-10-PCS | Performed by: INTERNAL MEDICINE

## 2025-02-28 PROCEDURE — 6370000000 HC RX 637 (ALT 250 FOR IP)

## 2025-02-28 PROCEDURE — 82746 ASSAY OF FOLIC ACID SERUM: CPT

## 2025-02-28 PROCEDURE — 36415 COLL VENOUS BLD VENIPUNCTURE: CPT

## 2025-02-28 PROCEDURE — 2720000010 HC SURG SUPPLY STERILE: Performed by: INTERNAL MEDICINE

## 2025-02-28 PROCEDURE — 6360000002 HC RX W HCPCS: Performed by: NURSE PRACTITIONER

## 2025-02-28 PROCEDURE — 85730 THROMBOPLASTIN TIME PARTIAL: CPT

## 2025-02-28 PROCEDURE — 6360000002 HC RX W HCPCS

## 2025-02-28 PROCEDURE — 84157 ASSAY OF PROTEIN OTHER: CPT

## 2025-02-28 PROCEDURE — 3700000000 HC ANESTHESIA ATTENDED CARE: Performed by: INTERNAL MEDICINE

## 2025-02-28 PROCEDURE — 6360000002 HC RX W HCPCS: Performed by: INTERNAL MEDICINE

## 2025-02-28 PROCEDURE — 84466 ASSAY OF TRANSFERRIN: CPT

## 2025-02-28 PROCEDURE — 7100000011 HC PHASE II RECOVERY - ADDTL 15 MIN: Performed by: INTERNAL MEDICINE

## 2025-02-28 PROCEDURE — 1200000000 HC SEMI PRIVATE

## 2025-02-28 PROCEDURE — 06L38CZ OCCLUSION OF ESOPHAGEAL VEIN WITH EXTRALUMINAL DEVICE, VIA NATURAL OR ARTIFICIAL OPENING ENDOSCOPIC: ICD-10-PCS | Performed by: INTERNAL MEDICINE

## 2025-02-28 PROCEDURE — 82270 OCCULT BLOOD FECES: CPT

## 2025-02-28 PROCEDURE — 85520 HEPARIN ASSAY: CPT

## 2025-02-28 PROCEDURE — 30233N1 TRANSFUSION OF NONAUTOLOGOUS RED BLOOD CELLS INTO PERIPHERAL VEIN, PERCUTANEOUS APPROACH: ICD-10-PCS | Performed by: INTERNAL MEDICINE

## 2025-02-28 PROCEDURE — 82728 ASSAY OF FERRITIN: CPT

## 2025-02-28 PROCEDURE — 99222 1ST HOSP IP/OBS MODERATE 55: CPT | Performed by: CLINICAL NURSE SPECIALIST

## 2025-02-28 PROCEDURE — 3609012300 HC EGD BAND LIGATION ESOPHGEAL/GASTRIC VARICES: Performed by: INTERNAL MEDICINE

## 2025-02-28 PROCEDURE — 6370000000 HC RX 637 (ALT 250 FOR IP): Performed by: STUDENT IN AN ORGANIZED HEALTH CARE EDUCATION/TRAINING PROGRAM

## 2025-02-28 PROCEDURE — P9041 ALBUMIN (HUMAN),5%, 50ML: HCPCS | Performed by: NURSE PRACTITIONER

## 2025-02-28 PROCEDURE — 85018 HEMOGLOBIN: CPT

## 2025-02-28 PROCEDURE — 49083 ABD PARACENTESIS W/IMAGING: CPT

## 2025-02-28 PROCEDURE — 7100000010 HC PHASE II RECOVERY - FIRST 15 MIN: Performed by: INTERNAL MEDICINE

## 2025-02-28 PROCEDURE — 6360000002 HC RX W HCPCS: Performed by: NURSE ANESTHETIST, CERTIFIED REGISTERED

## 2025-02-28 PROCEDURE — 2500000003 HC RX 250 WO HCPCS: Performed by: INTERNAL MEDICINE

## 2025-02-28 PROCEDURE — 2500000003 HC RX 250 WO HCPCS: Performed by: STUDENT IN AN ORGANIZED HEALTH CARE EDUCATION/TRAINING PROGRAM

## 2025-02-28 RX ORDER — PROCHLORPERAZINE EDISYLATE 5 MG/ML
5 INJECTION INTRAMUSCULAR; INTRAVENOUS
Status: DISCONTINUED | OUTPATIENT
Start: 2025-02-28 | End: 2025-02-28 | Stop reason: HOSPADM

## 2025-02-28 RX ORDER — DIPHENHYDRAMINE HYDROCHLORIDE 50 MG/ML
12.5 INJECTION INTRAMUSCULAR; INTRAVENOUS
Status: DISCONTINUED | OUTPATIENT
Start: 2025-02-28 | End: 2025-02-28 | Stop reason: HOSPADM

## 2025-02-28 RX ORDER — HEPARIN SODIUM 10000 [USP'U]/100ML
5-30 INJECTION, SOLUTION INTRAVENOUS CONTINUOUS
Status: DISCONTINUED | OUTPATIENT
Start: 2025-02-28 | End: 2025-03-03

## 2025-02-28 RX ORDER — LORAZEPAM 2 MG/ML
0.5 INJECTION INTRAMUSCULAR
Status: DISCONTINUED | OUTPATIENT
Start: 2025-02-28 | End: 2025-02-28 | Stop reason: HOSPADM

## 2025-02-28 RX ORDER — OXYCODONE HYDROCHLORIDE 5 MG/1
5 TABLET ORAL
Status: DISCONTINUED | OUTPATIENT
Start: 2025-02-28 | End: 2025-02-28 | Stop reason: HOSPADM

## 2025-02-28 RX ORDER — SODIUM CHLORIDE 9 MG/ML
INJECTION, SOLUTION INTRAVENOUS PRN
Status: DISCONTINUED | OUTPATIENT
Start: 2025-02-28 | End: 2025-02-28 | Stop reason: HOSPADM

## 2025-02-28 RX ORDER — NALOXONE HYDROCHLORIDE 0.4 MG/ML
INJECTION, SOLUTION INTRAMUSCULAR; INTRAVENOUS; SUBCUTANEOUS PRN
Status: DISCONTINUED | OUTPATIENT
Start: 2025-02-28 | End: 2025-02-28 | Stop reason: HOSPADM

## 2025-02-28 RX ORDER — SODIUM CHLORIDE 0.9 % (FLUSH) 0.9 %
5-40 SYRINGE (ML) INJECTION PRN
Status: DISCONTINUED | OUTPATIENT
Start: 2025-02-28 | End: 2025-02-28 | Stop reason: HOSPADM

## 2025-02-28 RX ORDER — PROPOFOL 10 MG/ML
INJECTION, EMULSION INTRAVENOUS
Status: DISCONTINUED | OUTPATIENT
Start: 2025-02-28 | End: 2025-02-28 | Stop reason: SDUPTHER

## 2025-02-28 RX ORDER — SODIUM CHLORIDE 0.9 % (FLUSH) 0.9 %
5-40 SYRINGE (ML) INJECTION EVERY 12 HOURS SCHEDULED
Status: DISCONTINUED | OUTPATIENT
Start: 2025-02-28 | End: 2025-02-28 | Stop reason: HOSPADM

## 2025-02-28 RX ORDER — LABETALOL HYDROCHLORIDE 5 MG/ML
10 INJECTION, SOLUTION INTRAVENOUS
Status: DISCONTINUED | OUTPATIENT
Start: 2025-02-28 | End: 2025-02-28 | Stop reason: HOSPADM

## 2025-02-28 RX ORDER — PANTOPRAZOLE SODIUM 40 MG/1
40 TABLET, DELAYED RELEASE ORAL
Status: DISCONTINUED | OUTPATIENT
Start: 2025-02-28 | End: 2025-03-04 | Stop reason: HOSPADM

## 2025-02-28 RX ORDER — MEPERIDINE HYDROCHLORIDE 50 MG/ML
12.5 INJECTION INTRAMUSCULAR; INTRAVENOUS; SUBCUTANEOUS EVERY 5 MIN PRN
Status: DISCONTINUED | OUTPATIENT
Start: 2025-02-28 | End: 2025-02-28 | Stop reason: HOSPADM

## 2025-02-28 RX ORDER — LIDOCAINE HYDROCHLORIDE 20 MG/ML
INJECTION, SOLUTION INFILTRATION; PERINEURAL
Status: DISCONTINUED | OUTPATIENT
Start: 2025-02-28 | End: 2025-02-28 | Stop reason: SDUPTHER

## 2025-02-28 RX ORDER — ALBUMIN HUMAN 50 G/1000ML
25 SOLUTION INTRAVENOUS ONCE
Status: COMPLETED | OUTPATIENT
Start: 2025-03-01 | End: 2025-03-01

## 2025-02-28 RX ORDER — ONDANSETRON 2 MG/ML
4 INJECTION INTRAMUSCULAR; INTRAVENOUS
Status: DISCONTINUED | OUTPATIENT
Start: 2025-02-28 | End: 2025-02-28 | Stop reason: HOSPADM

## 2025-02-28 RX ADMIN — TIZANIDINE 2 MG: 4 TABLET ORAL at 22:18

## 2025-02-28 RX ADMIN — PROPOFOL 30 MG: 10 INJECTION, EMULSION INTRAVENOUS at 11:21

## 2025-02-28 RX ADMIN — ALBUMIN (HUMAN) 25 G: 12.5 INJECTION, SOLUTION INTRAVENOUS at 23:58

## 2025-02-28 RX ADMIN — HEPARIN SODIUM 18 UNITS/KG/HR: 10000 INJECTION, SOLUTION INTRAVENOUS at 18:51

## 2025-02-28 RX ADMIN — WATER 1000 MG: 1 INJECTION INTRAMUSCULAR; INTRAVENOUS; SUBCUTANEOUS at 17:30

## 2025-02-28 RX ADMIN — INSULIN GLARGINE 50 UNITS: 100 INJECTION, SOLUTION SUBCUTANEOUS at 19:44

## 2025-02-28 RX ADMIN — FUROSEMIDE 40 MG: 40 TABLET ORAL at 19:44

## 2025-02-28 RX ADMIN — INSULIN LISPRO 8 UNITS: 100 INJECTION, SOLUTION INTRAVENOUS; SUBCUTANEOUS at 20:07

## 2025-02-28 RX ADMIN — INSULIN GLARGINE 50 UNITS: 100 INJECTION, SOLUTION SUBCUTANEOUS at 08:04

## 2025-02-28 RX ADMIN — FUROSEMIDE 40 MG: 40 TABLET ORAL at 08:04

## 2025-02-28 RX ADMIN — PROPOFOL 50 MG: 10 INJECTION, EMULSION INTRAVENOUS at 11:15

## 2025-02-28 RX ADMIN — PROPOFOL 20 MG: 10 INJECTION, EMULSION INTRAVENOUS at 11:24

## 2025-02-28 RX ADMIN — LIDOCAINE HYDROCHLORIDE 100 MG: 20 INJECTION, SOLUTION INFILTRATION; PERINEURAL at 11:11

## 2025-02-28 RX ADMIN — TIZANIDINE 2 MG: 4 TABLET ORAL at 13:43

## 2025-02-28 RX ADMIN — SODIUM CHLORIDE, PRESERVATIVE FREE 10 ML: 5 INJECTION INTRAVENOUS at 19:45

## 2025-02-28 RX ADMIN — PROPOFOL 50 MG: 10 INJECTION, EMULSION INTRAVENOUS at 11:11

## 2025-02-28 RX ADMIN — PROPOFOL 50 MG: 10 INJECTION, EMULSION INTRAVENOUS at 11:13

## 2025-02-28 RX ADMIN — SODIUM CHLORIDE, PRESERVATIVE FREE 10 ML: 5 INJECTION INTRAVENOUS at 08:09

## 2025-02-28 RX ADMIN — SPIRONOLACTONE 100 MG: 100 TABLET ORAL at 08:04

## 2025-02-28 RX ADMIN — PROPOFOL 50 MG: 10 INJECTION, EMULSION INTRAVENOUS at 11:17

## 2025-02-28 RX ADMIN — PANTOPRAZOLE SODIUM 40 MG: 40 TABLET, DELAYED RELEASE ORAL at 08:42

## 2025-02-28 ASSESSMENT — PAIN - FUNCTIONAL ASSESSMENT
PAIN_FUNCTIONAL_ASSESSMENT: PREVENTS OR INTERFERES SOME ACTIVE ACTIVITIES AND ADLS
PAIN_FUNCTIONAL_ASSESSMENT: NONE - DENIES PAIN
PAIN_FUNCTIONAL_ASSESSMENT: ACTIVITIES ARE NOT PREVENTED
PAIN_FUNCTIONAL_ASSESSMENT: 0-10

## 2025-02-28 ASSESSMENT — PAIN SCALES - GENERAL
PAINLEVEL_OUTOF10: 0
PAINLEVEL_OUTOF10: 7
PAINLEVEL_OUTOF10: 6
PAINLEVEL_OUTOF10: 6

## 2025-02-28 ASSESSMENT — ENCOUNTER SYMPTOMS: SHORTNESS OF BREATH: 1

## 2025-02-28 ASSESSMENT — PAIN DESCRIPTION - ORIENTATION
ORIENTATION: LEFT
ORIENTATION: LEFT

## 2025-02-28 ASSESSMENT — PAIN DESCRIPTION - ONSET: ONSET: ON-GOING

## 2025-02-28 ASSESSMENT — PAIN DESCRIPTION - PAIN TYPE: TYPE: ACUTE PAIN

## 2025-02-28 ASSESSMENT — PAIN DESCRIPTION - DESCRIPTORS
DESCRIPTORS: ACHING
DESCRIPTORS: ACHING
DESCRIPTORS: SORE

## 2025-02-28 ASSESSMENT — PAIN DESCRIPTION - LOCATION
LOCATION: ABDOMEN
LOCATION: ARM
LOCATION: ARM

## 2025-02-28 ASSESSMENT — PAIN DESCRIPTION - FREQUENCY: FREQUENCY: INTERMITTENT

## 2025-02-28 NOTE — CONSULTS
Pharmacy to Manage Heparin Infusion per Hospital Nomogram    Dx: acute upper extremity DVT   Pt wt = 105 Kg  Baseline aPTT = pending    Heparin (weight-based) Infusion: VTE/DVT/PE  NO BOLUS to be used  Heparin infusion at 18 units/kg/hr (recommended max initial rate: 2100 units/hr).  Recheck anti-Xa (unless aPTT being used) in 6 hours.  Goal anti-Xa 0.3-0.7 IU/mL  Goal aPTT =  seconds.    anti-Xa < 0.1          No bolus                                                            Increase infusion by 4 units/kg/hr  anti-Xa 0.1-0.29     No bolus                                                            Increase infusion by 2 units/kg/hr  anti-Xa 0.3-0.7       No bolus                                                            No change   anti-Xa 0.71-0.8     No bolus                                                            Decrease infusion by 1 units/kg/hr  anti-Xa 0.81-0.99   No bolus                                                            Decrease infusion by 2 units/kg/hr  anti-Xa 1 or more   Hold heparin for 1 hour                                     Decrease infusion by 3 units/kg/hr    When Anti-Xa  is within target range for two consecutive times, check Anti-Xa once daily with morning labs.       Oliva Schumacher, PharmD  2/28/2025 at 2:28 PM      2/28 @ 1932  Anti-Xa <0.10 at 1841  Increase Heparin infusion to 22 units/kg/hr  Recheck anti-Xa in 6 hours.  Humberto Washington, PharmD 2/28/2025 7:32 PM    3/1/2025 at 0128  Anti-Xa Unfrac Heparin   0.80   IU/mL  - Decrease Heparin infusion to _21_ units/kg/hr.   - Recheck Anti-Xa in 6 hours at 0800  Joshua Diallo Pharm D.3/1/2025 1:54 AM    3/1  - Anti-xa = 0.83 at 6:02 am  - Plan : decrease heparin infusion rate to 19 units/kg/hr  - Will recheck Anti-Xa at 14:30.  - PLT= 160  H&H: 6.6/20.2  Evette Torres/Rudyh. 3/1/25 8:26 AM EST    Drip is on hold due to drop in H&H.  Evette Torres/Rph. 3/1/25 8:30 AM EST    3/2 at 1031  Heparin drip restarted at 19

## 2025-02-28 NOTE — PROCEDURES
PROCEDURE NOTE  Date: 2/28/2025   Name: Carroll Wells  YOB: 1955    Procedures  Pt arrived for image guided right Paracentesis. Betsy Grajeda CNP explained the procedure including the risk and benefits of the procedure. All questions answered. Pt verbalizes understanding of the procedure and states no more questions. Consent confirmed. Vital signs stable. Labs, allergies, medications, and code status reviewed. No contraindications noted. Time out completed prior to procedure.    Vital Signs  Vitals:    02/28/25 1600   BP: 101/63   Pulse:    Resp:    Temp:    SpO2:     (vital signs in table format)      Allergies  Aspirin and Oxycontin [oxycodone hcl] (allergies)    Labs  Lab Results   Component Value Date    INR 1.20 (H) 02/27/2025    PROTIME 15.4 (H) 02/27/2025     Lab Results   Component Value Date    CREATININE 1.1 02/28/2025    BUN 29 (H) 02/28/2025     02/28/2025    K 4.1 02/28/2025     02/28/2025    CO2 25 02/28/2025     Lab Results   Component Value Date    WBC 4.4 02/28/2025    HGB 7.0 (L) 02/28/2025    HCT 21.9 (L) 02/28/2025    MCV 73.0 (L) 02/28/2025     02/28/2025

## 2025-02-28 NOTE — PROGRESS NOTES
..4 Eyes Skin Assessment     NAME:  Carroll Wells  YOB: 1955  MEDICAL RECORD NUMBER:  [id number]    The patient is being assessed for  Admission    I agree that at least one RN has performed a thorough Head to Toe Skin Assessment on the patient. ALL assessment sites listed below have been assessed.      Areas assessed by both nurses:    Head, Face, Ears, Shoulders, Back, Chest, Arms, Elbows, Hands, Sacrum. Buttock, Coccyx, Ischium, Legs. Feet and Heels, and Under Medical Devices         Does the Patient have a Wound? No noted wound(s)       Deni Prevention initiated by RN: Yes  Wound Care Orders initiated by RN: No    Pressure Injury (Stage 3,4, Unstageable, DTI, NWPT, and Complex wounds) if present, place Wound referral order by RN under : No    New Ostomies, if present place, Ostomy referral order under : No     Nurse 1 eSignature: Electronically signed by Chuyita Miranda RN on 2/27/25 at 10:15 PM EST    **SHARE this note so that the co-signing nurse can place an eSignature**    Nurse 2 eSignature: Electronically signed by Karina Mccurdy RN on 2/27/25 at 11:59 PM EST

## 2025-02-28 NOTE — PERIOP NOTE
Telephone report from Tr HENDERSON on C-4.  Dr Montalvo advised of Hgb of 7.0.  Holding transport until Dr. Montalvo calls back.

## 2025-02-28 NOTE — ANESTHESIA PRE PROCEDURE
GASTROINTESTINAL ENDOSCOPY N/A 09/13/2021    EGD W/ANES. (8:00) performed by Dennis Velasquez MD at [facility] ENDOSCOPY   • UPPER GASTROINTESTINAL ENDOSCOPY N/A 06/22/2023    EGD BAND LIGATION performed by Dennis Velasquez MD at [facility] ENDOSCOPY   • UPPER GASTROINTESTINAL ENDOSCOPY N/A 08/10/2023    EGD BAND LIGATION performed by Dennis Velasquez MD at [facility] ENDOSCOPY   • UPPER GASTROINTESTINAL ENDOSCOPY N/A 09/01/2023    EGD W/ANES. (8:00) performed by Dennis Velasquez MD at [facility] ENDOSCOPY   • UPPER GASTROINTESTINAL ENDOSCOPY N/A 08/01/2024    ESOPHAGOGASTRODUODENOSCOPY BAND LIGATION performed by Dennis Velasquez MD at [facility] ENDOSCOPY   • UPPER GASTROINTESTINAL ENDOSCOPY  08/01/2024    ESOPHAGOGASTRODUODENOSCOPY BIOPSY performed by Dennis Velasquez MD at [facility] ENDOSCOPY   • UPPER GASTROINTESTINAL ENDOSCOPY  9/13/2021   • UPPER GASTROINTESTINAL ENDOSCOPY N/A 9/5/2024    ESOPHAGOGASTRODUODENOSCOPY BAND LIGATION performed by Dennis Velasquez MD at [facility] ENDOSCOPY   • VASECTOMY         Social History:    Social History     Tobacco Use   • Smoking status: Never   • Smokeless tobacco: Former   Substance Use Topics   • Alcohol use: Not Currently     Comment: I occasionally drink a beer.                                Counseling given: Not Answered      Vital Signs (Current):   Vitals:    02/27/25 1856 02/27/25 2304 02/28/25 0330 02/28/25 0752   BP: (!) 149/69 122/75 (!) 104/58 108/66   Pulse: 100 98 75 78   Resp: 18 18 18 20   Temp: 98.2 °F (36.8 °C) 97.8 °F (36.6 °C) 98.7 °F (37.1 °C) 98.2 °F (36.8 °C)   TempSrc: Oral Oral Oral Oral   SpO2: 99% 97% 98% 98%   Weight: 105.1 kg (231 lb 12.8 oz)      Height: 1.727 m (5' 8\")                                                 BP Readings from Last 3 Encounters:   02/28/25 108/66   02/27/25 (!) 108/44   02/19/25 133/68       NPO Status:                                                                                 BMI:   Wt Readings from Last 3 Encounters:   02/27/25

## 2025-02-28 NOTE — H&P
[facility] Medicine History & Physical    V 1.6    Date of Admission: 2/27/2025    Date of Service:  Pt seen/examined on 02/27/25     [x]Admitted to Inpatient with expected LOS greater than two midnights due to medical therapy.  []Placed in Observation status.    Chief Admission Complaint:  UE edema    Presenting Admission History:      69 y.o. male who presented to [facility] with UE edema.  PMHx significant for HLD, HTN, DM.      Patient states he was admitted to the hospital roughly around 1 week ago due to weight gain and shortness of breath.  Patient underwent paracentesis with 6 L off.  Treated for SBP with ceftriaxone.  He was informed to follow-up with GI in outpatient setting.  Sometime after patient started develop swelling in his face and left arm.  Patient decided to wait a couple days to go to his PCP follow-up.  PCP ordered ultrasound which found jugular DVTs.  Patient also states that he feels like he has some ascites because his abdomen is distended.  Denies fever, chills, sweats.  Denies chest pain palpitation.  Denies shortness of breath and cough.  Denies nausea, vomiting, diarrhea.  Denies change in bowel or bladder habits.    Assessment/Plan:      Current Principal Problem:  Subclavian vein thrombosis, unspecified laterality (HCC)    DVT of Upper Extremity and Neck, Left   -Etiology unclear at this time  -Vascular duplex of upper extremity on 2/27/2025 showed occlusive DVTs in the axillary, subclavian, and internal jugular veins  -Given the patient's history of esophageal varices and banding we will hold off on full heparinization at this time  -Will start DVT prophylaxis with Lovenox    Liver cirrhosis with ascites  -Etiology likely due to LANG  -Labs pending  -Will likely need paracentesis to drain the ascites  -GI consulted    DM2 - normally controlled on home antiGlycemics - Oral/Insulin - held/continued.  Follow FSBS on SSI high regimen.  Last HbA1c 10.2% dated 2/19/25.  Resume home regimen at discharge.    -Lantus 50 units twice daily    HTN - w/out known CAD and no evidence of active signs and/or symptoms of ischemia and/or failure. Currently controlled on home meds w/ vitals documented and reviewed.      HyperLipidemia - normally controlled on home Statin. Continued.  Follow up w/ PCP outpatient for medication initiation and/or adjustment as needed.          Discussed management and the need for Hospitalization of the patient w/ the PCP: Karina Mendoza    CXR: I have reviewed the CXR with the following interpretation: NA  EKG:  I have reviewed the EKG with the following interpretation: NA    Physical Exam Performed:      BP (!) 149/69   Pulse 100   Temp 98.2 °F (36.8 °C) (Oral)   Resp 18   Ht 1.727 m (5' 8\")   Wt 105.1 kg (231 lb 12.8 oz)   SpO2 99%   BMI 35.25 kg/m²     General appearance:  No apparent distress, appears stated age and cooperative.  HEENT:  Pupils equal, round, and reactive to light. Conjunctivae/corneas clear.  Respiratory:  Normal respiratory effort. Clear to auscultation bilaterally without Rales/Wheezes/Rhonchi.  Cardiovascular:  Regular rate and rhythm with normal S1/S2 without murmurs, rubs or gallops.  Abdomen:  Soft with normal bowel sounds.  -Abdomen distended  -Tenderness to palpation in lower quadrants  Musculoskeletal:  No clubbing, cyanosis bilaterally.   -Lower extremity edema bilaterally  -Upper extremity edema, left  Neurologic:  Neurovascularly intact without any focal sensory/motor deficits.  Psychiatric:  Alert and oriented, thought content appropriate, normal insight  Skin:  Skin color, texture, turgor normal.   Capillary Refill:  Brisk,< 3 seconds   Peripheral Pulses:  +2 palpable, equal bilaterally     Diet: [x]Adult/General  []Cardiac  []Diabetic  []Low Fat  []NPO  [x]NPO after Midnight  []Other   DVT Prophylaxis: [x]PPx LMWH  []SQ Heparin  []IPC/SCDs  []Eliquis  []Xarelto  []Coumadin     Code status: [x]Full  []DNR/CCA  []Limited

## 2025-02-28 NOTE — CONSULTS
[facility]           Vascular Surgery Consultation  Carroll Wells  Medical Record #: [id number]  YOB: 1955      Date of Admission:  2/27/2025  6:45 PM  Date of Consultation:  2/28/2025      PCP:  Karina Mendoza PA       Chief Complaint:  facial and left arm swelling    History of Present Illness:   We are asked to see this patient in consultation by OLEKSANDR Chinchilla DO  regarding multiples upper extremity DVT's including internal jugular vein DVT.    Carroll Wells is a 69 y.o. male with PMH of LANG with cirrhosis, HTN, diabetes mellitus who was seen at PCP office yesterday following hospital admission for shortness of breath and weight gain. Since his discharge on 2/18, he noticed pain and swelling in left neck and arm. He was sent for a left upper venous duplex which showed occlusive DVT in left IJ, subclavian and axillary veins. He was directly admitted for further evaluation and treatment. He was started on Lovenox instead of full heparinization given his history of esophageal varices and banding. Vascular surgery was consulted to ascertain if thrombectomy or thrombolysis would be of benefit. GI was also consulted. He just underwent an EGD this am showing three columns of varices and were successfully banded. GI agreed anticoagulation for acute upper extremity DVT may be started but is high risk for GI bleed which he has never had.        Past Medical History:   Diagnosis Date    Cervical radiculopathy 04/22/2016    Chronic pain     Chronic renal disease, stage III (HCC) [757995] 05/06/2022    GERD (gastroesophageal reflux disease)     Hyperlipidemia     Hypertension     Irritable bowel syndrome Approx 1 year    Liver disease     Mild episode of recurrent major depressive disorder 02/08/2018    Morbidly obese 08/25/2020    PONV (postoperative nausea and vomiting)     Type II or unspecified type diabetes mellitus without mention of complication, not stated as  04/26/2024 09:55 AM    LEUKOCYTESUR Negative 04/26/2024 09:55 AM    BILIRUBINUR Negative 04/26/2024 09:55 AM    BLOODU Negative 04/26/2024 09:55 AM    GLUCOSEU 100 04/26/2024 09:55 AM         Assessment:    Acute left DVT of left internal jugular, subclavian and axillary veins  LANG related to liver cirrhosis  Esophageal varices, s/p banding  Hypertension  Anemia      Plan:   No acute vascular surgical intervention warranted including mechanical thrombectomy or thrombolysis. The patient is evidently undergoing a paracentesis this afternoon and should be treated with conventional therapeutic IV Heparin. Additionally, his left upper extremity should be elevated and compressed with ace wrap.      All pertinent information and plan of care discussed with Dr. José .    All questions and concerns were addressed with the patient. I have discussed the above stated plan with the patient and spouse and the nurse and hospitalist. The patient and spouse verbalized understanding and agreed with the plan.  Thank you for allowing to us to participate in the care of Carroll Harden, APRN  02/28/25

## 2025-02-28 NOTE — PROCEDURES
PROCEDURE NOTE  Date: 2/28/2025   Name: Carroll Wells  YOB: 1955    Procedures  Image guided right Paracentesis completed.  5.4 liters of cloudy yellow colored withdrawn.  Pt tolerated procedure without any signs or symptoms of distress. Vital signs stable.     DISCHARGED:  ADMITTED: Pt transferred back to room 104. Report called to nurse.     SPECIMEN SENT:  Yes    Vital Signs  Vitals:    02/28/25 1700   BP: (!) 110/54   Pulse:    Resp:    Temp:    SpO2:     (vital signs in table format)

## 2025-02-28 NOTE — ACP (ADVANCE CARE PLANNING)
Advance Care Planning   Advance Care Planning Clinical Specialist  Conversation Note      Date of ACP Conversation: 2/28/2025    Conversation Conducted with:   Patient with Decision Making Capacity   Healthcare Decision Maker: Named in Advance Directive or Healthcare Power of  spouse as below    ACP Clinical Specialist: Tiara Lu RN      *When Decision Maker makes decisions on behalf of the incapacitated patient: Decision Maker is asked to consider and make decisions based on patient values, known preferences, or best interests.       Current Designated Health Care Decision Maker:   Primary Decision Maker: NicholasaguedaRosa Maria pantoja - Spouse - [phone]  (as entered in ACP Activity Healthcare Decision Maker field.  Validate  this information as still accurate & up-to-date; edit Healthcare Decision Maker field as needed.)       For below questions, when conducting conversation with Health Care Decision Maker, substitute \"he\" and \"his\" for \"you\" and \"your\".      Hospitalization  If your health were to worsen and it became clear that your chance of recovery was unlikely, what would your preferences be regarding hospitalization?:    Choice:  [x]  The patient would want hospitalization  []  The patient would prefer comfort-focused treatment without hospitalization.    Ventilation  If you were in your present state of health and suddenly became very ill and were unable to breathe on your own, what would your preference be about the use of a ventilator (breathing machine) if it were available to you?      If patient would desire the use of a ventilator (breathing machine), answer \"yes\", if not \"no\":yes    If your health were to worsen and it became clear that your chance of recovery was unlikely, would that change your answer? Yes    Resuscitation  CPR works best to restart the heart when there is a sudden event, like a heart attack, in someone who is otherwise healthy. Unfortunately, CPR does not typically restart  the heart for people who have serious health conditions or who are very sick.    In the event your heart stopped, would you want attempts to restart your heart (answer \"yes\") or would you prefer a natural death (answer \"no\")? yes    If your health were to worsen and it became clear that your chance of recovery was unlikely, would that change your answer? Yes    [x] Yes  [] No   Educated Patient / Decision Maker regarding differences between Advance Directives and portable DNR orders.    Length of ACP Conversation in minutes:  20 minutes    Conversation Outcomes:  [x] ACP discussion completed  [] Existing advance directive reviewed with patient; no changes to patient's previously recorded wishes   [] New Advance Directive completed   [] Portable Do Not Rescitate prepared for Provider review and signature  [] POLST/POST/MOLST/MOST prepared for Provider review and signature      Follow-up plan:    [] Schedule follow-up conversation to continue planning  [] Referred individual to Provider for additional questions/concerns   [] Advised patient/agent/surrogate to review completed ACP document and update if needed with changes in condition, patient preferences or care setting     [] This note routed to one or more involved healthcare providers      Tiara Lu, RN  ACP Clinical Specialist

## 2025-02-28 NOTE — PROGRESS NOTES
..4 Eyes Skin Assessment     NAME:  Carroll Wells  YOB: 1955  MEDICAL RECORD NUMBER:  [id number]    The patient is being assessed for  Admission    I agree that at least one RN has performed a thorough Head to Toe Skin Assessment on the patient. ALL assessment sites listed below have been assessed.      Areas assessed by both nurses:    Head, Face, Ears, Shoulders, Back, Chest, Arms, Elbows, Hands, Sacrum. Buttock, Coccyx, Ischium, Legs. Feet and Heels, and Under Medical Devices         Does the Patient have a Wound? No noted wound(s)       Deni Prevention initiated by RN: Yes  Wound Care Orders initiated by RN: No    Pressure Injury (Stage 3,4, Unstageable, DTI, NWPT, and Complex wounds) if present, place Wound referral order by RN under : No    New Ostomies, if present place, Ostomy referral order under : No     Nurse 1 eSignature: Electronically signed by Chuyita Miranda RN on 2/27/25 at 10:51 PM EST    **SHARE this note so that the co-signing nurse can place an eSignature**    Nurse 2 eSignature: Electronically signed by Karina Mccurdy RN on 2/27/25 at 11:59 PM EST

## 2025-02-28 NOTE — CONSULTS
Gastroenterology H&P/Consult Note    Patient: Carroll Wells CSN: [id number]     YOB: 1955  Age: 69 y.o.  Sex: male    Unit: [facility] A1 REM TELEMETRY Room/Bed: 0104/0104-01 Location: [facility]     Admitting Physician: NACHO VIRAMONTES    Date of  Admission: 2/27/2025   Admission type: Urgent  Primary Care Physician: Karina Mendoza PA         Chief Complaint: left upper extremity DVT  Consult Date: 2/28/2025     Subjective:     History of Present Illness: Carroll Wells is a 69 y.o. male who is seen at the request of NACHO VIRAMONTES for cirrhosis with ascites.    69 y.o. male with medical history of diabetes mellitus type 2, MASH related liver cirrhosis, ascites and esophageal varices s/p banding, hypertension admitted 2/27/25 for facial and left arm swelling of about 2 days duration. Reports mild right lower abdominal pain at site of recent paracentesis removal. Denies abdominal pain, nausea, vomiting, fever, chills, constipation, diarrhea, hematochezia or melenic stools. Patient was recently admitted on 2/18/2025 with progressive weight gain and shortness of breath.  Underwent paracentesis on 2/19/25 with 6.8L removed.    Ultrasound duplex of upper extremity 2/27/2025 noted occlusive DVT of the left internal jugular vein, left subclavian vein and left axillary vein.    Labs on presentation 2/27/25 noted hemoglobin 8.6, hematocrit 26.7, MCV 73.7.  Platelets 219, WBC 8.4.  Unremarkable liver test, total bilirubin 0.9.  Unremarkable BMP except BUN 31. MELD Na 11    Labs today noted hemoglobin 7, hematocrit 21.9, MCV 73.  Unremarkable liver test except albumin 2.7.  Unremarkable BMP except BUN 29.       CT A/P with IV contrast 2/18/25 noted no acute process in the abdomen or pelvis. Cirrhosis with moderate amount of ascites and sequelae of portal hypertension including interval enlargement of a recanalized paraumbilical vein draining via collaterals to the right saphenofemoral

## 2025-02-28 NOTE — PROGRESS NOTES
[facility] Medicine Progress Note  V 1.6      Date of Admission: 2/27/2025    Hospital Day: 2      Chief Admission Complaint:  UE edema     Subjective:  Patient seen at bedside this morning. Ongoing LUE swelling. No pain anywhere, nausea, vomiting, fevers, chills.    Presenting Admission History:       69 y.o. male who presented to [facility] with UE edema.  PMHx significant for HLD, HTN, DM.       Patient states he was admitted to the hospital roughly around 1 week ago due to weight gain and shortness of breath.  Patient underwent paracentesis with 6 L off.  Treated for SBP with ceftriaxone.  He was informed to follow-up with GI in outpatient setting.  Sometime after patient started develop swelling in his face and left arm.  Patient decided to wait a couple days to go to his PCP follow-up.  PCP ordered ultrasound which found jugular DVTs.  Patient also states that he feels like he has some ascites because his abdomen is distended.  Denies fever, chills, sweats.  Denies chest pain palpitation.  Denies shortness of breath and cough.  Denies nausea, vomiting, diarrhea.  Denies change in bowel or bladder habits.       Assessment/Plan:      Current Principal Problem:  Subclavian vein thrombosis, unspecified laterality (HCC)    DVT of Upper Extremity and Neck, Left   -Etiology unclear at this time  -Vascular duplex of upper extremity on 2/27/2025 showed occlusive DVTs in the axillary, subclavian, and internal jugular veins  -Given the patient's history of esophageal varices and banding we will hold off on full heparinization at this time  Plan:  -Will start DVT prophylaxis with Lovenox  Vascular surgery consulted, appreciate recommendations  Continue to monitor     Liver cirrhosis with ascites  -Etiology likely due to LANG  -Labs pending  -Will likely need paracentesis to drain the ascites  Plan:  -GI consulted, appreciate recommendations  Aldactone, Lasixs in place  Continue to monitor  Maintain hgb >  circumstances)    [] Cardiac Medications (IV Amiodarone/Diltiazem, Tikosyn, etc)    [] Hemodialysis    [] Other -    [] Change in code status    [] Decision to escalate care    [] Major surgery/procedure with associated risk factors    --------------------------------------------------  C. Data (any 2)    [x] Data Review (any 3)    [x] Consultant notes from yesterday/today    [x] All available current labs reviewed interpreted for clinical significance    [x] Appropriate follow-up labs were ordered  [] Collateral history obtained     [] Independent Interpretation of tests (any 1)    [] Telemetry (Rhythm Strip) personally reviewed and interpreted        [] Imaging personally reviewed and interpreted     [x] Discussion (any 1)  [x] Multi-Disciplinary Rounds with Case Management  [] Discussed management of the case with           Labs:  Personally reviewed on 2/28/2025 and interpreted for clinical significance as documented above.     Recent Labs     02/27/25 1959 02/28/25 0624   WBC 8.0 4.4   HGB 8.6* 7.0*   HCT 26.7* 21.9*    172     Recent Labs     02/27/25  1303 02/27/25 1959 02/28/25 0624    136 137   K 4.1 4.4 4.1    100 104   CO2 23 23 25   BUN 28* 31* 29*   CREATININE 1.3 1.2 1.1   CALCIUM 8.9 9.2 8.6     No results for input(s): \"PROBNP\", \"TROPHS\" in the last 72 hours.  No results for input(s): \"LABA1C\" in the last 72 hours.  Recent Labs     02/27/25 1959 02/28/25 0624   AST 23 19   ALT 14 12   BILITOT 0.9 0.6   ALKPHOS 114 91     Recent Labs     02/27/25 1959   INR 1.20*       Urine Cultures:   Lab Results   Component Value Date/Time    LABURIN 300 08/08/2022 10:55 AM     Blood Cultures: No results found for: \"BC\"  No results found for: \"BLOODCULT2\"  Organism: No results found for: \"ORG\"      Robin Donis MD

## 2025-02-28 NOTE — ANESTHESIA POSTPROCEDURE EVALUATION
Department of Anesthesiology  Postprocedure Note    Patient: Carroll Wells  MRN: [id number]  YOB: 1955  Date of evaluation: 2/28/2025    Procedure Summary       Date: 02/28/25 Room / Location: [place] [address] / [facility]    Anesthesia Start: 1100 Anesthesia Stop: 1127    Procedures:       ESOPHAGOGASTRODUODENOSCOPY DIAGNOSTIC ONLY      ESOPHAGOGASTRODUODENOSCOPY BAND LIGATION Diagnosis:       Anemia, unspecified type      (Anemia, unspecified type [D64.9])    Surgeons: Dennis Velasquez MD Responsible Provider: Ashish Montalvo MD    Anesthesia Type: MAC ASA Status: 3            Anesthesia Type: No value filed.    Fco Phase I: Fco Score: 10    Fco Phase II: Fco Score: 10    Anesthesia Post Evaluation    Patient location during evaluation: PACU  Patient participation: complete - patient participated  Level of consciousness: awake and alert  Airway patency: patent  Nausea & Vomiting: no vomiting and no nausea  Cardiovascular status: hemodynamically stable and blood pressure returned to baseline  Respiratory status: acceptable  Hydration status: euvolemic  Comments: ---------------------------               02/28/25                      1231         ---------------------------   BP:          133/74         Pulse:         90           Resp:          20           Temp:   98.3 °F (36.8 °C)   SpO2:          98%         ---------------------------    Pain management: adequate    No notable events documented.

## 2025-02-28 NOTE — CARE COORDINATION
Case Management Assessment  Initial Evaluation    Date/Time of Evaluation: 2/28/2025 2:21 PM  Assessment Completed by: Tiara Lu RN    If patient is discharged prior to next notation, then this note serves as note for discharge by case management.    Patient Name: Carroll Wells                   YOB: 1955  Diagnosis: Subclavian vein thrombosis, unspecified laterality (HCC) [I82.B19]  Acute deep vein thrombosis (DVT) of left upper extremity, unspecified vein (HCC) [I82.622]                   Date / Time: 2/27/2025  6:45 PM    Patient Admission Status: Inpatient   Readmission Risk (Low < 19, Mod (19-27), High > 27): Readmission Risk Score: 14.6    Current PCP: Karina Mendoza PA  PCP verified by CM? Yes    Chart Reviewed: Yes      History Provided by: Patient, Spouse  Patient Orientation: Alert and Oriented    Patient Cognition: Alert    Hospitalization in the last 30 days (Readmission):  Yes    If yes, Readmission Assessment in CM Navigator will be completed.    Advance Directives:      Code Status: Full Code   Patient's Primary Decision Maker is: Named in Scanned ACP Document (not on file)    Primary Decision Maker: Rosa Maria Wells - Spouse - [phone]    Discharge Planning:    Patient lives with: Spouse/Significant Other Type of Home: House  Primary Care Giver: Self  Patient Support Systems include: Spouse/Significant Other   Current Financial resources:    Current community resources: None  Current services prior to admission: None            Current DME: Cane            Type of Home Care services:  None    ADLS  Prior functional level: Independent in ADLs/IADLs  Current functional level: Independent in ADLs/IADLs    PT AM-PAC:   /24  OT AM-PAC:   /24    Family can provide assistance at DC: Yes  Would you like Case Management to discuss the discharge plan with any other family members/significant others, and if so, who? Yes (spouse)  Plans to Return to Present Housing: Yes  Other  Identified Issues/Barriers to RETURNING to current housing: none  Potential Assistance needed at discharge: N/A            Potential DME:    Patient expects to discharge to: House  Plan for transportation at discharge:      Financial    Payor: [facility] MEDICARE / Plan: [facility] MEDICARE ADVANTAGE / Product Type: *No Product type* /     Does insurance require precert for SNF: Yes    Potential assistance Purchasing Medications: No  Meds-to-Beds request:        [id number] - [facility] [place] - [address] [phone] - F [phone]  [address] [facility] [address]  Phone: [phone] Fax: [phone]      Notes:    Factors facilitating achievement of predicted outcomes: Family support, Motivated, Cooperative, Pleasant, and Good insight into deficits    Barriers to discharge: Pain and Medical complications    Additional Case Management Notes: IPTA. From home with spouse. Uses cane at baseline. Here 1 wk ago for paracentesis (7,000cc removed).LANG and subclavian thrombus. Had an EGD and bandings as well as paracentesis today. Dr José is following for subclavian thrombus.   Hopes to return home with wife.    The Plan for Transition of Care is related to the following treatment goals of Subclavian vein thrombosis, unspecified laterality (HCC) [I82.B19]  Acute deep vein thrombosis (DVT) of left upper extremity, unspecified vein (HCC) [I82.622]    IF APPLICABLE: The Patient and/or patient representative Carroll and his family were provided with a choice of provider and agrees with the discharge plan. Freedom of choice list with basic dialogue that supports the patient's individualized plan of care/goals and shares the quality data associated with the providers was provided to:     Patient Representative Name:       The Patient and/or Patient Representative Agree with the Discharge Plan?      Tiara Lu RN  Case Management Department  Ph: [phone] Fax: [phone]

## 2025-03-01 LAB
ABO + RH BLD: NORMAL
ALBUMIN FLD-MCNC: 0.8 G/DL
ALBUMIN SERPL-MCNC: 3 G/DL (ref 3.4–5)
ALBUMIN/GLOB SERPL: 1.3 {RATIO} (ref 1.1–2.2)
ALP SERPL-CCNC: 77 U/L (ref 40–129)
ALT SERPL-CCNC: 8 U/L (ref 10–40)
ANION GAP SERPL CALCULATED.3IONS-SCNC: 11 MMOL/L (ref 3–16)
ANTI-XA UNFRAC HEPARIN: 0.8 IU/ML (ref 0.3–0.7)
ANTI-XA UNFRAC HEPARIN: 0.83 IU/ML (ref 0.3–0.7)
AST SERPL-CCNC: 17 U/L (ref 15–37)
BASOPHILS # BLD: 0 K/UL (ref 0–0.2)
BASOPHILS NFR BLD: 1.1 %
BILIRUB SERPL-MCNC: 0.5 MG/DL (ref 0–1)
BLD GP AB SCN SERPL QL: NORMAL
BLOOD BANK DISPENSE STATUS: NORMAL
BLOOD BANK PRODUCT CODE: NORMAL
BPU ID: NORMAL
BUN SERPL-MCNC: 23 MG/DL (ref 7–20)
CALCIUM SERPL-MCNC: 8.5 MG/DL (ref 8.3–10.6)
CHLORIDE SERPL-SCNC: 105 MMOL/L (ref 99–110)
CO2 SERPL-SCNC: 24 MMOL/L (ref 21–32)
CREAT SERPL-MCNC: 1 MG/DL (ref 0.8–1.3)
DEPRECATED RDW RBC AUTO: 17.8 % (ref 12.4–15.4)
DESCRIPTION BLOOD BANK: NORMAL
EOSINOPHIL # BLD: 0.1 K/UL (ref 0–0.6)
EOSINOPHIL NFR BLD: 2.4 %
GFR SERPLBLD CREATININE-BSD FMLA CKD-EPI: 80 ML/MIN/{1.73_M2}
GLUCOSE BLD-MCNC: 116 MG/DL (ref 70–99)
GLUCOSE BLD-MCNC: 177 MG/DL (ref 70–99)
GLUCOSE BLD-MCNC: 194 MG/DL (ref 70–99)
GLUCOSE BLD-MCNC: 240 MG/DL (ref 70–99)
GLUCOSE SERPL-MCNC: 114 MG/DL (ref 70–99)
HCT VFR BLD AUTO: 20.2 % (ref 40.5–52.5)
HCT VFR BLD AUTO: 25.2 % (ref 40.5–52.5)
HCT VFR BLD AUTO: 26.5 % (ref 40.5–52.5)
HGB BLD-MCNC: 6.6 G/DL (ref 13.5–17.5)
HGB BLD-MCNC: 8.2 G/DL (ref 13.5–17.5)
HGB BLD-MCNC: 8.6 G/DL (ref 13.5–17.5)
LYMPHOCYTES # BLD: 1 K/UL (ref 1–5.1)
LYMPHOCYTES NFR BLD: 28.7 %
MAGNESIUM SERPL-MCNC: 1.41 MG/DL (ref 1.8–2.4)
MCH RBC QN AUTO: 23.6 PG (ref 26–34)
MCHC RBC AUTO-ENTMCNC: 32.5 G/DL (ref 31–36)
MCV RBC AUTO: 72.5 FL (ref 80–100)
MONOCYTES # BLD: 0.6 K/UL (ref 0–1.3)
MONOCYTES NFR BLD: 15.9 %
NEUTROPHILS # BLD: 1.8 K/UL (ref 1.7–7.7)
NEUTROPHILS NFR BLD: 51.9 %
PERFORMED ON: ABNORMAL
PLATELET # BLD AUTO: 160 K/UL (ref 135–450)
PMV BLD AUTO: 7.4 FL (ref 5–10.5)
POTASSIUM SERPL-SCNC: 3.3 MMOL/L (ref 3.5–5.1)
POTASSIUM SERPL-SCNC: 3.3 MMOL/L (ref 3.5–5.1)
PROT FLD-MCNC: 1.6 G/DL
PROT SERPL-MCNC: 5.3 G/DL (ref 6.4–8.2)
RBC # BLD AUTO: 2.79 M/UL (ref 4.2–5.9)
SODIUM SERPL-SCNC: 140 MMOL/L (ref 136–145)
SPECIMEN SOURCE FLD: NORMAL
WBC # BLD AUTO: 3.5 K/UL (ref 4–11)

## 2025-03-01 PROCEDURE — 83735 ASSAY OF MAGNESIUM: CPT

## 2025-03-01 PROCEDURE — 85025 COMPLETE CBC W/AUTO DIFF WBC: CPT

## 2025-03-01 PROCEDURE — 6370000000 HC RX 637 (ALT 250 FOR IP): Performed by: STUDENT IN AN ORGANIZED HEALTH CARE EDUCATION/TRAINING PROGRAM

## 2025-03-01 PROCEDURE — 36430 TRANSFUSION BLD/BLD COMPNT: CPT

## 2025-03-01 PROCEDURE — 86850 RBC ANTIBODY SCREEN: CPT

## 2025-03-01 PROCEDURE — 6360000002 HC RX W HCPCS: Performed by: NURSE PRACTITIONER

## 2025-03-01 PROCEDURE — 2500000003 HC RX 250 WO HCPCS: Performed by: STUDENT IN AN ORGANIZED HEALTH CARE EDUCATION/TRAINING PROGRAM

## 2025-03-01 PROCEDURE — 6360000002 HC RX W HCPCS

## 2025-03-01 PROCEDURE — 80053 COMPREHEN METABOLIC PANEL: CPT

## 2025-03-01 PROCEDURE — P9045 ALBUMIN (HUMAN), 5%, 250 ML: HCPCS | Performed by: NURSE PRACTITIONER

## 2025-03-01 PROCEDURE — P9016 RBC LEUKOCYTES REDUCED: HCPCS

## 2025-03-01 PROCEDURE — 86923 COMPATIBILITY TEST ELECTRIC: CPT

## 2025-03-01 PROCEDURE — 6360000002 HC RX W HCPCS: Performed by: STUDENT IN AN ORGANIZED HEALTH CARE EDUCATION/TRAINING PROGRAM

## 2025-03-01 PROCEDURE — 86900 BLOOD TYPING SEROLOGIC ABO: CPT

## 2025-03-01 PROCEDURE — 6360000002 HC RX W HCPCS: Performed by: INTERNAL MEDICINE

## 2025-03-01 PROCEDURE — 6370000000 HC RX 637 (ALT 250 FOR IP)

## 2025-03-01 PROCEDURE — 1200000000 HC SEMI PRIVATE

## 2025-03-01 PROCEDURE — 36415 COLL VENOUS BLD VENIPUNCTURE: CPT

## 2025-03-01 PROCEDURE — 85520 HEPARIN ASSAY: CPT

## 2025-03-01 PROCEDURE — 85018 HEMOGLOBIN: CPT

## 2025-03-01 PROCEDURE — 2500000003 HC RX 250 WO HCPCS: Performed by: INTERNAL MEDICINE

## 2025-03-01 PROCEDURE — 85014 HEMATOCRIT: CPT

## 2025-03-01 PROCEDURE — 86901 BLOOD TYPING SEROLOGIC RH(D): CPT

## 2025-03-01 RX ORDER — POTASSIUM CHLORIDE 1500 MG/1
40 TABLET, EXTENDED RELEASE ORAL ONCE
Status: COMPLETED | OUTPATIENT
Start: 2025-03-01 | End: 2025-03-01

## 2025-03-01 RX ORDER — ALBUMIN HUMAN 50 G/1000ML
25 SOLUTION INTRAVENOUS ONCE
Status: COMPLETED | OUTPATIENT
Start: 2025-03-01 | End: 2025-03-01

## 2025-03-01 RX ORDER — SODIUM CHLORIDE 9 MG/ML
INJECTION, SOLUTION INTRAVENOUS PRN
Status: DISCONTINUED | OUTPATIENT
Start: 2025-03-01 | End: 2025-03-04 | Stop reason: HOSPADM

## 2025-03-01 RX ORDER — POTASSIUM CHLORIDE 7.45 MG/ML
10 INJECTION INTRAVENOUS
Status: DISPENSED | OUTPATIENT
Start: 2025-03-01 | End: 2025-03-01

## 2025-03-01 RX ADMIN — SPIRONOLACTONE 100 MG: 100 TABLET ORAL at 12:53

## 2025-03-01 RX ADMIN — FUROSEMIDE 40 MG: 40 TABLET ORAL at 12:53

## 2025-03-01 RX ADMIN — POTASSIUM CHLORIDE 40 MEQ: 1500 TABLET, EXTENDED RELEASE ORAL at 09:20

## 2025-03-01 RX ADMIN — INSULIN GLARGINE 50 UNITS: 100 INJECTION, SOLUTION SUBCUTANEOUS at 09:20

## 2025-03-01 RX ADMIN — POTASSIUM CHLORIDE 10 MEQ: 7.46 INJECTION, SOLUTION INTRAVENOUS at 12:49

## 2025-03-01 RX ADMIN — PANTOPRAZOLE SODIUM 40 MG: 40 TABLET, DELAYED RELEASE ORAL at 05:04

## 2025-03-01 RX ADMIN — WATER 1000 MG: 1 INJECTION INTRAMUSCULAR; INTRAVENOUS; SUBCUTANEOUS at 16:46

## 2025-03-01 RX ADMIN — FUROSEMIDE 40 MG: 40 TABLET ORAL at 20:19

## 2025-03-01 RX ADMIN — SODIUM CHLORIDE, PRESERVATIVE FREE 10 ML: 5 INJECTION INTRAVENOUS at 20:19

## 2025-03-01 RX ADMIN — ALBUMIN (HUMAN) 25 G: 12.5 INJECTION, SOLUTION INTRAVENOUS at 05:04

## 2025-03-01 RX ADMIN — INSULIN GLARGINE 50 UNITS: 100 INJECTION, SOLUTION SUBCUTANEOUS at 20:19

## 2025-03-01 RX ADMIN — MAGNESIUM SULFATE HEPTAHYDRATE 2000 MG: 40 INJECTION, SOLUTION INTRAVENOUS at 10:51

## 2025-03-01 RX ADMIN — INSULIN LISPRO 4 UNITS: 100 INJECTION, SOLUTION INTRAVENOUS; SUBCUTANEOUS at 16:46

## 2025-03-01 RX ADMIN — HEPARIN SODIUM 21 UNITS/KG/HR: 10000 INJECTION, SOLUTION INTRAVENOUS at 05:23

## 2025-03-01 RX ADMIN — INSULIN LISPRO 4 UNITS: 100 INJECTION, SOLUTION INTRAVENOUS; SUBCUTANEOUS at 20:19

## 2025-03-01 RX ADMIN — POTASSIUM CHLORIDE 10 MEQ: 7.46 INJECTION, SOLUTION INTRAVENOUS at 13:59

## 2025-03-01 ASSESSMENT — PAIN SCALES - GENERAL
PAINLEVEL_OUTOF10: 0

## 2025-03-01 NOTE — PROGRESS NOTES
liver cirrhosis, ascites and esophageal varices s/p banding, hypertension admitted 2/27/25 for facial and left arm swelling. Found to have left upper extremity acute DVT. GI follows for decompensated cirrhosis with ascites and anemia.      Ultrasound duplex of upper extremity 2/27/2025 noted occlusive DVT of the left internal jugular vein, left subclavian vein and left axillary vein     Labs on presentation 2/27/25 noted hemoglobin 8.6, hematocrit 26.7, MCV 73.7.  Platelets 219, WBC 8.4.  Unremarkable liver test, total bilirubin 0.9.  Unremarkable BMP except BUN 31. MELD Na 11     Labs today noted hemoglobin 7, hematocrit 21.9, MCV 73.  Unremarkable liver test except albumin 2.7.  Unremarkable BMP except BUN 29.     CT A/P with IV contrast 2/18/25 noted no acute process in the abdomen or pelvis. Cirrhosis with moderate amount of ascites and sequelae of portal hypertension including interval enlargement of a recanalized paraumbilical vein draining via collaterals to the right saphenofemoral confluence.     EGD 2/28/25: Normal upper third of esophagus. Three columns of grade II esophageal varices.  Completely eradicated.  Banded X 3. Z-line irregular, 40 cm from the incisors. Portal hypertensive gastropathy. 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 Normal duodenal bulb and second portion of the duodenum.       Impression:  Decompensated MASH related cirrhosis with Ascites. MELD Na = 11  Paracentesis 2/28/25 with removal of 5.4 L turbid milky ascitic fluid.   Continue Ceftriaxone 1gm IV daily   Monitor inputs and outputs; net    Diet 100 g protein, low sodium < 2 gm/day  Diuretics with Lasix 40 mg p.o. BID and Spironolactone 100 mg p.o. daily  Monitor CBC, BMP, LFTs, PT/INR daily  Continue lactulose 3 times daily titrate to 2-3 soft bowel movements daily     Acute on chronic microcytic anemia. History of esophageal varices s/p banding.   Hb 8.6 g/dL > 7 g/dL > 8.3 g/dL > 6.6 g/dL.  No evidence of overt GI  bleed.   Monitor hemoglobin and hematocrit and transfuse to maintain hemoglobin > 7 g/dL;  EGD 2/28/25 - 3 medium nonbleeding esophageal varices, banded. Portal hypertensive gastropathy and nonbleeding GAVE.       Left upper acute DVT  Vascular surgery recommendations noted.   Heparin gtt discontinued this morning due to acute anemia.   Hb 8.6 g/dL > 7 g/dL > 8.3 g/dL > 6.6 g/dL.     GI to follow.     Dennis Velasquez MD  [facility]  3/1/2025    [phone]. Also available via Perfect Serve    Please note that some or all of this record was generated using voice recognition software. If there are any questions about the content of this document, please contact the author as some errors in translation may have occurred.

## 2025-03-01 NOTE — PROGRESS NOTES
[facility] Medicine Progress Note  V 1.6      Date of Admission: 2/27/2025    Hospital Day: 3      Chief Admission Complaint:  UE edema     Subjective:  Patient seen at bedside this morning. Ongoing LUE swelling, wrapped. No pain anywhere, nausea, vomiting, fevers, chills, signs of bleeding.    Presenting Admission History:       69 y.o. male who presented to [facility] with UE edema.  PMHx significant for HLD, HTN, DM.       Patient states he was admitted to the hospital roughly around 1 week ago due to weight gain and shortness of breath.  Patient underwent paracentesis with 6 L off.  Treated for SBP with ceftriaxone.  He was informed to follow-up with GI in outpatient setting.  Sometime after patient started develop swelling in his face and left arm.  Patient decided to wait a couple days to go to his PCP follow-up.  PCP ordered ultrasound which found jugular DVTs.  Patient also states that he feels like he has some ascites because his abdomen is distended.  Denies fever, chills, sweats.  Denies chest pain palpitation.  Denies shortness of breath and cough.  Denies nausea, vomiting, diarrhea.  Denies change in bowel or bladder habits.       Assessment/Plan:      Current Principal Problem:  Subclavian vein thrombosis, unspecified laterality (HCC)    DVT of Upper Extremity and Neck, Left   Anemia  -Etiology unclear at this time  -Vascular duplex of upper extremity on 2/27/2025 showed occlusive DVTs in the axillary, subclavian, and internal jugular veins  -Given the patient's history of esophageal varices and banding we will hold off on full heparinization at this time  Plan:  Held anticoagulation in setting of anemia  Vascular surgery consulted, appreciate recommendations  Continue to monitor     Liver cirrhosis with ascites  -Etiology likely due to LANG  -Labs pending  -Will likely need paracentesis to drain the ascites  Plan:  -GI consulted, appreciate recommendations  Aldactone, Lasixs in

## 2025-03-01 NOTE — PLAN OF CARE
Problem: Chronic Conditions and Co-morbidities  Goal: Patient's chronic conditions and co-morbidity symptoms are monitored and maintained or improved  3/1/2025 0952 by Marychuy Rubio RN  Outcome: Progressing     Problem: Discharge Planning  Goal: Discharge to home or other facility with appropriate resources  3/1/2025 0952 by Marychuy Rubio RN  Outcome: Progressing     Problem: Pain  Goal: Verbalizes/displays adequate comfort level or baseline comfort level  3/1/2025 0952 by Marychuy Rubio RN  Outcome: Progressing     Problem: Safety - Adult  Goal: Free from fall injury  3/1/2025 0952 by Marychuy Rubio RN  Outcome: Progressing

## 2025-03-02 LAB
ANION GAP SERPL CALCULATED.3IONS-SCNC: 9 MMOL/L (ref 3–16)
ANTI-XA UNFRAC HEPARIN: 0.49 IU/ML (ref 0.3–0.7)
ANTI-XA UNFRAC HEPARIN: 0.64 IU/ML (ref 0.3–0.7)
ANTI-XA UNFRAC HEPARIN: <0.1 IU/ML (ref 0.3–0.7)
BASOPHILS # BLD: 0 K/UL (ref 0–0.2)
BASOPHILS NFR BLD: 0.5 %
BUN SERPL-MCNC: 15 MG/DL (ref 7–20)
CALCIUM SERPL-MCNC: 9.4 MG/DL (ref 8.3–10.6)
CHLORIDE SERPL-SCNC: 104 MMOL/L (ref 99–110)
CO2 SERPL-SCNC: 27 MMOL/L (ref 21–32)
CREAT SERPL-MCNC: 0.9 MG/DL (ref 0.8–1.3)
DEPRECATED RDW RBC AUTO: 18.4 % (ref 12.4–15.4)
EOSINOPHIL # BLD: 0.1 K/UL (ref 0–0.6)
EOSINOPHIL NFR BLD: 2 %
GFR SERPLBLD CREATININE-BSD FMLA CKD-EPI: >90 ML/MIN/{1.73_M2}
GLUCOSE BLD-MCNC: 173 MG/DL (ref 70–99)
GLUCOSE BLD-MCNC: 201 MG/DL (ref 70–99)
GLUCOSE BLD-MCNC: 313 MG/DL (ref 70–99)
GLUCOSE BLD-MCNC: 71 MG/DL (ref 70–99)
GLUCOSE SERPL-MCNC: 106 MG/DL (ref 70–99)
HCT VFR BLD AUTO: 27.4 % (ref 40.5–52.5)
HCT VFR BLD AUTO: 28.5 % (ref 40.5–52.5)
HCT VFR BLD AUTO: 29.9 % (ref 40.5–52.5)
HGB BLD-MCNC: 8.9 G/DL (ref 13.5–17.5)
HGB BLD-MCNC: 9.2 G/DL (ref 13.5–17.5)
HGB BLD-MCNC: 9.4 G/DL (ref 13.5–17.5)
LYMPHOCYTES # BLD: 0.9 K/UL (ref 1–5.1)
LYMPHOCYTES NFR BLD: 14.5 %
MAGNESIUM SERPL-MCNC: 1.66 MG/DL (ref 1.8–2.4)
MCH RBC QN AUTO: 24.2 PG (ref 26–34)
MCHC RBC AUTO-ENTMCNC: 32.4 G/DL (ref 31–36)
MCV RBC AUTO: 74.6 FL (ref 80–100)
MONOCYTES # BLD: 0.9 K/UL (ref 0–1.3)
MONOCYTES NFR BLD: 14.7 %
NEUTROPHILS # BLD: 4.1 K/UL (ref 1.7–7.7)
NEUTROPHILS NFR BLD: 68.3 %
PERFORMED ON: ABNORMAL
PERFORMED ON: NORMAL
PHOSPHATE SERPL-MCNC: 2.7 MG/DL (ref 2.5–4.9)
PLATELET # BLD AUTO: 185 K/UL (ref 135–450)
PMV BLD AUTO: 7.7 FL (ref 5–10.5)
POTASSIUM SERPL-SCNC: 3.9 MMOL/L (ref 3.5–5.1)
RBC # BLD AUTO: 3.67 M/UL (ref 4.2–5.9)
SODIUM SERPL-SCNC: 140 MMOL/L (ref 136–145)
WBC # BLD AUTO: 5.9 K/UL (ref 4–11)

## 2025-03-02 PROCEDURE — 1200000000 HC SEMI PRIVATE

## 2025-03-02 PROCEDURE — 2500000003 HC RX 250 WO HCPCS: Performed by: INTERNAL MEDICINE

## 2025-03-02 PROCEDURE — 85014 HEMATOCRIT: CPT

## 2025-03-02 PROCEDURE — 6360000002 HC RX W HCPCS

## 2025-03-02 PROCEDURE — 6360000002 HC RX W HCPCS: Performed by: INTERNAL MEDICINE

## 2025-03-02 PROCEDURE — 6370000000 HC RX 637 (ALT 250 FOR IP)

## 2025-03-02 PROCEDURE — 85018 HEMOGLOBIN: CPT

## 2025-03-02 PROCEDURE — 2500000003 HC RX 250 WO HCPCS: Performed by: STUDENT IN AN ORGANIZED HEALTH CARE EDUCATION/TRAINING PROGRAM

## 2025-03-02 PROCEDURE — 6370000000 HC RX 637 (ALT 250 FOR IP): Performed by: STUDENT IN AN ORGANIZED HEALTH CARE EDUCATION/TRAINING PROGRAM

## 2025-03-02 PROCEDURE — 84100 ASSAY OF PHOSPHORUS: CPT

## 2025-03-02 PROCEDURE — 36415 COLL VENOUS BLD VENIPUNCTURE: CPT

## 2025-03-02 PROCEDURE — 6370000000 HC RX 637 (ALT 250 FOR IP): Performed by: INTERNAL MEDICINE

## 2025-03-02 PROCEDURE — 85025 COMPLETE CBC W/AUTO DIFF WBC: CPT

## 2025-03-02 PROCEDURE — 80048 BASIC METABOLIC PNL TOTAL CA: CPT

## 2025-03-02 PROCEDURE — 83735 ASSAY OF MAGNESIUM: CPT

## 2025-03-02 PROCEDURE — 85520 HEPARIN ASSAY: CPT

## 2025-03-02 RX ORDER — LACTULOSE 10 G/15ML
20 SOLUTION ORAL 3 TIMES DAILY
Status: DISCONTINUED | OUTPATIENT
Start: 2025-03-02 | End: 2025-03-04 | Stop reason: HOSPADM

## 2025-03-02 RX ORDER — MAGNESIUM SULFATE IN WATER 40 MG/ML
2000 INJECTION, SOLUTION INTRAVENOUS ONCE
Status: COMPLETED | OUTPATIENT
Start: 2025-03-02 | End: 2025-03-02

## 2025-03-02 RX ADMIN — MAGNESIUM SULFATE HEPTAHYDRATE 2000 MG: 40 INJECTION, SOLUTION INTRAVENOUS at 08:27

## 2025-03-02 RX ADMIN — LACTULOSE 20 G: 20 SOLUTION ORAL at 10:22

## 2025-03-02 RX ADMIN — INSULIN GLARGINE 50 UNITS: 100 INJECTION, SOLUTION SUBCUTANEOUS at 20:21

## 2025-03-02 RX ADMIN — HEPARIN SODIUM 19 UNITS/KG/HR: 10000 INJECTION, SOLUTION INTRAVENOUS at 23:11

## 2025-03-02 RX ADMIN — PANTOPRAZOLE SODIUM 40 MG: 40 TABLET, DELAYED RELEASE ORAL at 08:19

## 2025-03-02 RX ADMIN — SPIRONOLACTONE 100 MG: 100 TABLET ORAL at 08:19

## 2025-03-02 RX ADMIN — LACTULOSE 20 G: 20 SOLUTION ORAL at 20:20

## 2025-03-02 RX ADMIN — INSULIN LISPRO 12 UNITS: 100 INJECTION, SOLUTION INTRAVENOUS; SUBCUTANEOUS at 20:21

## 2025-03-02 RX ADMIN — SODIUM CHLORIDE, PRESERVATIVE FREE 10 ML: 5 INJECTION INTRAVENOUS at 08:20

## 2025-03-02 RX ADMIN — ACETAMINOPHEN 500 MG: 500 TABLET ORAL at 20:21

## 2025-03-02 RX ADMIN — INSULIN GLARGINE 50 UNITS: 100 INJECTION, SOLUTION SUBCUTANEOUS at 08:24

## 2025-03-02 RX ADMIN — FUROSEMIDE 40 MG: 40 TABLET ORAL at 20:20

## 2025-03-02 RX ADMIN — INSULIN LISPRO 4 UNITS: 100 INJECTION, SOLUTION INTRAVENOUS; SUBCUTANEOUS at 12:05

## 2025-03-02 RX ADMIN — FUROSEMIDE 40 MG: 40 TABLET ORAL at 08:19

## 2025-03-02 RX ADMIN — WATER 1000 MG: 1 INJECTION INTRAMUSCULAR; INTRAVENOUS; SUBCUTANEOUS at 16:57

## 2025-03-02 ASSESSMENT — PAIN SCALES - GENERAL
PAINLEVEL_OUTOF10: 0
PAINLEVEL_OUTOF10: 0

## 2025-03-02 NOTE — PROGRESS NOTES
[facility] Medicine Progress Note  V 1.6      Date of Admission: 2/27/2025    Hospital Day: 4      Chief Admission Complaint:  UE edema     Subjective:  Patient seen at bedside this morning. Ongoing LUE swelling, wrapped. No pain anywhere, nausea, vomiting, fevers, chills, signs of bleeding.    Presenting Admission History:       69 y.o. male who presented to [facility] with UE edema.  PMHx significant for HLD, HTN, DM.       Patient states he was admitted to the hospital roughly around 1 week ago due to weight gain and shortness of breath.  Patient underwent paracentesis with 6 L off.  Treated for SBP with ceftriaxone.  He was informed to follow-up with GI in outpatient setting.  Sometime after patient started develop swelling in his face and left arm.  Patient decided to wait a couple days to go to his PCP follow-up.  PCP ordered ultrasound which found jugular DVTs.  Patient also states that he feels like he has some ascites because his abdomen is distended.  Denies fever, chills, sweats.  Denies chest pain palpitation.  Denies shortness of breath and cough.  Denies nausea, vomiting, diarrhea.  Denies change in bowel or bladder habits.       Assessment/Plan:      Current Principal Problem:  Subclavian vein thrombosis, unspecified laterality (HCC)    DVT of Upper Extremity and Neck, Left   Anemia  -Etiology unclear at this time  -Vascular duplex of upper extremity on 2/27/2025 showed occlusive DVTs in the axillary, subclavian, and internal jugular veins  -Given the patient's history of esophageal varices and banding we will hold off on full heparinization at this time  Plan:  Restarted heparin drip, continue to monitor for signs of bleeding  Trending Hgb  PPI in place  Gastroenterology following, appreciate recommendations  Vascular surgery consulted, appreciate recommendations  Continue to monitor  Maintain hgb > 7, transfuse as needed     Liver cirrhosis with ascites  -Etiology likely due to  dextrose      Physical Exam Performed:      General appearance:  No apparent distress  Respiratory:  Normal respiratory effort.   Cardiovascular:  Regular rate and rhythm.  Abdomen:  Soft, non-tender, non-distended.  Musculoskeletal:  No edema  Neurologic:  Non-focal  Psychiatric:  Alert and oriented    BP (!) 153/74   Pulse 98   Temp 98.2 °F (36.8 °C) (Oral)   Resp 18   Ht 1.727 m (5' 8\")   Wt 105.1 kg (231 lb 12.8 oz)   SpO2 98%   BMI 35.25 kg/m²     Telemetry:      Personally reviewed and interpreted telemetry (Rhythm Strip) on 3/2/2025 with the following findings: Unremarkable    Diet: ADULT DIET; Regular; Low Sodium (2 gm)    DVT Prophylaxis: []PPx LMWH  []SQ Heparin  []IPC/SCDs  []Eliquis  []Xarelto  []Coumadin  [x] Heparin Drip  []Other -      Code status: Full Code    PT/OT Eval Status:   [x]NOT yet ordered  []Ordered and Pending   []Seen with Recommendations for:   []Home independently  []Home w/ assist  []HHC  []SNF  []Acute Rehab    Multi-Disciplinary Rounds with Case Management completed on 3/2/2025 with the following recommendations:  Anticipated Discharge Location: [x]Home w/ []HHC vs []SNF  []Acute Rehab  []LTAC  []Hospice  []Other -    Anticipated Discharge Day/Date:  3/2/25  Barriers to Discharge: DVT, pending work up  --------------------------------------------------    MDM (any 2 required for High level billing)    A. Problems (any 1)  [x] Acute/Chronic Illness/injury posing ongoing threat to life and/or bodily function without ongoing treatment    [] Severe exacerbation of chronic illness    --------------------------------------------------  B. Risk of Treatment (any 1)    [x] Drugs/treatments that require intensive monitoring for toxicity    [] IV ABX (Vancomycin, Aminoglycosides, etc)     [] Post-Cath/Contrast study requiring serial monitoring    [] IV Narcotic analgesia    [] Aggressive IV diuresis    [] Hypertonic Saline    [] Critical electrolyte abnormalities requiring IV

## 2025-03-02 NOTE — PROGRESS NOTES
Gastroenterology Progress Note      Patient Carroll Wells  MRN: [id number]  YOB: 1955 Age: 69 y.o. Sex: male  Room: [address] [place] [address]       Admitting Physician: Ian Chinchilla DO   Date of Admission: 2/27/2025  6:45 PM   Primary Care Physician: Karina Mendoza PA     Subjective:  We are following for decompensated MASH cirrhosis with ascites. Carroll Wells was seen and examined. Reports resolved abdominal pain. Denies abdominal pain, nausea, vomiting, fever, hematochezia or melenic stools.     Current Hospital Schedued Meds   magnesium sulfate  2,000 mg IntraVENous Once    pantoprazole  40 mg Oral QAM AC    cefTRIAXone (ROCEPHIN) IV  1,000 mg IntraVENous Q24H    sodium chloride flush  5-40 mL IntraVENous 2 times per day    insulin lispro  0-16 Units SubCUTAneous 4x Daily AC & HS    insulin glargine  50 Units SubCUTAneous BID    furosemide  40 mg Oral BID    spironolactone  100 mg Oral Daily     Current Hospital IV Meds   sodium chloride      [Held by provider] heparin (PORCINE) Infusion Stopped (03/01/25 0821)    sodium chloride      dextrose       Current Hospital Prn Meds  sodium chloride, sodium chloride flush, sodium chloride, potassium chloride **OR** potassium alternative oral replacement **OR** potassium chloride, magnesium sulfate, ondansetron **OR** ondansetron, polyethylene glycol, acetaminophen **OR** acetaminophen, traMADol, tiZANidine, glucose, dextrose bolus **OR** dextrose bolus, glucagon (rDNA), dextrose    I/O last 3 completed shifts:  In: 768 [P.O.:480; Blood:288]  Out: -     Physical Exam:  VITAL SIGNS: BP (!) 153/74   Pulse 98   Temp 98.2 °F (36.8 °C) (Oral)   Resp 18   Ht 1.727 m (5' 8\")   Wt 105.1 kg (231 lb 12.8 oz)   SpO2 98%   BMI 35.25 kg/m²   Wt Readings from Last 3 Encounters:   02/27/25 105.1 kg (231 lb 12.8 oz)   02/27/25 106.1 kg (234 lb)   02/19/25 109.8 kg (242 lb)     Constitutional: Well developed, Well nourished, No acute distress, Non-toxic  hematocrit and transfuse to maintain hemoglobin > 7 g/dL;  EGD 2/28/25 - 3 medium nonbleeding esophageal varices, banded. Portal hypertensive gastropathy and nonbleeding GAVE.        Left upper acute DVT  Vascular surgery recommendations noted.   Heparin gtt discontinued 3/1 due to acute anemia. Restarted this morning.  Hb 8.6 g/dL > 7 g/dL > 8.3 g/dL > 6.6 g/dL. Transfused 1 unit PRBC 3/1 with stable Hb 8.9 g/dL     GI to follow.     Denins Velasquez MD  [facility]  3/2/2025    [phone]. Also available via Perfect Serve    Please note that some or all of this record was generated using voice recognition software. If there are any questions about the content of this document, please contact the author as some errors in translation may have occurred.

## 2025-03-03 LAB
ALBUMIN SERPL-MCNC: 2.9 G/DL (ref 3.4–5)
ALBUMIN SERPL-MCNC: 3.4 G/DL (ref 3.4–5)
ALP SERPL-CCNC: 115 U/L (ref 40–129)
ALP SERPL-CCNC: 87 U/L (ref 40–129)
ALT SERPL-CCNC: 13 U/L (ref 10–40)
ALT SERPL-CCNC: 17 U/L (ref 10–40)
ANION GAP SERPL CALCULATED.3IONS-SCNC: 12 MMOL/L (ref 3–16)
ANION GAP SERPL CALCULATED.3IONS-SCNC: 9 MMOL/L (ref 3–16)
ANTI-XA UNFRAC HEPARIN: 0.63 IU/ML (ref 0.3–0.7)
ANTI-XA UNFRAC HEPARIN: 0.78 IU/ML (ref 0.3–0.7)
ANTI-XA UNFRAC HEPARIN: 0.97 IU/ML (ref 0.3–0.7)
AST SERPL-CCNC: 26 U/L (ref 15–37)
AST SERPL-CCNC: 33 U/L (ref 15–37)
BASOPHILS # BLD: 0.1 K/UL (ref 0–0.2)
BASOPHILS NFR BLD: 1 %
BILIRUB DIRECT SERPL-MCNC: 0.3 MG/DL (ref 0–0.3)
BILIRUB DIRECT SERPL-MCNC: 0.5 MG/DL (ref 0–0.3)
BILIRUB INDIRECT SERPL-MCNC: 0.3 MG/DL (ref 0–1)
BILIRUB INDIRECT SERPL-MCNC: 0.3 MG/DL (ref 0–1)
BILIRUB SERPL-MCNC: 0.6 MG/DL (ref 0–1)
BILIRUB SERPL-MCNC: 0.8 MG/DL (ref 0–1)
BUN SERPL-MCNC: 11 MG/DL (ref 7–20)
BUN SERPL-MCNC: 11 MG/DL (ref 7–20)
CALCIUM SERPL-MCNC: 8.9 MG/DL (ref 8.3–10.6)
CALCIUM SERPL-MCNC: 9.5 MG/DL (ref 8.3–10.6)
CHLORIDE SERPL-SCNC: 103 MMOL/L (ref 99–110)
CHLORIDE SERPL-SCNC: 98 MMOL/L (ref 99–110)
CO2 SERPL-SCNC: 26 MMOL/L (ref 21–32)
CO2 SERPL-SCNC: 28 MMOL/L (ref 21–32)
CREAT SERPL-MCNC: 0.9 MG/DL (ref 0.8–1.3)
CREAT SERPL-MCNC: 0.9 MG/DL (ref 0.8–1.3)
DEPRECATED RDW RBC AUTO: 19 % (ref 12.4–15.4)
EOSINOPHIL # BLD: 0.2 K/UL (ref 0–0.6)
EOSINOPHIL NFR BLD: 4 %
GFR SERPLBLD CREATININE-BSD FMLA CKD-EPI: >90 ML/MIN/{1.73_M2}
GFR SERPLBLD CREATININE-BSD FMLA CKD-EPI: >90 ML/MIN/{1.73_M2}
GLUCOSE BLD-MCNC: 214 MG/DL (ref 70–99)
GLUCOSE BLD-MCNC: 235 MG/DL (ref 70–99)
GLUCOSE BLD-MCNC: 240 MG/DL (ref 70–99)
GLUCOSE BLD-MCNC: 85 MG/DL (ref 70–99)
GLUCOSE BLD-MCNC: 88 MG/DL (ref 70–99)
GLUCOSE SERPL-MCNC: 248 MG/DL (ref 70–99)
GLUCOSE SERPL-MCNC: 82 MG/DL (ref 70–99)
HCT VFR BLD AUTO: 25.2 % (ref 40.5–52.5)
HCT VFR BLD AUTO: 29.6 % (ref 40.5–52.5)
HGB BLD-MCNC: 8.3 G/DL (ref 13.5–17.5)
HGB BLD-MCNC: 9.7 G/DL (ref 13.5–17.5)
INR PPP: 1.23 (ref 0.85–1.15)
INR PPP: 1.37 (ref 0.85–1.15)
LYMPHOCYTES # BLD: 1 K/UL (ref 1–5.1)
LYMPHOCYTES NFR BLD: 18.5 %
MCH RBC QN AUTO: 24.2 PG (ref 26–34)
MCHC RBC AUTO-ENTMCNC: 32.8 G/DL (ref 31–36)
MCV RBC AUTO: 73.7 FL (ref 80–100)
MONOCYTES # BLD: 0.8 K/UL (ref 0–1.3)
MONOCYTES NFR BLD: 14.4 %
NEUTROPHILS # BLD: 3.5 K/UL (ref 1.7–7.7)
NEUTROPHILS NFR BLD: 62.1 %
PERFORMED ON: ABNORMAL
PERFORMED ON: NORMAL
PERFORMED ON: NORMAL
PLATELET # BLD AUTO: 175 K/UL (ref 135–450)
PMV BLD AUTO: 7.8 FL (ref 5–10.5)
POTASSIUM SERPL-SCNC: 3.7 MMOL/L (ref 3.5–5.1)
POTASSIUM SERPL-SCNC: 3.7 MMOL/L (ref 3.5–5.1)
PROT SERPL-MCNC: 5.4 G/DL (ref 6.4–8.2)
PROT SERPL-MCNC: 6.6 G/DL (ref 6.4–8.2)
PROTHROMBIN TIME: 15.7 SEC (ref 11.9–14.9)
PROTHROMBIN TIME: 17.1 SEC (ref 11.9–14.9)
RBC # BLD AUTO: 3.42 M/UL (ref 4.2–5.9)
SODIUM SERPL-SCNC: 136 MMOL/L (ref 136–145)
SODIUM SERPL-SCNC: 140 MMOL/L (ref 136–145)
WBC # BLD AUTO: 5.7 K/UL (ref 4–11)

## 2025-03-03 PROCEDURE — 6370000000 HC RX 637 (ALT 250 FOR IP): Performed by: PHYSICIAN ASSISTANT

## 2025-03-03 PROCEDURE — 85520 HEPARIN ASSAY: CPT

## 2025-03-03 PROCEDURE — 6370000000 HC RX 637 (ALT 250 FOR IP): Performed by: INTERNAL MEDICINE

## 2025-03-03 PROCEDURE — 6370000000 HC RX 637 (ALT 250 FOR IP): Performed by: STUDENT IN AN ORGANIZED HEALTH CARE EDUCATION/TRAINING PROGRAM

## 2025-03-03 PROCEDURE — 80076 HEPATIC FUNCTION PANEL: CPT

## 2025-03-03 PROCEDURE — 6370000000 HC RX 637 (ALT 250 FOR IP)

## 2025-03-03 PROCEDURE — 2500000003 HC RX 250 WO HCPCS: Performed by: STUDENT IN AN ORGANIZED HEALTH CARE EDUCATION/TRAINING PROGRAM

## 2025-03-03 PROCEDURE — 2500000003 HC RX 250 WO HCPCS: Performed by: INTERNAL MEDICINE

## 2025-03-03 PROCEDURE — 1200000000 HC SEMI PRIVATE

## 2025-03-03 PROCEDURE — 6360000002 HC RX W HCPCS: Performed by: INTERNAL MEDICINE

## 2025-03-03 PROCEDURE — 36415 COLL VENOUS BLD VENIPUNCTURE: CPT

## 2025-03-03 PROCEDURE — 80048 BASIC METABOLIC PNL TOTAL CA: CPT

## 2025-03-03 PROCEDURE — 6360000002 HC RX W HCPCS

## 2025-03-03 PROCEDURE — 85610 PROTHROMBIN TIME: CPT

## 2025-03-03 PROCEDURE — 85018 HEMOGLOBIN: CPT

## 2025-03-03 PROCEDURE — 85014 HEMATOCRIT: CPT

## 2025-03-03 PROCEDURE — 85025 COMPLETE CBC W/AUTO DIFF WBC: CPT

## 2025-03-03 RX ORDER — LANOLIN ALCOHOL/MO/W.PET/CERES
400 CREAM (GRAM) TOPICAL DAILY
Qty: 90 TABLET | Refills: 1 | Status: SHIPPED | OUTPATIENT
Start: 2025-03-03

## 2025-03-03 RX ORDER — SIMETHICONE 80 MG
80 TABLET,CHEWABLE ORAL 3 TIMES DAILY
Status: DISCONTINUED | OUTPATIENT
Start: 2025-03-03 | End: 2025-03-04 | Stop reason: HOSPADM

## 2025-03-03 RX ADMIN — INSULIN LISPRO 4 UNITS: 100 INJECTION, SOLUTION INTRAVENOUS; SUBCUTANEOUS at 16:42

## 2025-03-03 RX ADMIN — SODIUM CHLORIDE, PRESERVATIVE FREE 10 ML: 5 INJECTION INTRAVENOUS at 21:14

## 2025-03-03 RX ADMIN — ACETAMINOPHEN 500 MG: 500 TABLET ORAL at 23:33

## 2025-03-03 RX ADMIN — SODIUM CHLORIDE, PRESERVATIVE FREE 10 ML: 5 INJECTION INTRAVENOUS at 08:43

## 2025-03-03 RX ADMIN — PANTOPRAZOLE SODIUM 40 MG: 40 TABLET, DELAYED RELEASE ORAL at 08:42

## 2025-03-03 RX ADMIN — FUROSEMIDE 40 MG: 40 TABLET ORAL at 08:42

## 2025-03-03 RX ADMIN — WATER 1000 MG: 1 INJECTION INTRAMUSCULAR; INTRAVENOUS; SUBCUTANEOUS at 16:41

## 2025-03-03 RX ADMIN — LACTULOSE 20 G: 20 SOLUTION ORAL at 21:14

## 2025-03-03 RX ADMIN — INSULIN GLARGINE 50 UNITS: 100 INJECTION, SOLUTION SUBCUTANEOUS at 21:16

## 2025-03-03 RX ADMIN — FUROSEMIDE 40 MG: 40 TABLET ORAL at 21:14

## 2025-03-03 RX ADMIN — INSULIN LISPRO 4 UNITS: 100 INJECTION, SOLUTION INTRAVENOUS; SUBCUTANEOUS at 11:58

## 2025-03-03 RX ADMIN — SIMETHICONE 80 MG: 80 TABLET, CHEWABLE ORAL at 21:34

## 2025-03-03 RX ADMIN — INSULIN LISPRO 4 UNITS: 100 INJECTION, SOLUTION INTRAVENOUS; SUBCUTANEOUS at 21:17

## 2025-03-03 RX ADMIN — LACTULOSE 20 G: 20 SOLUTION ORAL at 08:42

## 2025-03-03 RX ADMIN — APIXABAN 10 MG: 5 TABLET, FILM COATED ORAL at 21:14

## 2025-03-03 RX ADMIN — SPIRONOLACTONE 100 MG: 100 TABLET ORAL at 08:42

## 2025-03-03 RX ADMIN — HEPARIN SODIUM 18 UNITS/KG/HR: 10000 INJECTION, SOLUTION INTRAVENOUS at 12:01

## 2025-03-03 RX ADMIN — SIMETHICONE 80 MG: 80 TABLET, CHEWABLE ORAL at 15:27

## 2025-03-03 ASSESSMENT — PAIN SCALES - GENERAL
PAINLEVEL_OUTOF10: 0
PAINLEVEL_OUTOF10: 0

## 2025-03-03 NOTE — PROGRESS NOTES
INPATIENT PROGRESS NOTE        IDENTIFYING DATA/REASON FOR CONSULTATION   PATIENT:  Carroll Wells  MRN:  [id number]  ADMIT DATE: 2/27/2025  TIME OF EVALUATION: 3/3/2025 9:45 AM  HOSPITAL STAY:   LOS: 4 days   CONSULTING PHYSICIAN: Miguel Donnelly MD   REASON FOR CONSULTATION: decompensated MASH cirrhosis with ascites     Subjective:    Patient seen in follow-up.  He denies nausea, vomiting, abdominal pain, hematochezia or melena. He reports some gas pains.    MEDICATIONS   SCHEDULED:  lactulose, 20 g, TID  pantoprazole, 40 mg, QAM AC  cefTRIAXone (ROCEPHIN) IV, 1,000 mg, Q24H  sodium chloride flush, 5-40 mL, 2 times per day  insulin lispro, 0-16 Units, 4x Daily AC & HS  insulin glargine, 50 Units, BID  furosemide, 40 mg, BID  spironolactone, 100 mg, Daily      FLUIDS/DRIPS:     sodium chloride      heparin (PORCINE) Infusion 18 Units/kg/hr (03/03/25 0751)    sodium chloride      dextrose       PRNs: sodium chloride, , PRN  sodium chloride flush, 5-40 mL, PRN  sodium chloride, , PRN  potassium chloride, 40 mEq, PRN   Or  potassium alternative oral replacement, 40 mEq, PRN   Or  potassium chloride, 10 mEq, PRN  magnesium sulfate, 2,000 mg, PRN  ondansetron, 4 mg, Q8H PRN   Or  ondansetron, 4 mg, Q6H PRN  polyethylene glycol, 17 g, Daily PRN  acetaminophen, 500 mg, Q6H PRN   Or  acetaminophen, 325 mg, Q6H PRN  traMADol, 50 mg, Q8H PRN  tiZANidine, 2 mg, TID PRN  glucose, 4 tablet, PRN  dextrose bolus, 125 mL, PRN   Or  dextrose bolus, 250 mL, PRN  glucagon (rDNA), 1 mg, PRN  dextrose, , Continuous PRN      ALLERGIES:    Allergies   Allergen Reactions    Aspirin Other (See Comments)     Takes baby aspirin without problems    Caused bleeding in the bowel    Oxycontin [Oxycodone Hcl] Nausea And Vomiting         PHYSICAL EXAM   VITALS:  BP (!) 145/77   Pulse 97   Temp 98.9 °F (37.2 °C) (Oral)   Resp 16   Ht 1.727 m (5' 8\")   Wt 105.1 kg (231 lb 12.8 oz)   SpO2 99%   BMI 35.25 kg/m²   TEMPERATURE:  Current -  [0] : 2) Feeling down, depressed, or hopeless: Not at all (0) [PHQ-2 Negative - No further assessment needed] : PHQ-2 Negative - No further assessment needed [[id number]] : 0

## 2025-03-03 NOTE — CARE COORDINATION
Chart review day 4- from home, with wife, Cirrhosis w/ascites; paracentesis 2/28; Hep gtt of DVT of LUE/neck. Possible home care at [place]. Cm will continue to follow.

## 2025-03-03 NOTE — PROGRESS NOTES
(IV Amiodarone/Diltiazem, Tikosyn, etc)    [] Hemodialysis    [] Other -    [] Change in code status    [] Decision to escalate care    [] Major surgery/procedure with associated risk factors    --------------------------------------------------  C. Data (any 2)    [x] Data Review (any 3)    [x] Consultant notes from yesterday/today    [x] All available current labs reviewed interpreted for clinical significance    [x] Appropriate follow-up labs were ordered  [] Collateral history obtained     [] Independent Interpretation of tests (any 1)    [] Telemetry (Rhythm Strip) personally reviewed and interpreted        [] Imaging personally reviewed and interpreted     [x] Discussion (any 1)  [x] Multi-Disciplinary Rounds with Case Management  [x] Discussed management of the case with   GI        Labs:  Personally reviewed on 3/3/2025 and interpreted for clinical significance as documented above.     Recent Labs     03/01/25  0602 03/01/25  1540 03/02/25  0218 03/02/25  1157 03/02/25  1942 03/03/25  0640 03/03/25  1209   WBC 3.5*  --  5.9  --   --  5.7  --    HGB 6.6*   < > 8.9*   < > 9.2* 8.3* 9.7*   HCT 20.2*   < > 27.4*   < > 28.5* 25.2* 29.6*     --  185  --   --  175  --     < > = values in this interval not displayed.     Recent Labs     03/01/25  0602 03/02/25  0218 03/03/25  0640 03/03/25  1209    140 140 136   K 3.3*  3.3* 3.9 3.7 3.7    104 103 98*   CO2 24 27 28 26   BUN 23* 15 11 11   CREATININE 1.0 0.9 0.9 0.9   CALCIUM 8.5 9.4 8.9 9.5   MG 1.41* 1.66*  --   --    PHOS  --  2.7  --   --      No results for input(s): \"PROBNP\", \"TROPHS\" in the last 72 hours.  No results for input(s): \"LABA1C\" in the last 72 hours.  Recent Labs     03/01/25  0602 03/03/25  0640 03/03/25  1209   AST 17 26 33   ALT 8* 13 17   BILIDIR  --  0.3 0.5*   BILITOT 0.5 0.6 0.8   ALKPHOS 77 87 115     Recent Labs     03/03/25  0640 03/03/25  1209   INR 1.37* 1.23*       Urine Cultures:   Lab Results   Component Value  Date/Time    LABURIN 300 08/08/2022 10:55 AM     Blood Cultures: No results found for: \"BC\"  No results found for: \"BLOODCULT2\"  Organism: No results found for: \"ORG\"      Miguel Donnelly MD

## 2025-03-04 VITALS
TEMPERATURE: 98.4 F | WEIGHT: 231.8 LBS | HEART RATE: 81 BPM | HEIGHT: 68 IN | OXYGEN SATURATION: 98 % | DIASTOLIC BLOOD PRESSURE: 69 MMHG | BODY MASS INDEX: 35.13 KG/M2 | RESPIRATION RATE: 18 BRPM | SYSTOLIC BLOOD PRESSURE: 136 MMHG

## 2025-03-04 LAB
ALBUMIN SERPL-MCNC: 3.4 G/DL (ref 3.4–5)
ALP SERPL-CCNC: 115 U/L (ref 40–129)
ALT SERPL-CCNC: 20 U/L (ref 10–40)
ANION GAP SERPL CALCULATED.3IONS-SCNC: 10 MMOL/L (ref 3–16)
AST SERPL-CCNC: 38 U/L (ref 15–37)
BASOPHILS # BLD: 0.1 K/UL (ref 0–0.2)
BASOPHILS NFR BLD: 1.4 %
BILIRUB DIRECT SERPL-MCNC: 0.4 MG/DL (ref 0–0.3)
BILIRUB INDIRECT SERPL-MCNC: 0.4 MG/DL (ref 0–1)
BILIRUB SERPL-MCNC: 0.8 MG/DL (ref 0–1)
BUN SERPL-MCNC: 12 MG/DL (ref 7–20)
CALCIUM SERPL-MCNC: 9.6 MG/DL (ref 8.3–10.6)
CHLORIDE SERPL-SCNC: 99 MMOL/L (ref 99–110)
CO2 SERPL-SCNC: 29 MMOL/L (ref 21–32)
CREAT SERPL-MCNC: 1 MG/DL (ref 0.8–1.3)
DEPRECATED RDW RBC AUTO: 18.9 % (ref 12.4–15.4)
EOSINOPHIL # BLD: 0.1 K/UL (ref 0–0.6)
EOSINOPHIL NFR BLD: 2.2 %
GFR SERPLBLD CREATININE-BSD FMLA CKD-EPI: 80 ML/MIN/{1.73_M2}
GLUCOSE BLD-MCNC: 154 MG/DL (ref 70–99)
GLUCOSE BLD-MCNC: 249 MG/DL (ref 70–99)
GLUCOSE SERPL-MCNC: 245 MG/DL (ref 70–99)
HCT VFR BLD AUTO: 28.8 % (ref 40.5–52.5)
HGB BLD-MCNC: 9.4 G/DL (ref 13.5–17.5)
INR PPP: 1.41 (ref 0.85–1.15)
LYMPHOCYTES # BLD: 0.7 K/UL (ref 1–5.1)
LYMPHOCYTES NFR BLD: 15.9 %
MCH RBC QN AUTO: 24.4 PG (ref 26–34)
MCHC RBC AUTO-ENTMCNC: 32.7 G/DL (ref 31–36)
MCV RBC AUTO: 74.7 FL (ref 80–100)
MONOCYTES # BLD: 0.9 K/UL (ref 0–1.3)
MONOCYTES NFR BLD: 22.3 %
NEUTROPHILS # BLD: 2.4 K/UL (ref 1.7–7.7)
NEUTROPHILS NFR BLD: 58.2 %
PERFORMED ON: ABNORMAL
PERFORMED ON: ABNORMAL
PLATELET # BLD AUTO: 202 K/UL (ref 135–450)
PMV BLD AUTO: 7.7 FL (ref 5–10.5)
POTASSIUM SERPL-SCNC: 4.1 MMOL/L (ref 3.5–5.1)
PROT SERPL-MCNC: 6.7 G/DL (ref 6.4–8.2)
PROTHROMBIN TIME: 17.4 SEC (ref 11.9–14.9)
RBC # BLD AUTO: 3.86 M/UL (ref 4.2–5.9)
SODIUM SERPL-SCNC: 138 MMOL/L (ref 136–145)
WBC # BLD AUTO: 4.1 K/UL (ref 4–11)

## 2025-03-04 PROCEDURE — 80076 HEPATIC FUNCTION PANEL: CPT

## 2025-03-04 PROCEDURE — 85610 PROTHROMBIN TIME: CPT

## 2025-03-04 PROCEDURE — 80048 BASIC METABOLIC PNL TOTAL CA: CPT

## 2025-03-04 PROCEDURE — 6370000000 HC RX 637 (ALT 250 FOR IP): Performed by: PHYSICIAN ASSISTANT

## 2025-03-04 PROCEDURE — 6370000000 HC RX 637 (ALT 250 FOR IP): Performed by: INTERNAL MEDICINE

## 2025-03-04 PROCEDURE — 36415 COLL VENOUS BLD VENIPUNCTURE: CPT

## 2025-03-04 PROCEDURE — 6370000000 HC RX 637 (ALT 250 FOR IP)

## 2025-03-04 PROCEDURE — 6370000000 HC RX 637 (ALT 250 FOR IP): Performed by: STUDENT IN AN ORGANIZED HEALTH CARE EDUCATION/TRAINING PROGRAM

## 2025-03-04 PROCEDURE — 85025 COMPLETE CBC W/AUTO DIFF WBC: CPT

## 2025-03-04 PROCEDURE — 2500000003 HC RX 250 WO HCPCS: Performed by: STUDENT IN AN ORGANIZED HEALTH CARE EDUCATION/TRAINING PROGRAM

## 2025-03-04 RX ORDER — SIMETHICONE 80 MG
80 TABLET,CHEWABLE ORAL 3 TIMES DAILY
Qty: 52 TABLET | Refills: 0 | Status: SHIPPED | OUTPATIENT
Start: 2025-03-04 | End: 2025-03-18

## 2025-03-04 RX ORDER — CIPROFLOXACIN 250 MG/1
250 TABLET, FILM COATED ORAL
Qty: 30 TABLET | Refills: 0 | Status: SHIPPED | OUTPATIENT
Start: 2025-03-04 | End: 2025-04-03

## 2025-03-04 RX ORDER — INSULIN ASPART 100 [IU]/ML
10 INJECTION, SOLUTION INTRAVENOUS; SUBCUTANEOUS
Qty: 15 ADJUSTABLE DOSE PRE-FILLED PEN SYRINGE | Refills: 5
Start: 2025-03-04

## 2025-03-04 RX ORDER — PANTOPRAZOLE SODIUM 40 MG/1
40 TABLET, DELAYED RELEASE ORAL
Qty: 30 TABLET | Refills: 0 | Status: SHIPPED | OUTPATIENT
Start: 2025-03-04 | End: 2025-04-03

## 2025-03-04 RX ORDER — LACTULOSE 10 G/15ML
20 SOLUTION ORAL 3 TIMES DAILY
Qty: 473 ML | Refills: 0 | Status: SHIPPED | OUTPATIENT
Start: 2025-03-04

## 2025-03-04 RX ADMIN — LACTULOSE 20 G: 20 SOLUTION ORAL at 09:28

## 2025-03-04 RX ADMIN — APIXABAN 10 MG: 5 TABLET, FILM COATED ORAL at 09:25

## 2025-03-04 RX ADMIN — SIMETHICONE 80 MG: 80 TABLET, CHEWABLE ORAL at 09:59

## 2025-03-04 RX ADMIN — SODIUM CHLORIDE, PRESERVATIVE FREE 10 ML: 5 INJECTION INTRAVENOUS at 09:35

## 2025-03-04 RX ADMIN — FUROSEMIDE 40 MG: 40 TABLET ORAL at 09:26

## 2025-03-04 RX ADMIN — PANTOPRAZOLE SODIUM 40 MG: 40 TABLET, DELAYED RELEASE ORAL at 06:10

## 2025-03-04 RX ADMIN — INSULIN GLARGINE 50 UNITS: 100 INJECTION, SOLUTION SUBCUTANEOUS at 09:26

## 2025-03-04 RX ADMIN — INSULIN LISPRO 4 UNITS: 100 INJECTION, SOLUTION INTRAVENOUS; SUBCUTANEOUS at 11:33

## 2025-03-04 RX ADMIN — SPIRONOLACTONE 100 MG: 100 TABLET ORAL at 09:25

## 2025-03-04 ASSESSMENT — PAIN SCALES - GENERAL: PAINLEVEL_OUTOF10: 0

## 2025-03-04 NOTE — PLAN OF CARE
Problem: Chronic Conditions and Co-morbidities  Goal: Patient's chronic conditions and co-morbidity symptoms are monitored and maintained or improved  3/4/2025 1145 by Oliva Rosales RN  Outcome: Completed  3/4/2025 1102 by Oliva Rosales RN  Outcome: Progressing  3/4/2025 1020 by Oliva Rosales RN  Outcome: Progressing  3/4/2025 0257 by Dee Reid RN  Outcome: Progressing  Flowsheets (Taken 3/4/2025 0257)  Care Plan - Patient's Chronic Conditions and Co-Morbidity Symptoms are Monitored and Maintained or Improved:   Monitor and assess patient's chronic conditions and comorbid symptoms for stability, deterioration, or improvement   Collaborate with multidisciplinary team to address chronic and comorbid conditions and prevent exacerbation or deterioration   Update acute care plan with appropriate goals if chronic or comorbid symptoms are exacerbated and prevent overall improvement and discharge     Problem: Discharge Planning  Goal: Discharge to home or other facility with appropriate resources  3/4/2025 1145 by Oliva Rosales RN  Outcome: Completed  3/4/2025 1102 by Oliva Rosales RN  Outcome: Progressing  3/4/2025 1020 by Oliva Rosales RN  Outcome: Progressing  3/4/2025 0257 by Dee Reid RN  Outcome: Progressing  Flowsheets (Taken 3/4/2025 0257)  Discharge to home or other facility with appropriate resources:   Identify barriers to discharge with patient and caregiver   Arrange for needed discharge resources and transportation as appropriate   Identify discharge learning needs (meds, wound care, etc)   Refer to discharge planning if patient needs post-hospital services based on physician order or complex needs related to functional status, cognitive ability or social support system     Problem: Pain  Goal: Verbalizes/displays adequate comfort level or baseline comfort level  3/4/2025 1145 by Oliva Rosales RN  Outcome: Completed  3/4/2025 1102 by Oliva Rosales RN  Outcome: Progressing  3/4/2025 1020 by

## 2025-03-04 NOTE — PLAN OF CARE
Problem: Chronic Conditions and Co-morbidities  Goal: Patient's chronic conditions and co-morbidity symptoms are monitored and maintained or improved  Outcome: Progressing  Flowsheets (Taken 3/4/2025 0257)  Care Plan - Patient's Chronic Conditions and Co-Morbidity Symptoms are Monitored and Maintained or Improved:   Monitor and assess patient's chronic conditions and comorbid symptoms for stability, deterioration, or improvement   Collaborate with multidisciplinary team to address chronic and comorbid conditions and prevent exacerbation or deterioration   Update acute care plan with appropriate goals if chronic or comorbid symptoms are exacerbated and prevent overall improvement and discharge     Problem: Discharge Planning  Goal: Discharge to home or other facility with appropriate resources  Outcome: Progressing  Flowsheets (Taken 3/4/2025 0257)  Discharge to home or other facility with appropriate resources:   Identify barriers to discharge with patient and caregiver   Arrange for needed discharge resources and transportation as appropriate   Identify discharge learning needs (meds, wound care, etc)   Refer to discharge planning if patient needs post-hospital services based on physician order or complex needs related to functional status, cognitive ability or social support system     Problem: Pain  Goal: Verbalizes/displays adequate comfort level or baseline comfort level  Outcome: Progressing  Flowsheets (Taken 3/3/2025 2112)  Verbalizes/displays adequate comfort level or baseline comfort level:   Encourage patient to monitor pain and request assistance   Assess pain using appropriate pain scale   Administer analgesics based on type and severity of pain and evaluate response   Implement non-pharmacological measures as appropriate and evaluate response   Consider cultural and social influences on pain and pain management   Notify Licensed Independent Practitioner if interventions unsuccessful or patient

## 2025-03-04 NOTE — CARE COORDINATION
CASE MANAGEMENT DISCHARGE SUMMARY      Discharge to: home, no needs         IMM given: (date) 3/3         Transportation:    Family/car:        Confirmed discharge plan with:     Patient: yes      Family:  yes- pt notified          RN, name: Oliva

## 2025-03-04 NOTE — PLAN OF CARE
Problem: Chronic Conditions and Co-morbidities  Goal: Patient's chronic conditions and co-morbidity symptoms are monitored and maintained or improved  3/4/2025 1102 by Oliva Rosales RN  Outcome: Progressing  3/4/2025 1020 by Oliva Rosales RN  Outcome: Progressing  3/4/2025 0257 by Dee Reid RN  Outcome: Progressing  Flowsheets (Taken 3/4/2025 0257)  Care Plan - Patient's Chronic Conditions and Co-Morbidity Symptoms are Monitored and Maintained or Improved:   Monitor and assess patient's chronic conditions and comorbid symptoms for stability, deterioration, or improvement   Collaborate with multidisciplinary team to address chronic and comorbid conditions and prevent exacerbation or deterioration   Update acute care plan with appropriate goals if chronic or comorbid symptoms are exacerbated and prevent overall improvement and discharge     Problem: Discharge Planning  Goal: Discharge to home or other facility with appropriate resources  3/4/2025 1102 by Oliva Rosales RN  Outcome: Progressing  3/4/2025 1020 by Oliva Rosales RN  Outcome: Progressing  3/4/2025 0257 by Dee Reid RN  Outcome: Progressing  Flowsheets (Taken 3/4/2025 0257)  Discharge to home or other facility with appropriate resources:   Identify barriers to discharge with patient and caregiver   Arrange for needed discharge resources and transportation as appropriate   Identify discharge learning needs (meds, wound care, etc)   Refer to discharge planning if patient needs post-hospital services based on physician order or complex needs related to functional status, cognitive ability or social support system     Problem: Pain  Goal: Verbalizes/displays adequate comfort level or baseline comfort level  3/4/2025 1102 by Oliva Rosales RN  Outcome: Progressing  3/4/2025 1020 by Oliva Rosales RN  Outcome: Progressing  3/4/2025 0257 by Dee Reid RN  Outcome: Progressing  Flowsheets (Taken 3/3/2025 2112)  Verbalizes/displays adequate comfort

## 2025-03-04 NOTE — DISCHARGE SUMMARY
Hospital Medicine Discharge Summary    Patient: Carroll Wells   : 1955     Hospital:  [facility]  Admit Date: 2025   Discharge Date: 3/4/2025    Disposition:  [x]Home   []HHC  []SNF  []Acute Rehab  []LTAC  []Hospice  Code status:  [x]Full  []DNR/CCA  []Limited (DNR/CCA with Do Not Intubate)  []DNRCC  Condition at Discharge: Stable  Primary Care Provider: Karina Mendoza PA    Admitting Provider: Ian Chinchilla DO  Discharge Provider: Miguel Donnelly MD     Discharge Diagnoses:      Active Hospital Problems    Diagnosis     Subclavian vein thrombosis, unspecified laterality (HCC) [I82.B19]     Acute deep vein thrombosis (DVT) of left upper extremity, unspecified vein (HCC) [I82.622]        Presenting Admission History:      69 y.o. male who presented to [facility] with UE edema.  PMHx significant for HLD, HTN, DM.       Patient states he was admitted to the hospital roughly around 1 week ago due to weight gain and shortness of breath.  Patient underwent paracentesis with 6 L off.  Treated for SBP with ceftriaxone.  He was informed to follow-up with GI in outpatient setting.  Sometime after patient started develop swelling in his face and left arm.  Patient decided to wait a couple days to go to his PCP follow-up.  PCP ordered ultrasound which found jugular DVTs.  Patient also states that he feels like he has some ascites because his abdomen is distended.  Denies fever, chills, sweats.  Denies chest pain palpitation.  Denies shortness of breath and cough.  Denies nausea, vomiting, diarrhea.  Denies change in bowel or bladder habits.        Assessment/Plan:    DVT of Upper Extremity and Neck, Left   Anemia  -Etiology unclear at this time  -Vascular duplex of upper extremity on 2025 showed occlusive DVTs in the axillary, subclavian, and internal jugular veins  -  history of esophageal varices and banding     heparin drip, switched to eliquis   PPI in  input(s): \"LABA1C\" in the last 72 hours.  No results for input(s): \"AST\", \"ALT\", \"BILIDIR\", \"BILITOT\", \"ALKPHOS\" in the last 72 hours.    No results for input(s): \"INR\", \"LACTA\", \"TSH\" in the last 72 hours.      Urine Cultures:   Lab Results   Component Value Date/Time    LABURIN 300 08/08/2022 10:55 AM     Blood Cultures: No results found for: \"BC\"  No results found for: \"BLOODCULT2\"  Organism: No results found for: \"ORG\"    Signed:    Miguel Donnelly MD

## 2025-03-04 NOTE — PROGRESS NOTES
INPATIENT PROGRESS NOTE        IDENTIFYING DATA/REASON FOR CONSULTATION   PATIENT:  Carroll Wells  MRN:  [id number]  ADMIT DATE: 2/27/2025  TIME OF EVALUATION: 3/4/2025 9:33 AM  HOSPITAL STAY:   LOS: 5 days   CONSULTING PHYSICIAN: Miguel Donnelly MD   REASON FOR CONSULTATION: decompensated MASH cirrhosis with ascites     Subjective:    Patient seen in follow-up.  Labs ordered for today.  Patient feeling slightly bloated and gassy.  No abdominal pain.  No overt bleeding.      MEDICATIONS   SCHEDULED:  simethicone, 80 mg, TID  apixaban, 10 mg, BID   Followed by  [START ON 3/10/2025] apixaban, 5 mg, BID  lactulose, 20 g, TID  pantoprazole, 40 mg, QAM AC  cefTRIAXone (ROCEPHIN) IV, 1,000 mg, Q24H  sodium chloride flush, 5-40 mL, 2 times per day  insulin lispro, 0-16 Units, 4x Daily AC & HS  insulin glargine, 50 Units, BID  furosemide, 40 mg, BID  spironolactone, 100 mg, Daily      FLUIDS/DRIPS:     sodium chloride      sodium chloride      dextrose       PRNs: sodium chloride, , PRN  sodium chloride flush, 5-40 mL, PRN  sodium chloride, , PRN  potassium chloride, 40 mEq, PRN   Or  potassium alternative oral replacement, 40 mEq, PRN   Or  potassium chloride, 10 mEq, PRN  magnesium sulfate, 2,000 mg, PRN  ondansetron, 4 mg, Q8H PRN   Or  ondansetron, 4 mg, Q6H PRN  polyethylene glycol, 17 g, Daily PRN  acetaminophen, 500 mg, Q6H PRN   Or  acetaminophen, 325 mg, Q6H PRN  traMADol, 50 mg, Q8H PRN  tiZANidine, 2 mg, TID PRN  glucose, 4 tablet, PRN  dextrose bolus, 125 mL, PRN   Or  dextrose bolus, 250 mL, PRN  glucagon (rDNA), 1 mg, PRN  dextrose, , Continuous PRN      ALLERGIES:    Allergies   Allergen Reactions    Aspirin Other (See Comments)     Takes baby aspirin without problems    Caused bleeding in the bowel    Oxycontin [Oxycodone Hcl] Nausea And Vomiting         PHYSICAL EXAM   VITALS:  /73   Pulse 85   Temp 98.2 °F (36.8 °C) (Oral)   Resp 17   Ht 1.727 m (5' 8\")   Wt 105.1 kg (231 lb 12.8 oz)    further information, please feel free to contact us [phone] or reach out via [employer].  Thank you for allowing us to participate in the care of Carroll Wells.    The note was completed using Dragon voice recognition transcription. Every effort was made to ensure accuracy; however, inadvertent transcription errors may be present despite my best efforts to edit errors.

## 2025-03-04 NOTE — PROGRESS NOTES
Pt d/c home in care of daughter, transported via wheelchair to outpatient pharmacy prior to vehicle. Patient alert/oriented, denies pain/shortness of breath etc upon discharge. Discharge instructions explained and provided prior to d/c, all questions answered appropriately.

## 2025-03-04 NOTE — PLAN OF CARE
Problem: Chronic Conditions and Co-morbidities  Goal: Patient's chronic conditions and co-morbidity symptoms are monitored and maintained or improved  3/4/2025 1020 by Oliva Rosales RN  Outcome: Progressing  3/4/2025 0257 by Dee Reid RN  Outcome: Progressing  Flowsheets (Taken 3/4/2025 0257)  Care Plan - Patient's Chronic Conditions and Co-Morbidity Symptoms are Monitored and Maintained or Improved:   Monitor and assess patient's chronic conditions and comorbid symptoms for stability, deterioration, or improvement   Collaborate with multidisciplinary team to address chronic and comorbid conditions and prevent exacerbation or deterioration   Update acute care plan with appropriate goals if chronic or comorbid symptoms are exacerbated and prevent overall improvement and discharge     Problem: Discharge Planning  Goal: Discharge to home or other facility with appropriate resources  3/4/2025 1020 by Oliva Rosales RN  Outcome: Progressing  3/4/2025 0257 by Dee Reid RN  Outcome: Progressing  Flowsheets (Taken 3/4/2025 0257)  Discharge to home or other facility with appropriate resources:   Identify barriers to discharge with patient and caregiver   Arrange for needed discharge resources and transportation as appropriate   Identify discharge learning needs (meds, wound care, etc)   Refer to discharge planning if patient needs post-hospital services based on physician order or complex needs related to functional status, cognitive ability or social support system     Problem: Pain  Goal: Verbalizes/displays adequate comfort level or baseline comfort level  3/4/2025 1020 by Oliva Rosales RN  Outcome: Progressing  3/4/2025 0257 by Dee Reid RN  Outcome: Progressing  Flowsheets (Taken 3/3/2025 2112)  Verbalizes/displays adequate comfort level or baseline comfort level:   Encourage patient to monitor pain and request assistance   Assess pain using appropriate pain scale   Administer analgesics based on type and

## 2025-03-05 ENCOUNTER — TELEPHONE (OUTPATIENT)
Dept: FAMILY MEDICINE CLINIC | Age: 70
End: 2025-03-05

## 2025-03-05 NOTE — TELEPHONE ENCOUNTER
Care Transitions Initial Follow Up Call    Outreach made within 2 business days of discharge: Yes    Patient: Carroll Wells Patient : 1955   MRN: [id number]  Reason for Admission: Subclavian vein thrombosis.  Discharge Date: 3/4/25       Spoke with: Carroll     Discharge department/facility: [facility] Interactive Patient Contact:  Was patient able to fill all prescriptions: Yes  Was patient instructed to bring all medications to the follow-up visit: Yes  Is patient taking all medications as directed in the discharge summary? Yes  Does patient understand their discharge instructions: Yes  Does patient have questions or concerns that need addressed prior to 7-14 day follow up office visit: no        Scheduled appointment with PCP within 7-14 days    Follow Up  Future Appointments   Date Time Provider Department Center   3/12/2025 10:00 AM Karina Mendoza PA [facility]   3/31/2025 11:00 AM Karina Mendoza PA [facility]   4/10/2025 11:00 AM Bon Reece DO [facility]   2025 11:00 [facility]       Betzy Oakley LPN

## 2025-03-11 DIAGNOSIS — K74.60 LIVER CIRRHOSIS SECONDARY TO NASH (HCC): Primary | ICD-10-CM

## 2025-03-11 DIAGNOSIS — K75.81 LIVER CIRRHOSIS SECONDARY TO NASH (HCC): Primary | ICD-10-CM

## 2025-03-12 ENCOUNTER — OFFICE VISIT (OUTPATIENT)
Dept: FAMILY MEDICINE CLINIC | Age: 70
End: 2025-03-12

## 2025-03-12 VITALS
WEIGHT: 225 LBS | SYSTOLIC BLOOD PRESSURE: 134 MMHG | BODY MASS INDEX: 34.1 KG/M2 | DIASTOLIC BLOOD PRESSURE: 74 MMHG | HEART RATE: 97 BPM | HEIGHT: 68 IN | OXYGEN SATURATION: 99 % | TEMPERATURE: 98.2 F

## 2025-03-12 DIAGNOSIS — I82.B12 THROMBOSIS OF LEFT SUBCLAVIAN VEIN (HCC): Primary | ICD-10-CM

## 2025-03-12 DIAGNOSIS — K74.60 LIVER CIRRHOSIS SECONDARY TO NASH (HCC): ICD-10-CM

## 2025-03-12 DIAGNOSIS — I82.C12: ICD-10-CM

## 2025-03-12 DIAGNOSIS — K75.81 LIVER CIRRHOSIS SECONDARY TO NASH (HCC): ICD-10-CM

## 2025-03-12 DIAGNOSIS — I82.A12: ICD-10-CM

## 2025-03-12 SDOH — ECONOMIC STABILITY: FOOD INSECURITY: WITHIN THE PAST 12 MONTHS, YOU WORRIED THAT YOUR FOOD WOULD RUN OUT BEFORE YOU GOT MONEY TO BUY MORE.: NEVER TRUE

## 2025-03-12 SDOH — ECONOMIC STABILITY: FOOD INSECURITY: WITHIN THE PAST 12 MONTHS, THE FOOD YOU BOUGHT JUST DIDN'T LAST AND YOU DIDN'T HAVE MONEY TO GET MORE.: NEVER TRUE

## 2025-03-12 ASSESSMENT — PATIENT HEALTH QUESTIONNAIRE - PHQ9
SUM OF ALL RESPONSES TO PHQ QUESTIONS 1-9: 0
2. FEELING DOWN, DEPRESSED OR HOPELESS: NOT AT ALL
1. LITTLE INTEREST OR PLEASURE IN DOING THINGS: NOT AT ALL

## 2025-03-12 NOTE — PROGRESS NOTES
Post-Discharge Transitional Care  Follow Up      Carroll Wells   YOB: 1955    Date of Office Visit:  3/12/2025  Date of Hospital Admission: 2/27/25  Date of Hospital Discharge: 3/4/25  Risk of hospital readmission (high >=14%. Medium >=10%) :Readmission Risk Score: 13.4      Care management risk score Rising risk (score 2-5) and Complex Care (Scores >=6): No Risk Score On File     Non face to face  following discharge, date last encounter closed (first attempt may have been earlier): *No documented post hospital discharge outreach found in the last 14 days    Call initiated 2 business days of discharge: *No response recorded in the last 14 days    ASSESSMENT/PLAN:   Thrombosis of left subclavian vein (HCC)  Thrombosis of internal jugular vein, left (HCC)  Acute thrombosis of left axillary vein (HCC)  - no acute intervention per vascular.   - s/p esophageal varices banding with GI and d/c home on AC.   - patient denies S&S of bleeding.   - GI has repeat blood work ordered. I will check H/h level q 2 weeks while on AC.  - will plan 3 months of treatment with repeat imaging.   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 Will need repeat BMP which GI has ordered.     Medical Decision Making: high complexity  No follow-ups on file.           Subjective:   HPI:  Follow up of Hospital problems/diagnosis(es): acute dvt of upper extremity and neck in setting of anemia.     Inpatient course: Discharge summary reviewed- see chart.  Admitted for acute dvt of upper extremity and neck in setting of acute on chronic anemia.   Underwent EGD with gi s/p additional banding. Continued on ppi   Vascular consulted- recommended ac with h/h monitoring.   D/c on eliquis h/h at discharge 9.4/28.8  Ammonia 30 (2/18)  Continued on lasix and spironolactone- also had paracentesis while Inpatient.     Interval history/Current status:     Pt reports some

## 2025-03-12 NOTE — PROGRESS NOTES
\"Have you been to the ER, urgent care clinic since your last visit?  Hospitalized since your last visit?\"    YES - When: approximately 2  weeks ago.  Where and Why: [facility] For blood clots and holding fluid. Blood clots in left arm and neck .    “Have you seen or consulted any other health care providers outside our system since your last visit?”    NO    “Have you had a diabetic eye exam?”    NO     Date of last diabetic eye exam: 3/30/2021

## 2025-03-16 ENCOUNTER — PATIENT MESSAGE (OUTPATIENT)
Dept: FAMILY MEDICINE CLINIC | Age: 70
End: 2025-03-16

## 2025-03-19 RX ORDER — SIMETHICONE 80 MG
80 TABLET,CHEWABLE ORAL 3 TIMES DAILY
Qty: 90 TABLET | Refills: 2 | Status: SHIPPED | OUTPATIENT
Start: 2025-03-19

## 2025-03-21 ENCOUNTER — TELEPHONE (OUTPATIENT)
Dept: INTERVENTIONAL RADIOLOGY/VASCULAR | Age: 70
End: 2025-03-21

## 2025-03-21 ENCOUNTER — TELEPHONE (OUTPATIENT)
Dept: FAMILY MEDICINE CLINIC | Age: 70
End: 2025-03-21

## 2025-03-21 DIAGNOSIS — K31.819 GAVE (GASTRIC ANTRAL VASCULAR ECTASIA): ICD-10-CM

## 2025-03-21 DIAGNOSIS — K74.60 LIVER CIRRHOSIS SECONDARY TO NASH (HCC): ICD-10-CM

## 2025-03-21 DIAGNOSIS — Z79.4 TYPE 2 DIABETES MELLITUS WITH HYPERGLYCEMIA, WITH LONG-TERM CURRENT USE OF INSULIN (HCC): ICD-10-CM

## 2025-03-21 DIAGNOSIS — E11.65 TYPE 2 DIABETES MELLITUS WITH HYPERGLYCEMIA, WITH LONG-TERM CURRENT USE OF INSULIN (HCC): ICD-10-CM

## 2025-03-21 DIAGNOSIS — K75.81 LIVER CIRRHOSIS SECONDARY TO NASH (HCC): ICD-10-CM

## 2025-03-21 NOTE — TELEPHONE ENCOUNTER
Call received from Rosa Maria, Patient's Wife wanting to schedule Paracentesis for patient.  No orders faxed or in EPIC.  Call placed to Dr. Velasquez's office for order.  Message sent by office to Dr. Garcia for order.  Office to Call [facility] when order placed.

## 2025-03-21 NOTE — TELEPHONE ENCOUNTER
Patient's wife calling stating patient is starting to have a lot of increased swelling in his abdomen, fluid collecting on his stomach. Wife is asking if provider can make an appointment at [facility] for paracentesis. No change in output of urine, decreased fluid intake. Denied any other symptoms.     Please advise  Thanks

## 2025-03-22 LAB
ALBUMIN SERPL-MCNC: 3.1 G/DL (ref 3.4–5)
ALP SERPL-CCNC: 132 U/L (ref 40–129)
ALT SERPL-CCNC: 17 U/L (ref 10–40)
AMMONIA PLAS-SCNC: 37 UMOL/L (ref 16–60)
ANION GAP SERPL CALCULATED.3IONS-SCNC: 10 MMOL/L (ref 3–16)
AST SERPL-CCNC: 26 U/L (ref 15–37)
BASOPHILS # BLD: 0.1 K/UL (ref 0–0.2)
BASOPHILS NFR BLD: 1.3 %
BILIRUB DIRECT SERPL-MCNC: 0.4 MG/DL (ref 0–0.3)
BILIRUB INDIRECT SERPL-MCNC: 0.4 MG/DL (ref 0–1)
BILIRUB SERPL-MCNC: 0.8 MG/DL (ref 0–1)
BUN SERPL-MCNC: 29 MG/DL (ref 7–20)
CALCIUM SERPL-MCNC: 9 MG/DL (ref 8.3–10.6)
CHLORIDE SERPL-SCNC: 97 MMOL/L (ref 99–110)
CO2 SERPL-SCNC: 26 MMOL/L (ref 21–32)
CREAT SERPL-MCNC: 1.1 MG/DL (ref 0.8–1.3)
DEPRECATED RDW RBC AUTO: 20.6 % (ref 12.4–15.4)
EOSINOPHIL # BLD: 0.2 K/UL (ref 0–0.6)
EOSINOPHIL NFR BLD: 3 %
GFR SERPLBLD CREATININE-BSD FMLA CKD-EPI: 72 ML/MIN/{1.73_M2}
GLUCOSE SERPL-MCNC: 250 MG/DL (ref 70–99)
HCT VFR BLD AUTO: 24.5 % (ref 40.5–52.5)
HGB BLD-MCNC: 7.7 G/DL (ref 13.5–17.5)
INR PPP: 1.62 (ref 0.85–1.15)
LYMPHOCYTES # BLD: 1.4 K/UL (ref 1–5.1)
LYMPHOCYTES NFR BLD: 19.6 %
MCH RBC QN AUTO: 23.9 PG (ref 26–34)
MCHC RBC AUTO-ENTMCNC: 31.5 G/DL (ref 31–36)
MCV RBC AUTO: 75.8 FL (ref 80–100)
MONOCYTES # BLD: 0.8 K/UL (ref 0–1.3)
MONOCYTES NFR BLD: 11.9 %
NEUTROPHILS # BLD: 4.5 K/UL (ref 1.7–7.7)
NEUTROPHILS NFR BLD: 64.2 %
PLATELET # BLD AUTO: 229 K/UL (ref 135–450)
PMV BLD AUTO: 8.4 FL (ref 5–10.5)
POTASSIUM SERPL-SCNC: 4.6 MMOL/L (ref 3.5–5.1)
PROT SERPL-MCNC: 6.2 G/DL (ref 6.4–8.2)
PROTHROMBIN TIME: 19.4 SEC (ref 11.9–14.9)
RBC # BLD AUTO: 3.24 M/UL (ref 4.2–5.9)
SODIUM SERPL-SCNC: 133 MMOL/L (ref 136–145)
WBC # BLD AUTO: 7.1 K/UL (ref 4–11)

## 2025-03-24 ENCOUNTER — TELEPHONE (OUTPATIENT)
Dept: INTERVENTIONAL RADIOLOGY/VASCULAR | Age: 70
End: 2025-03-24

## 2025-03-24 DIAGNOSIS — K74.60 CIRRHOSIS OF LIVER WITH ASCITES, UNSPECIFIED HEPATIC CIRRHOSIS TYPE (HCC): Primary | ICD-10-CM

## 2025-03-24 DIAGNOSIS — R18.8 CIRRHOSIS OF LIVER WITH ASCITES, UNSPECIFIED HEPATIC CIRRHOSIS TYPE (HCC): Primary | ICD-10-CM

## 2025-03-24 NOTE — TELEPHONE ENCOUNTER
Called and spoke to Rosa Maria about upcoming procedure. Phone number used: [phone]  Procedure:  para  Approving Radiologist: MARS    Pt informed of the following:  Date of procedure: 3/26/25  Arrival time of procedure: 1230  Time of procedure: 1300      Need SDS: Yes

## 2025-03-25 ENCOUNTER — RESULTS FOLLOW-UP (OUTPATIENT)
Dept: GASTROENTEROLOGY | Age: 70
End: 2025-03-25

## 2025-03-25 ENCOUNTER — HOSPITAL ENCOUNTER (INPATIENT)
Age: 70
LOS: 7 days | Discharge: HOME | DRG: 377 | End: 2025-04-01
Attending: EMERGENCY MEDICINE | Admitting: HOSPITALIST
Payer: MEDICARE

## 2025-03-25 DIAGNOSIS — K65.2 SPONTANEOUS BACTERIAL PERITONITIS (HCC): ICD-10-CM

## 2025-03-25 DIAGNOSIS — K75.81 NASH (NONALCOHOLIC STEATOHEPATITIS): ICD-10-CM

## 2025-03-25 DIAGNOSIS — D50.0 BLOOD LOSS ANEMIA: ICD-10-CM

## 2025-03-25 DIAGNOSIS — N17.9 AKI (ACUTE KIDNEY INJURY): ICD-10-CM

## 2025-03-25 DIAGNOSIS — D64.9 SYMPTOMATIC ANEMIA: ICD-10-CM

## 2025-03-25 DIAGNOSIS — Z79.4 TYPE 2 DIABETES MELLITUS WITH HYPERGLYCEMIA, WITH LONG-TERM CURRENT USE OF INSULIN (HCC): ICD-10-CM

## 2025-03-25 DIAGNOSIS — E11.65 TYPE 2 DIABETES MELLITUS WITH HYPERGLYCEMIA, WITH LONG-TERM CURRENT USE OF INSULIN (HCC): ICD-10-CM

## 2025-03-25 DIAGNOSIS — I82.622 ACUTE DEEP VEIN THROMBOSIS (DVT) OF LEFT UPPER EXTREMITY, UNSPECIFIED VEIN (HCC): Primary | ICD-10-CM

## 2025-03-25 DIAGNOSIS — R18.8 OTHER ASCITES: ICD-10-CM

## 2025-03-25 DIAGNOSIS — K92.2 GASTROINTESTINAL HEMORRHAGE, UNSPECIFIED GASTROINTESTINAL HEMORRHAGE TYPE: ICD-10-CM

## 2025-03-25 LAB
ABO/RH: NORMAL
ALBUMIN SERPL-MCNC: 3.1 G/DL (ref 3.4–5)
ALBUMIN/GLOB SERPL: 0.9 {RATIO} (ref 1.1–2.2)
ALP SERPL-CCNC: 127 U/L (ref 40–129)
ALT SERPL-CCNC: 17 U/L (ref 10–40)
ANION GAP SERPL CALCULATED.3IONS-SCNC: 10 MMOL/L (ref 3–16)
ANTIBODY SCREEN: NORMAL
AST SERPL-CCNC: 26 U/L (ref 15–37)
BASOPHILS # BLD: 0 K/UL (ref 0–0.2)
BASOPHILS NFR BLD: 0.5 %
BILIRUB SERPL-MCNC: 1 MG/DL (ref 0–1)
BLOOD BANK DISPENSE STATUS: NORMAL
BLOOD BANK PRODUCT CODE: NORMAL
BPU ID: NORMAL
BUN SERPL-MCNC: 29 MG/DL (ref 7–20)
CALCIUM SERPL-MCNC: 9.1 MG/DL (ref 8.3–10.6)
CHLORIDE SERPL-SCNC: 99 MMOL/L (ref 99–110)
CO2 SERPL-SCNC: 26 MMOL/L (ref 21–32)
CREAT SERPL-MCNC: 1.4 MG/DL (ref 0.8–1.3)
DEPRECATED RDW RBC AUTO: 19.4 % (ref 12.4–15.4)
DESCRIPTION BLOOD BANK: NORMAL
EOSINOPHIL # BLD: 0.1 K/UL (ref 0–0.6)
EOSINOPHIL NFR BLD: 1.3 %
GFR SERPLBLD CREATININE-BSD FMLA CKD-EPI: 54 ML/MIN/{1.73_M2}
GLUCOSE SERPL-MCNC: 258 MG/DL (ref 70–99)
HCT VFR BLD AUTO: 22.1 % (ref 40.5–52.5)
HEMOCCULT SP1 STL QL: ABNORMAL
HGB BLD-MCNC: 7.2 G/DL (ref 13.5–17.5)
INR PPP: 1.64 (ref 0.85–1.15)
LYMPHOCYTES # BLD: 1.1 K/UL (ref 1–5.1)
LYMPHOCYTES NFR BLD: 13.9 %
MCH RBC QN AUTO: 24.3 PG (ref 26–34)
MCHC RBC AUTO-ENTMCNC: 32.6 G/DL (ref 31–36)
MCV RBC AUTO: 74.7 FL (ref 80–100)
MONOCYTES # BLD: 0.9 K/UL (ref 0–1.3)
MONOCYTES NFR BLD: 11.4 %
NEUTROPHILS # BLD: 5.8 K/UL (ref 1.7–7.7)
NEUTROPHILS NFR BLD: 72.9 %
PLATELET # BLD AUTO: 257 K/UL (ref 135–450)
PMV BLD AUTO: 7.7 FL (ref 5–10.5)
POTASSIUM SERPL-SCNC: 4.2 MMOL/L (ref 3.5–5.1)
PROT SERPL-MCNC: 6.5 G/DL (ref 6.4–8.2)
PROTHROMBIN TIME: 19.6 SEC (ref 11.9–14.9)
RBC # BLD AUTO: 2.95 M/UL (ref 4.2–5.9)
SODIUM SERPL-SCNC: 135 MMOL/L (ref 136–145)
WBC # BLD AUTO: 7.9 K/UL (ref 4–11)

## 2025-03-25 PROCEDURE — 2060000000 HC ICU INTERMEDIATE R&B

## 2025-03-25 PROCEDURE — 86901 BLOOD TYPING SEROLOGIC RH(D): CPT

## 2025-03-25 PROCEDURE — 82270 OCCULT BLOOD FECES: CPT

## 2025-03-25 PROCEDURE — 86900 BLOOD TYPING SEROLOGIC ABO: CPT

## 2025-03-25 PROCEDURE — 86923 COMPATIBILITY TEST ELECTRIC: CPT

## 2025-03-25 PROCEDURE — 30233N1 TRANSFUSION OF NONAUTOLOGOUS RED BLOOD CELLS INTO PERIPHERAL VEIN, PERCUTANEOUS APPROACH: ICD-10-PCS | Performed by: HOSPITALIST

## 2025-03-25 PROCEDURE — 2580000003 HC RX 258: Performed by: EMERGENCY MEDICINE

## 2025-03-25 PROCEDURE — 85610 PROTHROMBIN TIME: CPT

## 2025-03-25 PROCEDURE — 96375 TX/PRO/DX INJ NEW DRUG ADDON: CPT

## 2025-03-25 PROCEDURE — 2580000003 HC RX 258

## 2025-03-25 PROCEDURE — 80053 COMPREHEN METABOLIC PANEL: CPT

## 2025-03-25 PROCEDURE — 6360000002 HC RX W HCPCS: Performed by: HOSPITALIST

## 2025-03-25 PROCEDURE — 96374 THER/PROPH/DIAG INJ IV PUSH: CPT

## 2025-03-25 PROCEDURE — 85025 COMPLETE CBC W/AUTO DIFF WBC: CPT

## 2025-03-25 PROCEDURE — 99285 EMERGENCY DEPT VISIT HI MDM: CPT

## 2025-03-25 PROCEDURE — 36415 COLL VENOUS BLD VENIPUNCTURE: CPT

## 2025-03-25 PROCEDURE — 2580000003 HC RX 258: Performed by: HOSPITALIST

## 2025-03-25 PROCEDURE — P9016 RBC LEUKOCYTES REDUCED: HCPCS

## 2025-03-25 PROCEDURE — 2500000003 HC RX 250 WO HCPCS

## 2025-03-25 PROCEDURE — 6360000002 HC RX W HCPCS: Performed by: EMERGENCY MEDICINE

## 2025-03-25 PROCEDURE — 6360000002 HC RX W HCPCS

## 2025-03-25 PROCEDURE — 86850 RBC ANTIBODY SCREEN: CPT

## 2025-03-25 RX ORDER — SODIUM CHLORIDE 9 MG/ML
INJECTION, SOLUTION INTRAVENOUS PRN
Status: DISCONTINUED | OUTPATIENT
Start: 2025-03-25 | End: 2025-04-01 | Stop reason: HOSPADM

## 2025-03-25 RX ORDER — MORPHINE SULFATE 2 MG/ML
2 INJECTION, SOLUTION INTRAMUSCULAR; INTRAVENOUS EVERY 4 HOURS PRN
Refills: 0 | Status: DISCONTINUED | OUTPATIENT
Start: 2025-03-25 | End: 2025-04-01 | Stop reason: HOSPADM

## 2025-03-25 RX ORDER — FENTANYL CITRATE 50 UG/ML
25 INJECTION, SOLUTION INTRAMUSCULAR; INTRAVENOUS ONCE
Refills: 0 | Status: COMPLETED | OUTPATIENT
Start: 2025-03-25 | End: 2025-03-25

## 2025-03-25 RX ORDER — PANTOPRAZOLE SODIUM 40 MG/10ML
40 INJECTION, POWDER, LYOPHILIZED, FOR SOLUTION INTRAVENOUS ONCE
Status: COMPLETED | OUTPATIENT
Start: 2025-03-25 | End: 2025-03-25

## 2025-03-25 RX ORDER — OCTREOTIDE ACETATE 100 UG/ML
50 INJECTION, SOLUTION INTRAVENOUS; SUBCUTANEOUS ONCE
Status: COMPLETED | OUTPATIENT
Start: 2025-03-25 | End: 2025-03-25

## 2025-03-25 RX ORDER — ACETAMINOPHEN 325 MG/1
650 TABLET ORAL EVERY 6 HOURS PRN
Status: DISCONTINUED | OUTPATIENT
Start: 2025-03-25 | End: 2025-03-25

## 2025-03-25 RX ORDER — ONDANSETRON 2 MG/ML
4 INJECTION INTRAMUSCULAR; INTRAVENOUS ONCE
Status: COMPLETED | OUTPATIENT
Start: 2025-03-25 | End: 2025-03-25

## 2025-03-25 RX ORDER — ONDANSETRON 2 MG/ML
4 INJECTION INTRAMUSCULAR; INTRAVENOUS EVERY 6 HOURS PRN
Status: DISCONTINUED | OUTPATIENT
Start: 2025-03-25 | End: 2025-04-01 | Stop reason: HOSPADM

## 2025-03-25 RX ORDER — ONDANSETRON 4 MG/1
4 TABLET, ORALLY DISINTEGRATING ORAL EVERY 8 HOURS PRN
Status: DISCONTINUED | OUTPATIENT
Start: 2025-03-25 | End: 2025-04-01 | Stop reason: HOSPADM

## 2025-03-25 RX ORDER — FUROSEMIDE 10 MG/ML
40 INJECTION INTRAMUSCULAR; INTRAVENOUS 2 TIMES DAILY
Status: DISCONTINUED | OUTPATIENT
Start: 2025-03-25 | End: 2025-03-27

## 2025-03-25 RX ORDER — ACETAMINOPHEN 650 MG/1
650 SUPPOSITORY RECTAL EVERY 6 HOURS PRN
Status: DISCONTINUED | OUTPATIENT
Start: 2025-03-25 | End: 2025-03-25

## 2025-03-25 RX ORDER — SODIUM CHLORIDE 0.9 % (FLUSH) 0.9 %
5-40 SYRINGE (ML) INJECTION PRN
Status: DISCONTINUED | OUTPATIENT
Start: 2025-03-25 | End: 2025-04-01 | Stop reason: HOSPADM

## 2025-03-25 RX ORDER — SODIUM CHLORIDE 9 MG/ML
INJECTION, SOLUTION INTRAVENOUS PRN
Status: DISCONTINUED | OUTPATIENT
Start: 2025-03-25 | End: 2025-03-28 | Stop reason: SDUPTHER

## 2025-03-25 RX ORDER — SODIUM CHLORIDE 0.9 % (FLUSH) 0.9 %
5-40 SYRINGE (ML) INJECTION EVERY 12 HOURS SCHEDULED
Status: DISCONTINUED | OUTPATIENT
Start: 2025-03-25 | End: 2025-04-01 | Stop reason: HOSPADM

## 2025-03-25 RX ORDER — MORPHINE SULFATE 2 MG/ML
2 INJECTION, SOLUTION INTRAMUSCULAR; INTRAVENOUS ONCE
Refills: 0 | Status: COMPLETED | OUTPATIENT
Start: 2025-03-25 | End: 2025-03-25

## 2025-03-25 RX ORDER — SODIUM CHLORIDE 9 MG/ML
1000 INJECTION, SOLUTION INTRAVENOUS CONTINUOUS
Status: DISCONTINUED | OUTPATIENT
Start: 2025-03-25 | End: 2025-03-29

## 2025-03-25 RX ORDER — SODIUM CHLORIDE 9 MG/ML
INJECTION, SOLUTION INTRAVENOUS CONTINUOUS
Status: DISCONTINUED | OUTPATIENT
Start: 2025-03-25 | End: 2025-03-26

## 2025-03-25 RX ORDER — INSULIN LISPRO 100 [IU]/ML
0-4 INJECTION, SOLUTION INTRAVENOUS; SUBCUTANEOUS
Status: DISCONTINUED | OUTPATIENT
Start: 2025-03-25 | End: 2025-03-26 | Stop reason: SDUPTHER

## 2025-03-25 RX ADMIN — MORPHINE SULFATE 2 MG: 2 INJECTION, SOLUTION INTRAMUSCULAR; INTRAVENOUS at 23:37

## 2025-03-25 RX ADMIN — FUROSEMIDE 40 MG: 10 INJECTION, SOLUTION INTRAMUSCULAR; INTRAVENOUS at 23:37

## 2025-03-25 RX ADMIN — ONDANSETRON 4 MG: 2 INJECTION, SOLUTION INTRAMUSCULAR; INTRAVENOUS at 21:01

## 2025-03-25 RX ADMIN — MORPHINE SULFATE 2 MG: 2 INJECTION, SOLUTION INTRAMUSCULAR; INTRAVENOUS at 22:00

## 2025-03-25 RX ADMIN — PANTOPRAZOLE SODIUM 40 MG: 40 INJECTION, POWDER, FOR SOLUTION INTRAVENOUS at 21:03

## 2025-03-25 RX ADMIN — SODIUM CHLORIDE: 9 INJECTION, SOLUTION INTRAVENOUS at 23:01

## 2025-03-25 RX ADMIN — FENTANYL CITRATE 25 MCG: 50 INJECTION INTRAMUSCULAR; INTRAVENOUS at 20:52

## 2025-03-25 RX ADMIN — SODIUM CHLORIDE 1000 ML: 0.9 INJECTION, SOLUTION INTRAVENOUS at 21:53

## 2025-03-25 RX ADMIN — WATER 1000 MG: 1 INJECTION INTRAMUSCULAR; INTRAVENOUS; SUBCUTANEOUS at 20:49

## 2025-03-25 RX ADMIN — OCTREOTIDE ACETATE 50 MCG: 100 INJECTION, SOLUTION INTRAVENOUS; SUBCUTANEOUS at 20:51

## 2025-03-25 RX ADMIN — OCTREOTIDE ACETATE 50 MCG/HR: 500 INJECTION, SOLUTION INTRAVENOUS; SUBCUTANEOUS at 22:27

## 2025-03-25 ASSESSMENT — PAIN DESCRIPTION - LOCATION
LOCATION: ABDOMEN

## 2025-03-25 ASSESSMENT — PAIN DESCRIPTION - DESCRIPTORS
DESCRIPTORS: SHOOTING
DESCRIPTORS: SHOOTING

## 2025-03-25 ASSESSMENT — PAIN SCALES - GENERAL
PAINLEVEL_OUTOF10: 7

## 2025-03-25 ASSESSMENT — PAIN - FUNCTIONAL ASSESSMENT: PAIN_FUNCTIONAL_ASSESSMENT: 0-10

## 2025-03-26 ENCOUNTER — APPOINTMENT (OUTPATIENT)
Dept: ULTRASOUND IMAGING | Age: 70
DRG: 377 | End: 2025-03-26
Payer: MEDICARE

## 2025-03-26 ENCOUNTER — APPOINTMENT (OUTPATIENT)
Dept: VASCULAR LAB | Age: 70
DRG: 377 | End: 2025-03-26
Attending: EMERGENCY MEDICINE
Payer: MEDICARE

## 2025-03-26 ENCOUNTER — ANESTHESIA (OUTPATIENT)
Dept: ENDOSCOPY | Age: 70
End: 2025-03-26
Payer: MEDICARE

## 2025-03-26 ENCOUNTER — ANESTHESIA EVENT (OUTPATIENT)
Dept: ENDOSCOPY | Age: 70
End: 2025-03-26
Payer: MEDICARE

## 2025-03-26 ENCOUNTER — APPOINTMENT (OUTPATIENT)
Dept: VASCULAR LAB | Age: 70
DRG: 377 | End: 2025-03-26
Attending: INTERNAL MEDICINE
Payer: MEDICARE

## 2025-03-26 ENCOUNTER — APPOINTMENT (OUTPATIENT)
Dept: GENERAL RADIOLOGY | Age: 70
DRG: 377 | End: 2025-03-26
Payer: MEDICARE

## 2025-03-26 LAB
ALBUMIN FLD-MCNC: 0.6 G/DL
ALBUMIN SERPL-MCNC: 2.9 G/DL (ref 3.4–5)
ALP SERPL-CCNC: 115 U/L (ref 40–129)
ALT SERPL-CCNC: 15 U/L (ref 10–40)
ANION GAP SERPL CALCULATED.3IONS-SCNC: 8 MMOL/L (ref 3–16)
APPEARANCE FLUID: NORMAL
APTT BLD: 35.1 SEC (ref 22.1–36.4)
AST SERPL-CCNC: 26 U/L (ref 15–37)
BDY FLUID QUALITY: NORMAL
BILIRUB DIRECT SERPL-MCNC: 0.5 MG/DL (ref 0–0.3)
BILIRUB INDIRECT SERPL-MCNC: 0.6 MG/DL (ref 0–1)
BILIRUB SERPL-MCNC: 1.1 MG/DL (ref 0–1)
BLOOD BANK DISPENSE STATUS: NORMAL
BLOOD BANK PRODUCT CODE: NORMAL
BPU ID: NORMAL
BUN SERPL-MCNC: 30 MG/DL (ref 7–20)
CALCIUM SERPL-MCNC: 8.7 MG/DL (ref 8.3–10.6)
CELL COUNT FLUID TYPE: NORMAL
CHLORIDE SERPL-SCNC: 101 MMOL/L (ref 99–110)
CO2 SERPL-SCNC: 26 MMOL/L (ref 21–32)
COLOR FLUID: NORMAL
CREAT SERPL-MCNC: 1.4 MG/DL (ref 0.8–1.3)
DESCRIPTION BLOOD BANK: NORMAL
ECHO BSA: 2.28 M2
FERRITIN SERPL IA-MCNC: 20.1 NG/ML (ref 30–400)
FOLATE SERPL-MCNC: 9.9 NG/ML (ref 4.78–24.2)
GFR SERPLBLD CREATININE-BSD FMLA CKD-EPI: 54 ML/MIN/{1.73_M2}
GLUCOSE BLD-MCNC: 175 MG/DL (ref 70–99)
GLUCOSE BLD-MCNC: 181 MG/DL (ref 70–99)
GLUCOSE BLD-MCNC: 194 MG/DL (ref 70–99)
GLUCOSE BLD-MCNC: 231 MG/DL (ref 70–99)
GLUCOSE BLD-MCNC: 253 MG/DL (ref 70–99)
GLUCOSE SERPL-MCNC: 174 MG/DL (ref 70–99)
HCT VFR BLD AUTO: 24.6 % (ref 40.5–52.5)
HCT VFR BLD AUTO: 25.7 % (ref 40.5–52.5)
HCT VFR BLD AUTO: 26.9 % (ref 40.5–52.5)
HCT VFR BLD AUTO: 34.4 % (ref 40.5–52.5)
HGB BLD-MCNC: 10.2 G/DL (ref 13.5–17.5)
HGB BLD-MCNC: 7.8 G/DL (ref 13.5–17.5)
HGB BLD-MCNC: 8.1 G/DL (ref 13.5–17.5)
HGB BLD-MCNC: 8.7 G/DL (ref 13.5–17.5)
INR PPP: 1.48 (ref 0.85–1.15)
IRON SATN MFR SERPL: 10 % (ref 20–50)
IRON SERPL-MCNC: 33 UG/DL (ref 59–158)
LYMPHOCYTES NFR FLD: 36 %
MESOTHL CELL NFR FLD: 26 %
MONOCYTES NFR FLD: 29 %
NEUTROPHIL, FLUID: 9 %
NUC CELL # FLD: 171 /CUMM
PATH CONSULT FLUID: NORMAL
PATH REV: YES
PERFORMED ON: ABNORMAL
POTASSIUM SERPL-SCNC: 4.1 MMOL/L (ref 3.5–5.1)
PROT FLD-MCNC: 1.2 G/DL
PROT SERPL-MCNC: 6.1 G/DL (ref 6.4–8.2)
PROTHROMBIN TIME: 18.1 SEC (ref 11.9–14.9)
RBC FLUID: 400 /CUMM
SODIUM SERPL-SCNC: 135 MMOL/L (ref 136–145)
SPECIMEN SOURCE FLD: NORMAL
TIBC SERPL-MCNC: 322 UG/DL (ref 260–445)
TOTAL CELLS COUNTED FLD: 100
VIT B12 SERPL-MCNC: 1797 PG/ML (ref 211–911)

## 2025-03-26 PROCEDURE — 6370000000 HC RX 637 (ALT 250 FOR IP): Performed by: NURSE PRACTITIONER

## 2025-03-26 PROCEDURE — 7100000010 HC PHASE II RECOVERY - FIRST 15 MIN: Performed by: INTERNAL MEDICINE

## 2025-03-26 PROCEDURE — 2709999900 HC NON-CHARGEABLE SUPPLY: Performed by: INTERNAL MEDICINE

## 2025-03-26 PROCEDURE — 6360000002 HC RX W HCPCS: Performed by: NURSE ANESTHETIST, CERTIFIED REGISTERED

## 2025-03-26 PROCEDURE — 87070 CULTURE OTHR SPECIMN AEROBIC: CPT

## 2025-03-26 PROCEDURE — 3700000000 HC ANESTHESIA ATTENDED CARE: Performed by: INTERNAL MEDICINE

## 2025-03-26 PROCEDURE — 88112 CYTOPATH CELL ENHANCE TECH: CPT

## 2025-03-26 PROCEDURE — 7100000011 HC PHASE II RECOVERY - ADDTL 15 MIN: Performed by: INTERNAL MEDICINE

## 2025-03-26 PROCEDURE — 89051 BODY FLUID CELL COUNT: CPT

## 2025-03-26 PROCEDURE — 93971 EXTREMITY STUDY: CPT | Performed by: SURGERY

## 2025-03-26 PROCEDURE — 2060000000 HC ICU INTERMEDIATE R&B

## 2025-03-26 PROCEDURE — 6360000002 HC RX W HCPCS: Performed by: EMERGENCY MEDICINE

## 2025-03-26 PROCEDURE — 82746 ASSAY OF FOLIC ACID SERUM: CPT

## 2025-03-26 PROCEDURE — 2580000003 HC RX 258: Performed by: HOSPITALIST

## 2025-03-26 PROCEDURE — 85610 PROTHROMBIN TIME: CPT

## 2025-03-26 PROCEDURE — 80048 BASIC METABOLIC PNL TOTAL CA: CPT

## 2025-03-26 PROCEDURE — 2580000003 HC RX 258: Performed by: EMERGENCY MEDICINE

## 2025-03-26 PROCEDURE — 6360000002 HC RX W HCPCS: Performed by: PHYSICIAN ASSISTANT

## 2025-03-26 PROCEDURE — P9047 ALBUMIN (HUMAN), 25%, 50ML: HCPCS | Performed by: PHYSICIAN ASSISTANT

## 2025-03-26 PROCEDURE — 93971 EXTREMITY STUDY: CPT

## 2025-03-26 PROCEDURE — 06L38CZ OCCLUSION OF ESOPHAGEAL VEIN WITH EXTRALUMINAL DEVICE, VIA NATURAL OR ARTIFICIAL OPENING ENDOSCOPIC: ICD-10-PCS | Performed by: INTERNAL MEDICINE

## 2025-03-26 PROCEDURE — 88305 TISSUE EXAM BY PATHOLOGIST: CPT

## 2025-03-26 PROCEDURE — 83550 IRON BINDING TEST: CPT

## 2025-03-26 PROCEDURE — 82607 VITAMIN B-12: CPT

## 2025-03-26 PROCEDURE — 49083 ABD PARACENTESIS W/IMAGING: CPT

## 2025-03-26 PROCEDURE — 2720000010 HC SURG SUPPLY STERILE: Performed by: INTERNAL MEDICINE

## 2025-03-26 PROCEDURE — 82042 OTHER SOURCE ALBUMIN QUAN EA: CPT

## 2025-03-26 PROCEDURE — 3609012300 HC EGD BAND LIGATION ESOPHGEAL/GASTRIC VARICES: Performed by: INTERNAL MEDICINE

## 2025-03-26 PROCEDURE — 3609017100 HC EGD: Performed by: INTERNAL MEDICINE

## 2025-03-26 PROCEDURE — 0W9G3ZZ DRAINAGE OF PERITONEAL CAVITY, PERCUTANEOUS APPROACH: ICD-10-PCS | Performed by: HOSPITALIST

## 2025-03-26 PROCEDURE — 87205 SMEAR GRAM STAIN: CPT

## 2025-03-26 PROCEDURE — 76705 ECHO EXAM OF ABDOMEN: CPT

## 2025-03-26 PROCEDURE — 6370000000 HC RX 637 (ALT 250 FOR IP): Performed by: INTERNAL MEDICINE

## 2025-03-26 PROCEDURE — 82728 ASSAY OF FERRITIN: CPT

## 2025-03-26 PROCEDURE — 85730 THROMBOPLASTIN TIME PARTIAL: CPT

## 2025-03-26 PROCEDURE — 3700000001 HC ADD 15 MINUTES (ANESTHESIA): Performed by: INTERNAL MEDICINE

## 2025-03-26 PROCEDURE — 36415 COLL VENOUS BLD VENIPUNCTURE: CPT

## 2025-03-26 PROCEDURE — 6360000002 HC RX W HCPCS: Performed by: INTERNAL MEDICINE

## 2025-03-26 PROCEDURE — 85018 HEMOGLOBIN: CPT

## 2025-03-26 PROCEDURE — 83540 ASSAY OF IRON: CPT

## 2025-03-26 PROCEDURE — 2580000003 HC RX 258

## 2025-03-26 PROCEDURE — 36430 TRANSFUSION BLD/BLD COMPNT: CPT

## 2025-03-26 PROCEDURE — 80076 HEPATIC FUNCTION PANEL: CPT

## 2025-03-26 PROCEDURE — 85014 HEMATOCRIT: CPT

## 2025-03-26 PROCEDURE — 74018 RADEX ABDOMEN 1 VIEW: CPT

## 2025-03-26 PROCEDURE — 2500000003 HC RX 250 WO HCPCS: Performed by: HOSPITALIST

## 2025-03-26 PROCEDURE — 6370000000 HC RX 637 (ALT 250 FOR IP): Performed by: HOSPITALIST

## 2025-03-26 PROCEDURE — 6360000002 HC RX W HCPCS: Performed by: HOSPITALIST

## 2025-03-26 PROCEDURE — 84157 ASSAY OF PROTEIN OTHER: CPT

## 2025-03-26 PROCEDURE — 6370000000 HC RX 637 (ALT 250 FOR IP): Performed by: PHYSICIAN ASSISTANT

## 2025-03-26 RX ORDER — ALBUMIN (HUMAN) 12.5 G/50ML
50 SOLUTION INTRAVENOUS ONCE
Status: COMPLETED | OUTPATIENT
Start: 2025-03-26 | End: 2025-03-26

## 2025-03-26 RX ORDER — PANTOPRAZOLE SODIUM 40 MG/10ML
40 INJECTION, POWDER, LYOPHILIZED, FOR SOLUTION INTRAVENOUS 2 TIMES DAILY
Status: DISCONTINUED | OUTPATIENT
Start: 2025-03-26 | End: 2025-03-26

## 2025-03-26 RX ORDER — PROPOFOL 10 MG/ML
INJECTION, EMULSION INTRAVENOUS
Status: DISCONTINUED | OUTPATIENT
Start: 2025-03-26 | End: 2025-03-26 | Stop reason: SDUPTHER

## 2025-03-26 RX ORDER — PANTOPRAZOLE SODIUM 40 MG/10ML
40 INJECTION, POWDER, LYOPHILIZED, FOR SOLUTION INTRAVENOUS DAILY
Status: DISCONTINUED | OUTPATIENT
Start: 2025-03-26 | End: 2025-03-28

## 2025-03-26 RX ORDER — GLUCAGON 1 MG/ML
1 KIT INJECTION PRN
Status: DISCONTINUED | OUTPATIENT
Start: 2025-03-26 | End: 2025-04-01 | Stop reason: HOSPADM

## 2025-03-26 RX ORDER — LIDOCAINE HYDROCHLORIDE 20 MG/ML
INJECTION, SOLUTION EPIDURAL; INFILTRATION; INTRACAUDAL; PERINEURAL
Status: DISCONTINUED | OUTPATIENT
Start: 2025-03-26 | End: 2025-03-26 | Stop reason: SDUPTHER

## 2025-03-26 RX ORDER — GLUCAGON 1 MG/ML
1 KIT INJECTION PRN
Status: DISCONTINUED | OUTPATIENT
Start: 2025-03-26 | End: 2025-03-26 | Stop reason: SDUPTHER

## 2025-03-26 RX ORDER — SIMETHICONE 80 MG
80 TABLET,CHEWABLE ORAL EVERY 6 HOURS PRN
Status: DISCONTINUED | OUTPATIENT
Start: 2025-03-26 | End: 2025-04-01 | Stop reason: HOSPADM

## 2025-03-26 RX ORDER — PANTOPRAZOLE SODIUM 40 MG/10ML
40 INJECTION, POWDER, LYOPHILIZED, FOR SOLUTION INTRAVENOUS DAILY
Status: DISCONTINUED | OUTPATIENT
Start: 2025-03-27 | End: 2025-03-26

## 2025-03-26 RX ORDER — SPIRONOLACTONE 100 MG/1
100 TABLET, FILM COATED ORAL DAILY
Status: DISCONTINUED | OUTPATIENT
Start: 2025-03-27 | End: 2025-03-27

## 2025-03-26 RX ORDER — SODIUM CHLORIDE 9 MG/ML
INJECTION, SOLUTION INTRAVENOUS PRN
Status: DISCONTINUED | OUTPATIENT
Start: 2025-03-26 | End: 2025-04-01 | Stop reason: HOSPADM

## 2025-03-26 RX ORDER — LACTULOSE 10 G/15ML
20 SOLUTION ORAL 3 TIMES DAILY
Status: DISCONTINUED | OUTPATIENT
Start: 2025-03-26 | End: 2025-04-01 | Stop reason: HOSPADM

## 2025-03-26 RX ORDER — ALBUMIN (HUMAN) 12.5 G/50ML
25 SOLUTION INTRAVENOUS ONCE
Status: DISCONTINUED | OUTPATIENT
Start: 2025-03-26 | End: 2025-04-01 | Stop reason: HOSPADM

## 2025-03-26 RX ORDER — DEXTROSE MONOHYDRATE 100 MG/ML
INJECTION, SOLUTION INTRAVENOUS CONTINUOUS PRN
Status: DISCONTINUED | OUTPATIENT
Start: 2025-03-26 | End: 2025-03-26 | Stop reason: SDUPTHER

## 2025-03-26 RX ORDER — DEXTROSE MONOHYDRATE 100 MG/ML
INJECTION, SOLUTION INTRAVENOUS CONTINUOUS PRN
Status: DISCONTINUED | OUTPATIENT
Start: 2025-03-26 | End: 2025-04-01 | Stop reason: HOSPADM

## 2025-03-26 RX ORDER — INSULIN GLARGINE 100 [IU]/ML
10 INJECTION, SOLUTION SUBCUTANEOUS 2 TIMES DAILY
Status: DISCONTINUED | OUTPATIENT
Start: 2025-03-26 | End: 2025-04-01 | Stop reason: HOSPADM

## 2025-03-26 RX ORDER — INSULIN LISPRO 100 [IU]/ML
0-8 INJECTION, SOLUTION INTRAVENOUS; SUBCUTANEOUS
Status: DISCONTINUED | OUTPATIENT
Start: 2025-03-26 | End: 2025-04-01 | Stop reason: HOSPADM

## 2025-03-26 RX ADMIN — FUROSEMIDE 40 MG: 10 INJECTION, SOLUTION INTRAMUSCULAR; INTRAVENOUS at 18:11

## 2025-03-26 RX ADMIN — PROPOFOL 50 MG: 10 INJECTION, EMULSION INTRAVENOUS at 14:43

## 2025-03-26 RX ADMIN — PANTOPRAZOLE SODIUM 40 MG: 40 INJECTION, POWDER, FOR SOLUTION INTRAVENOUS at 08:33

## 2025-03-26 RX ADMIN — SIMETHICONE 80 MG: 80 TABLET, CHEWABLE ORAL at 00:51

## 2025-03-26 RX ADMIN — INSULIN LISPRO 2 UNITS: 100 INJECTION, SOLUTION INTRAVENOUS; SUBCUTANEOUS at 21:59

## 2025-03-26 RX ADMIN — PROPOFOL 50 MG: 10 INJECTION, EMULSION INTRAVENOUS at 14:47

## 2025-03-26 RX ADMIN — INSULIN GLARGINE 10 UNITS: 100 INJECTION, SOLUTION SUBCUTANEOUS at 21:59

## 2025-03-26 RX ADMIN — PANTOPRAZOLE SODIUM 40 MG: 40 INJECTION, POWDER, FOR SOLUTION INTRAVENOUS at 18:34

## 2025-03-26 RX ADMIN — ALBUMIN (HUMAN) 50 G: 0.25 INJECTION, SOLUTION INTRAVENOUS at 16:32

## 2025-03-26 RX ADMIN — OCTREOTIDE ACETATE 50 MCG/HR: 500 INJECTION, SOLUTION INTRAVENOUS; SUBCUTANEOUS at 08:22

## 2025-03-26 RX ADMIN — MORPHINE SULFATE 2 MG: 2 INJECTION, SOLUTION INTRAMUSCULAR; INTRAVENOUS at 20:10

## 2025-03-26 RX ADMIN — PROPOFOL 50 MG: 10 INJECTION, EMULSION INTRAVENOUS at 14:50

## 2025-03-26 RX ADMIN — LACTULOSE 20 G: 20 SOLUTION ORAL at 16:28

## 2025-03-26 RX ADMIN — SODIUM CHLORIDE: 9 INJECTION, SOLUTION INTRAVENOUS at 08:30

## 2025-03-26 RX ADMIN — ONDANSETRON 4 MG: 2 INJECTION, SOLUTION INTRAMUSCULAR; INTRAVENOUS at 06:44

## 2025-03-26 RX ADMIN — SODIUM CHLORIDE 1000 ML: 0.9 INJECTION, SOLUTION INTRAVENOUS at 18:24

## 2025-03-26 RX ADMIN — LIDOCAINE HYDROCHLORIDE 100 MG: 20 INJECTION, SOLUTION EPIDURAL; INFILTRATION; INTRACAUDAL at 14:43

## 2025-03-26 RX ADMIN — Medication 10 ML: at 08:21

## 2025-03-26 RX ADMIN — Medication 10 ML: at 20:10

## 2025-03-26 RX ADMIN — WATER 1000 MG: 1 INJECTION INTRAMUSCULAR; INTRAVENOUS; SUBCUTANEOUS at 21:56

## 2025-03-26 RX ADMIN — MORPHINE SULFATE 2 MG: 2 INJECTION, SOLUTION INTRAMUSCULAR; INTRAVENOUS at 23:38

## 2025-03-26 RX ADMIN — INSULIN LISPRO 2 UNITS: 100 INJECTION, SOLUTION INTRAVENOUS; SUBCUTANEOUS at 18:11

## 2025-03-26 RX ADMIN — PROPOFOL 50 MG: 10 INJECTION, EMULSION INTRAVENOUS at 14:55

## 2025-03-26 RX ADMIN — INSULIN LISPRO 2 UNITS: 100 INJECTION, SOLUTION INTRAVENOUS; SUBCUTANEOUS at 00:51

## 2025-03-26 RX ADMIN — PROPOFOL 40 MG: 10 INJECTION, EMULSION INTRAVENOUS at 14:57

## 2025-03-26 RX ADMIN — MORPHINE SULFATE 2 MG: 2 INJECTION, SOLUTION INTRAMUSCULAR; INTRAVENOUS at 03:46

## 2025-03-26 RX ADMIN — MORPHINE SULFATE 2 MG: 2 INJECTION, SOLUTION INTRAMUSCULAR; INTRAVENOUS at 08:20

## 2025-03-26 RX ADMIN — MORPHINE SULFATE 2 MG: 2 INJECTION, SOLUTION INTRAMUSCULAR; INTRAVENOUS at 16:27

## 2025-03-26 RX ADMIN — FUROSEMIDE 40 MG: 10 INJECTION, SOLUTION INTRAMUSCULAR; INTRAVENOUS at 08:20

## 2025-03-26 ASSESSMENT — PAIN SCALES - GENERAL
PAINLEVEL_OUTOF10: 8
PAINLEVEL_OUTOF10: 7
PAINLEVEL_OUTOF10: 7
PAINLEVEL_OUTOF10: 6
PAINLEVEL_OUTOF10: 6
PAINLEVEL_OUTOF10: 8

## 2025-03-26 ASSESSMENT — PAIN DESCRIPTION - LOCATION
LOCATION: ABDOMEN

## 2025-03-26 ASSESSMENT — PAIN DESCRIPTION - ORIENTATION
ORIENTATION: MID
ORIENTATION: RIGHT;UPPER
ORIENTATION: MID;UPPER
ORIENTATION: UPPER;MID

## 2025-03-26 ASSESSMENT — ENCOUNTER SYMPTOMS: SHORTNESS OF BREATH: 1

## 2025-03-26 ASSESSMENT — PAIN DESCRIPTION - DESCRIPTORS
DESCRIPTORS: SHOOTING;SHARP
DESCRIPTORS: SHARP
DESCRIPTORS: STABBING
DESCRIPTORS: DULL

## 2025-03-26 ASSESSMENT — PAIN - FUNCTIONAL ASSESSMENT: PAIN_FUNCTIONAL_ASSESSMENT: 0-10

## 2025-03-26 NOTE — CONSULTS
[facility]           Vascular Surgery Consultation  Carroll Wells  Medical Record #: [id number]  YOB: 1955      Date of Admission:  3/25/2025  7:51 PM  Date of Consultation:  3/26/2025      PCP:  Karina Mendoza PA       Chief Complaint:     History of Present Illness:   We are asked to see this patient in consultation by MELODY Carrasco MD  regarding recent LIJ DVT on apixaban and new GI bleed.    Carroll Wells is a 69 y.o. male with a past medical history of LANG with cirrhosis, HTN and diabetes who we saw on his last admission when he presented with facial and left arm swelling. At that time he was diagnosed with an occlusive DVT in the left IJ, subclavian and axillary veins. GI had been consulted as well and agreed to that oral anticoagulation may proceed given he had no previous GI bleeding. He was discharged on apixaban. He presented to [facility] ED yesterday as instructed by his GI physician, Dr Velasquez, due to a drop in his hemoglobin. Evidently, he had been getting increased fatigue with increased abdominal pain and distention. He reported a dark black tarry stool the past week. In the ED, his hemoglobin was 7.2 and occult stool screen was positive. He was transfused with PRBC and admitted for further evaluation and treatment. Of note, he had been scheduled for an outpatient paracentesis.        Past Medical History:   Diagnosis Date    Cervical radiculopathy 04/22/2016    Chronic pain     Chronic renal disease, stage III (HCC) [413422] 05/06/2022    GERD (gastroesophageal reflux disease)     Hyperlipidemia     Hypertension     Irritable bowel syndrome Approx 1 year    Liver disease     Mild episode of recurrent major depressive disorder 02/08/2018    Morbidly obese 08/25/2020    PONV (postoperative nausea and vomiting)     Type II or unspecified type diabetes mellitus without mention of complication, not stated as uncontrolled          Past Surgical  ESOPHAGOGASTRODUODENOSCOPY BIOPSY performed by Dennis Velasquez MD at [facility] ENDOSCOPY    UPPER GASTROINTESTINAL ENDOSCOPY  9/13/2021    UPPER GASTROINTESTINAL ENDOSCOPY N/A 9/5/2024    ESOPHAGOGASTRODUODENOSCOPY BAND LIGATION performed by Dennis Velasquez MD at [facility] ENDOSCOPY    UPPER GASTROINTESTINAL ENDOSCOPY N/A 2/28/2025    ESOPHAGOGASTRODUODENOSCOPY DIAGNOSTIC ONLY performed by Dennis Velasquez MD at [facility] ENDOSCOPY    UPPER GASTROINTESTINAL ENDOSCOPY  2/28/2025    ESOPHAGOGASTRODUODENOSCOPY BAND LIGATION performed by Dennis Velasquez MD at [facility] ENDOSCOPY    VASECTOMY         Home Medications:   Prior to Admission medications    Medication Sig Start Date End Date Taking? Authorizing Provider   simethicone (MYLICON) 80 MG chewable tablet Take 1 tablet by mouth 3 times daily 3/19/25  Yes Karina Mendoza PA   insulin aspart (NOVOLOG FLEXPEN) 100 UNIT/ML injection pen Inject 10 Units into the skin 3 times daily (before meals) 3/4/25  Yes Miguel Donnelly MD   lactulose (CHRONULAC) 10 GM/15ML solution Take 30 mLs by mouth 3 times daily 3/4/25  Yes Miguel Donnelly MD   pantoprazole (PROTONIX) 40 MG tablet Take 1 tablet by mouth every morning (before breakfast) 3/4/25 4/3/25 Yes Miguel Donnelly MD   magnesium oxide (MAG-OX) 400 (240 Mg) MG tablet TAKE 1 TABLET BY MOUTH EVERY DAY 3/3/25  Yes Karina Mendoza PA   insulin glargine (LANTUS SOLOSTAR) 100 UNIT/ML injection pen Inject 50 Units into the skin 2 times daily 8/23/24  Yes Karina Mendoza PA   midodrine (PROAMATINE) 5 MG tablet Take 1 tablet by mouth daily  Patient taking differently: Take 1 tablet by mouth as needed 6/18/24  Yes ProviderZeferino MD   furosemide (LASIX) 40 MG tablet Take 1 tablet by mouth 2 times daily 12/27/23  Yes Marisela Cho PA-C   apixaban (ELIQUIS) 5 MG TABS tablet Take 2 tablets by mouth 2 times daily for 6 days, THEN 1 tablet 2 times daily for 6 days. 3/4/25 3/16/25  Miguel Donnelly MD   spironolactone

## 2025-03-26 NOTE — ANESTHESIA PRE PROCEDURE
RDW 19.4 03/25/2025 08:10 PM     03/25/2025 08:10 PM       CMP:   Lab Results   Component Value Date/Time     03/26/2025 05:43 AM    K 4.1 03/26/2025 05:43 AM    K 4.2 03/25/2025 08:10 PM     03/26/2025 05:43 AM    CO2 26 03/26/2025 05:43 AM    BUN 30 03/26/2025 05:43 AM    CREATININE 1.4 03/26/2025 05:43 AM    GFRAA >60 08/08/2022 10:57 AM    GFRAA >60 06/10/2013 10:53 AM    AGRATIO 0.9 03/25/2025 08:10 PM    LABGLOM 54 03/26/2025 05:43 AM    LABGLOM 81 04/26/2024 11:14 AM    GLUCOSE 174 03/26/2025 05:43 AM    CALCIUM 8.7 03/26/2025 05:43 AM    BILITOT 1.1 03/26/2025 05:43 AM    ALKPHOS 115 03/26/2025 05:43 AM    AST 26 03/26/2025 05:43 AM    ALT 15 03/26/2025 05:43 AM       POC Tests:   Recent Labs     03/26/25  0816   POCGLU 175*       Coags:   Lab Results   Component Value Date/Time    PROTIME 18.1 03/26/2025 05:43 AM    INR 1.48 03/26/2025 05:43 AM    APTT 35.1 03/26/2025 05:43 AM       HCG (If Applicable): No results found for: \"PREGTESTUR\", \"PREGSERUM\", \"HCG\", \"HCGQUANT\"     ABGs: No results found for: \"PHART\", \"PO2ART\", \"ATY0HQA\", \"CKQ7CAN\", \"BEART\", \"F2GIKJGT\"     Type & Screen (If Applicable):  Lab Results   Component Value Date    ABORH O POS 03/25/2025    LABANTI NEG 03/25/2025       Drug/Infectious Status (If Applicable):  Lab Results   Component Value Date/Time    HIV Non-Reactive 07/12/2019 11:10 AM       COVID-19 Screening (If Applicable):   Lab Results   Component Value Date/Time    COVID19 Not Detected 01/13/2025 11:27 AM    COVID19 NOT DETECTED 12/27/2023 07:30 PM           Anesthesia Evaluation  Patient summary reviewed and Nursing notes reviewed   history of anesthetic complications: PONV.  Airway: Mallampati: III     Neck ROM: full     Dental:          Pulmonary:   (+)   shortness of breath:                                    Cardiovascular:    (+) hypertension:, ARREDONDO:                  Neuro/Psych:   (+) neuromuscular disease:, psychiatric history:

## 2025-03-26 NOTE — ANESTHESIA POSTPROCEDURE EVALUATION
Department of Anesthesiology  Postprocedure Note    Patient: Carroll Wells  MRN: [id number]  YOB: 1955  Date of evaluation: 3/26/2025    Procedure Summary       Date: 03/26/25 Room / Location: [place] [address] / [facility]    Anesthesia Start: 1436 Anesthesia Stop: 1500    Procedures:       ESOPHAGOGASTRODUODENOSCOPY DIAGNOSTIC ONLY      ESOPHAGOGASTRODUODENOSCOPY BAND LIGATION Diagnosis:       Melena      (Melena [K92.1])    Surgeons: Rommel Lindsay MD Responsible Provider: Richard Qureshi MD    Anesthesia Type: General ASA Status: 3            Anesthesia Type: General    Fco Phase I: Fco Score: 10    Fco Phase II: Fco Score: 10    Anesthesia Post Evaluation    Comments: Postoperative Anesthesia Note    Name:    Carroll Wells  MRN:      [id number]    Patient Vitals in the past 12 hrs:  03/26/25 1545, BP:120/67, Temp:97.9 °F (36.6 °C), Temp src:Oral, Pulse:92, Resp:18, SpO2:97 %  03/26/25 1512, BP:125/64, Pulse:87, Resp:18  03/26/25 1507, BP:(!) 116/55, Pulse:86, Resp:16  03/26/25 1502, BP:(!) 113/48, Temp:98 °F (36.7 °C), Temp src:Oral, Pulse:85, Resp:16, SpO2:97 %  03/26/25 1323, BP:(!) 150/76, Temp:98.7 °F (37.1 °C), Temp src:Oral, Pulse:94, Resp:16, SpO2:100 %  03/26/25 0757, BP:106/67, Temp:98 °F (36.7 °C), Temp src:Oral, Pulse:83, Resp:16, SpO2:96 %     LABS:    CBC  Lab Results       Component                Value               Date/Time                  WBC                      7.9                 03/25/2025 08:10 PM        HGB                      10.2 (L)            03/26/2025 09:53 AM        HCT                      34.4 (L)            03/26/2025 09:53 AM        PLT                      257                 03/25/2025 08:10 PM   RENAL  Lab Results       Component                Value               Date/Time                  NA                       135 (L)             03/26/2025 05:43 AM        K                        4.1                 03/26/2025 05:43 AM

## 2025-03-26 NOTE — PLAN OF CARE
4 Eyes Skin Assessment     NAME:  Carroll Wells  YOB: 1955  MEDICAL RECORD NUMBER:  [id number]    The patient is being assessed for  Admission    I agree that at least one RN has performed a thorough Head to Toe Skin Assessment on the patient. ALL assessment sites listed below have been assessed.      Areas assessed by both nurses:    Head, Face, Ears, Shoulders, Back, Chest, Arms, Elbows, Hands, Sacrum. Buttock, Coccyx, Ischium, Legs. Feet and Heels, and Under Medical Devices         Does the Patient have a Wound? No noted wound(s)       Deni Prevention initiated by RN: No  Wound Care Orders initiated by RN: No    Pressure Injury (Stage 3,4, Unstageable, DTI, NWPT, and Complex wounds) if present, place Wound referral order by RN under : No    New Ostomies, if present place, Ostomy referral order under : No     Nurse 1 eSignature: Electronically signed by Theodore Ko RN on 3/26/25 at 1:48 AM EDT    **SHARE this note so that the co-signing nurse can place an eSignature**    Nurse 2 eSignature: Electronically signed by Teri Greene RN on 3/26/25 at 1:48 AM EDT

## 2025-03-26 NOTE — ED PROVIDER NOTES
[facility] EMERGENCY DEPARTMENT  EMERGENCY DEPARTMENT ENCOUNTER        Pt Name: Carroll Wells  MRN: [id number]  Birthdate 1955  Date of evaluation: 3/25/2025  Provider: HO Templeton Jr  PCP: Karina Mendoza PA  Note Started: 9:48 PM EDT 3/25/25       I have seen and evaluated this patient with my supervising physician Yonatan Cohn MD.      CHIEF COMPLAINT       Chief Complaint   Patient presents with    Abnormal Lab     Hgb dropped a couple points this month, pt sent in by  for admission.   Pt is to be scoped tomorrow - pt recently started on blood thinner now has dark stools.        HISTORY OF PRESENT ILLNESS: 1 or more Elements     History from : Patient and Family      Limitations to history : None    Carroll Wells is a 69 y.o. male who presents with reports of abdominal pain, abdominal distention, dark black tarry stools that have been ongoing for approximately a week.  He follows with Dr. Velasquez for his LANG.  States that he has been getting weak and fatigued and reach out to Dr. Velasquez today and was instructed to go to the emergency department for evaluation.  He is supposed to have an outpatient paracentesis done tomorrow.  He has been nauseous occasionally but not vomiting.  He is had issues with recurring and reaccumulating abdominal ascites requiring paracentesis.    Recently he developed a DVT in his right internal jugular vein and had to be placed on Eliquis per vascular surgery.  States that his last dose of Eliquis was this morning.    Nursing Notes were all reviewed and agreed with or any disagreements were addressed in the HPI.      SURGICAL HISTORY     Past Surgical History:   Procedure Laterality Date    CERVICAL SPINE SURGERY  2014    fusion    CHOLECYSTECTOMY  1995    COLONOSCOPY  2005    Normal    COLONOSCOPY N/A 08/01/2024    COLONOSCOPY POLYPECTOMY SNARE/BIOPSY performed by Dennis Velasquez MD at [facility] ENDOSCOPY    ESOPHAGOGASTRODUODENOSCOPY  W/ BANDING  09/05/2024    KNEE ARTHROSCOPY Left 10/22/2019    EXAM UNDER ANESTHESIA VIDEO ARTHROSCOPY LEFT KNEE, PARTIAL MEDIAL MENISCECTOMY,  MEDIAL-FEMORAL CHONDROPLASTY, SYNOVECTOMY, EXCISION OF ADHESIONS performed by Gwyn Dickson MD at [facility] ASC OR    NASAL SEPTUM SURGERY  1987    NECK SURGERY  1996    fusion    OTHER SURGICAL HISTORY  08/01/2024    ESOPHAGOGASTRODUODENOSCOPY BAND LIGATION    OTHER SURGICAL HISTORY  08/01/2024    ESOPHAGOGASTRODUODENOSCOPY BAND LIGATION    ROTATOR CUFF REPAIR Right 2014    TONSILLECTOMY  1978    UPPER GASTROINTESTINAL ENDOSCOPY N/A 08/16/2021    EGD W/ANES. (11:30) performed by Dennis Velasquez MD at [facility] ENDOSCOPY    UPPER GASTROINTESTINAL ENDOSCOPY  08/16/2021    EGD CONTROL HEMORRHAGE performed by Dennis Velasquez MD at [facility] ENDOSCOPY    UPPER GASTROINTESTINAL ENDOSCOPY  09/13/2021    Portal Hypertension; Varices    UPPER GASTROINTESTINAL ENDOSCOPY  09/13/2021    bands removed    UPPER GASTROINTESTINAL ENDOSCOPY N/A 09/13/2021    EGD W/ANES. (8:00) performed by Dennis Velasquez MD at [facility] ENDOSCOPY    UPPER GASTROINTESTINAL ENDOSCOPY N/A 06/22/2023    EGD BAND LIGATION performed by Dennis Velasquez MD at [facility] ENDOSCOPY    UPPER GASTROINTESTINAL ENDOSCOPY N/A 08/10/2023    EGD BAND LIGATION performed by Dennis Velasquez MD at [facility] ENDOSCOPY    UPPER GASTROINTESTINAL ENDOSCOPY N/A 09/01/2023    EGD W/ANES. (8:00) performed by Dennis Velasquez MD at [facility] ENDOSCOPY    UPPER GASTROINTESTINAL ENDOSCOPY N/A 08/01/2024    ESOPHAGOGASTRODUODENOSCOPY BAND LIGATION performed by Dennis Velasquez MD at [facility] ENDOSCOPY    UPPER GASTROINTESTINAL ENDOSCOPY  08/01/2024    ESOPHAGOGASTRODUODENOSCOPY BIOPSY performed by Dennis Velasquez MD at [facility] ENDOSCOPY    UPPER GASTROINTESTINAL ENDOSCOPY  9/13/2021    UPPER GASTROINTESTINAL ENDOSCOPY N/A 9/5/2024    ESOPHAGOGASTRODUODENOSCOPY BAND LIGATION performed by Dennis Velasquez MD at [facility] ENDOSCOPY    UPPER

## 2025-03-26 NOTE — PROGRESS NOTES
Patient admitted to room 450 from ed.  Patient oriented to room, call light, bed rails, phone, lights and bathroom.  Patient instructed about the schedule of the day including: vital sign frequency, lab draws, possible tests, frequency of MD and staff rounds, including RN/MD rounding together at bedside, daily weights, and I &O's.  Patient instructed about prescribed diet, how to use 8MENU, and television.   bed alarm in place, patient aware of placement and reason.  Telemetry box  in place, patient aware of placement and reason.  Bed locked, in lowest position, side rails up 2/4, call light within reach.  Will continue to monitor.

## 2025-03-26 NOTE — PROGRESS NOTES
[facility] Medicine Progress Note  V 1.6      Date of Admission: 3/25/2025    Hospital Day: 2      Chief Admission Complaint:   GI bleed and he is on Eliquis    Subjective: He is sitting up in bed, in no acute distress.  He is returned from EGD and also had paracentesis done earlier today.  States he is feeling a little better.  Denies chest pain or shortness of breath    Presenting Admission History:       69 y.o. male with PMH  of LANG cirrhosis, esophageal  varices, recently diagnosed internal jugular and upper extremity DVT on Eliquis, CKD stage 3, DM II, HTN, HLD Admitted for weakness, fatigue, melanotic stools and abdominal distention with associated pain that has been ongoing getting worse over the past week.     Of note, he had an internal jugular DVT diagnosed about 3 weeks ago and he was started on Eliquis.   He does have a positive stool guaiac. His hemoglobins have been downtrending from 8-1/2-9 to 7.2. He has a mild ROWAN with a creatinine of 1.4 on his CMP. Albumin seems to be near appropriate at 3.1. INR not significantly changed at 1.64.   He does have significant amount of ascites and mild peritoneal signs concerning for spontaneous bacterial peritonitis.   With his history of gastric varices he was started on Rocephin empirically as well as octreotide and Protonix. He was given 2 units of blood in the emergency department. The ED provider spoke to GI and their recommendation was to just hold the eliquis and not reverse it at this time. Patient states his last dose of eliquis was on the AM of admit       Assessment/Plan:      Current Principal Problem:  GI bleed    GI bleed on Eliquis : Eliquis is currently being held.  Follow H/H closely.  Continue IV Protonix.  Was on octreotide infusion now discontinued.    He did go for EGD on 3/26/2025.  Revealed esophageal varices x2 and banded.  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     No precise source of bleeding identified today    Per  []Acute Rehab    Multi-Disciplinary Rounds with Case Management completed on 3/26/2025 with the following recommendations:  Anticipated Discharge Location: []Home w/ [x]HHC vs []SNF  []Acute Rehab  []LTAC  []Hospice  []Other -    Anticipated Discharge Day/Date: 3 to 4 days  Barriers to Discharge:   Following H/H in setting of GI bleed  Need to determine when we can resume anticoagulation with Eliquis  --------------------------------------------------    MDM (any 2 required for High level billing)    A. Problems (any 1)  [x] Acute/Chronic Illness/injury posing ongoing threat to life and/or bodily function without ongoing treatment    [] Severe exacerbation of chronic illness    --------------------------------------------------  B. Risk of Treatment (any 1)    [x] Drugs/treatments that require intensive monitoring for toxicity    [] IV ABX (Vancomycin, Aminoglycosides, etc)     [] Post-Cath/Contrast study requiring serial monitoring    [] IV Narcotic analgesia    [] Aggressive IV diuresis    [] Hypertonic Saline    [] Critical electrolyte abnormalities requiring IV replacement    [] Insulin - Scheduled/SSI or Insulin gtt    [] Anticoagulation (Heparin gtt or Coumadin - other anticoagulants in special circumstances)    [] Cardiac Medications (IV Amiodarone/Diltiazem, Tikosyn, etc)    [] Hemodialysis    [x] Other -  transfusion of PRBC  [] Change in code status    [] Decision to escalate care    [x] Major surgery/procedure with associated risk factors he did go for EGD this admission  --------------------------------------------------  C. Data (any 2)    [x] Data Review (any 3)    [x] Consultant notes from yesterday/today    [x] All available current labs reviewed interpreted for clinical significance    [x] Appropriate follow-up labs were ordered  [] Collateral history obtained     [x] Independent Interpretation of tests (any 1)    [x] Telemetry (Rhythm Strip) personally reviewed and interpreted        [] Imaging

## 2025-03-26 NOTE — CONSULTS
GASTROENTEROLOGY INPATIENT CONSULTATION        IDENTIFYING DATA/REASON FOR CONSULTATION   PATIENT:  Carroll Wells  MRN:  [id number]  ADMIT DATE: 3/25/2025  TIME OF EVALUATION: 3/26/2025 8:02 AM  HOSPITAL STAY:   LOS: 1 day     REASON FOR CONSULTATION:  GI Bleed on Eliquis    HISTORY OF PRESENT ILLNESS   Carroll Wells is a 69 y.o. male with a PMH of diabetes mellitus type 2, LANG related liver cirrhosis, ascites and esophageal varices s/p banding, hypertension who presented on 3/25/2025 with melena and ascites.  Patient and wife assist with HPI/ROS. He reports a one week history of black, tarry bowel movements. He does take lactulose at home and has approximately 2 bowel movements daily. No iron supplementation or pepto bismol use. No BRBPR. He has abdominal pain. No nausea, vomiting, fevers. Last dose of Eliquis was yesterday morning. Wife reports patient has not been himself but no overt confusion.  He was scheduled for an outpatient paracentesis today. He has been taking his diuretics as prescribes: lasix 40mg twice daily and aldactone 100mg daily. Last paracentesis was on 2/28 with 5.4L removed.    He was recently admitted in Feb 2025 for left upper acute DVT of left internal jugular, subclavian and axillary veins DVT, subsequently started on Eliquis at that time.     On 3/4 hgb was 9.4 then upon arrival down to 7.7. Patient received two units of PRBCs. Hgb up to 10.2 now. PT 18.1/INR 1.48. Cr up to 1.4. Na 135. LFTs normal.       Prior Endoscopic Evaluations:  EGD 2/28/25: Normal upper third of esophagus. Three columns of grade II esophageal varices.  Completely eradicated.  Banded X 3. Z-line irregular, 40 cm from the incisors. Portal hypertensive gastropathy. 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 Normal duodenal bulb and second portion of the duodenum.     EGD 9/2024 with Dr. Velasquez:  Normal upper third of esophagus. Grade II esophageal varices. Completely eradicated with 4 bands. Z-line  03/26/25  0543   WBC 7.9  --    HGB 7.2* 8.7*   HCT 22.1* 26.9*   MCV 74.7*  --      --      Recent Labs     03/25/25 2010   *   K 4.2   CL 99   CO2 26   BUN 29*   CREATININE 1.4*     Recent Labs     03/25/25 2010   AST 26   ALT 17   BILITOT 1.0   ALKPHOS 127     No results for input(s): \"LIPASE\", \"AMYLASE\" in the last 72 hours.  Recent Labs     03/25/25 2010 03/26/25  0543   PROTIME 19.6* 18.1*   INR 1.64* 1.48*       Imaging  XR ABDOMEN (KUB) (SINGLE AP VIEW)   Final Result   1. Some retained stool in the transverse colon. Normal bowel caliber with no evidence of mechanical bowel obstruction.      Electronically signed by Himanshu Painter MD      Vascular duplex upper extremity venous bilateral    (Results Pending)   US ABDOMEN COMPLETE    (Results Pending)         ASSESSMENT AND RECOMMENDATIONS   Carroll Wells is a 69 y.o. male with a PMH of diabetes mellitus type 2, LANG related liver cirrhosis, ascites and esophageal varices s/p banding, hypertension who presented on 3/25/2025 with melena and ascites.      On 3/4 hgb was 9.4 then upon arrival down to 7.7. Patient received two units of PRBCs. Hgb up to 10.2 now. PT 18.1/INR 1.48. Cr up to 1.4. Na 135. LFTs normal.     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 MELD-3.0 is 16.0 on 3/26.   -Varices: EGD 2/28/25 - 3 medium nonbleeding esophageal varices, banded   -Ascites: last paracentesis 2/28 with 5.4L removed. No SBP. Was on Lasix 40 mg p.o. BID and Spironolactone 100 mg p.o. daily at home   -Encephalopathy: none. On lactulose.   -HCC screening: Contrast CT 2/2025 with no obvious liver lesion.   -Immunization: Immune to Hep A, not immune to Hep B  -Transplant candidacy: did not assess    Left occlusive DVT of IJ and subclavian veins, non-occlusive DVT of axillary vein. Eliquis on hold. Vascular consulted.    ROWAN, Cr 1.4.    RECOMMENDATIONS:    -EGD today with Dr. Lindsay  -Would consider

## 2025-03-26 NOTE — PROCEDURES
PROCEDURE NOTE  Date: 3/26/2025   Name: Carroll Wells  YOB: 1955    Procedures  Pt arrived for image guided left Paracentesis. olvin explained the procedure including the risk and benefits of the procedure. All questions answered. Pt verbalizes understanding of the procedure and states no more questions. Consent confirmed. Vital signs stable. Labs, allergies, medications, and code status reviewed. No contraindications noted. Time out completed prior to procedure.    Vital Signs  Vitals:    03/26/25 0757   BP: 106/67   Pulse: 83   Resp: 16   Temp: 98 °F (36.7 °C)   SpO2: 96%    (vital signs in table format)      Allergies  Aspirin and Oxycontin [oxycodone hcl] (allergies)    Labs  Lab Results   Component Value Date    INR 1.48 (H) 03/26/2025    PROTIME 18.1 (H) 03/26/2025     Lab Results   Component Value Date    CREATININE 1.4 (H) 03/26/2025    BUN 30 (H) 03/26/2025     (L) 03/26/2025    K 4.1 03/26/2025     03/26/2025    CO2 26 03/26/2025     Lab Results   Component Value Date    WBC 7.9 03/25/2025    HGB 10.2 (L) 03/26/2025    HCT 34.4 (L) 03/26/2025    MCV 74.7 (L) 03/25/2025     03/25/2025

## 2025-03-26 NOTE — PROCEDURES
EGD PROCEDURE NOTE         Esophagogastroduodenoscopy Procedure Note     Patient: Carroll Wells MRN: [id number]   YOB: 1955 Age: 69 y.o. Sex: male   Unit: [facility] ENDO Room/Bed: [facility] Endo Pool/NONE Location: [facility]    Admitting Physician: VIRGINIA TRINIDAD     Primary Care Physician: Karina Mendoza PA      DATE OF PROCEDURE: 3/26/2025  PROCEDURE: Esophagogastroduodenoscopy  INDICATION: Melena and acute blood loss anemia in a patient who was recently started on Eliquis for DVT, with a background of known Xiong cirrhosis on esophageal variceal banding protocol and GAVE.    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  -Duodenum unremarkable to the proximal third portion.    The precise source of bleeding is impossible to identify today.  In the context of Eliquis, he could have potentially bleed from one of the foci of GAVE, but there was no clear target for ablation today.      PLAN:    -Vascular consult is noted.  Upper extremity DVT are usually less risky and the first question is whether the patient truly needs anticoagulation.  If the answer is yes, may want to consider low-dose Eliquis 2.5 mg twice a day if they believe this will be of some benefit.  Could start this in 5 days if needed.  However, if vascular thinks he needs for therapeutic Eliquis, we will need to repeat the EGD first and RFA ablate the GAVE.  -Okay to stop octreotide.  -Would complete about 5 days of ceftriaxone for prophylaxis against bacterial translocation in the context of GI bleed in cirrhosis.  -Follow-up iron studies, B12 and folate.  -GI soft diet for 3 days then advance as tolerated.  -Once daily PPI.    INFORMED CONSENT:  Informed consent for esophagogastoduodenoscopy was obtained.  The benefits and risks including adverse medicine reaction have been explained.  The  patient's questions were answered and the patient agreed to proceed.    ASA:  ASA 3 - Patient with moderate systemic disease with functional limitations    SEDATION: MAC     The patient's vital signs, cardiac status, pulmonary status, abdominal status and mental status were stable for the procedure.  The patient's vitals signs and respiratory function as monitored by oxygen saturation were stable throughout    Procedure Details:    The Olympus videoendoscope was inserted into the mouth and carefully passed into the esophagus, through the stomach and to the distal duodenum.  Antegrade and retrograde examination of the upper gi tract was carefully performed.    Findings:   In the esophagus, there were 2 grade 2 esophageal varices with no high risk stigmata for bleeding.  The GE junction, squamocolumnar line and diaphragm are at the same level.  In the stomach, the cardia, fundus and proximal gastric body contained moderate portal hypertensive gastropathy and friable mucosa.  In the antrum, there was patchy erythema in a pattern consistent with GAVE.  There was no bleeding seen anywhere.  The first second and proximal third portions of the duodenum were unremarkable.  The gastroscope was withdrawn back to the stomach and retroflexed views were obtained to complete the exam.  Back in the esophagus, esophageal variceal band ligator's were placed x 2 as distally as possible on the esophageal varices with excellent decompression proximally in the esophagus.    Gastric or Duodenal ulcer present: No    Estimated Blood Loss: None      Signed By: BONNIE SPRINGER MD

## 2025-03-26 NOTE — CONSENT
Informed Consent for Blood Component Transfusion Note    I have discussed with the patient the rationale for blood component transfusion; its benefits in treating or preventing fatigue, organ damage, or death; and its risk which includes mild transfusion reactions, rare risk of blood borne infection, or more serious but rare reactions. I have discussed the alternatives to transfusion, including the risk and consequences of not receiving transfusion. The patient had an opportunity to ask questions and had agreed to proceed with transfusion of blood components.    Electronically signed by HO Templeton Jr on 3/25/25 at 8:47 PM EDT

## 2025-03-26 NOTE — CONSULTS
Consult Placed     Who: [facility]  Date: 3/25/25  Time: 2052     Electronically signed by Radha Adan RN on 3/25/2025 at 10:52 PM

## 2025-03-26 NOTE — ED NOTES
Carroll Wells is a 69 y.o. male admitted for  Principal Problem:    GI bleed  Resolved Problems:    * No resolved hospital problems. *  .   Patient Home via family with   Chief Complaint   Patient presents with    Abnormal Lab     Hgb dropped a couple points this month, pt sent in by  for admission.   Pt is to be scoped tomorrow - pt recently started on blood thinner now has dark stools.    .  Patient is alert and Person, Place, Time, and Situation  Patient's baseline mobility: Baseline Mobility: Cane   Code Status: Full Code   Cardiac Rhythm:       Is patient on baseline Oxygen: no how many Liters:   Abnormal Assessment Findings:     Isolation: None      NIH Score:    C-SSRS: Risk of Suicide: No Risk  Bedside swallow:        Active LDA's:   Peripheral IV 03/25/25 Right Antecubital (Active)   Site Assessment Clean, dry & intact 03/25/25 2001   Line Status Blood return noted;Flushed 03/25/25 2001       Peripheral IV 03/25/25 Right Forearm (Active)   Site Assessment Clean, dry & intact 03/25/25 2150   Line Status Blood return noted;Flushed 03/25/25 2150   Line Care Connections checked and tightened 03/25/25 2150   Phlebitis Assessment No symptoms 03/25/25 2150   Infiltration Assessment 0 03/25/25 2150   Dressing Status Clean, dry & intact 03/25/25 2150   Dressing Type Transparent 03/25/25 2150   Dressing Intervention New 03/25/25 2150     Patient admitted with a dias:  If the dias is chronic was it exchanged:  Reason for dias:   Patient admitted with Central Line:  . PICC line placement confirmed:   Reason for Central line:   Was central line Inserted from an outside facility:        Family/Caregiver Present yes Any Concerns: no   Restraints no  Sitter no         Vitals: MEWS Score: 2    Vitals:    03/25/25 2134 03/25/25 2146 03/25/25 2150 03/25/25 2216   BP: 123/73 116/70 116/77 117/67   Pulse: 95 96  95   Resp: 15 15  12   Temp: 98.9 °F (37.2 °C)  98.4 °F (36.9 °C)    TempSrc:   Oral    SpO2: 96% 100%

## 2025-03-26 NOTE — PROCEDURES
PROCEDURE NOTE  Date: 3/26/2025   Name: Carroll Wells  YOB: 1955    Procedures  Image guided left Paracentesis completed.  9.7 liters of cloudy yellow colored withdrawn.  Pt tolerated procedure without any signs or symptoms of distress. Vital signs stable.     DISCHARGED:  ADMITTED: Pt transferred back to room 450. Report called to nurse.     SPECIMEN SENT:  Yes    Vital Signs  Vitals:    03/26/25 0757   BP: 106/67   Pulse: 83   Resp: 16   Temp: 98 °F (36.7 °C)   SpO2: 96%    (vital signs in table format)

## 2025-03-26 NOTE — CONSULTS
Called [facility] @ 2052  PER:  Cristian Hanson Jr., PA  RE:  GI hemorrhage on thinners - sees Eva  Mykel Castrejon MD responded @ 2059

## 2025-03-26 NOTE — ED NOTES
15 minute post infusion start obtained, IV site clean, dry and intact. No swelling or reaction noted.

## 2025-03-26 NOTE — CARE COORDINATION
Case Management Assessment  Initial Evaluation    Date/Time of Evaluation: 3/26/2025 1:05 PM  Assessment Completed by: Constance Frias RN    If patient is discharged prior to next notation, then this note serves as note for discharge by case management.    Patient Name: Carroll Wells                   YOB: 1955  Diagnosis: Spontaneous bacterial peritonitis (HCC) [K65.2]  GI bleed [K92.2]  LANG (nonalcoholic steatohepatitis) [K75.81]  Other ascites [R18.8]  ROWAN (acute kidney injury) [N17.9]  Blood loss anemia [D50.0]  Gastrointestinal hemorrhage, unspecified gastrointestinal hemorrhage type [K92.2]                   Date / Time: 3/25/2025  7:51 PM    Patient Admission Status: Inpatient   Readmission Risk (Low < 19, Mod (19-27), High > 27): Readmission Risk Score: 20.2    Current PCP: Karina Mendoza PA  PCP verified by CM? Yes    Chart Reviewed: Yes      History Provided by: Patient, Spouse, Medical Record  Patient Orientation: Alert and Oriented, Person, Place, Situation, Self    Patient Cognition: Alert    Hospitalization in the last 30 days (Readmission):  Yes    If yes, Readmission Assessment in CM Navigator will be completed.    Advance Directives:      Code Status: Full Code   Patient's Primary Decision Maker is: Legal Next of Kin    Primary Decision Maker: Rosa Maria Wells - Spouse - [phone]    Discharge Planning:    Patient lives with: Spouse/Significant Other, Family Members, Children Type of Home: House  Primary Care Giver: Self  Patient Support Systems include: Spouse/Significant Other, Children   Current Financial resources: Medicare  Current community resources: None  Current services prior to admission: None            Current DME: Cane            Type of Home Care services:  Nursing Services    ADLS  Prior functional level: Independent in ADLs/IADLs  Current functional level: Independent in ADLs/IADLs    PT AM-PAC:   /24  OT AM-PAC:   /24    Family can provide assistance at DC:

## 2025-03-26 NOTE — PERIOP NOTE
Attempted to call report to  Ruthie HENDERSON on C-4.  No answer at [phone].  Will try again later.

## 2025-03-26 NOTE — ED PROVIDER NOTES
[facility]    EMERGENCY DEPARTMENT ENCOUNTER     Location: [place] [address] PCU  3/25/2025  Note Started: 10:54 PM EDT 3/25/25      Patient Identification  Carroll Wells is a 69 y.o. male  Chief Complaint   Patient presents with    Abnormal Lab     Hgb dropped a couple points this month, pt sent in by  for admission.   Pt is to be scoped tomorrow - pt recently started on blood thinner now has dark stools.        HPI:Carroll Wells was evaluated in the Emergency Department for anemia.  Patient reports that he has ascites from Lang and had an ulcer a year ago that was treated by Dr. Velasquez.  He was told to come to the ER today because of dropping hemoglobin.  He has had black poop for the last week or so.  Patient is on anticoagulants because of upper extremity DVT as well as internal jugular on the left.. Although initial history and physical exam information was obtained by ISMA/NPP/MD/DO (who also dictated a record of this visit), I personally saw the patient and performed and made/approved the management plan and take responsibility for the patient management.  ISMA report to me: LANG, low HH, melena, accumulating ascites, peripheral thromboses on eliquis, distended    PHYSICAL EXAM:  Patient awake and alert and in no apparent distress.  Heart regular rate and rhythm.  Lungs clear to auscultation bilaterally.  Abdomen benign.    Vascular duplex upper extremity venous bilateral    (Results Pending)   US ABDOMEN COMPLETE    (Results Pending)     Labs Reviewed   CBC WITH AUTO DIFFERENTIAL - Abnormal; Notable for the following components:       Result Value    RBC 2.95 (*)     Hemoglobin 7.2 (*)     Hematocrit 22.1 (*)     MCV 74.7 (*)     MCH 24.3 (*)     RDW 19.4 (*)     All other components within normal limits   COMPREHENSIVE METABOLIC PANEL W/ REFLEX TO MG FOR LOW K - Abnormal; Notable for the following components:    Sodium 135 (*)     Glucose 258 (*)     BUN 29 (*)     Creatinine 1.4 (*)     Est, Glom  Dragon Dictation system and may contain errors related to that system including errors in grammar, punctuation, and spelling, as well as words and phrases that may be inappropriate. If there are any questions or concerns please feel free to contact the dictating provider for clarification.     Yonatan Cohn MD   [facility]       Yonatan Cohn MD  03/25/25 8853

## 2025-03-26 NOTE — H&P
[facility] History & Physical    V 1.6    Date of Admission: 3/25/2025    Date of Service:  Pt seen/examined on 3/25/25    [x]Admitted to Inpatient with expected LOS greater than two midnights due to medical therapy.  []Placed in Observation status.    Chief Admission Complaint:  GI bleed on eliquis    Presenting Admission History:      69 y.o. male with history of LANG cirrhosis, gastric varices, recently diagnosed internal jugular DVT on Eliquis, CKD stage 3, DM II, HTN, HLD admitted for weakness, fatigue, melanotic stools and abdominal distention with associated pain that has been ongoing getting worse over the past week.   Of note, he had an internal jugular DVT over a month ago where he was started on Eliquis. He does have a positive stool guaiac. His hemoglobins have been downtrending from 8-1/2-9 to 7.2. He has a mild ROWAN with a creatinine of 1.4 on his CMP. Albumin seems to be near appropriate at 3.1. INR not significantly changed at 1.64.   He does have significant amount of ascites and mild peritoneal signs concerning for spontaneous bacterial peritonitis. With his history of gastric varices he was started on Rocephin empirically as well as octreotide and Protonix. He was given 2 units of blood in the emergency department. The ED provider spoke to GI and their recommendation was to just hold the eliquis and not reverse it at this time. Patient states his last dose of eliquis was this morning.        Assessment/Plan:      Current Principal Problem:  GI bleed    GI bleed on eliquis   - hold eliquis for now   - IV octreotide   - IV PPI   - monitor H/H   - GI eval  Recent internal jugular DVT on eliquis   - hold eliquis for now   - vasc surgery consult  Ascites due to history of LANG cirrhosis   - check abd US   - IV lasix   - may need paracentesis/ defer to GI   - GI eval  Concern for SBP   - IV rocephin   - consider paracentesis / defer to GI  CKD stage 3   - monitor Crt    Labs/medications/imaging  GAVE.        - GI to follow Procedure Code(s):        - 20627, Esophagogastroduodenoscopy, flexible, transoral; with band           ligation of esophageal/gastric varices Diagnosis Code(s):        - D64.9, Anemia, unspecified        - I85.00, Esophageal varices without bleeding        - K22.89, Other specified disease of esophagus        - K76.6, Portal hypertension        - K31.89, Other diseases of stomach and duodenum        - K31.819, Angiodysplasia of stomach and duodenum without bleeding CPT(R) - 2023 copyright [employer]. All Rights Reserved.       The CPT codes, CCI edits and ICD codes generated are intended as       suggestions and were generated based on input data.  These codes are       preliminary and upon  review may be revised to meet current       compliance and payer requirements.  The provider is responsible for       the final determination of appropriate codes, and modifiers. Dennis Velasquez MD This document has been electronically signed LUCIA. Note Initiated:2/28/2025 Note Completed:2/28/2025 11:34 AM    Vascular duplex upper extremity venous left  Result Date: 2/27/2025    Occlusive deep vein thrombosis in the left internal jugular vein.   Occlusive deep vein thrombosis in the left subclavian vein.   Occlusive deep vein thrombosis in the left axillary vein.   For comparison purposes, the right subclavian vein was investigated. This vein appeared to show normal color filling and Doppler interrogation showed phasic, spontaneous, and somewhat pulsatile flow.       PCP: Karina Mendoza PA    Past Medical History:        Diagnosis Date    Cervical radiculopathy 04/22/2016    Chronic pain     Chronic renal disease, stage III (HCC) [959116] 05/06/2022    GERD (gastroesophageal reflux disease)     Hyperlipidemia     Hypertension     Irritable bowel syndrome Approx 1 year    Liver disease     Mild episode of recurrent major depressive disorder 02/08/2018    Morbidly obese

## 2025-03-26 NOTE — PROGRESS NOTES
Awake and alert with no complaints. VS stable report called to Ruthie RN floor nurse,  CMU called to confirm monitor capture. Bed and buttocks checked for incontinence - dry and clean. Ready to go to room.

## 2025-03-26 NOTE — CONSULTS
Consult Placed     Who: [facility]  Date: 3/25/25  Time: 2257     Electronically signed by Radha Adan RN on 3/25/2025 at 10:56 PM

## 2025-03-27 ENCOUNTER — ANESTHESIA EVENT (OUTPATIENT)
Dept: ENDOSCOPY | Age: 70
End: 2025-03-27
Payer: MEDICARE

## 2025-03-27 LAB
ALBUMIN SERPL-MCNC: 3.1 G/DL (ref 3.4–5)
ALP SERPL-CCNC: 93 U/L (ref 40–129)
ALT SERPL-CCNC: 11 U/L (ref 10–40)
ANION GAP SERPL CALCULATED.3IONS-SCNC: 8 MMOL/L (ref 3–16)
AST SERPL-CCNC: 22 U/L (ref 15–37)
BILIRUB DIRECT SERPL-MCNC: 0.4 MG/DL (ref 0–0.3)
BILIRUB INDIRECT SERPL-MCNC: 0.3 MG/DL (ref 0–1)
BILIRUB SERPL-MCNC: 0.7 MG/DL (ref 0–1)
BUN SERPL-MCNC: 28 MG/DL (ref 7–20)
CALCIUM SERPL-MCNC: 8.5 MG/DL (ref 8.3–10.6)
CHLORIDE SERPL-SCNC: 101 MMOL/L (ref 99–110)
CO2 SERPL-SCNC: 26 MMOL/L (ref 21–32)
CREAT SERPL-MCNC: 1.4 MG/DL (ref 0.8–1.3)
DEPRECATED RDW RBC AUTO: 19.7 % (ref 12.4–15.4)
EST. AVERAGE GLUCOSE BLD GHB EST-MCNC: 182.9 MG/DL
GFR SERPLBLD CREATININE-BSD FMLA CKD-EPI: 54 ML/MIN/{1.73_M2}
GLUCOSE BLD-MCNC: 176 MG/DL (ref 70–99)
GLUCOSE BLD-MCNC: 180 MG/DL (ref 70–99)
GLUCOSE BLD-MCNC: 237 MG/DL (ref 70–99)
GLUCOSE BLD-MCNC: 319 MG/DL (ref 70–99)
GLUCOSE SERPL-MCNC: 174 MG/DL (ref 70–99)
HBA1C MFR BLD: 8 %
HCT VFR BLD AUTO: 24.3 % (ref 40.5–52.5)
HCT VFR BLD AUTO: 26 % (ref 40.5–52.5)
HCT VFR BLD AUTO: 26.8 % (ref 40.5–52.5)
HCT VFR BLD AUTO: 28.3 % (ref 40.5–52.5)
HGB BLD-MCNC: 7.8 G/DL (ref 13.5–17.5)
HGB BLD-MCNC: 8.3 G/DL (ref 13.5–17.5)
HGB BLD-MCNC: 8.7 G/DL (ref 13.5–17.5)
HGB BLD-MCNC: 8.8 G/DL (ref 13.5–17.5)
INR PPP: 1.22 (ref 0.85–1.15)
MAGNESIUM SERPL-MCNC: 1.92 MG/DL (ref 1.8–2.4)
MCH RBC QN AUTO: 25.1 PG (ref 26–34)
MCHC RBC AUTO-ENTMCNC: 32.2 G/DL (ref 31–36)
MCV RBC AUTO: 77.9 FL (ref 80–100)
PERFORMED ON: ABNORMAL
PLATELET # BLD AUTO: 174 K/UL (ref 135–450)
PMV BLD AUTO: 7.6 FL (ref 5–10.5)
POTASSIUM SERPL-SCNC: 3.9 MMOL/L (ref 3.5–5.1)
PROT SERPL-MCNC: 5.3 G/DL (ref 6.4–8.2)
PROTHROMBIN TIME: 15.6 SEC (ref 11.9–14.9)
RBC # BLD AUTO: 3.12 M/UL (ref 4.2–5.9)
REASON FOR REJECTION: NORMAL
REJECTED TEST: NORMAL
SODIUM SERPL-SCNC: 135 MMOL/L (ref 136–145)
WBC # BLD AUTO: 4.2 K/UL (ref 4–11)

## 2025-03-27 PROCEDURE — 2060000000 HC ICU INTERMEDIATE R&B

## 2025-03-27 PROCEDURE — 36415 COLL VENOUS BLD VENIPUNCTURE: CPT

## 2025-03-27 PROCEDURE — 2500000003 HC RX 250 WO HCPCS: Performed by: HOSPITALIST

## 2025-03-27 PROCEDURE — 2580000003 HC RX 258

## 2025-03-27 PROCEDURE — 85018 HEMOGLOBIN: CPT

## 2025-03-27 PROCEDURE — 83735 ASSAY OF MAGNESIUM: CPT

## 2025-03-27 PROCEDURE — 6370000000 HC RX 637 (ALT 250 FOR IP): Performed by: PHYSICIAN ASSISTANT

## 2025-03-27 PROCEDURE — 85027 COMPLETE CBC AUTOMATED: CPT

## 2025-03-27 PROCEDURE — 2580000003 HC RX 258: Performed by: PHYSICIAN ASSISTANT

## 2025-03-27 PROCEDURE — 85610 PROTHROMBIN TIME: CPT

## 2025-03-27 PROCEDURE — 2580000003 HC RX 258: Performed by: HOSPITALIST

## 2025-03-27 PROCEDURE — 6360000002 HC RX W HCPCS: Performed by: PHYSICIAN ASSISTANT

## 2025-03-27 PROCEDURE — 85014 HEMATOCRIT: CPT

## 2025-03-27 PROCEDURE — 6360000002 HC RX W HCPCS: Performed by: INTERNAL MEDICINE

## 2025-03-27 PROCEDURE — 80048 BASIC METABOLIC PNL TOTAL CA: CPT

## 2025-03-27 PROCEDURE — 83036 HEMOGLOBIN GLYCOSYLATED A1C: CPT

## 2025-03-27 PROCEDURE — 6370000000 HC RX 637 (ALT 250 FOR IP): Performed by: INTERNAL MEDICINE

## 2025-03-27 PROCEDURE — 6360000002 HC RX W HCPCS: Performed by: HOSPITALIST

## 2025-03-27 PROCEDURE — 80076 HEPATIC FUNCTION PANEL: CPT

## 2025-03-27 RX ORDER — BISACODYL 10 MG
10 SUPPOSITORY, RECTAL RECTAL DAILY PRN
Status: DISCONTINUED | OUTPATIENT
Start: 2025-03-27 | End: 2025-04-01 | Stop reason: HOSPADM

## 2025-03-27 RX ORDER — PROCHLORPERAZINE EDISYLATE 5 MG/ML
10 INJECTION INTRAMUSCULAR; INTRAVENOUS EVERY 6 HOURS PRN
Status: DISCONTINUED | OUTPATIENT
Start: 2025-03-27 | End: 2025-04-01 | Stop reason: HOSPADM

## 2025-03-27 RX ORDER — BISACODYL 10 MG
10 SUPPOSITORY, RECTAL RECTAL DAILY
Status: DISCONTINUED | OUTPATIENT
Start: 2025-03-27 | End: 2025-04-01 | Stop reason: HOSPADM

## 2025-03-27 RX ADMIN — WATER 1000 MG: 1 INJECTION INTRAMUSCULAR; INTRAVENOUS; SUBCUTANEOUS at 20:53

## 2025-03-27 RX ADMIN — Medication 10 ML: at 20:50

## 2025-03-27 RX ADMIN — ONDANSETRON 4 MG: 2 INJECTION, SOLUTION INTRAMUSCULAR; INTRAVENOUS at 08:40

## 2025-03-27 RX ADMIN — INSULIN GLARGINE 10 UNITS: 100 INJECTION, SOLUTION SUBCUTANEOUS at 08:41

## 2025-03-27 RX ADMIN — Medication 10 ML: at 08:41

## 2025-03-27 RX ADMIN — BISACODYL 10 MG: 10 SUPPOSITORY RECTAL at 10:46

## 2025-03-27 RX ADMIN — INSULIN LISPRO 6 UNITS: 100 INJECTION, SOLUTION INTRAVENOUS; SUBCUTANEOUS at 16:28

## 2025-03-27 RX ADMIN — INSULIN LISPRO 2 UNITS: 100 INJECTION, SOLUTION INTRAVENOUS; SUBCUTANEOUS at 12:43

## 2025-03-27 RX ADMIN — LACTULOSE 20 G: 20 SOLUTION ORAL at 16:27

## 2025-03-27 RX ADMIN — PANTOPRAZOLE SODIUM 40 MG: 40 INJECTION, POWDER, FOR SOLUTION INTRAVENOUS at 08:40

## 2025-03-27 RX ADMIN — INSULIN GLARGINE 10 UNITS: 100 INJECTION, SOLUTION SUBCUTANEOUS at 20:49

## 2025-03-27 RX ADMIN — LACTULOSE 20 G: 20 SOLUTION ORAL at 08:40

## 2025-03-27 RX ADMIN — IRON SUCROSE 200 MG: 20 INJECTION, SOLUTION INTRAVENOUS at 11:48

## 2025-03-27 RX ADMIN — INSULIN LISPRO 2 UNITS: 100 INJECTION, SOLUTION INTRAVENOUS; SUBCUTANEOUS at 08:40

## 2025-03-27 RX ADMIN — SODIUM CHLORIDE 1000 ML: 0.9 INJECTION, SOLUTION INTRAVENOUS at 08:57

## 2025-03-27 RX ADMIN — SODIUM CHLORIDE: 0.9 INJECTION, SOLUTION INTRAVENOUS at 11:46

## 2025-03-27 RX ADMIN — SODIUM CHLORIDE 1000 ML: 0.9 INJECTION, SOLUTION INTRAVENOUS at 14:04

## 2025-03-27 ASSESSMENT — PAIN SCALES - GENERAL
PAINLEVEL_OUTOF10: 2
PAINLEVEL_OUTOF10: 0
PAINLEVEL_OUTOF10: 5
PAINLEVEL_OUTOF10: 3

## 2025-03-27 ASSESSMENT — PAIN DESCRIPTION - LOCATION
LOCATION: ABDOMEN

## 2025-03-27 ASSESSMENT — PAIN DESCRIPTION - DESCRIPTORS
DESCRIPTORS: ACHING
DESCRIPTORS: PRESSURE

## 2025-03-27 ASSESSMENT — PAIN DESCRIPTION - ORIENTATION
ORIENTATION: RIGHT;UPPER

## 2025-03-27 NOTE — PROGRESS NOTES
Pt is beginning to move his bowels and nausea is subsiding. He ate 50% of his lunch and is not in pain. Consent is signed for tomorrow's EGD.

## 2025-03-27 NOTE — PLAN OF CARE
Problem: Chronic Conditions and Co-morbidities  Goal: Patient's chronic conditions and co-morbidity symptoms are monitored and maintained or improved  3/27/2025 1441 by Ruthie Schafer RN  Outcome: Progressing  3/27/2025 0202 by Mariam Eubanks RN  Outcome: Progressing     Problem: Discharge Planning  Goal: Discharge to home or other facility with appropriate resources  3/27/2025 1441 by Ruthie Schafer RN  Outcome: Progressing  3/27/2025 0202 by Mariam Eubanks RN  Outcome: Progressing     Problem: Pain  Goal: Verbalizes/displays adequate comfort level or baseline comfort level  3/27/2025 1441 by Ruthie Schafer RN  Outcome: Progressing  3/27/2025 0202 by Mariam Eubanks RN  Outcome: Progressing     Problem: Safety - Adult  Goal: Free from fall injury  3/27/2025 1441 by Ruthie Schafer RN  Outcome: Progressing  3/27/2025 0202 by Mariam Eubanks RN  Outcome: Progressing

## 2025-03-27 NOTE — CARE COORDINATION
CM update    PD #2    Followed by IM and GI. Admission with GIB (esoph varicies), Ascites requiring  paracentesis, and DVT which will need Eliquis initiated when GIB resolved.    Following.    Tiara Lu RN

## 2025-03-27 NOTE — PLAN OF CARE
Problem: Chronic Conditions and Co-morbidities  Goal: Patient's chronic conditions and co-morbidity symptoms are monitored and maintained or improved  3/27/2025 0202 by Mariam Eubanks RN  Outcome: Progressing  3/26/2025 1852 by Ruthie Schafer RN  Outcome: Progressing     Problem: Discharge Planning  Goal: Discharge to home or other facility with appropriate resources  3/27/2025 0202 by Mariam Eubanks RN  Outcome: Progressing  3/26/2025 1852 by Ruthie Schafer RN  Outcome: Progressing     Problem: Pain  Goal: Verbalizes/displays adequate comfort level or baseline comfort level  3/27/2025 0202 by Mariam Eubanks RN  Outcome: Progressing  3/26/2025 1852 by Ruthie Schafer RN  Outcome: Progressing     Problem: Safety - Adult  Goal: Free from fall injury  3/27/2025 0202 by Mariam Eubanks RN  Outcome: Progressing  3/26/2025 1852 by Ruthie Schafer RN  Outcome: Progressing

## 2025-03-27 NOTE — PROGRESS NOTES
INPATIENT PROGRESS NOTE        IDENTIFYING DATA/REASON FOR CONSULTATION   PATIENT:  Carroll Wells  MRN:  [id number]  ADMIT DATE: 3/25/2025  TIME OF EVALUATION: 3/27/2025 6:47 AM  HOSPITAL STAY:   LOS: 2 days   CONSULTING PHYSICIAN: Ian Chinchilla DO   REASON FOR CONSULTATION: GI Bleed on Eliquis     Subjective:    Patient seen in follow-up.  He has not had a bowel movement in 2-3 days.  He is very nauseous this morning. Woke up feeling nauseous.  Tolerated dinner well last night.  No overt abdominal pain, just discomfort from nausea.    MEDICATIONS   SCHEDULED:  lactulose, 20 g, TID  pantoprazole, 40 mg, Daily  [Held by provider] apixaban, 5 mg, BID  spironolactone, 100 mg, Daily  insulin glargine, 10 Units, BID  insulin lispro, 0-8 Units, 4x Daily AC & HS  albumin human 25%, 25 g, Once  sodium chloride flush, 5-40 mL, 2 times per day  cefTRIAXone (ROCEPHIN) IV, 1,000 mg, Q24H  furosemide, 40 mg, BID      FLUIDS/DRIPS:     sodium chloride      dextrose      sodium chloride      sodium chloride 1,000 mL (25 7704)    sodium chloride       PRNs: sodium chloride, , PRN  simethicone, 80 mg, Q6H PRN  glucose, 4 tablet, PRN  dextrose bolus, 125 mL, PRN   Or  dextrose bolus, 250 mL, PRN  glucagon (rDNA), 1 mg, PRN  dextrose, , Continuous PRN  sodium chloride, , PRN  sodium chloride flush, 5-40 mL, PRN  sodium chloride, , PRN  ondansetron, 4 mg, Q8H PRN   Or  ondansetron, 4 mg, Q6H PRN  morphine, 2 mg, Q4H PRN      ALLERGIES:    Allergies   Allergen Reactions    Aspirin Other (See Comments)     Takes baby aspirin without problems    Caused bleeding in the bowel    Oxycontin [Oxycodone Hcl] Nausea And Vomiting         PHYSICAL EXAM   VITALS:  /65   Pulse 83   Temp 98.2 °F (36.8 °C) (Oral)   Resp 18   Ht 1.702 m (5' 7\")   Wt 102.6 kg (226 lb 1.6 oz)   SpO2 94%   BMI 35.41 kg/m²   TEMPERATURE:  Current - Temp: 98.2 °F (36.8 °C); Max - Temp  Av.1 °F (36.7 °C)  Min: 97.7 °F (36.5 °C)  Max: 98.7 °F  3/26/2025 with Dr. Lindsay:  -2 grade 2 esophageal varices, banded x 2  -Portal hypertensive gastropathy and mild to moderate GAVE.  -Duodenum unremarkable to the proximal third portion.    EGD 2/28/25: Normal upper third of esophagus. Three columns of grade II esophageal varices.  Completely eradicated.  Banded X 3. Z-line irregular, 40 cm from the incisors. Portal hypertensive gastropathy. 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 Normal duodenal bulb and second portion of the duodenum.     EGD 9/2024 with Dr. Velasquez:  Normal upper third of esophagus. Grade II esophageal varices. Completely eradicated with 4 bands. Z-line irregular, 42 cm from the incisors. Portal hypertensive gastropathy. 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 Normal duodenal bulb and second portion of the duodenum. No specimens collected. Repeat EGD in 4 weeks for further esophageal variceal banding      EGD and Colonoscopy 8/1/24:    -EGD: Normal upper third of esophagus. 4 columns of large (> 5 mm) esophageal varices.  Completely eradicated with 6 bands placed. Z-line irregular, 42 cm from the incisors.Portal hypertensive gastropathy. 10 mm linear non-bleeding gastric ulcer with a clean ulcer base (Yoan Class III) at the pylorus.  Biopsied (Benign pyloric mucosa with superficial mucosal erosion and scant inflammatory exudate, negative H. Pylori) .A 12 mm non-bleeding duodenal ulcer with a clean ulcer base (Yoan Class III).Duodenal erosions without bleeding.  -Colon: Hemorrhoids found on perianal exam. One 6 mm polyp in the cecum, removed with a cold snare. Resected and retrieved. Diverticulosis in the sigmoid colon. Internal hemorrhoids.           EGD 9/1/23: 4 columns of small non bleeding esophageal varices in the mid and distal esophagus which flattened on insufflation. No stigmata of bleed noted.Irregular Z-line at 40 cm from incisors. Moderate portal hypertensive gastropathy. No evidence of gastric varices. Normal

## 2025-03-27 NOTE — PROGRESS NOTES
Patient concerned about edema in LUE and feels it is being caused by the IV fluid. Patient re-educated on the signs and symptoms that can occur with a DVT in the upper extremity. Patient verbalized understanding.

## 2025-03-27 NOTE — PROGRESS NOTES
[facility] Medicine Progress Note  V 1.6      Date of Admission: 3/25/2025    Hospital Day: 3      Chief Admission Complaint:   GI bleed and he is on Eliquis    Subjective:   Pt denies any further bleeding, states he has not had a BM in a while. GI with plans for repeat EGD tomorrow.     Presenting Admission History:       69 y.o. male with PMH  of LANG cirrhosis, esophageal  varices, recently diagnosed internal jugular and upper extremity DVT on Eliquis, CKD stage 3, DM II, HTN, HLD Admitted for weakness, fatigue, melanotic stools and abdominal distention with associated pain that has been ongoing getting worse over the past week.     Of note, he had an internal jugular DVT diagnosed about 3 weeks ago and he was started on Eliquis.   He does have a positive stool guaiac. His hemoglobins have been downtrending from 8-1/2-9 to 7.2. He has a mild ROWAN with a creatinine of 1.4 on his CMP. Albumin seems to be near appropriate at 3.1. INR not significantly changed at 1.64.   He does have significant amount of ascites and mild peritoneal signs concerning for spontaneous bacterial peritonitis.   With his history of gastric varices he was started on Rocephin empirically as well as octreotide and Protonix. He was given 2 units of blood in the emergency department. The ED provider spoke to GI and their recommendation was to just hold the eliquis and not reverse it at this time. Patient states his last dose of eliquis was on the AM of admit       Assessment/Plan:      Current Principal Problem:  GI bleed    GI bleed on Eliquis : Eliquis is currently being held.  - Follow H/H closely.  - Continue IV Protonix.  - Was on octreotide infusion now discontinued.  - EGD on 3/26/2025: Revealed esophageal varices x2 and banded. 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 Duodenum unremarkable to the proximal third portion.   - Per GI would complete 5 days of ceftriaxone for prophylaxis against bacterial translocation in

## 2025-03-28 ENCOUNTER — ANESTHESIA (OUTPATIENT)
Dept: ENDOSCOPY | Age: 70
End: 2025-03-28
Payer: MEDICARE

## 2025-03-28 LAB
ALBUMIN SERPL-MCNC: 3.1 G/DL (ref 3.4–5)
ALP SERPL-CCNC: 101 U/L (ref 40–129)
ALT SERPL-CCNC: 12 U/L (ref 10–40)
ANION GAP SERPL CALCULATED.3IONS-SCNC: 9 MMOL/L (ref 3–16)
AST SERPL-CCNC: 23 U/L (ref 15–37)
BILIRUB DIRECT SERPL-MCNC: 0.3 MG/DL (ref 0–0.3)
BILIRUB INDIRECT SERPL-MCNC: 0.2 MG/DL (ref 0–1)
BILIRUB SERPL-MCNC: 0.5 MG/DL (ref 0–1)
BUN SERPL-MCNC: 22 MG/DL (ref 7–20)
CALCIUM SERPL-MCNC: 8.4 MG/DL (ref 8.3–10.6)
CHLORIDE SERPL-SCNC: 102 MMOL/L (ref 99–110)
CO2 SERPL-SCNC: 24 MMOL/L (ref 21–32)
CREAT SERPL-MCNC: 1 MG/DL (ref 0.8–1.3)
GFR SERPLBLD CREATININE-BSD FMLA CKD-EPI: 80 ML/MIN/{1.73_M2}
GLUCOSE BLD-MCNC: 115 MG/DL (ref 70–99)
GLUCOSE BLD-MCNC: 120 MG/DL (ref 70–99)
GLUCOSE BLD-MCNC: 142 MG/DL (ref 70–99)
GLUCOSE BLD-MCNC: 179 MG/DL (ref 70–99)
GLUCOSE BLD-MCNC: 202 MG/DL (ref 70–99)
GLUCOSE SERPL-MCNC: 119 MG/DL (ref 70–99)
HCT VFR BLD AUTO: 24.3 % (ref 40.5–52.5)
HCT VFR BLD AUTO: 25.4 % (ref 40.5–52.5)
HGB BLD-MCNC: 7.8 G/DL (ref 13.5–17.5)
HGB BLD-MCNC: 8.1 G/DL (ref 13.5–17.5)
INR PPP: 1.31 (ref 0.85–1.15)
PERFORMED ON: ABNORMAL
POTASSIUM SERPL-SCNC: 3.5 MMOL/L (ref 3.5–5.1)
PROT SERPL-MCNC: 5.4 G/DL (ref 6.4–8.2)
PROTHROMBIN TIME: 16.4 SEC (ref 11.9–14.9)
REASON FOR REJECTION: NORMAL
REJECTED TEST: NORMAL
SODIUM SERPL-SCNC: 135 MMOL/L (ref 136–145)

## 2025-03-28 PROCEDURE — 3609013000 HC EGD TRANSORAL CONTROL BLEEDING ANY METHOD: Performed by: INTERNAL MEDICINE

## 2025-03-28 PROCEDURE — C1888 ENDOVAS NON-CARDIAC ABL CATH: HCPCS | Performed by: INTERNAL MEDICINE

## 2025-03-28 PROCEDURE — 0W3P8ZZ CONTROL BLEEDING IN GASTROINTESTINAL TRACT, VIA NATURAL OR ARTIFICIAL OPENING ENDOSCOPIC: ICD-10-PCS | Performed by: INTERNAL MEDICINE

## 2025-03-28 PROCEDURE — 6360000002 HC RX W HCPCS: Performed by: HOSPITALIST

## 2025-03-28 PROCEDURE — 2500000003 HC RX 250 WO HCPCS: Performed by: HOSPITALIST

## 2025-03-28 PROCEDURE — 6360000002 HC RX W HCPCS: Performed by: NURSE ANESTHETIST, CERTIFIED REGISTERED

## 2025-03-28 PROCEDURE — 3609013200 HC EGD W/ ABLATION: Performed by: INTERNAL MEDICINE

## 2025-03-28 PROCEDURE — 6360000002 HC RX W HCPCS: Performed by: INTERNAL MEDICINE

## 2025-03-28 PROCEDURE — 80076 HEPATIC FUNCTION PANEL: CPT

## 2025-03-28 PROCEDURE — 85610 PROTHROMBIN TIME: CPT

## 2025-03-28 PROCEDURE — 6370000000 HC RX 637 (ALT 250 FOR IP): Performed by: INTERNAL MEDICINE

## 2025-03-28 PROCEDURE — 2580000003 HC RX 258

## 2025-03-28 PROCEDURE — 85018 HEMOGLOBIN: CPT

## 2025-03-28 PROCEDURE — 85014 HEMATOCRIT: CPT

## 2025-03-28 PROCEDURE — 7100000010 HC PHASE II RECOVERY - FIRST 15 MIN: Performed by: INTERNAL MEDICINE

## 2025-03-28 PROCEDURE — 36415 COLL VENOUS BLD VENIPUNCTURE: CPT

## 2025-03-28 PROCEDURE — 3700000000 HC ANESTHESIA ATTENDED CARE: Performed by: INTERNAL MEDICINE

## 2025-03-28 PROCEDURE — 6360000002 HC RX W HCPCS: Performed by: PHYSICIAN ASSISTANT

## 2025-03-28 PROCEDURE — 2709999900 HC NON-CHARGEABLE SUPPLY: Performed by: INTERNAL MEDICINE

## 2025-03-28 PROCEDURE — 2580000003 HC RX 258: Performed by: HOSPITALIST

## 2025-03-28 PROCEDURE — 2720000010 HC SURG SUPPLY STERILE: Performed by: INTERNAL MEDICINE

## 2025-03-28 PROCEDURE — 80048 BASIC METABOLIC PNL TOTAL CA: CPT

## 2025-03-28 PROCEDURE — 3700000001 HC ADD 15 MINUTES (ANESTHESIA): Performed by: INTERNAL MEDICINE

## 2025-03-28 PROCEDURE — 7100000011 HC PHASE II RECOVERY - ADDTL 15 MIN: Performed by: INTERNAL MEDICINE

## 2025-03-28 PROCEDURE — 2060000000 HC ICU INTERMEDIATE R&B

## 2025-03-28 PROCEDURE — 2580000003 HC RX 258: Performed by: PHYSICIAN ASSISTANT

## 2025-03-28 PROCEDURE — 6370000000 HC RX 637 (ALT 250 FOR IP): Performed by: PHYSICIAN ASSISTANT

## 2025-03-28 RX ORDER — SODIUM CHLORIDE 9 MG/ML
INJECTION, SOLUTION INTRAVENOUS PRN
Status: DISCONTINUED | OUTPATIENT
Start: 2025-03-28 | End: 2025-03-28 | Stop reason: HOSPADM

## 2025-03-28 RX ORDER — SUCRALFATE 1 G/1
1 TABLET ORAL EVERY 8 HOURS SCHEDULED
Status: DISCONTINUED | OUTPATIENT
Start: 2025-03-28 | End: 2025-03-30

## 2025-03-28 RX ORDER — PANTOPRAZOLE SODIUM 40 MG/10ML
40 INJECTION, POWDER, LYOPHILIZED, FOR SOLUTION INTRAVENOUS
Status: DISCONTINUED | OUTPATIENT
Start: 2025-03-28 | End: 2025-04-01 | Stop reason: HOSPADM

## 2025-03-28 RX ORDER — LIDOCAINE HYDROCHLORIDE 10 MG/ML
0.3 INJECTION, SOLUTION EPIDURAL; INFILTRATION; INTRACAUDAL; PERINEURAL
Status: DISCONTINUED | OUTPATIENT
Start: 2025-03-28 | End: 2025-03-28 | Stop reason: HOSPADM

## 2025-03-28 RX ORDER — SODIUM CHLORIDE 0.9 % (FLUSH) 0.9 %
5-40 SYRINGE (ML) INJECTION PRN
Status: DISCONTINUED | OUTPATIENT
Start: 2025-03-28 | End: 2025-03-28 | Stop reason: HOSPADM

## 2025-03-28 RX ORDER — LIDOCAINE HYDROCHLORIDE 20 MG/ML
INJECTION, SOLUTION EPIDURAL; INFILTRATION; INTRACAUDAL; PERINEURAL
Status: DISCONTINUED | OUTPATIENT
Start: 2025-03-28 | End: 2025-03-28 | Stop reason: SDUPTHER

## 2025-03-28 RX ORDER — SODIUM CHLORIDE 0.9 % (FLUSH) 0.9 %
5-40 SYRINGE (ML) INJECTION EVERY 12 HOURS SCHEDULED
Status: DISCONTINUED | OUTPATIENT
Start: 2025-03-28 | End: 2025-03-28 | Stop reason: HOSPADM

## 2025-03-28 RX ORDER — PHENYLEPHRINE HCL IN 0.9% NACL 1 MG/10 ML
SYRINGE (ML) INTRAVENOUS
Status: DISCONTINUED | OUTPATIENT
Start: 2025-03-28 | End: 2025-03-28 | Stop reason: SDUPTHER

## 2025-03-28 RX ORDER — SODIUM CHLORIDE, SODIUM LACTATE, POTASSIUM CHLORIDE, CALCIUM CHLORIDE 600; 310; 30; 20 MG/100ML; MG/100ML; MG/100ML; MG/100ML
INJECTION, SOLUTION INTRAVENOUS CONTINUOUS
Status: DISCONTINUED | OUTPATIENT
Start: 2025-03-28 | End: 2025-03-28 | Stop reason: HOSPADM

## 2025-03-28 RX ORDER — ACETYLCYSTEINE 200 MG/ML
SOLUTION ORAL; RESPIRATORY (INHALATION) PRN
Status: DISCONTINUED | OUTPATIENT
Start: 2025-03-28 | End: 2025-03-28 | Stop reason: ALTCHOICE

## 2025-03-28 RX ORDER — PROPOFOL 10 MG/ML
INJECTION, EMULSION INTRAVENOUS
Status: DISCONTINUED | OUTPATIENT
Start: 2025-03-28 | End: 2025-03-28 | Stop reason: SDUPTHER

## 2025-03-28 RX ADMIN — SUCRALFATE 1 G: 1 TABLET ORAL at 13:12

## 2025-03-28 RX ADMIN — PROPOFOL 50 MG: 10 INJECTION, EMULSION INTRAVENOUS at 09:44

## 2025-03-28 RX ADMIN — WATER 1000 MG: 1 INJECTION INTRAMUSCULAR; INTRAVENOUS; SUBCUTANEOUS at 21:31

## 2025-03-28 RX ADMIN — PROCHLORPERAZINE EDISYLATE 10 MG: 5 INJECTION INTRAMUSCULAR; INTRAVENOUS at 12:19

## 2025-03-28 RX ADMIN — PROPOFOL 50 MG: 10 INJECTION, EMULSION INTRAVENOUS at 09:35

## 2025-03-28 RX ADMIN — PROPOFOL 50 MG: 10 INJECTION, EMULSION INTRAVENOUS at 09:18

## 2025-03-28 RX ADMIN — LACTULOSE 20 G: 20 SOLUTION ORAL at 12:19

## 2025-03-28 RX ADMIN — SODIUM CHLORIDE: 0.9 INJECTION, SOLUTION INTRAVENOUS at 13:22

## 2025-03-28 RX ADMIN — Medication 10 ML: at 12:27

## 2025-03-28 RX ADMIN — Medication 100 MCG: at 09:25

## 2025-03-28 RX ADMIN — LIDOCAINE HYDROCHLORIDE 60 MG: 20 INJECTION, SOLUTION EPIDURAL; INFILTRATION; INTRACAUDAL; PERINEURAL at 09:18

## 2025-03-28 RX ADMIN — INSULIN LISPRO 2 UNITS: 100 INJECTION, SOLUTION INTRAVENOUS; SUBCUTANEOUS at 17:04

## 2025-03-28 RX ADMIN — INSULIN GLARGINE 10 UNITS: 100 INJECTION, SOLUTION SUBCUTANEOUS at 13:12

## 2025-03-28 RX ADMIN — MORPHINE SULFATE 2 MG: 2 INJECTION, SOLUTION INTRAMUSCULAR; INTRAVENOUS at 12:19

## 2025-03-28 RX ADMIN — LACTULOSE 20 G: 20 SOLUTION ORAL at 17:04

## 2025-03-28 RX ADMIN — PROPOFOL 100 MG: 10 INJECTION, EMULSION INTRAVENOUS at 09:30

## 2025-03-28 RX ADMIN — SODIUM CHLORIDE 1000 ML: 0.9 INJECTION, SOLUTION INTRAVENOUS at 12:16

## 2025-03-28 RX ADMIN — Medication 100 MCG: at 09:32

## 2025-03-28 RX ADMIN — PROPOFOL 50 MG: 10 INJECTION, EMULSION INTRAVENOUS at 09:19

## 2025-03-28 RX ADMIN — PROPOFOL 50 MG: 10 INJECTION, EMULSION INTRAVENOUS at 09:32

## 2025-03-28 RX ADMIN — PROPOFOL 50 MG: 10 INJECTION, EMULSION INTRAVENOUS at 09:21

## 2025-03-28 RX ADMIN — IRON SUCROSE 200 MG: 20 INJECTION, SOLUTION INTRAVENOUS at 13:23

## 2025-03-28 RX ADMIN — PANTOPRAZOLE SODIUM 40 MG: 40 INJECTION, POWDER, FOR SOLUTION INTRAVENOUS at 17:04

## 2025-03-28 RX ADMIN — LACTULOSE 20 G: 20 SOLUTION ORAL at 21:32

## 2025-03-28 RX ADMIN — INSULIN GLARGINE 10 UNITS: 100 INJECTION, SOLUTION SUBCUTANEOUS at 21:31

## 2025-03-28 RX ADMIN — SUCRALFATE 1 G: 1 TABLET ORAL at 21:31

## 2025-03-28 RX ADMIN — PROPOFOL 50 MG: 10 INJECTION, EMULSION INTRAVENOUS at 09:49

## 2025-03-28 RX ADMIN — PROPOFOL 50 MG: 10 INJECTION, EMULSION INTRAVENOUS at 09:38

## 2025-03-28 RX ADMIN — ONDANSETRON 4 MG: 2 INJECTION, SOLUTION INTRAMUSCULAR; INTRAVENOUS at 10:12

## 2025-03-28 RX ADMIN — SODIUM CHLORIDE 1000 ML: 0.9 INJECTION, SOLUTION INTRAVENOUS at 05:14

## 2025-03-28 RX ADMIN — Medication 100 MCG: at 09:27

## 2025-03-28 ASSESSMENT — PAIN SCALES - GENERAL
PAINLEVEL_OUTOF10: 8
PAINLEVEL_OUTOF10: 0
PAINLEVEL_OUTOF10: 0
PAINLEVEL_OUTOF10: 6
PAINLEVEL_OUTOF10: 5
PAINLEVEL_OUTOF10: 0
PAINLEVEL_OUTOF10: 0
PAINLEVEL_OUTOF10: 5
PAINLEVEL_OUTOF10: 8
PAINLEVEL_OUTOF10: 5
PAINLEVEL_OUTOF10: 6
PAINLEVEL_OUTOF10: 0

## 2025-03-28 ASSESSMENT — PAIN DESCRIPTION - DESCRIPTORS
DESCRIPTORS: CRAMPING;PRESSURE
DESCRIPTORS: PRESSURE;CRAMPING
DESCRIPTORS: ACHING;CRAMPING
DESCRIPTORS: ACHING;CRAMPING

## 2025-03-28 ASSESSMENT — ENCOUNTER SYMPTOMS: SHORTNESS OF BREATH: 1

## 2025-03-28 ASSESSMENT — PAIN DESCRIPTION - ORIENTATION
ORIENTATION: LOWER
ORIENTATION: LOWER
ORIENTATION: RIGHT;MID
ORIENTATION: LOWER

## 2025-03-28 ASSESSMENT — PAIN DESCRIPTION - LOCATION
LOCATION: ABDOMEN

## 2025-03-28 ASSESSMENT — PAIN - FUNCTIONAL ASSESSMENT: PAIN_FUNCTIONAL_ASSESSMENT: 0-10

## 2025-03-28 ASSESSMENT — PAIN SCALES - WONG BAKER: WONGBAKER_NUMERICALRESPONSE: NO HURT

## 2025-03-28 NOTE — PROCEDURES
EGD PROCEDURE NOTE         Esophagogastroduodenoscopy Procedure Note     Patient: Carroll Wells MRN: [id number]   YOB: 1955 Age: 69 y.o. Sex: male   Unit: [facility] ENDO Room/Bed: [facility] Endo Pool/NONE Location: [facility]    Admitting Physician: VIRGINIA TRINIDAD     Primary Care Physician: Karina Mendoza PA      DATE OF PROCEDURE: 3/28/2025  PROCEDURE: Esophagogastroduodenoscopy  INDICATION:   Therapeutic EGD for ablation of GAVE.  69-year-old man history of Xiong cirrhosis admitted with melena and acute on chronic blood loss anemia, 2 weeks after starting Eliquis 5 mg twice daily for upper extremity DVT.  Therefore, 2 days ago, we did initial EGD and noted grade 2 esophageal varices without high risk stigmata that we banded.  In the stomach, there was GAVE but no focal bleeding source.  Based on the assumption that his eliquis could be held or lowered, and no clear GAVE target, we didn't do RFA.  However, vascular surgery thinks he needs to be on therapeutic Eliquis again therefore we are going to try to do so today.  Because of the variceal banding, we will need to be careful in the esophagus.  Patient is aware that we may need to abort the procedure today and return on Monday.    ENDOSCOPIST: BONNIE SPRINGER MD    POSTOPERATIVE DIAGNOSIS:    -Esophagus with 1 small superficial left-sided band erosion and a larger right sided clean-based band ulcer distally.  -Stomach again contained portal hypertensive gastropathy and diffuse mild to moderate GAVE, not bleeding.  RFA ablation with through-the-scope Barrx catheter performed, 12 J/cm² with paired ablations at each location.  Because of the diffuse nature, we could not make a second pass at each location, though we did touch up with APC ablation.  -Duodenum unremarkable    In summary, it is still not completely clear where his melena was from but the suspicion was that it was from one of the foci within the GAVE.  I cannot

## 2025-03-28 NOTE — PROGRESS NOTES
Arrived from inpatient hospital room via gurney accompanied by transport staff  Alert and oriented x4   Calm  VSS  Iv site assessed without evidence of infiltration on arrival  Respiration  easy unlabored  Abd soft nondistended

## 2025-03-28 NOTE — PROGRESS NOTES
Patient's procedure went well with no complication noted Halo used with TS-1100 supply at 120 ablation.    Gabriela HENDERSON

## 2025-03-28 NOTE — ANESTHESIA POSTPROCEDURE EVALUATION
Department of Anesthesiology  Postprocedure Note    Patient: Carroll Wells  MRN: [id number]  YOB: 1955  Date of evaluation: 3/28/2025    Procedure Summary       Date: 03/28/25 Room / Location: [place] [address] / [facility]    Anesthesia Start: 0911 Anesthesia Stop: 1001    Procedures:       ESOPHAGOGASTRODUODENOSCOPY POLYP ABLATION/OTHER      ESOPHAGOGASTRODUODENOSCOPY CONTROL HEMORRHAGE Diagnosis:       GAVE (gastric antral vascular ectasia)      (GAVE (gastric antral vascular ectasia) [K31.819])    Surgeons: Rommel Lindsay MD Responsible Provider: Richard Qureshi MD    Anesthesia Type: MAC ASA Status: 3            Anesthesia Type: No value filed.    Fco Phase I: Fco Score: 10    Fco Phase II: Fco Score: 10    Anesthesia Post Evaluation    Comments: Anes Post-op Note    Name:    Carroll Wells  MRN:      [id number]    Patient Vitals in the past 12 hrs:  03/28/25 1219, Resp:18  03/28/25 1120, BP:(!) 109/55, Temp:97.5 °F (36.4 °C), Temp src:Oral, Pulse:74, Resp:16, SpO2:95 %  03/28/25 1105, BP:(!) 98/50, Pulse:72, Resp:16, SpO2:95 %  03/28/25 1100, SpO2:95 %  03/28/25 1055, BP:(!) 101/49, Pulse:72, Resp:16, SpO2:95 %  03/28/25 1040, BP:(!) 104/47, Pulse:75, Resp:16, SpO2:94 %  03/28/25 1030, BP:(!) 103/46, Pulse:73, Resp:16, SpO2:100 %  03/28/25 1020, BP:(!) 87/42, Pulse:67, Resp:16, SpO2:96 %  03/28/25 1010, BP:(!) 92/41, Pulse:70, Resp:16, SpO2:95 %  03/28/25 1000, BP:(!) 98/46, Pulse:83, Resp:16, SpO2:96 %  03/28/25 0752, BP:(!) 109/58, Temp:98.1 °F (36.7 °C), Temp src:Oral, Pulse:83, Resp:16, SpO2:100 %  03/28/25 0722, BP:(!) 154/78, Pulse:95  03/28/25 0718, BP:95/61, Temp:97.7 °F (36.5 °C), Temp src:Oral, Pulse:90, SpO2:99 %  03/28/25 0717, BP:95/61, Temp src:Oral  03/28/25 0500, BP:124/74, Temp:98.1 °F (36.7 °C), Temp src:Oral, Pulse:78, Resp:16, SpO2:97 %, Weight:105.1 kg (231 lb 11.2 oz)     LABS:    CBC  Lab Results       Component                Value

## 2025-03-28 NOTE — PROGRESS NOTES
[facility] Medicine Progress Note  V 1.6      Date of Admission: 3/25/2025    Hospital Day: 4      Chief Admission Complaint:   GI bleed and he is on Eliquis    Subjective:   EMR and notes reviewed pt seen and examined, No complainant at this time S/P EGD      Presenting Admission History:       69 y.o. male with PMH  of LANG cirrhosis, esophageal  varices, recently diagnosed internal jugular and upper extremity DVT on Eliquis, CKD stage 3, DM II, HTN, HLD Admitted for weakness, fatigue, melanotic stools and abdominal distention with associated pain that has been ongoing getting worse over the past week.     Of note, he had an internal jugular DVT diagnosed about 3 weeks ago and he was started on Eliquis.   He does have a positive stool guaiac. His hemoglobins have been downtrending from 8-1/2-9 to 7.2. He has a mild ROWAN with a creatinine of 1.4 on his CMP. Albumin seems to be near appropriate at 3.1. INR not significantly changed at 1.64.   He does have significant amount of ascites and mild peritoneal signs concerning for spontaneous bacterial peritonitis.   With his history of gastric varices he was started on Rocephin empirically as well as octreotide and Protonix. He was given 2 units of blood in the emergency department. The ED provider spoke to GI and their recommendation was to just hold the eliquis and not reverse it at this time. Patient states his last dose of eliquis was on the AM of admit       Assessment/Plan:      Current Principal Problem:  GI bleed    GI bleed on Eliquis : Eliquis is currently being held.  - Follow H/H closely.  - Continue IV Protonix.  - Was on octreotide infusion now discontinued.  - EGD on 3/26/2025: Revealed esophageal varices x2 and banded. 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 Duodenum unremarkable to the proximal third portion.   - Per GI would complete 5 days of ceftriaxone for prophylaxis against bacterial translocation in the context of GI bleed and    Anticipated Discharge Day/Date: 3 to 4 days  Barriers to Discharge:   Following H/H in setting of GI bleed  Need to determine when we can resume anticoagulation with Eliquis  --------------------------------------------------    MDM (any 2 required for High level billing)    A. Problems (any 1)  [x] Acute/Chronic Illness/injury posing ongoing threat to life and/or bodily function without ongoing treatment    [] Severe exacerbation of chronic illness    --------------------------------------------------  B. Risk of Treatment (any 1)    [x] Drugs/treatments that require intensive monitoring for toxicity    [] IV ABX (Vancomycin, Aminoglycosides, etc)     [] Post-Cath/Contrast study requiring serial monitoring    [] IV Narcotic analgesia    [] Aggressive IV diuresis    [] Hypertonic Saline    [] Critical electrolyte abnormalities requiring IV replacement    [] Insulin - Scheduled/SSI or Insulin gtt    [] Anticoagulation (Heparin gtt or Coumadin - other anticoagulants in special circumstances)    [] Cardiac Medications (IV Amiodarone/Diltiazem, Tikosyn, etc)    [] Hemodialysis    [x] Other -  transfusion of PRBC  [] Change in code status    [] Decision to escalate care    [] Major surgery/procedure with associated risk factors he did go for EGD this admission  --------------------------------------------------  C. Data (any 2)    [x] Data Review (any 3)    [x] Consultant notes from yesterday/today    [x] All available current labs reviewed interpreted for clinical significance    [x] Appropriate follow-up labs were ordered  [] Collateral history obtained     [x] Independent Interpretation of tests (any 1)    [x] Telemetry (Rhythm Strip) personally reviewed and interpreted        [] Imaging personally reviewed and interpreted     [x] Discussion (any 1)  [x] Multi-Disciplinary Rounds with Case Management  [] Discussed management of the case with           Labs:  Personally reviewed on 3/28/2025 and interpreted for

## 2025-03-28 NOTE — PLAN OF CARE
Problem: Chronic Conditions and Co-morbidities  Goal: Patient's chronic conditions and co-morbidity symptoms are monitored and maintained or improved  3/28/2025 1854 by Ruthie Schafer RN  Outcome: Progressing  3/28/2025 0530 by Mariam Eubanks RN  Outcome: Progressing     Problem: Discharge Planning  Goal: Discharge to home or other facility with appropriate resources  3/28/2025 1854 by Ruthie Schafer RN  Outcome: Progressing  3/28/2025 0530 by Mariam Eubanks RN  Outcome: Progressing     Problem: Pain  Goal: Verbalizes/displays adequate comfort level or baseline comfort level  3/28/2025 1854 by Ruthie Schafer RN  Outcome: Progressing  3/28/2025 0530 by Mariam Eubanks RN  Outcome: Progressing     Problem: Safety - Adult  Goal: Free from fall injury  3/28/2025 1854 by Ruthie Schafer RN  Outcome: Progressing  3/28/2025 0530 by Mariam Eubanks RN  Outcome: Progressing

## 2025-03-28 NOTE — CARE COORDINATION
LOS 3. Care managed by [facility], GI.  Here w DVT, GIB- EGD today, paracentesis earlier this admit. Eliquis on hold.  From home w spouse family. CM following. Delphine Watkins RN

## 2025-03-28 NOTE — PROGRESS NOTES
Awake and alert with no complaints. VS stable. Meets PACU phase 2 discharge criteria. CMU called to confirm monitor capture. Bed and buttocks checked for incontinence - dry and clean. Ready to go to room.

## 2025-03-28 NOTE — ANESTHESIA PRE PROCEDURE
beer.                                Counseling given: Not Answered      Vital Signs (Current):   Vitals:    03/28/25 0717 03/28/25 0718 03/28/25 0722 03/28/25 0752   BP: 95/61 95/61 (!) 154/78 (!) 109/58   Pulse:  90 95 83   Resp:    16   Temp:  97.7 °F (36.5 °C)  98.1 °F (36.7 °C)   TempSrc: Oral Oral  Oral   SpO2:  99%  100%   Weight:       Height:                                                  BP Readings from Last 3 Encounters:   03/28/25 (!) 109/58   03/12/25 134/74   03/04/25 136/69       NPO Status: Time of last liquid consumption: 1700                        Time of last solid consumption: 1700                        Date of last liquid consumption: 03/24/25                        Date of last solid food consumption: 03/24/25    BMI:   Wt Readings from Last 3 Encounters:   03/28/25 105.1 kg (231 lb 11.2 oz)   03/12/25 102.1 kg (225 lb)   02/27/25 105.1 kg (231 lb 12.8 oz)     Body mass index is 36.29 kg/m².    CBC:   Lab Results   Component Value Date/Time    WBC 4.2 03/27/2025 06:36 AM    RBC 3.12 03/27/2025 06:36 AM    HGB 7.8 03/28/2025 06:24 AM    HCT 24.3 03/28/2025 06:24 AM    MCV 77.9 03/27/2025 06:36 AM    RDW 19.7 03/27/2025 06:36 AM     03/27/2025 06:36 AM       CMP:   Lab Results   Component Value Date/Time     03/27/2025 05:20 AM    K 3.9 03/27/2025 05:20 AM    K 4.2 03/25/2025 08:10 PM     03/27/2025 05:20 AM    CO2 26 03/27/2025 05:20 AM    BUN 28 03/27/2025 05:20 AM    CREATININE 1.4 03/27/2025 05:20 AM    GFRAA >60 08/08/2022 10:57 AM    GFRAA >60 06/10/2013 10:53 AM    AGRATIO 0.9 03/25/2025 08:10 PM    LABGLOM 54 03/27/2025 05:20 AM    LABGLOM 81 04/26/2024 11:14 AM    GLUCOSE 174 03/27/2025 05:20 AM    CALCIUM 8.5 03/27/2025 05:20 AM    BILITOT 0.7 03/27/2025 05:20 AM    ALKPHOS 93 03/27/2025 05:20 AM    AST 22 03/27/2025 05:20 AM    ALT 11 03/27/2025 05:20 AM       POC Tests:   Recent Labs     03/27/25 2025   POCGLU 176*       Coags:   Lab Results   Component Value

## 2025-03-29 LAB
ALBUMIN SERPL-MCNC: 2.7 G/DL (ref 3.4–5)
ALP SERPL-CCNC: 98 U/L (ref 40–129)
ALT SERPL-CCNC: 13 U/L (ref 10–40)
ANION GAP SERPL CALCULATED.3IONS-SCNC: 9 MMOL/L (ref 3–16)
AST SERPL-CCNC: 26 U/L (ref 15–37)
BACTERIA FLD AEROBE CULT: NORMAL
BILIRUB DIRECT SERPL-MCNC: 0.3 MG/DL (ref 0–0.3)
BILIRUB INDIRECT SERPL-MCNC: 0.2 MG/DL (ref 0–1)
BILIRUB SERPL-MCNC: 0.5 MG/DL (ref 0–1)
BUN SERPL-MCNC: 18 MG/DL (ref 7–20)
CALCIUM SERPL-MCNC: 8.3 MG/DL (ref 8.3–10.6)
CHLORIDE SERPL-SCNC: 105 MMOL/L (ref 99–110)
CO2 SERPL-SCNC: 23 MMOL/L (ref 21–32)
CREAT SERPL-MCNC: 1 MG/DL (ref 0.8–1.3)
GFR SERPLBLD CREATININE-BSD FMLA CKD-EPI: 80 ML/MIN/{1.73_M2}
GLUCOSE BLD-MCNC: 173 MG/DL (ref 70–99)
GLUCOSE BLD-MCNC: 192 MG/DL (ref 70–99)
GLUCOSE BLD-MCNC: 257 MG/DL (ref 70–99)
GLUCOSE BLD-MCNC: 299 MG/DL (ref 70–99)
GLUCOSE SERPL-MCNC: 163 MG/DL (ref 70–99)
GRAM STN SPEC: NORMAL
HCT VFR BLD AUTO: 29.5 % (ref 40.5–52.5)
HGB BLD-MCNC: 9.4 G/DL (ref 13.5–17.5)
INR PPP: 1.24 (ref 0.85–1.15)
PERFORMED ON: ABNORMAL
POTASSIUM SERPL-SCNC: 3.6 MMOL/L (ref 3.5–5.1)
PROT SERPL-MCNC: 5.2 G/DL (ref 6.4–8.2)
PROTHROMBIN TIME: 15.8 SEC (ref 11.9–14.9)
SODIUM SERPL-SCNC: 137 MMOL/L (ref 136–145)

## 2025-03-29 PROCEDURE — 80076 HEPATIC FUNCTION PANEL: CPT

## 2025-03-29 PROCEDURE — 85610 PROTHROMBIN TIME: CPT

## 2025-03-29 PROCEDURE — 6360000002 HC RX W HCPCS: Performed by: INTERNAL MEDICINE

## 2025-03-29 PROCEDURE — 6370000000 HC RX 637 (ALT 250 FOR IP): Performed by: PHYSICIAN ASSISTANT

## 2025-03-29 PROCEDURE — 2580000003 HC RX 258: Performed by: PHYSICIAN ASSISTANT

## 2025-03-29 PROCEDURE — 36415 COLL VENOUS BLD VENIPUNCTURE: CPT

## 2025-03-29 PROCEDURE — 2580000003 HC RX 258

## 2025-03-29 PROCEDURE — 85018 HEMOGLOBIN: CPT

## 2025-03-29 PROCEDURE — 2500000003 HC RX 250 WO HCPCS: Performed by: HOSPITALIST

## 2025-03-29 PROCEDURE — 6360000002 HC RX W HCPCS: Performed by: PHYSICIAN ASSISTANT

## 2025-03-29 PROCEDURE — 6370000000 HC RX 637 (ALT 250 FOR IP): Performed by: INTERNAL MEDICINE

## 2025-03-29 PROCEDURE — 85014 HEMATOCRIT: CPT

## 2025-03-29 PROCEDURE — 80048 BASIC METABOLIC PNL TOTAL CA: CPT

## 2025-03-29 PROCEDURE — 2060000000 HC ICU INTERMEDIATE R&B

## 2025-03-29 PROCEDURE — 6360000002 HC RX W HCPCS: Performed by: HOSPITALIST

## 2025-03-29 RX ADMIN — Medication 10 ML: at 15:31

## 2025-03-29 RX ADMIN — SUCRALFATE 1 G: 1 TABLET ORAL at 14:00

## 2025-03-29 RX ADMIN — IRON SUCROSE 200 MG: 20 INJECTION, SOLUTION INTRAVENOUS at 15:33

## 2025-03-29 RX ADMIN — INSULIN GLARGINE 10 UNITS: 100 INJECTION, SOLUTION SUBCUTANEOUS at 09:00

## 2025-03-29 RX ADMIN — SODIUM CHLORIDE 1000 ML: 0.9 INJECTION, SOLUTION INTRAVENOUS at 01:55

## 2025-03-29 RX ADMIN — INSULIN LISPRO 2 UNITS: 100 INJECTION, SOLUTION INTRAVENOUS; SUBCUTANEOUS at 21:30

## 2025-03-29 RX ADMIN — SUCRALFATE 1 G: 1 TABLET ORAL at 05:58

## 2025-03-29 RX ADMIN — Medication 10 ML: at 21:43

## 2025-03-29 RX ADMIN — PANTOPRAZOLE SODIUM 40 MG: 40 INJECTION, POWDER, FOR SOLUTION INTRAVENOUS at 05:58

## 2025-03-29 RX ADMIN — INSULIN LISPRO 4 UNITS: 100 INJECTION, SOLUTION INTRAVENOUS; SUBCUTANEOUS at 18:05

## 2025-03-29 RX ADMIN — INSULIN GLARGINE 10 UNITS: 100 INJECTION, SOLUTION SUBCUTANEOUS at 21:30

## 2025-03-29 RX ADMIN — SUCRALFATE 1 G: 1 TABLET ORAL at 21:32

## 2025-03-29 RX ADMIN — PANTOPRAZOLE SODIUM 40 MG: 40 INJECTION, POWDER, FOR SOLUTION INTRAVENOUS at 15:30

## 2025-03-29 RX ADMIN — Medication 10 ML: at 15:30

## 2025-03-29 RX ADMIN — LACTULOSE 20 G: 20 SOLUTION ORAL at 09:00

## 2025-03-29 RX ADMIN — INSULIN LISPRO 4 UNITS: 100 INJECTION, SOLUTION INTRAVENOUS; SUBCUTANEOUS at 12:25

## 2025-03-29 RX ADMIN — WATER 1000 MG: 1 INJECTION INTRAMUSCULAR; INTRAVENOUS; SUBCUTANEOUS at 21:33

## 2025-03-29 RX ADMIN — Medication 10 ML: at 09:00

## 2025-03-29 ASSESSMENT — PAIN SCALES - GENERAL
PAINLEVEL_OUTOF10: 0

## 2025-03-29 NOTE — PROGRESS NOTES
[facility] Medicine Progress Note  V 1.6      Date of Admission: 3/25/2025    Hospital Day: 5      Chief Admission Complaint:   GI bleed and he is on Eliquis    Subjective:   EMR and notes reviewed pt seen and examined, sitting up at side of bed in NAD, reports feels well tolerating clears w/o issues         Presenting Admission History:       69 y.o. male with PMH  of LANG cirrhosis, esophageal  varices, recently diagnosed internal jugular and upper extremity DVT on Eliquis, CKD stage 3, DM II, HTN, HLD Admitted for weakness, fatigue, melanotic stools and abdominal distention with associated pain that has been ongoing getting worse over the past week.     Of note, he had an internal jugular DVT diagnosed about 3 weeks ago and he was started on Eliquis.   He does have a positive stool guaiac. His hemoglobins have been downtrending from 8-1/2-9 to 7.2. He has a mild ROWAN with a creatinine of 1.4 on his CMP. Albumin seems to be near appropriate at 3.1. INR not significantly changed at 1.64.   He does have significant amount of ascites and mild peritoneal signs concerning for spontaneous bacterial peritonitis.   With his history of gastric varices he was started on Rocephin empirically as well as octreotide and Protonix. He was given 2 units of blood in the emergency department. The ED provider spoke to GI and their recommendation was to just hold the eliquis and not reverse it at this time. Patient states his last dose of eliquis was on the AM of admit       Assessment/Plan:      Current Principal Problem:  GI bleed    GI bleed on Eliquis : Eliquis is currently being held.  - Follow H/H closely.  - Continue IV Protonix.  - Was on octreotide infusion now discontinued.  - EGD on 3/26/2025: Revealed esophageal varices x2 and banded. 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 Duodenum unremarkable to the proximal third portion.   - Per GI would complete 5 days of ceftriaxone for prophylaxis against

## 2025-03-29 NOTE — PLAN OF CARE
Problem: Chronic Conditions and Co-morbidities  Goal: Patient's chronic conditions and co-morbidity symptoms are monitored and maintained or improved  Recent Flowsheet Documentation  Taken 3/28/2025 2000 by Betzy Burger RN  Care Plan - Patient's Chronic Conditions and Co-Morbidity Symptoms are Monitored and Maintained or Improved:   Monitor and assess patient's chronic conditions and comorbid symptoms for stability, deterioration, or improvement   Collaborate with multidisciplinary team to address chronic and comorbid conditions and prevent exacerbation or deterioration  3/28/2025 1854 by Ruthie Schafer RN  Outcome: Progressing     Problem: Discharge Planning  Goal: Discharge to home or other facility with appropriate resources  Recent Flowsheet Documentation  Taken 3/28/2025 2000 by Betzy Burger RN  Discharge to home or other facility with appropriate resources:   Identify barriers to discharge with patient and caregiver   Arrange for needed discharge resources and transportation as appropriate   Identify discharge learning needs (meds, wound care, etc)   Arrange for interpreters to assist at discharge as needed  3/28/2025 1854 by Ruthie Schafer RN  Outcome: Progressing     Problem: Pain  Goal: Verbalizes/displays adequate comfort level or baseline comfort level  Recent Flowsheet Documentation  Taken 3/28/2025 2019 by Betzy Burger RN  Verbalizes/displays adequate comfort level or baseline comfort level:   Encourage patient to monitor pain and request assistance   Assess pain using appropriate pain scale   Administer analgesics based on type and severity of pain and evaluate response   Implement non-pharmacological measures as appropriate and evaluate response  3/28/2025 1854 by Ruthie Schafer RN  Outcome: Progressing     Problem: Safety - Adult  Goal: Free from fall injury  3/28/2025 1854 by Ruthie Schafer RN  Outcome: Progressing

## 2025-03-29 NOTE — PROGRESS NOTES
Assessment  Principal Problem:    GI bleed  Resolved Problems:    * No resolved hospital problems. *     Iron deficiency anemia, acute blood loss anemia, melena, which was all occurring in the context of recent Eliquis.  EGD 3/28/2025 with RFA ablation of GAVE, and EGD 3/26 for variceal banding.  He has not had further melena since arrival and has had a total of 3 units of blood since arrival but has been off Eliquis  Xoing cirrhosis without ascites.  Ascites, other.  No SBP last week by cell count  DVT in upper extremities that vascular surgery believes will benefit from therapeutic Eliquis    Plan:  GI soft diet, lactose free.    Carafate for another day, then as needed.  Antiemetics as needed.  Likely start Eliquis back on Sunday and observe for a few days.  Would tentatively plan for repeat EGD with RFA ablation in about 4 weeks with Dr. Velasquez.  Consider video capsule endoscopy  IV iron x 3 doses, given.    IV protonix 2x per day.  Will likely need another paracentesis on Monday.  Complete 5 days of ceftriaxone for prophylaxis of infection in the context of GI bleed with cirrhosis      Subjective:  We are following for melena, acute blood loss anemia, Xiong cirrhosis.  He reports no abdominal pain today and less bloating than usual.  \"This is the best I felt in a long time\".  Brown stool overnight.    Objective:  CMP okay.  No hemoglobin this morning    Review of Systems:    Constitutional: Negative for fever, chills, and unexpected weight change.   HENT: Negative for trouble swallowing.    Respiratory: Negative for cough, chest tightness and shortness of breath.    Cardiovascular: Negative for chest pain  Gastrointestinal: see HPI  Musculoskeletal: Negative for unusual arthralgias.   Skin: Negative for rash.     Scheduled Meds:   sucralfate  1 g Oral 3 times per day    pantoprazole  40 mg IntraVENous BID AC    bisacodyl  10 mg Rectal Daily    iron sucrose (VENOFER) 200 mg in sodium chloride 0.9 % 100 mL  [id number] Exp Problem Focused - Low Complex

## 2025-03-30 LAB
ALBUMIN SERPL-MCNC: 2.6 G/DL (ref 3.4–5)
ALP SERPL-CCNC: 94 U/L (ref 40–129)
ALT SERPL-CCNC: 14 U/L (ref 10–40)
ANION GAP SERPL CALCULATED.3IONS-SCNC: 7 MMOL/L (ref 3–16)
AST SERPL-CCNC: 22 U/L (ref 15–37)
BILIRUB DIRECT SERPL-MCNC: 0.3 MG/DL (ref 0–0.3)
BILIRUB INDIRECT SERPL-MCNC: 0.2 MG/DL (ref 0–1)
BILIRUB SERPL-MCNC: 0.5 MG/DL (ref 0–1)
BUN SERPL-MCNC: 15 MG/DL (ref 7–20)
CALCIUM SERPL-MCNC: 8.2 MG/DL (ref 8.3–10.6)
CHLORIDE SERPL-SCNC: 105 MMOL/L (ref 99–110)
CO2 SERPL-SCNC: 25 MMOL/L (ref 21–32)
CREAT SERPL-MCNC: 0.9 MG/DL (ref 0.8–1.3)
DEPRECATED RDW RBC AUTO: 20.4 % (ref 12.4–15.4)
GFR SERPLBLD CREATININE-BSD FMLA CKD-EPI: >90 ML/MIN/{1.73_M2}
GLUCOSE BLD-MCNC: 145 MG/DL (ref 70–99)
GLUCOSE BLD-MCNC: 212 MG/DL (ref 70–99)
GLUCOSE BLD-MCNC: 218 MG/DL (ref 70–99)
GLUCOSE BLD-MCNC: 226 MG/DL (ref 70–99)
GLUCOSE SERPL-MCNC: 144 MG/DL (ref 70–99)
HCT VFR BLD AUTO: 23.5 % (ref 40.5–52.5)
HCT VFR BLD AUTO: 26.2 % (ref 40.5–52.5)
HGB BLD-MCNC: 7.8 G/DL (ref 13.5–17.5)
HGB BLD-MCNC: 8.7 G/DL (ref 13.5–17.5)
INR PPP: 1.21 (ref 0.85–1.15)
MCH RBC QN AUTO: 25.4 PG (ref 26–34)
MCHC RBC AUTO-ENTMCNC: 33 G/DL (ref 31–36)
MCV RBC AUTO: 77 FL (ref 80–100)
PERFORMED ON: ABNORMAL
PLATELET # BLD AUTO: 164 K/UL (ref 135–450)
PMV BLD AUTO: 7.4 FL (ref 5–10.5)
POTASSIUM SERPL-SCNC: 3.6 MMOL/L (ref 3.5–5.1)
PROT SERPL-MCNC: 5 G/DL (ref 6.4–8.2)
PROTHROMBIN TIME: 15.5 SEC (ref 11.9–14.9)
RBC # BLD AUTO: 3.06 M/UL (ref 4.2–5.9)
SODIUM SERPL-SCNC: 137 MMOL/L (ref 136–145)
WBC # BLD AUTO: 6 K/UL (ref 4–11)

## 2025-03-30 PROCEDURE — 85027 COMPLETE CBC AUTOMATED: CPT

## 2025-03-30 PROCEDURE — 85610 PROTHROMBIN TIME: CPT

## 2025-03-30 PROCEDURE — 2060000000 HC ICU INTERMEDIATE R&B

## 2025-03-30 PROCEDURE — 6370000000 HC RX 637 (ALT 250 FOR IP): Performed by: INTERNAL MEDICINE

## 2025-03-30 PROCEDURE — 6360000002 HC RX W HCPCS: Performed by: INTERNAL MEDICINE

## 2025-03-30 PROCEDURE — 80048 BASIC METABOLIC PNL TOTAL CA: CPT

## 2025-03-30 PROCEDURE — 6370000000 HC RX 637 (ALT 250 FOR IP): Performed by: PHYSICIAN ASSISTANT

## 2025-03-30 PROCEDURE — 80076 HEPATIC FUNCTION PANEL: CPT

## 2025-03-30 PROCEDURE — 85014 HEMATOCRIT: CPT

## 2025-03-30 PROCEDURE — 36415 COLL VENOUS BLD VENIPUNCTURE: CPT

## 2025-03-30 PROCEDURE — 85018 HEMOGLOBIN: CPT

## 2025-03-30 PROCEDURE — 0W9G3ZZ DRAINAGE OF PERITONEAL CAVITY, PERCUTANEOUS APPROACH: ICD-10-PCS | Performed by: HOSPITALIST

## 2025-03-30 PROCEDURE — 6360000002 HC RX W HCPCS: Performed by: HOSPITALIST

## 2025-03-30 PROCEDURE — 2500000003 HC RX 250 WO HCPCS: Performed by: HOSPITALIST

## 2025-03-30 RX ORDER — SUCRALFATE 1 G/1
1 TABLET ORAL 3 TIMES DAILY PRN
Status: DISCONTINUED | OUTPATIENT
Start: 2025-03-30 | End: 2025-04-01 | Stop reason: HOSPADM

## 2025-03-30 RX ORDER — FUROSEMIDE 40 MG/1
40 TABLET ORAL 2 TIMES DAILY
Status: DISCONTINUED | OUTPATIENT
Start: 2025-03-30 | End: 2025-04-01 | Stop reason: HOSPADM

## 2025-03-30 RX ORDER — SPIRONOLACTONE 100 MG/1
100 TABLET, FILM COATED ORAL DAILY
Status: DISCONTINUED | OUTPATIENT
Start: 2025-03-30 | End: 2025-04-01 | Stop reason: HOSPADM

## 2025-03-30 RX ADMIN — Medication 10 ML: at 06:32

## 2025-03-30 RX ADMIN — LACTULOSE 20 G: 20 SOLUTION ORAL at 09:16

## 2025-03-30 RX ADMIN — WATER 1000 MG: 1 INJECTION INTRAMUSCULAR; INTRAVENOUS; SUBCUTANEOUS at 21:20

## 2025-03-30 RX ADMIN — Medication 10 ML: at 16:16

## 2025-03-30 RX ADMIN — LACTULOSE 20 G: 20 SOLUTION ORAL at 13:29

## 2025-03-30 RX ADMIN — FUROSEMIDE 40 MG: 40 TABLET ORAL at 17:11

## 2025-03-30 RX ADMIN — PANTOPRAZOLE SODIUM 40 MG: 40 INJECTION, POWDER, FOR SOLUTION INTRAVENOUS at 16:16

## 2025-03-30 RX ADMIN — INSULIN LISPRO 2 UNITS: 100 INJECTION, SOLUTION INTRAVENOUS; SUBCUTANEOUS at 13:30

## 2025-03-30 RX ADMIN — SUCRALFATE 1 G: 1 TABLET ORAL at 13:29

## 2025-03-30 RX ADMIN — FUROSEMIDE 40 MG: 40 TABLET ORAL at 13:29

## 2025-03-30 RX ADMIN — INSULIN GLARGINE 10 UNITS: 100 INJECTION, SOLUTION SUBCUTANEOUS at 09:16

## 2025-03-30 RX ADMIN — PANTOPRAZOLE SODIUM 40 MG: 40 INJECTION, POWDER, FOR SOLUTION INTRAVENOUS at 06:29

## 2025-03-30 RX ADMIN — LACTULOSE 20 G: 20 SOLUTION ORAL at 21:19

## 2025-03-30 RX ADMIN — INSULIN LISPRO 2 UNITS: 100 INJECTION, SOLUTION INTRAVENOUS; SUBCUTANEOUS at 17:11

## 2025-03-30 RX ADMIN — Medication 10 ML: at 21:20

## 2025-03-30 RX ADMIN — Medication 10 ML: at 06:29

## 2025-03-30 RX ADMIN — Medication 10 ML: at 13:29

## 2025-03-30 RX ADMIN — INSULIN GLARGINE 10 UNITS: 100 INJECTION, SOLUTION SUBCUTANEOUS at 21:20

## 2025-03-30 RX ADMIN — SUCRALFATE 1 G: 1 TABLET ORAL at 05:34

## 2025-03-30 RX ADMIN — INSULIN LISPRO 2 UNITS: 100 INJECTION, SOLUTION INTRAVENOUS; SUBCUTANEOUS at 21:20

## 2025-03-30 RX ADMIN — SPIRONOLACTONE 100 MG: 100 TABLET ORAL at 13:29

## 2025-03-30 RX ADMIN — MORPHINE SULFATE 2 MG: 2 INJECTION, SOLUTION INTRAMUSCULAR; INTRAVENOUS at 21:32

## 2025-03-30 ASSESSMENT — PAIN DESCRIPTION - ORIENTATION
ORIENTATION: LOWER
ORIENTATION: RIGHT;LOWER

## 2025-03-30 ASSESSMENT — PAIN DESCRIPTION - LOCATION
LOCATION: ABDOMEN
LOCATION: ABDOMEN

## 2025-03-30 ASSESSMENT — PAIN SCALES - WONG BAKER: WONGBAKER_NUMERICALRESPONSE: NO HURT

## 2025-03-30 ASSESSMENT — PAIN SCALES - GENERAL
PAINLEVEL_OUTOF10: 6
PAINLEVEL_OUTOF10: 0

## 2025-03-30 NOTE — PLAN OF CARE
Problem: Discharge Planning  Goal: Discharge to home or other facility with appropriate resources  3/30/2025 0539 by Maryan Raoms RN  Outcome: Progressing  Flowsheets (Taken 3/29/2025 2052)  Discharge to home or other facility with appropriate resources:   Identify barriers to discharge with patient and caregiver   Arrange for needed discharge resources and transportation as appropriate   Identify discharge learning needs (meds, wound care, etc)  3/29/2025 2051 by Maryan Ramos RN  Outcome: Progressing     Problem: Pain  Goal: Verbalizes/displays adequate comfort level or baseline comfort level  3/30/2025 0539 by Maryan Ramos RN  Outcome: Progressing  3/29/2025 2051 by Maryan Ramos RN  Outcome: Progressing     Problem: Safety - Adult  Goal: Free from fall injury  3/30/2025 0539 by Maryan Ramos RN  Outcome: Progressing  3/29/2025 2051 by Maryan Ramos RN  Outcome: Progressing     Problem: Hematologic - Adult  Goal: Maintains hematologic stability  3/30/2025 0539 by Maryan Ramos RN  Outcome: Progressing  Flowsheets (Taken 3/29/2025 2052)  Maintains hematologic stability: Assess for signs and symptoms of bleeding or hemorrhage  3/29/2025 2051 by Maryan Ramos RN  Outcome: Progressing     Problem: Gastrointestinal - Adult  Goal: Minimal or absence of nausea and vomiting  3/30/2025 0539 by Maryan Ramos RN  Outcome: Progressing  3/29/2025 2051 by Maryan Ramos RN  Outcome: Progressing  Goal: Maintains or returns to baseline bowel function  3/30/2025 0539 by Maryan Ramos RN  Outcome: Progressing  Flowsheets (Taken 3/29/2025 2052)  Maintains or returns to baseline bowel function: Assess bowel function  3/29/2025 2051 by Maryan Ramos RN  Outcome: Progressing

## 2025-03-30 NOTE — PROGRESS NOTES
Pt resting quietly in bed. No c/o pain/discomfort. No visual s/s of bleeding noted. Pt denies any needs at this time. Safety/fall prevention maintained. Personal belongings and call light within reach. MICHAELN

## 2025-03-30 NOTE — PROGRESS NOTES
[facility] Medicine Progress Note  V 1.6      Date of Admission: 3/25/2025    Hospital Day: 6      Chief Admission Complaint:   GI bleed and he is on Eliquis    Subjective:   EMR and notes reviewed pt seen and examined, sitting up at side of bed in NAD, reports feels well tolerating clears w/o issues. Tolerating PO and having BM's normal color and constancy. Pt is a bit discouraged this am because H/H dropped again. Increased abd distention reports plan for paracentesis ricky         Presenting Admission History:       69 y.o. male with PMH  of LANG cirrhosis, esophageal  varices, recently diagnosed internal jugular and upper extremity DVT on Eliquis, CKD stage 3, DM II, HTN, HLD Admitted for weakness, fatigue, melanotic stools and abdominal distention with associated pain that has been ongoing getting worse over the past week.     Of note, he had an internal jugular DVT diagnosed about 3 weeks ago and he was started on Eliquis.   He does have a positive stool guaiac. His hemoglobins have been downtrending from 8-1/2-9 to 7.2. He has a mild ROWAN with a creatinine of 1.4 on his CMP. Albumin seems to be near appropriate at 3.1. INR not significantly changed at 1.64.   He does have significant amount of ascites and mild peritoneal signs concerning for spontaneous bacterial peritonitis.   With his history of gastric varices he was started on Rocephin empirically as well as octreotide and Protonix. He was given 2 units of blood in the emergency department. The ED provider spoke to GI and their recommendation was to just hold the eliquis and not reverse it at this time. Patient states his last dose of eliquis was on the AM of admit       Assessment/Plan:      Current Principal Problem:  GI bleed    GI bleed on Eliquis : Eliquis is currently being held.  - Follow H/H closely.  - Continue IV Protonix.  - Was on octreotide infusion now discontinued.  - EGD on 3/26/2025: Revealed esophageal varices x2 and banded. Portal  lactulose  20 g Oral TID    [Held by provider] apixaban  5 mg Oral BID    insulin glargine  10 Units SubCUTAneous BID    insulin lispro  0-8 Units SubCUTAneous 4x Daily AC & HS    albumin human 25%  25 g IntraVENous Once    sodium chloride flush  5-40 mL IntraVENous 2 times per day    cefTRIAXone (ROCEPHIN) IV  1,000 mg IntraVENous Q24H     PRN Meds: bisacodyl, prochlorperazine, sodium chloride, simethicone, glucose, dextrose bolus **OR** dextrose bolus, glucagon (rDNA), dextrose, sodium chloride flush, sodium chloride, ondansetron **OR** ondansetron, morphine      Physical Exam Performed:      General appearance: He is sitting up in bed, states he is feeling a little better.  Does not appear in acute distress  Respiratory: Bilateral breath sounds clear, normal respiratory effort.  Cardiovascular: S1, S2 regular rate and rhythm.  Abdomen:  Soft, mild tenderness on palpation, abdomen is distended.  + bowel sounds   Slight shifting dullness noted   Musculoskeletal:  + 1 bilateral lower extreme edema  Neurologic:  Non-focal  Psychiatric:  Alert and oriented x 3     /72   Pulse 78   Temp 98 °F (36.7 °C) (Oral)   Resp 18   Ht 1.702 m (5' 7\")   Wt 108.6 kg (239 lb 8 oz)   SpO2 96%   BMI 37.51 kg/m²     Telemetry:      Telemetry (Rhythm Strip) monitoring - Personally reviewed and interpreted telemetry (Rhythm Strip) on 3/30/2025 as ordered with the following findings      [x] NSR w/ controlled rate  [] Sinus Tachycardia w/ rate > 100  [] Sinus Bradycardia w/ rate < 60  [] Rate controlled Afib  [] Afib w/ RVR  [] Other -      Diet: ADULT DIET; Dysphagia - Soft and Bite Sized; Lactose-Controlled    DVT Prophylaxis: []PPx LMWH  []SQ Heparin  []IPC/SCDs  []Eliquis  []Xarelto  []Coumadin  [] Heparin Drip  [x]Other -      No heparin products or anticoagulation this time due to GI bleed    Code status: Full Code    PT/OT Eval Status:   []NOT yet ordered  [x]Ordered and Pending   []Seen with Recommendations for:

## 2025-03-30 NOTE — PROGRESS NOTES
Assessment  Principal Problem:    GI bleed  Resolved Problems:    * No resolved hospital problems. *     Iron deficiency anemia, acute blood loss anemia, melena, which was all occurring in the context of recent Eliquis.  EGD 3/28/2025 with RFA ablation of GAVE, and EGD 3/26 for variceal banding.  He has not had further melena since arrival and has had a total of 3 units of blood since arrival but has been off Eliquis.  Hgb down a little today but yesterday may have been spuriously elevated.    Xiong cirrhosis without ascites.  Ascites, other.  No SBP last week by cell count  DVT in upper extremities that vascular surgery believes will benefit from therapeutic Eliquis    Plan:  GI soft diet, lactose free.    Change carafate to as needed.    Restarting home aldactone 100mg daily and lasix 40mg bid po.    Antiemetics as needed.  Ok to resume Eliquis today.   Would tentatively plan for repeat EGD with RFA ablation in about 4 weeks with Dr. Velasquez.  Consider video capsule endoscopy  IV iron x 3 doses, given.    IV protonix 2x per day.  Repeat LV paracentesis Monday.  Order placed.    Complete 7 days of ceftriaxone for prophylaxis of infection in the context of GI bleed with cirrhosis and given RFA ablation.  Last dose can be 4/1/25    Subjective:  We are following for  melena, acute blood loss anemia in the context of GAVE  Brown bm overnight.  Increase abd distention but not pain or nausea.  He wants another para.  Also, he is wondering about restarting diuretics.      Objective:  Hgb 7.8 today, from 9.4 yesterday, but previously 8.1  Sp 3 units prbc with last infusion 3/26    Review of Systems:    Constitutional: Negative for fever, chills, and unexpected weight change.   HENT: Negative for trouble swallowing.    Respiratory: Negative for cough, chest tightness and shortness of breath.    Cardiovascular: Negative for chest pain  Gastrointestinal: see HPI  Musculoskeletal: Negative for unusual arthralgias.

## 2025-03-31 ENCOUNTER — APPOINTMENT (OUTPATIENT)
Dept: ULTRASOUND IMAGING | Age: 70
DRG: 377 | End: 2025-03-31
Attending: INTERNAL MEDICINE
Payer: MEDICARE

## 2025-03-31 LAB
ALBUMIN SERPL-MCNC: 2.6 G/DL (ref 3.4–5)
ALP SERPL-CCNC: 108 U/L (ref 40–129)
ALT SERPL-CCNC: 11 U/L (ref 10–40)
ANION GAP SERPL CALCULATED.3IONS-SCNC: 11 MMOL/L (ref 3–16)
APPEARANCE FLUID: NORMAL
AST SERPL-CCNC: 27 U/L (ref 15–37)
BDY FLUID QUALITY: NORMAL
BILIRUB DIRECT SERPL-MCNC: 0.2 MG/DL (ref 0–0.3)
BILIRUB INDIRECT SERPL-MCNC: 0.2 MG/DL (ref 0–1)
BILIRUB SERPL-MCNC: 0.4 MG/DL (ref 0–1)
BUN SERPL-MCNC: 14 MG/DL (ref 7–20)
CALCIUM SERPL-MCNC: 8.3 MG/DL (ref 8.3–10.6)
CELL COUNT FLUID TYPE: NORMAL
CHLORIDE SERPL-SCNC: 105 MMOL/L (ref 99–110)
CO2 SERPL-SCNC: 22 MMOL/L (ref 21–32)
COLOR FLUID: NORMAL
CREAT SERPL-MCNC: 1 MG/DL (ref 0.8–1.3)
DEPRECATED RDW RBC AUTO: 22.3 % (ref 12.4–15.4)
GFR SERPLBLD CREATININE-BSD FMLA CKD-EPI: 80 ML/MIN/{1.73_M2}
GLUCOSE BLD-MCNC: 181 MG/DL (ref 70–99)
GLUCOSE BLD-MCNC: 300 MG/DL (ref 70–99)
GLUCOSE BLD-MCNC: 304 MG/DL (ref 70–99)
GLUCOSE SERPL-MCNC: 160 MG/DL (ref 70–99)
HCT VFR BLD AUTO: 27.9 % (ref 40.5–52.5)
HGB BLD-MCNC: 8.7 G/DL (ref 13.5–17.5)
INR PPP: 1.33 (ref 0.85–1.15)
LYMPHOCYTES NFR FLD: 26 %
MACROPHAGES # FLD: 21 %
MCH RBC QN AUTO: 25 PG (ref 26–34)
MCHC RBC AUTO-ENTMCNC: 31.3 G/DL (ref 31–36)
MCV RBC AUTO: 79.9 FL (ref 80–100)
MESOTHL CELL NFR FLD: 33 %
MONOCYTES NFR FLD: 1 %
MONONUCLEAR UNIDENTIFIED CELLS FLUID: 9 %
NEUTROPHIL, FLUID: 9 %
NUC CELL # FLD: 271 /CUMM
PATH REV: YES
PERFORMED ON: ABNORMAL
PLATELET # BLD AUTO: 248 K/UL (ref 135–450)
PMV BLD AUTO: 8.7 FL (ref 5–10.5)
POTASSIUM SERPL-SCNC: 3.5 MMOL/L (ref 3.5–5.1)
PROT SERPL-MCNC: 5.2 G/DL (ref 6.4–8.2)
PROTHROMBIN TIME: 16.7 SEC (ref 11.9–14.9)
RBC # BLD AUTO: 3.49 M/UL (ref 4.2–5.9)
RBC FLUID: 600 /CUMM
SODIUM SERPL-SCNC: 138 MMOL/L (ref 136–145)
TOTAL CELLS COUNTED FLD: 100
VARIANT LYMPHS NFR FLD: 1 %
WBC # BLD AUTO: 7.1 K/UL (ref 4–11)

## 2025-03-31 PROCEDURE — 6370000000 HC RX 637 (ALT 250 FOR IP): Performed by: NURSE PRACTITIONER

## 2025-03-31 PROCEDURE — 87205 SMEAR GRAM STAIN: CPT

## 2025-03-31 PROCEDURE — 89051 BODY FLUID CELL COUNT: CPT

## 2025-03-31 PROCEDURE — 6360000002 HC RX W HCPCS: Performed by: HOSPITALIST

## 2025-03-31 PROCEDURE — 2060000000 HC ICU INTERMEDIATE R&B

## 2025-03-31 PROCEDURE — 36415 COLL VENOUS BLD VENIPUNCTURE: CPT

## 2025-03-31 PROCEDURE — 85027 COMPLETE CBC AUTOMATED: CPT

## 2025-03-31 PROCEDURE — 85610 PROTHROMBIN TIME: CPT

## 2025-03-31 PROCEDURE — 87070 CULTURE OTHR SPECIMN AEROBIC: CPT

## 2025-03-31 PROCEDURE — 6370000000 HC RX 637 (ALT 250 FOR IP): Performed by: INTERNAL MEDICINE

## 2025-03-31 PROCEDURE — P9045 ALBUMIN (HUMAN), 5%, 250 ML: HCPCS | Performed by: INTERNAL MEDICINE

## 2025-03-31 PROCEDURE — 2500000003 HC RX 250 WO HCPCS: Performed by: HOSPITALIST

## 2025-03-31 PROCEDURE — 6360000002 HC RX W HCPCS: Performed by: INTERNAL MEDICINE

## 2025-03-31 PROCEDURE — 6370000000 HC RX 637 (ALT 250 FOR IP): Performed by: PHYSICIAN ASSISTANT

## 2025-03-31 PROCEDURE — 88305 TISSUE EXAM BY PATHOLOGIST: CPT

## 2025-03-31 PROCEDURE — 80048 BASIC METABOLIC PNL TOTAL CA: CPT

## 2025-03-31 PROCEDURE — 49083 ABD PARACENTESIS W/IMAGING: CPT

## 2025-03-31 PROCEDURE — 80076 HEPATIC FUNCTION PANEL: CPT

## 2025-03-31 PROCEDURE — 88112 CYTOPATH CELL ENHANCE TECH: CPT

## 2025-03-31 RX ORDER — ALBUMIN HUMAN 50 G/1000ML
50 SOLUTION INTRAVENOUS ONCE
Status: COMPLETED | OUTPATIENT
Start: 2025-03-31 | End: 2025-04-01

## 2025-03-31 RX ADMIN — INSULIN GLARGINE 10 UNITS: 100 INJECTION, SOLUTION SUBCUTANEOUS at 08:48

## 2025-03-31 RX ADMIN — INSULIN LISPRO 6 UNITS: 100 INJECTION, SOLUTION INTRAVENOUS; SUBCUTANEOUS at 17:18

## 2025-03-31 RX ADMIN — LACTULOSE 20 G: 20 SOLUTION ORAL at 08:48

## 2025-03-31 RX ADMIN — INSULIN LISPRO 6 UNITS: 100 INJECTION, SOLUTION INTRAVENOUS; SUBCUTANEOUS at 20:24

## 2025-03-31 RX ADMIN — SPIRONOLACTONE 100 MG: 100 TABLET ORAL at 08:48

## 2025-03-31 RX ADMIN — Medication 10 ML: at 20:24

## 2025-03-31 RX ADMIN — INSULIN GLARGINE 10 UNITS: 100 INJECTION, SOLUTION SUBCUTANEOUS at 20:55

## 2025-03-31 RX ADMIN — Medication 10 ML: at 08:49

## 2025-03-31 RX ADMIN — PANTOPRAZOLE SODIUM 40 MG: 40 INJECTION, POWDER, FOR SOLUTION INTRAVENOUS at 17:18

## 2025-03-31 RX ADMIN — PANTOPRAZOLE SODIUM 40 MG: 40 INJECTION, POWDER, FOR SOLUTION INTRAVENOUS at 06:18

## 2025-03-31 RX ADMIN — APIXABAN 5 MG: 5 TABLET, FILM COATED ORAL at 20:23

## 2025-03-31 RX ADMIN — ALBUMIN (HUMAN) 50 G: 12.5 INJECTION, SOLUTION INTRAVENOUS at 20:37

## 2025-03-31 RX ADMIN — FUROSEMIDE 40 MG: 40 TABLET ORAL at 17:18

## 2025-03-31 RX ADMIN — LACTULOSE 20 G: 20 SOLUTION ORAL at 20:23

## 2025-03-31 RX ADMIN — WATER 1000 MG: 1 INJECTION INTRAMUSCULAR; INTRAVENOUS; SUBCUTANEOUS at 20:24

## 2025-03-31 RX ADMIN — FUROSEMIDE 40 MG: 40 TABLET ORAL at 08:48

## 2025-03-31 NOTE — PLAN OF CARE
Problem: Chronic Conditions and Co-morbidities  Goal: Patient's chronic conditions and co-morbidity symptoms are monitored and maintained or improved  Outcome: Progressing     Problem: Discharge Planning  Goal: Discharge to home or other facility with appropriate resources  Outcome: Progressing     Problem: Pain  Goal: Verbalizes/displays adequate comfort level or baseline comfort level  Outcome: Progressing     Problem: Safety - Adult  Goal: Free from fall injury  Outcome: Progressing     Problem: Hematologic - Adult  Goal: Maintains hematologic stability  Outcome: Progressing     Problem: Gastrointestinal - Adult  Goal: Minimal or absence of nausea and vomiting  Outcome: Progressing  Goal: Maintains or returns to baseline bowel function  Outcome: Progressing  Goal: Maintains adequate nutritional intake  Outcome: Progressing  Goal: Establish and maintain optimal ostomy function  Outcome: Progressing

## 2025-03-31 NOTE — PROCEDURES
PROCEDURE NOTE  Date: 3/31/2025   Name: Carroll Wells  YOB: 1955    Procedures  Image guided right Paracentesis completed.  8 liters of cloudy yellow colored withdrawn.  Pt tolerated procedure without any signs or symptoms of distress. Vital signs stable.     DISCHARGED:  ADMITTED: Pt transferred back to room 450. Report called to nurse.     SPECIMEN SENT:  Yes    Vital Signs  Vitals:    03/31/25 0805   BP: 102/64   Pulse: 84   Resp: 18   Temp: 98 °F (36.7 °C)   SpO2: 99%    (vital signs in table format)

## 2025-03-31 NOTE — PROGRESS NOTES
Gastroenterology Progress Note      Patient Carroll Wells  MRN: [id number]  YOB: 1955 Age: 69 y.o. Sex: male  Room: [address] [place] [address]       Admitting Physician: En Carrasco MD   Date of Admission: 3/25/2025  7:51 PM   Primary Care Physician: Karina Mendoza PA     Subjective:  We are following for acute blood loss anemia in setting of Eliquis. Carroll Wells was seen and examined. Reports feeling improved. He is s/p paracentesis this after with removal of 8L. Denies abdominal pain, nausea, vomiting, fever, chills,hematochezia or melenic stools.     Current Hospital Schedued Meds   furosemide  40 mg Oral BID    spironolactone  100 mg Oral Daily    pantoprazole  40 mg IntraVENous BID AC    bisacodyl  10 mg Rectal Daily    lactulose  20 g Oral TID    [Held by provider] apixaban  5 mg Oral BID    insulin glargine  10 Units SubCUTAneous BID    insulin lispro  0-8 Units SubCUTAneous 4x Daily AC & HS    albumin human 25%  25 g IntraVENous Once    sodium chloride flush  5-40 mL IntraVENous 2 times per day    cefTRIAXone (ROCEPHIN) IV  1,000 mg IntraVENous Q24H     Current Hospital IV Meds   sodium chloride      dextrose      sodium chloride 10 mL/hr at 03/28/25 1322     Current Hospital Prn Meds  sucralfate, bisacodyl, prochlorperazine, sodium chloride, simethicone, glucose, dextrose bolus **OR** dextrose bolus, glucagon (rDNA), dextrose, sodium chloride flush, sodium chloride, ondansetron **OR** ondansetron, morphine    I/O last 3 completed shifts:  In: 950 [P.O.:940; I.V.:10]  Out: -     Physical Exam:  VITAL SIGNS: /64   Pulse 84   Temp 98 °F (36.7 °C) (Oral)   Resp 18   Ht 1.702 m (5' 7\")   Wt 108.7 kg (239 lb 9.6 oz)   SpO2 99%   BMI 37.53 kg/m²   Wt Readings from Last 3 Encounters:   03/31/25 108.7 kg (239 lb 9.6 oz)   03/12/25 102.1 kg (225 lb)   02/27/25 105.1 kg (231 lb 12.8 oz)     Constitutional: Well developed, Well nourished, No acute distress, Non-toxic appearance.

## 2025-03-31 NOTE — PROGRESS NOTES
[facility] Medicine Progress Note  V 1.6      Date of Admission: 3/25/2025    Hospital Day: 7      Chief Admission Complaint:   GI bleed and he is on Eliquis    Subjective:   EMR and notes reviewed pt seen and examined, sitting up at side of bed in NAD, reports feels well tolerating clears w/o issues. Tolerating PO, dicussed reasons for holding diuretics early on.         Presenting Admission History:       69 y.o. male with PMH  of LANG cirrhosis, esophageal  varices, recently diagnosed internal jugular and upper extremity DVT on Eliquis, CKD stage 3, DM II, HTN, HLD Admitted for weakness, fatigue, melanotic stools and abdominal distention with associated pain that has been ongoing getting worse over the past week.     Of note, he had an internal jugular DVT diagnosed about 3 weeks ago and he was started on Eliquis.   He does have a positive stool guaiac. His hemoglobins have been downtrending from 8-1/2-9 to 7.2. He has a mild ROWAN with a creatinine of 1.4 on his CMP. Albumin seems to be near appropriate at 3.1. INR not significantly changed at 1.64.   He does have significant amount of ascites and mild peritoneal signs concerning for spontaneous bacterial peritonitis.   With his history of gastric varices he was started on Rocephin empirically as well as octreotide and Protonix. He was given 2 units of blood in the emergency department. The ED provider spoke to GI and their recommendation was to just hold the eliquis and not reverse it at this time. Patient states his last dose of eliquis was on the AM of admit       Assessment/Plan:      Current Principal Problem:  GI bleed    GI bleed on Eliquis : Eliquis is currently being held.  - Follow H/H closely.  - Continue IV Protonix.  - Was on octreotide infusion now discontinued.  - EGD on 3/26/2025: Revealed esophageal varices x2 and banded. 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 Duodenum unremarkable to the proximal third portion.   - Per GI  apixaban  5 mg Oral BID    insulin glargine  10 Units SubCUTAneous BID    insulin lispro  0-8 Units SubCUTAneous 4x Daily AC & HS    albumin human 25%  25 g IntraVENous Once    sodium chloride flush  5-40 mL IntraVENous 2 times per day    cefTRIAXone (ROCEPHIN) IV  1,000 mg IntraVENous Q24H     PRN Meds: sucralfate, bisacodyl, prochlorperazine, sodium chloride, simethicone, glucose, dextrose bolus **OR** dextrose bolus, glucagon (rDNA), dextrose, sodium chloride flush, sodium chloride, ondansetron **OR** ondansetron, morphine      Physical Exam Performed:      General appearance: He is sitting up in bed, states he is feeling a little better.  Does not appear in acute distress  Respiratory: Bilateral breath sounds clear, normal respiratory effort.  Cardiovascular: S1, S2 regular rate and rhythm.  Abdomen:  Soft, mild tenderness on palpation, abdomen is distended.  + bowel sounds   Slight shifting dullness noted   Musculoskeletal:  + 1 bilateral lower extreme edema  Neurologic:  Non-focal  Psychiatric:  Alert and oriented x 3     /67   Pulse 96   Temp 97.8 °F (36.6 °C) (Oral)   Resp 18   Ht 1.702 m (5' 7\")   Wt 108.7 kg (239 lb 9.6 oz)   SpO2 97%   BMI 37.53 kg/m²     Telemetry:      Telemetry (Rhythm Strip) monitoring - Personally reviewed and interpreted telemetry (Rhythm Strip) on 3/31/2025 as ordered with the following findings      [x] NSR w/ controlled rate  [] Sinus Tachycardia w/ rate > 100  [] Sinus Bradycardia w/ rate < 60  [] Rate controlled Afib  [] Afib w/ RVR  [] Other -      Diet: ADULT DIET; Dysphagia - Soft and Bite Sized; Lactose-Controlled    DVT Prophylaxis: []PPx LMWH  []SQ Heparin  []IPC/SCDs  []Eliquis  []Xarelto  []Coumadin  [] Heparin Drip  [x]Other -      No heparin products or anticoagulation this time due to GI bleed    Code status: Full Code    PT/OT Eval Status:   []NOT yet ordered  [x]Ordered and Pending   []Seen with Recommendations for:   []Home independently  []Home w/

## 2025-03-31 NOTE — PROCEDURES
PROCEDURE NOTE  Date: 3/31/2025   Name: Carroll Wells  YOB: 1955    Procedures  Pt arrived for image guided right Paracentesis. olvin explained the procedure including the risk and benefits of the procedure. All questions answered. Pt verbalizes understanding of the procedure and states no more questions. Consent confirmed. Vital signs stable. Labs, allergies, medications, and code status reviewed. No contraindications noted. Time out completed prior to procedure.    Vital Signs  Vitals:    03/31/25 0805   BP: 102/64   Pulse: 84   Resp: 18   Temp: 98 °F (36.7 °C)   SpO2: 99%    (vital signs in table format)      Allergies  Aspirin and Oxycontin [oxycodone hcl] (allergies)    Labs  Lab Results   Component Value Date    INR 1.33 (H) 03/31/2025    PROTIME 16.7 (H) 03/31/2025     Lab Results   Component Value Date    CREATININE 1.0 03/31/2025    BUN 14 03/31/2025     03/31/2025    K 3.5 03/31/2025     03/31/2025    CO2 22 03/31/2025     Lab Results   Component Value Date    WBC 7.1 03/31/2025    HGB 8.7 (L) 03/31/2025    HCT 27.9 (L) 03/31/2025    MCV 79.9 (L) 03/31/2025     03/31/2025

## 2025-03-31 NOTE — CARE COORDINATION
CM update- Chart reviewed. H/H 8.7/27.9. Pt getting paracentesis again today. DC plan is home, no needs.   Constance Frias RN,CM

## 2025-04-01 VITALS
WEIGHT: 227 LBS | RESPIRATION RATE: 18 BRPM | BODY MASS INDEX: 35.63 KG/M2 | HEIGHT: 67 IN | TEMPERATURE: 97.8 F | HEART RATE: 78 BPM | DIASTOLIC BLOOD PRESSURE: 57 MMHG | SYSTOLIC BLOOD PRESSURE: 101 MMHG | OXYGEN SATURATION: 99 %

## 2025-04-01 LAB
ALBUMIN SERPL-MCNC: 2.9 G/DL (ref 3.4–5)
ALBUMIN/GLOB SERPL: 1.4 {RATIO} (ref 1.1–2.2)
ALP SERPL-CCNC: 99 U/L (ref 40–129)
ALT SERPL-CCNC: 11 U/L (ref 10–40)
ANION GAP SERPL CALCULATED.3IONS-SCNC: 10 MMOL/L (ref 3–16)
AST SERPL-CCNC: 23 U/L (ref 15–37)
BILIRUB SERPL-MCNC: 0.3 MG/DL (ref 0–1)
BUN SERPL-MCNC: 14 MG/DL (ref 7–20)
CALCIUM SERPL-MCNC: 8.3 MG/DL (ref 8.3–10.6)
CHLORIDE SERPL-SCNC: 106 MMOL/L (ref 99–110)
CO2 SERPL-SCNC: 24 MMOL/L (ref 21–32)
CREAT SERPL-MCNC: 1 MG/DL (ref 0.8–1.3)
DEPRECATED RDW RBC AUTO: 20.6 % (ref 12.4–15.4)
GFR SERPLBLD CREATININE-BSD FMLA CKD-EPI: 80 ML/MIN/{1.73_M2}
GLUCOSE BLD-MCNC: 127 MG/DL (ref 70–99)
GLUCOSE SERPL-MCNC: 103 MG/DL (ref 70–99)
HCT VFR BLD AUTO: 22.3 % (ref 40.5–52.5)
HCT VFR BLD AUTO: 26.3 % (ref 40.5–52.5)
HGB BLD-MCNC: 7.3 G/DL (ref 13.5–17.5)
HGB BLD-MCNC: 8.6 G/DL (ref 13.5–17.5)
MAGNESIUM SERPL-MCNC: 1.66 MG/DL (ref 1.8–2.4)
MCH RBC QN AUTO: 25.3 PG (ref 26–34)
MCHC RBC AUTO-ENTMCNC: 32.6 G/DL (ref 31–36)
MCV RBC AUTO: 77.7 FL (ref 80–100)
PATH CONSULT FLUID: NORMAL
PERFORMED ON: ABNORMAL
PLATELET # BLD AUTO: 146 K/UL (ref 135–450)
PMV BLD AUTO: 7.2 FL (ref 5–10.5)
POTASSIUM SERPL-SCNC: 3.5 MMOL/L (ref 3.5–5.1)
PROT SERPL-MCNC: 5 G/DL (ref 6.4–8.2)
RBC # BLD AUTO: 2.87 M/UL (ref 4.2–5.9)
SODIUM SERPL-SCNC: 140 MMOL/L (ref 136–145)
WBC # BLD AUTO: 4.3 K/UL (ref 4–11)

## 2025-04-01 PROCEDURE — 6370000000 HC RX 637 (ALT 250 FOR IP): Performed by: INTERNAL MEDICINE

## 2025-04-01 PROCEDURE — 85018 HEMOGLOBIN: CPT

## 2025-04-01 PROCEDURE — 6370000000 HC RX 637 (ALT 250 FOR IP): Performed by: NURSE PRACTITIONER

## 2025-04-01 PROCEDURE — 85014 HEMATOCRIT: CPT

## 2025-04-01 PROCEDURE — 80053 COMPREHEN METABOLIC PANEL: CPT

## 2025-04-01 PROCEDURE — 6360000002 HC RX W HCPCS: Performed by: INTERNAL MEDICINE

## 2025-04-01 PROCEDURE — 36415 COLL VENOUS BLD VENIPUNCTURE: CPT

## 2025-04-01 PROCEDURE — 6370000000 HC RX 637 (ALT 250 FOR IP): Performed by: PHYSICIAN ASSISTANT

## 2025-04-01 PROCEDURE — 85027 COMPLETE CBC AUTOMATED: CPT

## 2025-04-01 PROCEDURE — 83735 ASSAY OF MAGNESIUM: CPT

## 2025-04-01 RX ORDER — INSULIN LISPRO 100 [IU]/ML
0-8 INJECTION, SOLUTION INTRAVENOUS; SUBCUTANEOUS
Qty: 10 ML | Refills: 1 | Status: SHIPPED | OUTPATIENT
Start: 2025-04-01 | End: 2025-04-01

## 2025-04-01 RX ORDER — FERROUS SULFATE 325(65) MG
325 TABLET ORAL
Qty: 30 TABLET | Refills: 1 | Status: SHIPPED | OUTPATIENT
Start: 2025-04-01

## 2025-04-01 RX ORDER — INSULIN GLARGINE 100 [IU]/ML
12 INJECTION, SOLUTION SUBCUTANEOUS 2 TIMES DAILY
Qty: 15 ADJUSTABLE DOSE PRE-FILLED PEN SYRINGE | Refills: 1 | Status: SHIPPED | OUTPATIENT
Start: 2025-04-01

## 2025-04-01 RX ORDER — SUCRALFATE 1 G/1
1 TABLET ORAL 3 TIMES DAILY PRN
Qty: 90 TABLET | Refills: 1 | Status: SHIPPED | OUTPATIENT
Start: 2025-04-01 | End: 2025-04-01

## 2025-04-01 RX ORDER — PANTOPRAZOLE SODIUM 40 MG/1
40 TABLET, DELAYED RELEASE ORAL 2 TIMES DAILY
Qty: 60 TABLET | Refills: 2 | Status: SHIPPED | OUTPATIENT
Start: 2025-04-01 | End: 2025-06-30

## 2025-04-01 RX ORDER — POTASSIUM CHLORIDE 1500 MG/1
20 TABLET, EXTENDED RELEASE ORAL ONCE
Status: COMPLETED | OUTPATIENT
Start: 2025-04-01 | End: 2025-04-01

## 2025-04-01 RX ORDER — INSULIN LISPRO 100 [IU]/ML
0-8 INJECTION, SOLUTION INTRAVENOUS; SUBCUTANEOUS
Qty: 10 ML | Refills: 1 | Status: SHIPPED | OUTPATIENT
Start: 2025-04-01 | End: 2025-04-14 | Stop reason: SDUPTHER

## 2025-04-01 RX ORDER — LANOLIN ALCOHOL/MO/W.PET/CERES
400 CREAM (GRAM) TOPICAL ONCE
Status: COMPLETED | OUTPATIENT
Start: 2025-04-01 | End: 2025-04-01

## 2025-04-01 RX ORDER — SUCRALFATE 1 G/1
1 TABLET ORAL 3 TIMES DAILY PRN
Qty: 90 TABLET | Refills: 1 | Status: SHIPPED | OUTPATIENT
Start: 2025-04-01 | End: 2025-05-31

## 2025-04-01 RX ADMIN — SPIRONOLACTONE 100 MG: 100 TABLET ORAL at 08:23

## 2025-04-01 RX ADMIN — POTASSIUM CHLORIDE 20 MEQ: 1500 TABLET, EXTENDED RELEASE ORAL at 11:45

## 2025-04-01 RX ADMIN — PANTOPRAZOLE SODIUM 40 MG: 40 INJECTION, POWDER, FOR SOLUTION INTRAVENOUS at 05:32

## 2025-04-01 RX ADMIN — FUROSEMIDE 40 MG: 40 TABLET ORAL at 08:22

## 2025-04-01 RX ADMIN — APIXABAN 5 MG: 5 TABLET, FILM COATED ORAL at 09:03

## 2025-04-01 RX ADMIN — LACTULOSE 20 G: 20 SOLUTION ORAL at 08:22

## 2025-04-01 RX ADMIN — INSULIN GLARGINE 10 UNITS: 100 INJECTION, SOLUTION SUBCUTANEOUS at 08:23

## 2025-04-01 RX ADMIN — Medication 400 MG: at 11:45

## 2025-04-01 NOTE — FLOWSHEET NOTE
03/31/25 2012   Vital Signs   Temp 98.2 °F (36.8 °C)   Temp Source Oral   Pulse 95   Heart Rate Source Monitor   Respirations 16   /61   MAP (Calculated) 81   MAP (mmHg) 81   BP Location Right upper arm   BP Method Automatic   Patient Position Up in chair   Pain Assessment   Pain Assessment None - Denies Pain   Oxygen Therapy   SpO2 99 %   O2 Device None (Room air)     Pt with no complaints at this time. States that he feels \"so much better\" after paracentesis. Daughter at bedside. VSS.

## 2025-04-01 NOTE — DISCHARGE INSTR - COC
Continuity of Care Form    Patient Name: Carroll Wells   :  1955  MRN:  [id number]    Admit date:  3/25/2025  Discharge date:  ***    Code Status Order: Full Code   Advance Directives:    Date/Time Healthcare Directive Type of Healthcare Directive Copy in Chart Healthcare Agent Appointed Healthcare Agent's Name Healthcare Agent's Phone Number    [phone] Other (Comment)  --  --  --  --  --     25 1326 Yes, patient has an advance directive for healthcare treatment  Durable power of  for health care;Living will  --  --  --  --             Admitting Physician:  En Carrasco MD  PCP: Karina Mendoza PA    Discharging Nurse: ***  Discharging Hospital Unit/Room#: 0450/0450-01  Discharging Unit Phone Number: ***    Emergency Contact:   Extended Emergency Contact Information  Primary Emergency Contact: Rosa Maria Wells  Address: [address] [place] [address] [address] [place] [address]  Home Phone: [phone]  Mobile Phone: [phone]  Relation: Spouse  Secondary Emergency Contact: Be Wells  Address: [address] [place] [address]  Home Phone: [phone]  Mobile Phone: [phone]  Relation: Child    Past Surgical History:  Past Surgical History:   Procedure Laterality Date    CERVICAL SPINE SURGERY      fusion    CHOLECYSTECTOMY      COLONOSCOPY      Normal    COLONOSCOPY N/A 2024    COLONOSCOPY POLYPECTOMY SNARE/BIOPSY performed by Dennis [facility]asquez MD at [facility] ENDOSCOPY    ESOPHAGOGASTRODUODENOSCOPY W/ BANDING  2024    KNEE ARTHROSCOPY Left 10/22/2019    EXAM UNDER ANESTHESIA VIDEO ARTHROSCOPY LEFT KNEE, PARTIAL MEDIAL MENISCECTOMY,  MEDIAL-FEMORAL CHONDROPLASTY, SYNOVECTOMY, EXCISION OF ADHESIONS performed by Gwyn Dickson MD at [facility] OR    NASAL SEPTUM SURGERY      NECK SURGERY      fusion    OTHER SURGICAL HISTORY  2024    ESOPHAGOGASTRODUODENOSCOPY BAND LIGATION

## 2025-04-01 NOTE — CARE COORDINATION
CASE MANAGEMENT DISCHARGE SUMMARY      Discharge to: Home      IMM given: 4/1/25    New Durable Medical Equipment ordered/agency: no    Transportation:    Family/car: yes   Confirmed discharge plan with:     Patient: yes/     Family:  yes/   Name: Contact number:        RN, name: Merna    Note: Discharging nurse to complete ROBEL, reconcile AVS, and place final copy with patient's discharge packet. RN to ensure that written prescriptions for  Level II medications are sent with patient to the facility as per protocol.    Constance Frias RN, CM

## 2025-04-01 NOTE — DISCHARGE SUMMARY
Hospital Medicine Discharge Summary    Patient: Carroll Wells   : 1955     Hospital:  [facility]  Admit Date: 3/25/2025   Discharge Date: 2025    Disposition:  [x]Home   []HHC  []SNF  []Acute Rehab  []LTAC  []Hospice  Code status:  [x]Full  []DNR/CCA  []Limited (DNR/CCA with Do Not Intubate)  []DNRCC  Condition at Discharge: Stable  Primary Care Provider: Karina Mendoza PA    Admitting Provider: nE Carrasco MD  Discharge Provider: KATIE Olmos     Discharge Diagnoses:      Active Hospital Problems    Diagnosis     GI bleed [K92.2]        Presenting Admission History:      69 y.o. male with PMH  of LANG cirrhosis, esophageal  varices, recently diagnosed internal jugular and upper extremity DVT on Eliquis, CKD stage 3, DM II, HTN, HLD Admitted for weakness, fatigue, melanotic stools and abdominal distention with associated pain that has been ongoing getting worse over the past week.      Of note, he had an internal jugular DVT diagnosed about 3 weeks ago and he was started on Eliquis.   He does have a positive stool guaiac. His hemoglobins have been downtrending from 8-1/2-9 to 7.2. He has a mild ROWAN with a creatinine of 1.4 on his CMP. Albumin seems to be near appropriate at 3.1. INR not significantly changed at 1.64.   He does have significant amount of ascites and mild peritoneal signs concerning for spontaneous bacterial peritonitis.   With his history of gastric varices he was started on Rocephin empirically as well as octreotide and Protonix. He was given 2 units of blood in the emergency department. The ED provider spoke to GI and their recommendation was to just hold the eliquis and not reverse it at this time. Patient states his last dose of eliquis was on the AM of admit     Assessment/Plan:      GI bleed on Eliquis :   - EGD on 3/26/2025: Revealed esophageal varices x2 and banded. 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 Duodenum

## 2025-04-01 NOTE — PROGRESS NOTES
Patient discharged to home. Sent with all belongings. AVS reviewed and all questions answered. PIV removed with no complications. Tele removed and returned.

## 2025-04-01 NOTE — PROGRESS NOTES
Gastroenterology Progress Note      Patient Carroll Wells  MRN: [id number]  YOB: 1955 Age: 69 y.o. Sex: male  Room: [address] [place] [address]       Admitting Physician: En Carrasco MD   Date of Admission: 3/25/2025  7:51 PM   Primary Care Physician: Karina Mendoza PA     Subjective:  We are following for acute blood loss anemia in setting of Eliquis and decompensated cirrhosis with ascites. Carroll Wells was seen and examined. Reports feeling improved. Had a nonbloody bowel movement last night. Denies abdominal pain, nausea, vomiting, fever, chills, diarrhea, hematochezia or melenic stools.     Current Hospital Schedued Meds   furosemide  40 mg Oral BID    spironolactone  100 mg Oral Daily    pantoprazole  40 mg IntraVENous BID AC    bisacodyl  10 mg Rectal Daily    lactulose  20 g Oral TID    apixaban  5 mg Oral BID    insulin glargine  10 Units SubCUTAneous BID    insulin lispro  0-8 Units SubCUTAneous 4x Daily AC & HS    albumin human 25%  25 g IntraVENous Once    sodium chloride flush  5-40 mL IntraVENous 2 times per day    cefTRIAXone (ROCEPHIN) IV  1,000 mg IntraVENous Q24H     Current Hospital IV Meds   sodium chloride      dextrose      sodium chloride 10 mL/hr at 03/28/25 1322     Current Hospital Prn Meds  sucralfate, bisacodyl, prochlorperazine, sodium chloride, simethicone, glucose, dextrose bolus **OR** dextrose bolus, glucagon (rDNA), dextrose, sodium chloride flush, sodium chloride, ondansetron **OR** ondansetron, morphine    I/O last 3 completed shifts:  In: 1160 [P.O.:1160]  Out: -     Physical Exam:  VITAL SIGNS: BP (!) 101/57   Pulse 78   Temp 97.8 °F (36.6 °C) (Oral)   Resp 18   Ht 1.702 m (5' 7\")   Wt 103 kg (227 lb)   SpO2 99%   BMI 35.55 kg/m²   Wt Readings from Last 3 Encounters:   04/01/25 103 kg (227 lb)   03/12/25 102.1 kg (225 lb)   02/27/25 105.1 kg (231 lb 12.8 oz)     Constitutional: Well developed, Well nourished, No acute distress, Non-toxic

## 2025-04-02 ENCOUNTER — TELEPHONE (OUTPATIENT)
Dept: FAMILY MEDICINE CLINIC | Age: 70
End: 2025-04-02

## 2025-04-02 DIAGNOSIS — R18.8 CIRRHOSIS OF LIVER WITH ASCITES, UNSPECIFIED HEPATIC CIRRHOSIS TYPE (HCC): Primary | ICD-10-CM

## 2025-04-02 DIAGNOSIS — K74.60 CIRRHOSIS OF LIVER WITH ASCITES, UNSPECIFIED HEPATIC CIRRHOSIS TYPE (HCC): Primary | ICD-10-CM

## 2025-04-02 NOTE — TELEPHONE ENCOUNTER
Care Transitions Initial Follow Up Call    Outreach made within 2 business days of discharge: Yes    Patient: Carroll Welsl Patient : 1955   MRN: [id number]  Reason for Admission: GI bleed   Discharge Date: 25       Spoke with: Patient     Discharge department/facility: [facility] Interactive Patient Contact:  Was patient able to fill all prescriptions: Yes  Was patient instructed to bring all medications to the follow-up visit: Yes  Is patient taking all medications as directed in the discharge summary? Yes  Does patient understand their discharge instructions: Yes  Does patient have questions or concerns that need addressed prior to 7-14 day follow up office visit: no        Scheduled appointment with PCP within 7-14 days    Follow Up  Future Appointments   Date Time Provider Department Center   2025 11:00 AM Karina Mendoza PA [facility]       Betzy Oakley LPN

## 2025-04-03 LAB
BACTERIA FLD AEROBE CULT: NORMAL
GRAM STN SPEC: NORMAL

## 2025-04-03 NOTE — PROGRESS NOTES
Physician Progress Note      PATIENT:               JACOB POST  CSN #:                  [id number]  :                       1955  ADMIT DATE:       3/25/2025 7:51 PM  DISCH DATE:        2025 12:53 PM  RESPONDING  PROVIDER #:        FRANCHESCA WILSON - ESTHER          QUERY TEXT:    Hepatic encephalopathy is documented in the medical record on 3/26 in GI   note.. Please provide additional clinical indicators supportive of your   documentation. Or please document if the diagnosis of Hepatic encephalopathy   has been ruled out after study.    The clinical indicators include:  This is a 69 y.o male with pMHx of CKD, HTN, LANG Cirrhosis.  3/25 - IM - Psychiatric:  Alert and oriented, thought content appropriate,   normal insight. 3/26 - GI - Hepatic encephalopathy, mild. Neuro: Grossly   intact, A&OX3. 3/26 - IM - Psychiatric:  Alert and oriented x 3  This was treated with: imaging, IVF, and GI consult.  Options provided:  -- Hepatic encephalopathy was ruled out after study.  -- Hepatic encephalopathy is present as evidenced by, Please document   evidence.  -- Other - I will add my own diagnosis  -- Disagree - Not applicable / Not valid  -- Disagree - Clinically unable to determine / Unknown  -- Refer to Clinical Documentation Reviewer    PROVIDER RESPONSE TEXT:    Hepatic encephalopathy was ruled out after study    Query created by: Aris Frias on 4/3/2025 6:51 AM      Electronically signed by:  FRANCHESCA Galeas NP 4/3/2025 8:22 AM

## 2025-04-04 ENCOUNTER — OFFICE VISIT (OUTPATIENT)
Dept: FAMILY MEDICINE CLINIC | Age: 70
End: 2025-04-04
Payer: MEDICARE

## 2025-04-04 VITALS
TEMPERATURE: 97.9 F | BODY MASS INDEX: 37.39 KG/M2 | WEIGHT: 238.2 LBS | DIASTOLIC BLOOD PRESSURE: 70 MMHG | HEART RATE: 98 BPM | OXYGEN SATURATION: 99 % | SYSTOLIC BLOOD PRESSURE: 118 MMHG | HEIGHT: 67 IN

## 2025-04-04 DIAGNOSIS — D62 ACUTE BLOOD LOSS ANEMIA: ICD-10-CM

## 2025-04-04 DIAGNOSIS — E11.65 UNCONTROLLED TYPE 2 DIABETES MELLITUS WITH HYPERGLYCEMIA (HCC): ICD-10-CM

## 2025-04-04 DIAGNOSIS — K74.60 CIRRHOSIS OF LIVER WITH ASCITES, UNSPECIFIED HEPATIC CIRRHOSIS TYPE (HCC): ICD-10-CM

## 2025-04-04 DIAGNOSIS — R18.8 CIRRHOSIS OF LIVER WITH ASCITES, UNSPECIFIED HEPATIC CIRRHOSIS TYPE (HCC): ICD-10-CM

## 2025-04-04 DIAGNOSIS — I82.C12: ICD-10-CM

## 2025-04-04 DIAGNOSIS — K74.60 LIVER CIRRHOSIS SECONDARY TO NASH (HCC): ICD-10-CM

## 2025-04-04 DIAGNOSIS — I82.B12 THROMBOSIS OF LEFT SUBCLAVIAN VEIN (HCC): Primary | ICD-10-CM

## 2025-04-04 DIAGNOSIS — K75.81 LIVER CIRRHOSIS SECONDARY TO NASH (HCC): ICD-10-CM

## 2025-04-04 DIAGNOSIS — I85.11 SECONDARY ESOPHAGEAL VARICES WITH BLEEDING (HCC): ICD-10-CM

## 2025-04-04 LAB
BASOPHILS # BLD: 0.1 K/UL (ref 0–0.2)
BASOPHILS NFR BLD: 1.2 %
DEPRECATED RDW RBC AUTO: 23.4 % (ref 12.4–15.4)
EOSINOPHIL # BLD: 0.2 K/UL (ref 0–0.6)
EOSINOPHIL NFR BLD: 2.4 %
HCT VFR BLD AUTO: 30.3 % (ref 40.5–52.5)
HGB BLD-MCNC: 9.7 G/DL (ref 13.5–17.5)
LYMPHOCYTES # BLD: 1.1 K/UL (ref 1–5.1)
LYMPHOCYTES NFR BLD: 13.5 %
MCH RBC QN AUTO: 25.3 PG (ref 26–34)
MCHC RBC AUTO-ENTMCNC: 32.1 G/DL (ref 31–36)
MCV RBC AUTO: 78.8 FL (ref 80–100)
MONOCYTES # BLD: 0.9 K/UL (ref 0–1.3)
MONOCYTES NFR BLD: 10.7 %
NEUTROPHILS # BLD: 5.9 K/UL (ref 1.7–7.7)
NEUTROPHILS NFR BLD: 72.2 %
PLATELET # BLD AUTO: 188 K/UL (ref 135–450)
PMV BLD AUTO: 8.4 FL (ref 5–10.5)
RBC # BLD AUTO: 3.85 M/UL (ref 4.2–5.9)
WBC # BLD AUTO: 8.2 K/UL (ref 4–11)

## 2025-04-04 PROCEDURE — 1123F ACP DISCUSS/DSCN MKR DOCD: CPT | Performed by: PHYSICIAN ASSISTANT

## 2025-04-04 PROCEDURE — 3052F HG A1C>EQUAL 8.0%<EQUAL 9.0%: CPT | Performed by: PHYSICIAN ASSISTANT

## 2025-04-04 PROCEDURE — 99214 OFFICE O/P EST MOD 30 MIN: CPT | Performed by: PHYSICIAN ASSISTANT

## 2025-04-04 PROCEDURE — 3078F DIAST BP <80 MM HG: CPT | Performed by: PHYSICIAN ASSISTANT

## 2025-04-04 PROCEDURE — 1160F RVW MEDS BY RX/DR IN RCRD: CPT | Performed by: PHYSICIAN ASSISTANT

## 2025-04-04 PROCEDURE — 1159F MED LIST DOCD IN RCRD: CPT | Performed by: PHYSICIAN ASSISTANT

## 2025-04-04 PROCEDURE — 3074F SYST BP LT 130 MM HG: CPT | Performed by: PHYSICIAN ASSISTANT

## 2025-04-04 SDOH — ECONOMIC STABILITY: FOOD INSECURITY: WITHIN THE PAST 12 MONTHS, YOU WORRIED THAT YOUR FOOD WOULD RUN OUT BEFORE YOU GOT MONEY TO BUY MORE.: NEVER TRUE

## 2025-04-04 SDOH — ECONOMIC STABILITY: FOOD INSECURITY: WITHIN THE PAST 12 MONTHS, THE FOOD YOU BOUGHT JUST DIDN'T LAST AND YOU DIDN'T HAVE MONEY TO GET MORE.: NEVER TRUE

## 2025-04-04 ASSESSMENT — PATIENT HEALTH QUESTIONNAIRE - PHQ9
SUM OF ALL RESPONSES TO PHQ QUESTIONS 1-9: 0
2. FEELING DOWN, DEPRESSED OR HOPELESS: NOT AT ALL
SUM OF ALL RESPONSES TO PHQ QUESTIONS 1-9: 0
1. LITTLE INTEREST OR PLEASURE IN DOING THINGS: NOT AT ALL

## 2025-04-04 NOTE — PROGRESS NOTES
\"Have you been to the ER, urgent care clinic since your last visit?  Hospitalized since your last visit?\"    YES - When: approximately 2  weeks ago.  Where and Why: [facility] for GI bleed.    “Have you seen or consulted any other health care providers outside our system since your last visit?”    NO    “Have you had a diabetic eye exam?”    NO     Date of last diabetic eye exam: 3/30/2021         
non-distended, normal bowel sounds, no masses or organomegaly  Extremities: no cyanosis, clubbing or edema  Musculoskeletal: normal range of motion, no joint swelling, deformity or tenderness  Neurologic: reflexes normal and symmetric, no cranial nerve deficit, gait, coordination and speech normal      An electronic signature was used to authenticate this note.  --HO Morrison

## 2025-04-07 ENCOUNTER — TELEPHONE (OUTPATIENT)
Dept: INTERVENTIONAL RADIOLOGY/VASCULAR | Age: 70
End: 2025-04-07

## 2025-04-07 NOTE — TELEPHONE ENCOUNTER
Called and spoke to Family patient's wife  about upcoming procedure. Phone number used:   Procedure: Paracentesis     Pt informed of the following:  Date of procedure: 04/09/2025  Arrival time of procedure: 0930  Time of procedure: 1000  Check in at Main Entrance prior to procedure.

## 2025-04-09 ENCOUNTER — HOSPITAL ENCOUNTER (OUTPATIENT)
Dept: ULTRASOUND IMAGING | Age: 70
Discharge: HOME | End: 2025-04-09
Payer: MEDICARE

## 2025-04-09 VITALS — HEART RATE: 89 BPM | SYSTOLIC BLOOD PRESSURE: 124 MMHG | DIASTOLIC BLOOD PRESSURE: 70 MMHG | OXYGEN SATURATION: 96 %

## 2025-04-09 DIAGNOSIS — K74.60 CIRRHOSIS OF LIVER WITH ASCITES, UNSPECIFIED HEPATIC CIRRHOSIS TYPE (HCC): ICD-10-CM

## 2025-04-09 DIAGNOSIS — R18.8 CIRRHOSIS OF LIVER WITH ASCITES, UNSPECIFIED HEPATIC CIRRHOSIS TYPE (HCC): ICD-10-CM

## 2025-04-09 PROCEDURE — 6360000002 HC RX W HCPCS: Performed by: INTERNAL MEDICINE

## 2025-04-09 PROCEDURE — P9047 ALBUMIN (HUMAN), 25%, 50ML: HCPCS | Performed by: INTERNAL MEDICINE

## 2025-04-09 PROCEDURE — 49083 ABD PARACENTESIS W/IMAGING: CPT

## 2025-04-09 RX ORDER — ALBUMIN (HUMAN) 12.5 G/50ML
50 SOLUTION INTRAVENOUS ONCE
Status: COMPLETED | OUTPATIENT
Start: 2025-04-09 | End: 2025-04-09

## 2025-04-09 RX ADMIN — ALBUMIN (HUMAN) 50 G: 0.25 INJECTION, SOLUTION INTRAVENOUS at 11:19

## 2025-04-09 NOTE — PROGRESS NOTES
Image guided Paracentesis completed.  11.1 liters of chylous (cloudy) light yellow colored withdrawn.  Pt tolerated procedure without any signs or symptoms of distress. Vital signs stable.     DISCHARGED:  IR Nurses administered albumin    SPECIMEN SENT:  No    Vital Signs  Vitals:    04/09/25 1100   BP: 124/70   Pulse: 89   SpO2: 96%    (vital signs in table format)

## 2025-04-09 NOTE — PROGRESS NOTES
Pt arrived for image guided Paracentesis. Dr. Clayton explained the procedure including the risk and benefits of the procedure. All questions answered. Pt verbalizes understanding of the procedure and states no more questions. Consent confirmed. Vital signs stable. Labs, allergies, medications, and code status reviewed. No contraindications noted. Time out completed prior to procedure.    Vital Signs  Vitals:    04/09/25 1000   BP: (!) 146/87   Pulse: 99   SpO2: 97%    (vital signs in table format)      Allergies  Aspirin and Oxycontin [oxycodone hcl] (allergies)    Labs  Lab Results   Component Value Date    INR 1.33 (H) 03/31/2025    PROTIME 16.7 (H) 03/31/2025     Lab Results   Component Value Date    CREATININE 1.0 04/01/2025    BUN 14 04/01/2025     04/01/2025    K 3.5 04/01/2025     04/01/2025    CO2 24 04/01/2025     Lab Results   Component Value Date    WBC 8.2 04/04/2025    HGB 9.7 (L) 04/04/2025    HCT 30.3 (L) 04/04/2025    MCV 78.8 (L) 04/04/2025     04/04/2025

## 2025-04-10 NOTE — DISCHARGE INSTRUCTIONS
albumin (human)  Pronunciation:  al BUE min (HUE man)  Brand:  Albuked, Albuminar-25, Alburx, Albutein, Buminate, Flexbumin, Human Albumin Grifols, Kedbumin, Plasbumin-25, Plasbumin-5  What is the most important information I should know about albumin?  You should not receive albumin if you have severe anemia (lack of red blood cells), or severe heart failure.  What is albumin?  Albumin is a protein produced by the liver that circulates in plasma (the clear liquid portion of your blood). Medicinal albumin is made of plasma proteins from human blood. This medicine works by increasing plasma volume or levels of albumin in the blood.  Albumin is used to replace blood volume loss resulting from trauma such as a severe burns or an injury that causes blood loss. This medicine is also used to treat low albumin levels caused by surgery, dialysis, abdominal infections, liver failure, pancreatitis, respiratory distress, bypass surgery, ovarian problems caused by fertility drugs, and other many other conditions.  Albumin may also be used for purposes not listed in this medication guide.  What should I discuss with my healthcare provider before receiving albumin?  You should not use albumin if you are allergic to it, or if you have:  severe anemia (lack of red blood cells); or  severe heart failure.  If possible before you receive albumin, tell your doctor if you have:  anemia;  heart disease, high blood pressure;  bleeding or blood clotting disorder such as hemophilia;  lung problems;  kidney disease;  a latex allergy; or  if you are unable to urinate.  In an emergency situation it may not be possible to tell your caregivers about your health conditions. Make sure any doctor caring for you afterward knows you have received this medicine.  Albumin is made from human plasma (part of the blood) which may contain viruses and other infectious agents. Donated plasma is tested and treated to reduce the risk of it containing

## 2025-04-14 ENCOUNTER — TELEPHONE (OUTPATIENT)
Dept: FAMILY MEDICINE CLINIC | Age: 70
End: 2025-04-14

## 2025-04-14 DIAGNOSIS — I82.B12 THROMBOSIS OF LEFT SUBCLAVIAN VEIN (HCC): Primary | ICD-10-CM

## 2025-04-14 DIAGNOSIS — I82.C12: ICD-10-CM

## 2025-04-14 RX ORDER — INSULIN LISPRO 100 [IU]/ML
INJECTION, SOLUTION INTRAVENOUS; SUBCUTANEOUS
Qty: 10 ML | Refills: 2 | Status: SHIPPED | OUTPATIENT
Start: 2025-04-14

## 2025-04-14 RX ORDER — OMEPRAZOLE 10 MG/1
10 CAPSULE, DELAYED RELEASE ORAL DAILY
COMMUNITY

## 2025-04-14 RX ORDER — WARFARIN SODIUM 5 MG/1
5 TABLET ORAL EVERY EVENING
Qty: 30 TABLET | Refills: 2 | Status: SHIPPED | OUTPATIENT
Start: 2025-04-14

## 2025-04-14 NOTE — TELEPHONE ENCOUNTER
Patient called the office requesting his Humalog. States that the hospital put him on a sliding scale, however he was at 30 units before his hospital visit, not sure why is was changed.     Patient also would like to know if he could switch to Warfain as the Eliquis is to expensive. He has not had any Eliquis in the last 3 days.   Patient is happy to go to the [facility] on Wednesday as he is already at [facility].       Please advise, thanks.

## 2025-04-14 NOTE — PROGRESS NOTES
1.  Do not eat or drink anything after 12 midnight prior to surgery.  This includes no water, chewing gum,mints or chewing tobacco, except for bowel prep per MD.  2.  Take pills with a small sip of water on the morning of surgery.  3.  Aspirin, Ibuprofen, Advil, Naproxen, Vitamin E and other Anti-inflammatory products should be stopped as directed by your physician.  4.  Check with your doctor regarding stopping Plavix, Coumadin, Lovenox, or other blood thinners.  5.  Do not smoke and do not drink alcoholic beverages 24 hours prior to surgery. This includes NA Beer.  6.  You may brush your teeth and gargle the morning of surgery.  DO NOT SWALLOW WATER.  7.  You MUST make arrangements for a responsible adult to take you home after your procedure.  You will not be allowed to leave alone or drive yourself home.  It is strongly suggested someone   stay with you the first 24 hours.  Your procedure will be cancelled if you do not have a ride home.  8.  A parent/legal guardian must accompany a child scheduled for procedure and plan to stay at the hospital until the child is discharged.  Please do not bring other children with you.  9.  Please wear simple, loose fitting clothing to the hospital.  Do not bring valuables ( money, credit cards, checkbooks, etc.) 10.  Do not wear any jewelry or piercing on the day of surgery.  All body piercing jewelry must be removed.  11.  If you have dentures, they will be removed before going to the OR; we will provide you a container.  If you wear contact lenses or glasses, they will be removed; please bring a case for them.  12.  Notify your Physician if you develop any illness between now and surgery time; cough, cold, fever, sore throat, nausea, vomiting, etc.   13.  Please notify your physician if you experience dizziness, shortness of breath or blurred vision between now and the time of your surgery.        To provide excellent care, visitors will be limited to two in a room at any

## 2025-04-14 NOTE — TELEPHONE ENCOUNTER
Spoke with Rachna, wife.  Wife stated patient has already been on medication for 1 month, but has been without medication since Friday.  Explained to wife that warfarin was sent to the pharmacy and that patient would have to have routine levels checked, and went over the dietary restrictions.  Wife stated their deductible is $400 and they have not met any of it as of now.  Explained to wife that Eliquis PAP requires for them to have met a certain percentage of their out of pocket cost, wife again stated they have med $0, explained they would not qualify until then.  Offered to get samples if approved by Karina but I would have to check with Karina.  Advised I would discuss with Karina and let her know.    Spoke with Karina, ok for samples, stating patient may have to be on it for up to 6 months.    Spoke with , rep stated she is able to bring out 1 month at a time.  Rep will try to come today but may not be able to get here until Wednesday.    Wife notified.  Wife would like to know what they should do if the rep isn't able to bring samples until Wednesday?    Please advise.

## 2025-04-14 NOTE — TELEPHONE ENCOUNTER
Janie,   This patient was on eliquis for acute IJ thrombosis and acute left axillary thrombosis. He will need AC for at least 3 months. Eliquis is now unaffordable. Can we see if we can do PAP?      I have switched him to coumadin for now until we can see if he qualifies.     Do you care to let him know I sent in the coumadin and he needs to call coumadin clinic to get scheduled for follow up.     Thanks!

## 2025-04-14 NOTE — TELEPHONE ENCOUNTER
Script signed.   Will determine length of treatment and determine best/affordable agent from there.     Thanks

## 2025-04-14 NOTE — TELEPHONE ENCOUNTER
Spoke with Karina.  Call and ask pharmacy cost for 3 days.  Spoke with pharmacist, he was not able to give me cost without an active script.  Gave verbal for 3 days, per pharmacist without insurance it was $80, with the discount card it was $69 and with insurance it was $60.31.    Notified Karina.  Check with wife regarding cost.  Per Karina, she did reach out to vascular and is currently waiting on response.    Wife notified.  Wife stated that cost is doable and will  script when it is ready.    FYI.

## 2025-04-16 ENCOUNTER — HOSPITAL ENCOUNTER (OUTPATIENT)
Dept: NURSING | Age: 70
Setting detail: INFUSION SERIES
Discharge: HOME | End: 2025-04-16
Payer: MEDICARE

## 2025-04-16 ENCOUNTER — ANESTHESIA EVENT (OUTPATIENT)
Dept: ENDOSCOPY | Age: 70
End: 2025-04-16
Payer: MEDICARE

## 2025-04-16 ENCOUNTER — HOSPITAL ENCOUNTER (OUTPATIENT)
Dept: ULTRASOUND IMAGING | Age: 70
Discharge: HOME | End: 2025-04-16
Payer: MEDICARE

## 2025-04-16 VITALS
WEIGHT: 224 LBS | BODY MASS INDEX: 35.07 KG/M2 | DIASTOLIC BLOOD PRESSURE: 70 MMHG | RESPIRATION RATE: 16 BRPM | TEMPERATURE: 98.1 F | SYSTOLIC BLOOD PRESSURE: 116 MMHG | HEART RATE: 88 BPM

## 2025-04-16 VITALS — OXYGEN SATURATION: 97 % | SYSTOLIC BLOOD PRESSURE: 127 MMHG | HEART RATE: 94 BPM | DIASTOLIC BLOOD PRESSURE: 80 MMHG

## 2025-04-16 DIAGNOSIS — R18.8 CIRRHOSIS OF LIVER WITH ASCITES, UNSPECIFIED HEPATIC CIRRHOSIS TYPE (HCC): ICD-10-CM

## 2025-04-16 DIAGNOSIS — K74.60 CIRRHOSIS OF LIVER WITH ASCITES, UNSPECIFIED HEPATIC CIRRHOSIS TYPE (HCC): ICD-10-CM

## 2025-04-16 PROCEDURE — P9047 ALBUMIN (HUMAN), 25%, 50ML: HCPCS | Performed by: INTERNAL MEDICINE

## 2025-04-16 PROCEDURE — 49083 ABD PARACENTESIS W/IMAGING: CPT

## 2025-04-16 PROCEDURE — 96365 THER/PROPH/DIAG IV INF INIT: CPT

## 2025-04-16 PROCEDURE — 6360000002 HC RX W HCPCS: Performed by: INTERNAL MEDICINE

## 2025-04-16 RX ORDER — ENOXAPARIN SODIUM 100 MG/ML
40 INJECTION SUBCUTANEOUS 2 TIMES DAILY
Qty: 1.6 ML | Refills: 0 | Status: SHIPPED | OUTPATIENT
Start: 2025-04-16 | End: 2025-04-18

## 2025-04-16 RX ORDER — ALBUMIN (HUMAN) 12.5 G/50ML
50 SOLUTION INTRAVENOUS ONCE
Status: COMPLETED | OUTPATIENT
Start: 2025-04-16 | End: 2025-04-16

## 2025-04-16 RX ADMIN — ALBUMIN (HUMAN) 50 G: 0.25 INJECTION, SOLUTION INTRAVENOUS at 13:02

## 2025-04-16 NOTE — PROGRESS NOTES
Albumin infusion complete.  Pt states he feels much better.  IV removed.  Discharge paperwork given, pt and wife deny any needs.

## 2025-04-16 NOTE — PROGRESS NOTES
Pt here after paracentesis.  12L removed per Brisa.  Pt with standing order to infuse 50 gm IV albumin after paracentesis.  IV started by VIKTORIYA Hathaway RN in right hand.

## 2025-04-16 NOTE — TELEPHONE ENCOUNTER
Patient's wife came into the office today to  the samples fo Eliquis for the patient.  Nurse asked the wife if she had any questions.  Patient's wife stated no, and verbalized understanding.  Nurse advised the patient's wife if she had any questions to contact the office.

## 2025-04-16 NOTE — TELEPHONE ENCOUNTER
2 days prior to procedure.   Given acute DVT will need to do lovenox injections for the days he is not taking eliquis.   Given recent blood loss anemia, and high risk for bleed with varices, will send in 40 mg lovenox twice daily.     Thanks

## 2025-04-16 NOTE — PROGRESS NOTES
Pt arrived for image guided Paracentesis. Dr. Medina explained the procedure including the risk and benefits of the procedure. All questions answered. Pt verbalizes understanding of the procedure and states no more questions. Consent confirmed. Vital signs stable. Labs, allergies, medications, and code status reviewed. No contraindications noted. Time out completed prior to procedure.    Vital Signs  Vitals:    04/16/25 1130   BP: 127/80   Pulse: 94   SpO2: 97%    (vital signs in table format)      Allergies  Aspirin, Fentanyl, and Oxycontin [oxycodone hcl] (allergies)    Labs  Lab Results   Component Value Date    INR 1.33 (H) 03/31/2025    PROTIME 16.7 (H) 03/31/2025     Lab Results   Component Value Date    CREATININE 1.0 04/01/2025    BUN 14 04/01/2025     04/01/2025    K 3.5 04/01/2025     04/01/2025    CO2 24 04/01/2025     Lab Results   Component Value Date    WBC 8.2 04/04/2025    HGB 9.7 (L) 04/04/2025    HCT 30.3 (L) 04/04/2025    MCV 78.8 (L) 04/04/2025     04/04/2025

## 2025-04-16 NOTE — TELEPHONE ENCOUNTER
Received 2 months of samples for patient (8 boxes).    Sample order signed.    Wife stated patient is scheduled for EGD on the 24th and would like to know when patient should stop taking blood thinner for procedure.    Please advise.

## 2025-04-16 NOTE — PROGRESS NOTES
Image guided Paracentesis completed.  12 liters of chylous yellow colored withdrawn.  Pt tolerated procedure without any signs or symptoms of distress. Vital signs stable.     DISCHARGED:  SDS: Discharge instructions reviewed with patient. Verbalized understanding. Patient taken to [facility] for albumin infusion. Report given to nurse.    SPECIMEN SENT:  No    Vital Signs  Vitals:    04/16/25 1130   BP: 127/80   Pulse: 94   SpO2: 97%    (vital signs in table format)

## 2025-04-22 ENCOUNTER — TELEPHONE (OUTPATIENT)
Dept: FAMILY MEDICINE CLINIC | Age: 70
End: 2025-04-22

## 2025-04-23 ENCOUNTER — HOSPITAL ENCOUNTER (OUTPATIENT)
Dept: ULTRASOUND IMAGING | Age: 70
Discharge: HOME | End: 2025-04-23
Payer: MEDICARE

## 2025-04-23 ENCOUNTER — HOSPITAL ENCOUNTER (OUTPATIENT)
Dept: NURSING | Age: 70
Setting detail: INFUSION SERIES
Discharge: HOME | End: 2025-04-23
Payer: MEDICARE

## 2025-04-23 VITALS
SYSTOLIC BLOOD PRESSURE: 137 MMHG | HEIGHT: 67 IN | DIASTOLIC BLOOD PRESSURE: 54 MMHG | HEART RATE: 74 BPM | RESPIRATION RATE: 16 BRPM | TEMPERATURE: 97.8 F | BODY MASS INDEX: 35.16 KG/M2 | WEIGHT: 223.99 LBS

## 2025-04-23 VITALS — OXYGEN SATURATION: 97 % | DIASTOLIC BLOOD PRESSURE: 71 MMHG | HEART RATE: 80 BPM | SYSTOLIC BLOOD PRESSURE: 140 MMHG

## 2025-04-23 DIAGNOSIS — K74.60 CIRRHOSIS OF LIVER WITH ASCITES, UNSPECIFIED HEPATIC CIRRHOSIS TYPE (HCC): ICD-10-CM

## 2025-04-23 DIAGNOSIS — R18.8 CIRRHOSIS OF LIVER WITH ASCITES, UNSPECIFIED HEPATIC CIRRHOSIS TYPE (HCC): ICD-10-CM

## 2025-04-23 PROCEDURE — 49083 ABD PARACENTESIS W/IMAGING: CPT

## 2025-04-23 PROCEDURE — 99211 OFF/OP EST MAY X REQ PHY/QHP: CPT

## 2025-04-23 PROCEDURE — P9047 ALBUMIN (HUMAN), 25%, 50ML: HCPCS | Performed by: INTERNAL MEDICINE

## 2025-04-23 PROCEDURE — 96365 THER/PROPH/DIAG IV INF INIT: CPT

## 2025-04-23 PROCEDURE — 6360000002 HC RX W HCPCS: Performed by: INTERNAL MEDICINE

## 2025-04-23 RX ORDER — ALBUMIN (HUMAN) 12.5 G/50ML
50 SOLUTION INTRAVENOUS ONCE
Status: COMPLETED | OUTPATIENT
Start: 2025-04-23 | End: 2025-04-23

## 2025-04-23 RX ORDER — INSULIN LISPRO 100 [IU]/ML
30 INJECTION, SOLUTION INTRAVENOUS; SUBCUTANEOUS 2 TIMES DAILY WITH MEALS
Qty: 15 ML | Refills: 1 | Status: SHIPPED | OUTPATIENT
Start: 2025-04-23

## 2025-04-23 RX ADMIN — ALBUMIN (HUMAN) 50 G: 0.25 INJECTION, SOLUTION INTRAVENOUS at 13:02

## 2025-04-23 ASSESSMENT — PAIN SCALES - GENERAL: PAINLEVEL_OUTOF10: 0

## 2025-04-23 NOTE — PROGRESS NOTES
Image guided Paracentesis completed.  10.6 liters of cloudy yellow colored withdrawn.  Pt tolerated procedure without any signs or symptoms of distress. Vital signs stable.     DISCHARGED:  SDS: Discharge instructions reviewed with patient. Verbalized understanding. Patient taken to [facility] for albumin infusion. Report given to nurse.    SPECIMEN SENT:  No    Vital Signs  Vitals:    04/23/25 1130   BP: (!) 140/71   Pulse: 80   SpO2: 97%    (vital signs in table format)

## 2025-04-23 NOTE — PROGRESS NOTES
Pt arrived for image guided Paracentesis. Dr. Watson explained the procedure including the risk and benefits of the procedure. All questions answered. Pt verbalizes understanding of the procedure and states no more questions. Consent confirmed. Vital signs stable. Labs, allergies, medications, and code status reviewed. No contraindications noted. Time out completed prior to procedure.    Vital Signs  Vitals:    04/23/25 1130   BP: (!) 140/71   Pulse: 80   SpO2: 97%    (vital signs in table format)      Allergies  Aspirin, Fentanyl, and Oxycontin [oxycodone hcl] (allergies)    Labs  Lab Results   Component Value Date    INR 1.33 (H) 03/31/2025    PROTIME 16.7 (H) 03/31/2025     Lab Results   Component Value Date    CREATININE 1.0 04/01/2025    BUN 14 04/01/2025     04/01/2025    K 3.5 04/01/2025     04/01/2025    CO2 24 04/01/2025     Lab Results   Component Value Date    WBC 8.2 04/04/2025    HGB 9.7 (L) 04/04/2025    HCT 30.3 (L) 04/04/2025    MCV 78.8 (L) 04/04/2025     04/04/2025

## 2025-04-23 NOTE — PROGRESS NOTES
Problem: Pain:  Goal: Control of acute pain  Description: Control of acute pain  Outcome: Ongoing  Note: Patient's pain is controlled with current regime      Problem: Skin Integrity:  Goal: Will show no infection signs and symptoms  Description: Will show no infection signs and symptoms  4/28/2021 1004 by Nellie Oconnor RN  Outcome: Ongoing  Note: Surgical site shows no s/sx of infection at this time. Will continue to assess q shift and prn and provide interventions as needed. Problem: Falls - Risk of:  Goal: Will remain free from falls  Description: Will remain free from falls  4/28/2021 1004 by Nellie Oconnor RN  Outcome: Ongoing  Note: Pt remains free of falls thus far this shift. All fall precautions in place and functioning properly. Gerber Hirsch Pt here via w/c from radiology for an Albumin Infusion after having a paracentesis completed with removal of 10.6 liters of fluid per Brisa  Pt without c/o's at present time  IV # 22 was started in his RAC on 2nd attempt per S Rivas RN after 1 failed attempt per myself  Pt yenny well Pt was then given 50 gm of Albumin  over 31 mins without issues Pt reports that he feels ok  IV was removed  cath tip intact  Pressure then pressure dressing was applied and then secured with a coban dressing  IV site unremarkable  Pt yenny well  Discharge instructions reviewed with pt and understanding was verbalized Copy was given  Pt was then discharged ambulatory using his cane with his wife in stable condition  Pt was escorted to his vehicle per nursing staff

## 2025-04-24 NOTE — H&P
[facility]   Pre-operative History and Physical    Patient: Carroll Wells  : 1955  Acct#:     HISTORY OF PRESENT ILLNESS:    Indications: GAVE with melena    The patient is a -69 y.o. male with medical history of diabetes mellitus type 2, LANG related liver cirrhosis, ascites and esophageal varices s/p banding, hypertension and left upper extremity DVT on [facility] 2025 follows up after recent admission to [facility] on 3/25-25 for GI bleed with melena. He underwent EGD on 3/28/2025 with Dr. Lindsay with findings of superficial band erosions, mild to moderate diffuse portal hypertensive gastropathy s/p RFA ablation. He also underwent large volume paracentesis on 3/26 and 3/31 with removal of 9.7 L and 8 L ascitic fluid respectively. He reports feeling improved with resolution of melenic stools. However, reports increasing abdominal distension and lower extremity edema. Reports 2 bowel movements daily on lactulose TID.  Denies abdominal pain, confusion, jaundice, nausea, vomiting, fever, chills,melena or hematochezia.     Ultrasound duplex of upper extremity 2025 noted occlusive DVT of the left internal jugular vein, left subclavian vein and left axillary vein     Labs on 25 noted improved hemoglobin 9.7, hematocrit 30.3, MCV 78.8. Platelets 188, WBC 8.2.  MELD Na 11 on 25.      CT A/P with IV contrast 25 noted no acute process in the abdomen or pelvis. Cirrhosis with moderate amount of ascites and sequelae of portal hypertension including interval enlargement of a recanalized paraumbilical vein draining via collaterals to the right saphenofemoral confluence.       EGD 3/28/2025 Dr. Lindsay: Esophagus with 1 small superficial left-sided band erosion and a larger right sided clean-based band ulcer distally. Stomach contained portal hypertensive gastropathy and diffuse mild to moderate GAVE, not bleeding. RFA ablation with through-the-scope Barrx catheter performed, 12 J/cm\'b2 with

## 2025-04-25 ENCOUNTER — HOSPITAL ENCOUNTER (OUTPATIENT)
Age: 70
Setting detail: OUTPATIENT SURGERY
Discharge: HOME | End: 2025-04-25
Attending: INTERNAL MEDICINE | Admitting: INTERNAL MEDICINE
Payer: MEDICARE

## 2025-04-25 ENCOUNTER — ANESTHESIA (OUTPATIENT)
Dept: ENDOSCOPY | Age: 70
End: 2025-04-25
Payer: MEDICARE

## 2025-04-25 VITALS
SYSTOLIC BLOOD PRESSURE: 102 MMHG | DIASTOLIC BLOOD PRESSURE: 55 MMHG | TEMPERATURE: 97.9 F | WEIGHT: 224 LBS | RESPIRATION RATE: 16 BRPM | HEART RATE: 82 BPM | OXYGEN SATURATION: 99 % | BODY MASS INDEX: 35.07 KG/M2

## 2025-04-25 LAB
GLUCOSE BLD-MCNC: 353 MG/DL (ref 70–99)
GLUCOSE BLD-MCNC: 373 MG/DL (ref 70–99)
HCT VFR BLD AUTO: 31.5 % (ref 40.5–52.5)
HGB BLD-MCNC: 10.2 G/DL (ref 13.5–17.5)
PERFORMED ON: ABNORMAL
PERFORMED ON: ABNORMAL

## 2025-04-25 PROCEDURE — 2720000010 HC SURG SUPPLY STERILE: Performed by: INTERNAL MEDICINE

## 2025-04-25 PROCEDURE — 85014 HEMATOCRIT: CPT

## 2025-04-25 PROCEDURE — 3609013000 HC EGD TRANSORAL CONTROL BLEEDING ANY METHOD: Performed by: INTERNAL MEDICINE

## 2025-04-25 PROCEDURE — 2500000003 HC RX 250 WO HCPCS: Performed by: ANESTHESIOLOGY

## 2025-04-25 PROCEDURE — 85018 HEMOGLOBIN: CPT

## 2025-04-25 PROCEDURE — 36415 COLL VENOUS BLD VENIPUNCTURE: CPT

## 2025-04-25 PROCEDURE — 7100000010 HC PHASE II RECOVERY - FIRST 15 MIN: Performed by: INTERNAL MEDICINE

## 2025-04-25 PROCEDURE — 6360000002 HC RX W HCPCS: Performed by: NURSE ANESTHETIST, CERTIFIED REGISTERED

## 2025-04-25 PROCEDURE — 2709999900 HC NON-CHARGEABLE SUPPLY: Performed by: INTERNAL MEDICINE

## 2025-04-25 PROCEDURE — 6360000002 HC RX W HCPCS

## 2025-04-25 PROCEDURE — 6360000002 HC RX W HCPCS: Performed by: ANESTHESIOLOGY

## 2025-04-25 PROCEDURE — 3700000000 HC ANESTHESIA ATTENDED CARE: Performed by: INTERNAL MEDICINE

## 2025-04-25 PROCEDURE — 7100000011 HC PHASE II RECOVERY - ADDTL 15 MIN: Performed by: INTERNAL MEDICINE

## 2025-04-25 RX ORDER — SODIUM CHLORIDE 9 MG/ML
INJECTION, SOLUTION INTRAVENOUS PRN
Status: DISCONTINUED | OUTPATIENT
Start: 2025-04-25 | End: 2025-04-25 | Stop reason: HOSPADM

## 2025-04-25 RX ORDER — OXYCODONE HYDROCHLORIDE 5 MG/1
5 TABLET ORAL PRN
Status: DISCONTINUED | OUTPATIENT
Start: 2025-04-25 | End: 2025-04-25 | Stop reason: HOSPADM

## 2025-04-25 RX ORDER — OXYCODONE HYDROCHLORIDE 5 MG/1
10 TABLET ORAL PRN
Status: DISCONTINUED | OUTPATIENT
Start: 2025-04-25 | End: 2025-04-25 | Stop reason: HOSPADM

## 2025-04-25 RX ORDER — ONDANSETRON 2 MG/ML
4 INJECTION INTRAMUSCULAR; INTRAVENOUS
Status: DISCONTINUED | OUTPATIENT
Start: 2025-04-25 | End: 2025-04-25 | Stop reason: HOSPADM

## 2025-04-25 RX ORDER — ENOXAPARIN SODIUM 300 MG/3ML
1 INJECTION INTRAVENOUS; SUBCUTANEOUS 2 TIMES DAILY
COMMUNITY

## 2025-04-25 RX ORDER — SODIUM CHLORIDE 0.9 % (FLUSH) 0.9 %
5-40 SYRINGE (ML) INJECTION PRN
Status: DISCONTINUED | OUTPATIENT
Start: 2025-04-25 | End: 2025-04-25 | Stop reason: HOSPADM

## 2025-04-25 RX ORDER — ONDANSETRON 2 MG/ML
INJECTION INTRAMUSCULAR; INTRAVENOUS
Status: COMPLETED
Start: 2025-04-25 | End: 2025-04-25

## 2025-04-25 RX ORDER — LIDOCAINE HYDROCHLORIDE 20 MG/ML
INJECTION, SOLUTION INFILTRATION; PERINEURAL
Status: DISCONTINUED | OUTPATIENT
Start: 2025-04-25 | End: 2025-04-25 | Stop reason: SDUPTHER

## 2025-04-25 RX ORDER — MEPERIDINE HYDROCHLORIDE 25 MG/ML
12.5 INJECTION INTRAMUSCULAR; INTRAVENOUS; SUBCUTANEOUS EVERY 5 MIN PRN
Status: DISCONTINUED | OUTPATIENT
Start: 2025-04-25 | End: 2025-04-25 | Stop reason: RX

## 2025-04-25 RX ORDER — SODIUM CHLORIDE 0.9 % (FLUSH) 0.9 %
5-40 SYRINGE (ML) INJECTION EVERY 12 HOURS SCHEDULED
Status: DISCONTINUED | OUTPATIENT
Start: 2025-04-25 | End: 2025-04-25 | Stop reason: HOSPADM

## 2025-04-25 RX ORDER — NALOXONE HYDROCHLORIDE 0.4 MG/ML
INJECTION, SOLUTION INTRAMUSCULAR; INTRAVENOUS; SUBCUTANEOUS PRN
Status: DISCONTINUED | OUTPATIENT
Start: 2025-04-25 | End: 2025-04-25 | Stop reason: HOSPADM

## 2025-04-25 RX ORDER — SODIUM CHLORIDE, SODIUM LACTATE, POTASSIUM CHLORIDE, CALCIUM CHLORIDE 600; 310; 30; 20 MG/100ML; MG/100ML; MG/100ML; MG/100ML
INJECTION, SOLUTION INTRAVENOUS CONTINUOUS
Status: DISCONTINUED | OUTPATIENT
Start: 2025-04-25 | End: 2025-04-25 | Stop reason: HOSPADM

## 2025-04-25 RX ORDER — PROPOFOL 10 MG/ML
INJECTION, EMULSION INTRAVENOUS
Status: DISCONTINUED | OUTPATIENT
Start: 2025-04-25 | End: 2025-04-25 | Stop reason: SDUPTHER

## 2025-04-25 RX ADMIN — SODIUM CHLORIDE, PRESERVATIVE FREE 20 MG: 5 INJECTION INTRAVENOUS at 12:47

## 2025-04-25 RX ADMIN — ONDANSETRON 4 MG: 2 INJECTION INTRAMUSCULAR; INTRAVENOUS at 13:34

## 2025-04-25 RX ADMIN — PROPOFOL 220 MG: 10 INJECTION, EMULSION INTRAVENOUS at 13:02

## 2025-04-25 RX ADMIN — LIDOCAINE HYDROCHLORIDE 60 MG: 20 INJECTION, SOLUTION INFILTRATION; PERINEURAL at 13:02

## 2025-04-25 ASSESSMENT — PAIN SCALES - GENERAL
PAINLEVEL_OUTOF10: 0
PAINLEVEL_OUTOF10: 0

## 2025-04-25 ASSESSMENT — LIFESTYLE VARIABLES: SMOKING_STATUS: 0

## 2025-04-25 ASSESSMENT — ENCOUNTER SYMPTOMS: SHORTNESS OF BREATH: 1

## 2025-04-25 ASSESSMENT — PAIN - FUNCTIONAL ASSESSMENT: PAIN_FUNCTIONAL_ASSESSMENT: 0-10

## 2025-04-25 NOTE — ANESTHESIA POSTPROCEDURE EVALUATION
Department of Anesthesiology  Postprocedure Note    Patient: Carroll Wells  MRN: [id number]  YOB: 1955  Date of evaluation: 4/25/2025    Procedure Summary       Date: 04/25/25 Room / Location: [address]    Anesthesia Start: 1259 Anesthesia Stop: 1311    Procedure: ESOPHAGOGASTRODUODENOSCOPY CONTROL HEMORRHAGE Diagnosis:       Hepatic cirrhosis, unspecified hepatic cirrhosis type, unspecified whether ascites present (HCC)      GAVE (gastric antral vascular ectasia)      (Hepatic cirrhosis, unspecified hepatic cirrhosis type, unspecified whether ascites present (HCC) [K74.60])      (GAVE (gastric antral vascular ectasia) [K31.819])    Surgeons: Dennis Velasquez MD Responsible Provider: Josue Rock MD    Anesthesia Type: TIVA ASA Status: 3            Anesthesia Type: No value filed.    Fco Phase I: Fco Score: 10    Fco Phase II:      Anesthesia Post Evaluation    Patient location during evaluation: bedside  Patient participation: complete - patient participated  Level of consciousness: awake and alert  Airway patency: patent  Nausea & Vomiting: no nausea  Cardiovascular status: hemodynamically stable  Respiratory status: acceptable  Hydration status: euvolemic  Pain management: adequate      Vitals:    04/14/25 1228 04/25/25 1217   BP:  134/65   Pulse:  89   Resp:  16   Temp:  98.2 °F (36.8 °C)   TempSrc:  Oral   SpO2:  98%   Weight: 101.6 kg (224 lb)       No notable events documented.

## 2025-04-25 NOTE — ANESTHESIA PRE PROCEDURE
24 04/01/2025 05:22 AM    BUN 14 04/01/2025 05:22 AM    CREATININE 1.0 04/01/2025 05:22 AM    GFRAA >60 08/08/2022 10:57 AM    GFRAA >60 06/10/2013 10:53 AM    AGRATIO 1.4 04/01/2025 05:22 AM    LABGLOM 80 04/01/2025 05:22 AM    LABGLOM 81 04/26/2024 11:14 AM    GLUCOSE 103 04/01/2025 05:22 AM    CALCIUM 8.3 04/01/2025 05:22 AM    BILITOT 0.3 04/01/2025 05:22 AM    ALKPHOS 99 04/01/2025 05:22 AM    AST 23 04/01/2025 05:22 AM    ALT 11 04/01/2025 05:22 AM       POC Tests:   Recent Labs     04/25/25  1242   POCGLU 373*       Coags:   Lab Results   Component Value Date/Time    PROTIME 16.7 03/31/2025 05:44 AM    INR 1.33 03/31/2025 05:44 AM    APTT 35.1 03/26/2025 05:43 AM       HCG (If Applicable): No results found for: \"PREGTESTUR\", \"PREGSERUM\", \"HCG\", \"HCGQUANT\"     ABGs: No results found for: \"PHART\", \"PO2ART\", \"YDC2QNH\", \"ATE0PRT\", \"BEART\", \"R0SAWNQO\"     Type & Screen (If Applicable):  Lab Results   Component Value Date    ABORH O POS 03/25/2025    LABANTI NEG 03/25/2025       Drug/Infectious Status (If Applicable):  Lab Results   Component Value Date/Time    HIV Non-Reactive 07/12/2019 11:10 AM       COVID-19 Screening (If Applicable):   Lab Results   Component Value Date/Time    COVID19 Not Detected 01/13/2025 11:27 AM    COVID19 NOT DETECTED 12/27/2023 07:30 PM           Anesthesia Evaluation  Patient summary reviewed   history of anesthetic complications: PONV.  Airway: Mallampati: III  TM distance: <3 FB   Neck ROM: limited  Mouth opening: > = 3 FB   Dental:    (+) upper dentures      Pulmonary: breath sounds clear to auscultation  (+)   shortness of breath (exert):             (-) COPD, asthma, sleep apnea and not a current smoker          Patient did not smoke on day of surgery.                 Cardiovascular:    (+) hypertension:, ARREDONDO:, hyperlipidemia    (-) pacemaker, past MI, CABG/stent, dysrhythmias,  angina and  CHF    ECG reviewed  Rhythm: regular  Rate: normal  Echocardiogram reviewed         Beta

## 2025-04-25 NOTE — PROGRESS NOTES
Phase II:      Report from Viki HENDERSON and Janie SRIVASTAVA      1.  Patient is identified using name and the date of birth.  2.  The patient is free from signs and symptoms of chemical, electrical, laser, radiation, positioning, or transfer/transport injury.  3.  The patient receives appropriate medication(s), safely administered during the Perioperative period.  4.  The patient has wound/tissue perfusion consistent with or improved from baseline levels established preoperatively.  5.  The patient is at or returning to normothermia at the conclusion of the immediate postoperative period.  6.  The patient's fluid, electrolyte, and acid base balances are consistent with or improved from baseline levels established preoperatively.  7.  The patient's pulmonary function is consistent with or improved from baseline levels established preoperatively.  8.  The patient's cardiovascular status is consistent with or improved from baseline levels established preoperatively.  9.  The patient/caregiver demonstrates knowledge of nutritional management related to the operative or other invasive procedure.  10.  The patient/caregiver demonstrates knowledge of medication, pain, and wound management.  11.  The patient participates in the rehabilitation process as applicable.  12.  The patient/caregiver participates in decisions affection his or her Perioperative plan of care.  13.  The patient's care is consistent with the individualized Perioperative plan of care.  14.  The patient's right to privacy is maintained.  15.  The patient is the recipient of competent and ethical care within legal standards of practice.  16.  The patient's value system, lifestyle, ethnicity, and culture are considered, respected, and incorporated in the Perioperative plan of care and understands special services available.  17.  The patient demonstrates and/or reports adequate pain control throughout the the Perioperative period.  18.  The patient's

## 2025-04-30 ENCOUNTER — HOSPITAL ENCOUNTER (OUTPATIENT)
Dept: NURSING | Age: 70
Setting detail: INFUSION SERIES
Discharge: HOME | End: 2025-04-30
Payer: MEDICARE

## 2025-04-30 ENCOUNTER — HOSPITAL ENCOUNTER (OUTPATIENT)
Dept: ULTRASOUND IMAGING | Age: 70
Discharge: HOME | End: 2025-04-30
Payer: MEDICARE

## 2025-04-30 VITALS
RESPIRATION RATE: 16 BRPM | DIASTOLIC BLOOD PRESSURE: 59 MMHG | HEART RATE: 94 BPM | HEIGHT: 67 IN | WEIGHT: 223.99 LBS | BODY MASS INDEX: 35.16 KG/M2 | TEMPERATURE: 98 F | SYSTOLIC BLOOD PRESSURE: 139 MMHG

## 2025-04-30 VITALS — OXYGEN SATURATION: 95 % | SYSTOLIC BLOOD PRESSURE: 156 MMHG | HEART RATE: 95 BPM | DIASTOLIC BLOOD PRESSURE: 77 MMHG

## 2025-04-30 DIAGNOSIS — K74.60 CIRRHOSIS OF LIVER WITH ASCITES, UNSPECIFIED HEPATIC CIRRHOSIS TYPE (HCC): ICD-10-CM

## 2025-04-30 DIAGNOSIS — R18.8 CIRRHOSIS OF LIVER WITH ASCITES, UNSPECIFIED HEPATIC CIRRHOSIS TYPE (HCC): ICD-10-CM

## 2025-04-30 PROCEDURE — 76937 US GUIDE VASCULAR ACCESS: CPT

## 2025-04-30 PROCEDURE — 49083 ABD PARACENTESIS W/IMAGING: CPT

## 2025-04-30 PROCEDURE — 99211 OFF/OP EST MAY X REQ PHY/QHP: CPT

## 2025-04-30 PROCEDURE — 6360000002 HC RX W HCPCS: Performed by: INTERNAL MEDICINE

## 2025-04-30 PROCEDURE — P9047 ALBUMIN (HUMAN), 25%, 50ML: HCPCS | Performed by: INTERNAL MEDICINE

## 2025-04-30 PROCEDURE — 96365 THER/PROPH/DIAG IV INF INIT: CPT

## 2025-04-30 RX ORDER — ALBUMIN (HUMAN) 12.5 G/50ML
50 SOLUTION INTRAVENOUS ONCE
Status: COMPLETED | OUTPATIENT
Start: 2025-04-30 | End: 2025-04-30

## 2025-04-30 RX ADMIN — ALBUMIN (HUMAN) 50 G: 0.25 INJECTION, SOLUTION INTRAVENOUS at 12:56

## 2025-04-30 ASSESSMENT — PAIN SCALES - GENERAL: PAINLEVEL_OUTOF10: 0

## 2025-04-30 NOTE — PROGRESS NOTES
Pt arrived for image guided Paracentesis. Dr. Medina explained the procedure including the risk and benefits of the procedure. All questions answered. Pt verbalizes understanding of the procedure and states no more questions. Consent confirmed. Vital signs stable. Labs, allergies, medications, and code status reviewed. No contraindications noted. Time out completed prior to procedure.    Vital Signs  Vitals:    04/30/25 1145   BP: (!) 156/77   Pulse: 95   SpO2: 95%    (vital signs in table format)      Allergies  Aspirin, Fentanyl, and Oxycontin [oxycodone hcl] (allergies)    Labs  Lab Results   Component Value Date    INR 1.33 (H) 03/31/2025    PROTIME 16.7 (H) 03/31/2025     Lab Results   Component Value Date    CREATININE 1.0 04/01/2025    BUN 14 04/01/2025     04/01/2025    K 3.5 04/01/2025     04/01/2025    CO2 24 04/01/2025     Lab Results   Component Value Date    WBC 8.2 04/04/2025    HGB 10.2 (L) 04/25/2025    HCT 31.5 (L) 04/25/2025    MCV 78.8 (L) 04/04/2025     04/04/2025

## 2025-04-30 NOTE — PROGRESS NOTES
Image guided Paracentesis completed.  11.9 liters of cloudy light yellow colored withdrawn.  Pt tolerated procedure without any signs or symptoms of distress. Vital signs stable. RN Destini from vascular access team put IV in right antecubital.    DISCHARGED:  SDS: Discharge instructions reviewed with patient. Verbalized understanding. Patient taken to [facility] for albumin infusion. Report given to nurse.    SPECIMEN SENT:  No    Vital Signs  Vitals:    04/30/25 1145   BP: (!) 156/77   Pulse: 95   SpO2: 95%    (vital signs in table format)

## 2025-04-30 NOTE — PROGRESS NOTES
[facility]  Ultrasound Guided Peripheral Insertion Procedure Note.     Carroll Wells   Admitted- 4/30/2025 11:46 AM  Admission diagnosis- Other ascites [R18.8]  Unspecified cirrhosis of liver (HCC) [K74.60]      Attending Physician- No att. providers found  Ordering Physician- Eva  Indication for Insertion: Limited Access    USG PIV placed to right forearm using sterile technique. Brisk blood return noted, line flushes with ease. Patient tolerated well. Bed returned to lowest position, call light within reach. See US images below.        Destini Zamarripa RN  Vascular Access Team

## 2025-04-30 NOTE — PROGRESS NOTES
Pt here via w/c from radiology for an Albumin Infusion after having a paracentesis completed with removal of 11.9 liters of fluid per Brisa  Pt without c/o's at present time  IV # 22 was started in his RAC in radiology Site unremarkable Pt was given 50 gm of Albumin  over 31 mins without issues Pt reports that he feels ok  IV was removed  cath tip intact  Pressure then pressure dressing was applied and then secured with a coban dressing  IV site unremarkable  Pt yenny well  Discharge instructions reviewed with pt and understanding was verbalized Copy was given  Pt was then discharged ambulatory using his cane with his wife in stable condition  Pt was escorted to his vehicle per nursing staff

## 2025-05-01 RX ORDER — ONDANSETRON 4 MG/1
4 TABLET, ORALLY DISINTEGRATING ORAL 3 TIMES DAILY PRN
Qty: 21 TABLET | Refills: 0 | Status: SHIPPED | OUTPATIENT
Start: 2025-05-01

## 2025-05-01 NOTE — TELEPHONE ENCOUNTER
Refill Request     CONFIRM preferred pharmacy with the patient.    If Mail Order Rx - Pend for 90 day refill.      Last Seen: Last Seen Department: 4/4/2025  Last Seen by PCP: 4/4/2025    Last Written: 01/13/2025 21 tab 0 refills     If no future appointment scheduled:  Review the last OV with PCP and review information for follow-up visit,  Route STAFF MESSAGE with patient name to the  Pool for scheduling with the following information:            -  Timing of next visit           -  Visit type ie Physical, OV, etc           -  Diagnoses/Reason ie. COPD, HTN - Do not use MEDICATION, Follow-up or CHECK UP - Give reason for visit      Next Appointment:   Future Appointments   Date Time Provider Department Center   5/7/2025 12:00 PM [facility] RM 1 [facility] [place]   5/7/2025  1:00 PM [facility] NURSING ROOM 1 [facility] RN [facility]   7/7/2025 10:30 AM Karina Mendoza PA [facility] DEP       Message sent to  to schedule appt with patient?  NO      Requested Prescriptions     Pending Prescriptions Disp Refills    ondansetron (ZOFRAN-ODT) 4 MG disintegrating tablet 21 tablet 0     Sig: Take 1 tablet by mouth 3 times daily as needed for Nausea or Vomiting

## 2025-05-07 ENCOUNTER — HOSPITAL ENCOUNTER (OUTPATIENT)
Dept: NURSING | Age: 70
Setting detail: INFUSION SERIES
Discharge: HOME | End: 2025-05-07
Payer: MEDICARE

## 2025-05-07 ENCOUNTER — HOSPITAL ENCOUNTER (OUTPATIENT)
Age: 70
Discharge: HOME | End: 2025-05-07
Payer: MEDICARE

## 2025-05-07 ENCOUNTER — HOSPITAL ENCOUNTER (OUTPATIENT)
Dept: ULTRASOUND IMAGING | Age: 70
Discharge: HOME | End: 2025-05-07
Payer: MEDICARE

## 2025-05-07 VITALS — OXYGEN SATURATION: 98 % | HEART RATE: 90 BPM | DIASTOLIC BLOOD PRESSURE: 80 MMHG | SYSTOLIC BLOOD PRESSURE: 158 MMHG

## 2025-05-07 VITALS
WEIGHT: 223.99 LBS | HEIGHT: 67 IN | SYSTOLIC BLOOD PRESSURE: 154 MMHG | HEART RATE: 87 BPM | BODY MASS INDEX: 35.16 KG/M2 | DIASTOLIC BLOOD PRESSURE: 70 MMHG | RESPIRATION RATE: 16 BRPM | TEMPERATURE: 97.8 F

## 2025-05-07 DIAGNOSIS — R18.8 CIRRHOSIS OF LIVER WITH ASCITES, UNSPECIFIED HEPATIC CIRRHOSIS TYPE (HCC): ICD-10-CM

## 2025-05-07 DIAGNOSIS — K74.60 CIRRHOSIS OF LIVER WITH ASCITES, UNSPECIFIED HEPATIC CIRRHOSIS TYPE (HCC): ICD-10-CM

## 2025-05-07 LAB
BASOPHILS # BLD: 0 K/UL (ref 0–0.2)
BASOPHILS NFR BLD: 0.5 %
DEPRECATED RDW RBC AUTO: 22.3 % (ref 12.4–15.4)
EOSINOPHIL # BLD: 0.1 K/UL (ref 0–0.6)
EOSINOPHIL NFR BLD: 1.6 %
HCT VFR BLD AUTO: 32.7 % (ref 40.5–52.5)
HGB BLD-MCNC: 10.6 G/DL (ref 13.5–17.5)
INR PPP: 1.25 (ref 0.85–1.15)
LYMPHOCYTES # BLD: 1 K/UL (ref 1–5.1)
LYMPHOCYTES NFR BLD: 13.6 %
MCH RBC QN AUTO: 26.5 PG (ref 26–34)
MCHC RBC AUTO-ENTMCNC: 32.5 G/DL (ref 31–36)
MCV RBC AUTO: 81.4 FL (ref 80–100)
MONOCYTES # BLD: 0.7 K/UL (ref 0–1.3)
MONOCYTES NFR BLD: 10.4 %
NEUTROPHILS # BLD: 5.3 K/UL (ref 1.7–7.7)
NEUTROPHILS NFR BLD: 73.9 %
PLATELET # BLD AUTO: 195 K/UL (ref 135–450)
PMV BLD AUTO: 7.8 FL (ref 5–10.5)
PROTHROMBIN TIME: 15.8 SEC (ref 11.9–14.9)
RBC # BLD AUTO: 4.01 M/UL (ref 4.2–5.9)
WBC # BLD AUTO: 7.2 K/UL (ref 4–11)

## 2025-05-07 PROCEDURE — 96365 THER/PROPH/DIAG IV INF INIT: CPT

## 2025-05-07 PROCEDURE — 6360000002 HC RX W HCPCS: Performed by: INTERNAL MEDICINE

## 2025-05-07 PROCEDURE — 85610 PROTHROMBIN TIME: CPT

## 2025-05-07 PROCEDURE — 99211 OFF/OP EST MAY X REQ PHY/QHP: CPT

## 2025-05-07 PROCEDURE — 36415 COLL VENOUS BLD VENIPUNCTURE: CPT

## 2025-05-07 PROCEDURE — 49083 ABD PARACENTESIS W/IMAGING: CPT

## 2025-05-07 PROCEDURE — 76937 US GUIDE VASCULAR ACCESS: CPT

## 2025-05-07 PROCEDURE — 85025 COMPLETE CBC W/AUTO DIFF WBC: CPT

## 2025-05-07 PROCEDURE — P9047 ALBUMIN (HUMAN), 25%, 50ML: HCPCS | Performed by: INTERNAL MEDICINE

## 2025-05-07 RX ORDER — ALBUMIN (HUMAN) 12.5 G/50ML
50 SOLUTION INTRAVENOUS ONCE
Status: COMPLETED | OUTPATIENT
Start: 2025-05-07 | End: 2025-05-07

## 2025-05-07 RX ORDER — ALBUMIN (HUMAN) 12.5 G/50ML
50 SOLUTION INTRAVENOUS ONCE
Status: DISCONTINUED | OUTPATIENT
Start: 2025-05-07 | End: 2025-05-07

## 2025-05-07 RX ADMIN — ALBUMIN (HUMAN) 50 G: 0.25 INJECTION, SOLUTION INTRAVENOUS at 12:50

## 2025-05-07 ASSESSMENT — PAIN SCALES - GENERAL: PAINLEVEL_OUTOF10: 0

## 2025-05-07 NOTE — PROGRESS NOTES
Pt here via w/c from radiology for an Albumin Infusion after having a paracentesis completed with removal of 10.6 liters of fluid per Brisa  Pt without c/o's at present time  Pt has an IV 20 ga in his upper Rt arm started in US Site unremarkable Pt was given 50 gm of Albumin  over 30 mins without issues Pt reports that he feels ok  IV was removed  cath tip intact  Pressure then pressure dressing was applied and then secured with a coban dressing  IV site unremarkable  Pt yenny well  Discharge instructions reviewed with pt and understanding was verbalized Copy was given  Pt was then discharged ambulatory using his cane with his wife in stable condition  Pt was escorted to his vehicle per nursing staff

## 2025-05-07 NOTE — PROGRESS NOTES
Pt arrived for image guided Paracentesis. Dr. Watson explained the procedure including the risk and benefits of the procedure. All questions answered. Pt verbalizes understanding of the procedure and states no more questions. Consent confirmed. Vital signs stable. Labs, allergies, medications, and code status reviewed. No contraindications noted. Time out completed prior to procedure.    Vital Signs  Vitals:    05/07/25 1145   BP: (!) 158/80   Pulse: 90   SpO2: 98%    (vital signs in table format)      Allergies  Aspirin, Fentanyl, and Oxycontin [oxycodone hcl] (allergies)    Labs  Lab Results   Component Value Date    INR 1.25 (H) 05/07/2025    PROTIME 15.8 (H) 05/07/2025     Lab Results   Component Value Date    CREATININE 1.0 04/01/2025    BUN 14 04/01/2025     04/01/2025    K 3.5 04/01/2025     04/01/2025    CO2 24 04/01/2025     Lab Results   Component Value Date    WBC 7.2 05/07/2025    HGB 10.6 (L) 05/07/2025    HCT 32.7 (L) 05/07/2025    MCV 81.4 05/07/2025     05/07/2025

## 2025-05-07 NOTE — PROGRESS NOTES
[facility] Vascular Access  Ultrasound Guided Peripheral Insertion Procedure Note.     Carroll Wells   Admitted- 5/7/2025 11:05 AM  Admission diagnosis- Cirrhosis of liver with ascites, unspecified hepatic cirrhosis type (HCC) [K74.60, R18.8]      Attending Physician- No att. providers found  Ordering Physician- Dr. Velasquez  Indication for Insertion: No Access          USG PIV placed to right using sterile technique. Brisk blood return noted, line flushes with ease. Patient tolerated well. Bed returned to lowest position, call light within reach. See US images below.

## 2025-05-07 NOTE — PROGRESS NOTES
Image guided Paracentesis completed.  10.6 liters of cloudy yellow colored withdrawn.  Pt tolerated procedure without any signs or symptoms of distress. Vital signs stable. Vascular access team put IV in right upper in arm.     DISCHARGED:  SDS: Discharge instructions reviewed with patient. Verbalized understanding. Patient taken to [facility] for albumin infusion. Report given to nurse.    SPECIMEN SENT:  No    Vital Signs  Vitals:    05/07/25 1145   BP: (!) 158/80   Pulse: 90   SpO2: 98%    (vital signs in table format)

## 2025-05-14 ENCOUNTER — HOSPITAL ENCOUNTER (OUTPATIENT)
Dept: ULTRASOUND IMAGING | Age: 70
Discharge: HOME | End: 2025-05-14
Payer: MEDICARE

## 2025-05-14 ENCOUNTER — HOSPITAL ENCOUNTER (OUTPATIENT)
Dept: NURSING | Age: 70
Setting detail: INFUSION SERIES
Discharge: HOME | End: 2025-05-14
Payer: MEDICARE

## 2025-05-14 VITALS
TEMPERATURE: 97.9 F | HEIGHT: 67 IN | BODY MASS INDEX: 35.16 KG/M2 | SYSTOLIC BLOOD PRESSURE: 138 MMHG | RESPIRATION RATE: 16 BRPM | WEIGHT: 223.99 LBS | HEART RATE: 84 BPM | DIASTOLIC BLOOD PRESSURE: 74 MMHG

## 2025-05-14 VITALS — DIASTOLIC BLOOD PRESSURE: 74 MMHG | HEART RATE: 83 BPM | OXYGEN SATURATION: 96 % | SYSTOLIC BLOOD PRESSURE: 142 MMHG

## 2025-05-14 DIAGNOSIS — R18.8 CIRRHOSIS OF LIVER WITH ASCITES, UNSPECIFIED HEPATIC CIRRHOSIS TYPE (HCC): ICD-10-CM

## 2025-05-14 DIAGNOSIS — K74.60 CIRRHOSIS OF LIVER WITH ASCITES, UNSPECIFIED HEPATIC CIRRHOSIS TYPE (HCC): ICD-10-CM

## 2025-05-14 PROCEDURE — 76937 US GUIDE VASCULAR ACCESS: CPT

## 2025-05-14 PROCEDURE — 99211 OFF/OP EST MAY X REQ PHY/QHP: CPT

## 2025-05-14 PROCEDURE — 96365 THER/PROPH/DIAG IV INF INIT: CPT

## 2025-05-14 PROCEDURE — P9047 ALBUMIN (HUMAN), 25%, 50ML: HCPCS | Performed by: INTERNAL MEDICINE

## 2025-05-14 PROCEDURE — 49083 ABD PARACENTESIS W/IMAGING: CPT

## 2025-05-14 PROCEDURE — 6360000002 HC RX W HCPCS: Performed by: INTERNAL MEDICINE

## 2025-05-14 RX ORDER — ALBUMIN (HUMAN) 12.5 G/50ML
50 SOLUTION INTRAVENOUS ONCE
Status: COMPLETED | OUTPATIENT
Start: 2025-05-14 | End: 2025-05-14

## 2025-05-14 RX ADMIN — ALBUMIN (HUMAN) 50 G: 0.25 INJECTION, SOLUTION INTRAVENOUS at 13:23

## 2025-05-14 ASSESSMENT — PAIN SCALES - GENERAL: PAINLEVEL_OUTOF10: 0

## 2025-05-14 NOTE — PROGRESS NOTES
[facility]  Ultrasound Guided Peripheral Insertion Procedure Note.     Carroll Wells   Admitted- 5/14/2025 11:56 AM  Admission diagnosis- Unspecified cirrhosis of liver (HCC) [K74.60]  Other ascites [R18.8]      Attending Physician- No att. providers found  Ordering Physician- Eva  Indication for Insertion: Limited Access    USG PIV placed to right forearm using sterile technique. Brisk blood return noted, line flushes with ease. Patient tolerated well. Bed returned to lowest position, call light within reach. See US images below.        Destini Zamarripa RN  Vascular Access Team

## 2025-05-14 NOTE — PROGRESS NOTES
Pt here via w/c from radiology for an Albumin Infusion after having a paracentesis completed with removal of 9.9 liters of fluid per [facility] tech   Pt without c/o's at present time  Pt has an IV 20 ga in his RFA started in US Site unremarkable Pt was given 50 gm of Albumin  over 30 mins without issues Pt reports that he feels ok  IV was removed  cath tip intact  Pressure then pressure dressing was applied and then secured with a coban dressing  IV site unremarkable  Pt yenny well  Verbal discharge instructions reviewed with pt and his wife and understanding was verbalized   Pt refused a written copy today  Pt was then discharged ambulatory using his cane with his wife in stable condition  Pt was escorted to his vehicle per nursing staff

## 2025-05-14 NOTE — PROCEDURES
PROCEDURE NOTE  Date: 5/14/2025   Name: Carroll Wells  YOB: 1955    Procedures    Image guided right Paracentesis completed.  9.9 liters of cloudy yellow colored withdrawn.  Pt tolerated procedure without any signs or symptoms of distress. Vital signs stable.     DISCHARGED:  SDS: Discharge instructions reviewed with patient. Verbalized understanding. Patient taken to \[facility]\"" for albumin infusion. Report given to nurse.    SPECIMEN SENT:  No    Vital Signs  Vitals:    05/14/25 1230   BP: (!) 142/74   Pulse: 83   SpO2:     (vital signs in table format)

## 2025-05-14 NOTE — PROCEDURES
PROCEDURE NOTE  Date: 5/14/2025   Name: Carroll Wells  YOB: 1955    Procedures    Pt arrived for image guided right Paracentesis. Dr. Medina explained the procedure including the risk and benefits of the procedure. All questions answered. Pt verbalizes understanding of the procedure and states no more questions. Consent confirmed. Vital signs stable. Labs, allergies, medications, and code status reviewed. No contraindications noted. Time out completed prior to procedure.    Vital Signs  Vitals:    05/14/25 1200   BP: (!) 147/71   Pulse: 83   SpO2: 96%    (vital signs in table format)      Allergies  Aspirin, Fentanyl, and Oxycontin [oxycodone hcl] (allergies)    Labs  Lab Results   Component Value Date    INR 1.25 (H) 05/07/2025    PROTIME 15.8 (H) 05/07/2025     Lab Results   Component Value Date    CREATININE 1.0 04/01/2025    BUN 14 04/01/2025     04/01/2025    K 3.5 04/01/2025     04/01/2025    CO2 24 04/01/2025     Lab Results   Component Value Date    WBC 7.2 05/07/2025    HGB 10.6 (L) 05/07/2025    HCT 32.7 (L) 05/07/2025    MCV 81.4 05/07/2025     05/07/2025

## 2025-05-14 NOTE — DISCHARGE INSTRUCTIONS
albumin (human)  Pronunciation:  al BUE min (HUE man)  Brand:  Albuked, Albuminar-25, Alburx, Albutein, Buminate, Flexbumin, Human Albumin Grifols, Kedbumin, Plasbumin-25, Plasbumin-5  What is the most important information I should know about albumin?  You should not receive albumin if you have severe anemia (lack of red blood cells), or severe heart failure.  What is albumin?  Albumin is a protein produced by the liver that circulates in plasma (the clear liquid portion of your blood). Medicinal albumin is made of plasma proteins from human blood. This medicine works by increasing plasma volume or levels of albumin in the blood.  Albumin is used to replace blood volume loss resulting from trauma such as a severe burns or an injury that causes blood loss. This medicine is also used to treat low albumin levels caused by surgery, dialysis, abdominal infections, liver failure, pancreatitis, respiratory distress, bypass surgery, ovarian problems caused by fertility drugs, and other many other conditions.  Albumin may also be used for purposes not listed in this medication guide.  What should I discuss with my healthcare provider before receiving albumin?  You should not use albumin if you are allergic to it, or if you have:  severe anemia (lack of red blood cells); or  severe heart failure.  If possible before you receive albumin, tell your doctor if you have:  anemia;  heart disease, high blood pressure;  bleeding or blood clotting disorder such as hemophilia;  lung problems;  kidney disease;  a latex allergy; or  if you are unable to urinate.  In an emergency situation it may not be possible to tell your caregivers about your health conditions. Make sure any doctor caring for you afterward knows you have received this medicine.  Albumin is made from human plasma (part of the blood) which may contain viruses and other infectious agents. Donated plasma is tested and treated to reduce the risk of it containing  chest pain, and fast or uneven heart rate.  Common side effects may include:  nausea, vomiting;  fever, chills;  fast heart rate;  mild rash; or  flushing (warmth, redness, or tingly feeling).  This is not a complete list of side effects and others may occur. Call your doctor for medical advice about side effects. You may report side effects to FDA at 7-074-GCX-8741.  What other drugs will affect albumin?  Other drugs may interact with albumin, including prescription and over-the-counter medicines, vitamins, and herbal products. Tell each of your health care providers about all medicines you use now and any medicine you start or stop using.  Where can I get more information?  Your pharmacist can provide more information about albumin.    FOLLOW UP WITH DR. RODRIGUEZ AS NEEDED OR SCHEDULED

## 2025-05-21 ENCOUNTER — HOSPITAL ENCOUNTER (OUTPATIENT)
Dept: ULTRASOUND IMAGING | Age: 70
Discharge: HOME | End: 2025-05-21
Payer: MEDICARE

## 2025-05-21 ENCOUNTER — HOSPITAL ENCOUNTER (OUTPATIENT)
Dept: NURSING | Age: 70
Setting detail: INFUSION SERIES
Discharge: HOME | End: 2025-05-21
Payer: MEDICARE

## 2025-05-21 VITALS
RESPIRATION RATE: 16 BRPM | BODY MASS INDEX: 35.31 KG/M2 | DIASTOLIC BLOOD PRESSURE: 70 MMHG | HEIGHT: 67 IN | HEART RATE: 92 BPM | WEIGHT: 225 LBS | TEMPERATURE: 97.8 F | SYSTOLIC BLOOD PRESSURE: 131 MMHG

## 2025-05-21 VITALS — DIASTOLIC BLOOD PRESSURE: 82 MMHG | HEART RATE: 99 BPM | SYSTOLIC BLOOD PRESSURE: 167 MMHG | OXYGEN SATURATION: 98 %

## 2025-05-21 DIAGNOSIS — K74.60 CIRRHOSIS OF LIVER WITH ASCITES, UNSPECIFIED HEPATIC CIRRHOSIS TYPE (HCC): ICD-10-CM

## 2025-05-21 DIAGNOSIS — R18.8 CIRRHOSIS OF LIVER WITH ASCITES, UNSPECIFIED HEPATIC CIRRHOSIS TYPE (HCC): ICD-10-CM

## 2025-05-21 PROCEDURE — 99211 OFF/OP EST MAY X REQ PHY/QHP: CPT

## 2025-05-21 PROCEDURE — 6360000002 HC RX W HCPCS: Performed by: INTERNAL MEDICINE

## 2025-05-21 PROCEDURE — P9047 ALBUMIN (HUMAN), 25%, 50ML: HCPCS | Performed by: INTERNAL MEDICINE

## 2025-05-21 PROCEDURE — 49083 ABD PARACENTESIS W/IMAGING: CPT

## 2025-05-21 PROCEDURE — 96367 TX/PROPH/DG ADDL SEQ IV INF: CPT

## 2025-05-21 PROCEDURE — 76937 US GUIDE VASCULAR ACCESS: CPT

## 2025-05-21 PROCEDURE — 96365 THER/PROPH/DIAG IV INF INIT: CPT

## 2025-05-21 RX ORDER — ALBUMIN (HUMAN) 12.5 G/50ML
50 SOLUTION INTRAVENOUS ONCE
Status: COMPLETED | OUTPATIENT
Start: 2025-05-21 | End: 2025-05-21

## 2025-05-21 RX ADMIN — ALBUMIN (HUMAN) 50 G: 0.25 INJECTION, SOLUTION INTRAVENOUS at 13:12

## 2025-05-21 NOTE — PROGRESS NOTES
1250 Pt here from radiology for an Albumin Infusion after having a paracentesis completed with removal of 9.1 liters of fluid per US tech   Pt without c/o's at present time  Pt has an IV 24 ga in his RHA started in US Site unremarkable.       1312 Albumin Infusion started.Wife in room with patient.  Patient tolerating peanut butter crackers and Soda.    1350 Pt was given 50 gm of Albumin without issues Pt reports that he feels ok  IV was removed  cath tip intact  Pressure then pressure dressing was applied and then secured with a coban dressing  IV site unremarkable  Pt yenny well  Verbal discharge instructions reviewed with pt and his wife and understanding was verbalized  Pt was then discharged ambulatory using his cane with his wife in stable condition

## 2025-05-21 NOTE — PROGRESS NOTES
Image guided Paracentesis completed.  9.1 liters of cloudy yellow colored withdrawn.  Pt tolerated procedure without any signs or symptoms of distress. Vital signs stable. RN Destini 24 G IV right hand.     DISCHARGED:  SDS: Discharge instructions reviewed with patient. Verbalized understanding. Patient taken to [facility] for albumin infusion. Report given to nurse.    SPECIMEN SENT:  No    Vital Signs  Vitals:    05/21/25 1154   BP: (!) 167/82   Pulse: 99   SpO2: 98%    (vital signs in table format)

## 2025-05-21 NOTE — PROGRESS NOTES
[facility]  Ultrasound Guided Peripheral Insertion Procedure Note.     Carroll Wells   Admitted- 5/21/2025 11:47 AM  Admission diagnosis- Unspecified cirrhosis of liver (HCC) [K74.60]  Other ascites [R18.8]      Attending Physician- No att. providers found  Ordering Physician- Eva  Indication for Insertion: Limited Access    USG PIV placed to right hand using sterile technique. Brisk blood return noted, line flushes with ease. Patient tolerated well. Bed returned to lowest position, call light within reach. See US images below.           Destini Zamarripa RN  Vascular Access Team

## 2025-05-21 NOTE — PROGRESS NOTES
Pt arrived for image guided Paracentesis. Dr. Robb explained the procedure including the risk and benefits of the procedure. All questions answered. Pt verbalizes understanding of the procedure and states no more questions. Consent confirmed. Vital signs stable. Labs, allergies, medications, and code status reviewed. No contraindications noted. Time out completed prior to procedure.    Vital Signs  Vitals:    05/21/25 1154   BP: (!) 167/82   Pulse: 99   SpO2: 98%    (vital signs in table format)      Allergies  Aspirin, Fentanyl, and Oxycontin [oxycodone hcl] (allergies)    Labs  Lab Results   Component Value Date    INR 1.25 (H) 05/07/2025    PROTIME 15.8 (H) 05/07/2025     Lab Results   Component Value Date    CREATININE 1.0 04/01/2025    BUN 14 04/01/2025     04/01/2025    K 3.5 04/01/2025     04/01/2025    CO2 24 04/01/2025     Lab Results   Component Value Date    WBC 7.2 05/07/2025    HGB 10.6 (L) 05/07/2025    HCT 32.7 (L) 05/07/2025    MCV 81.4 05/07/2025     05/07/2025

## 2025-05-23 NOTE — DISCHARGE INSTRUCTIONS
albumin (human)  Pronunciation:  al BUE min (HUE man)  Brand:  Albuked, Albuminar-25, Alburx, Albutein, Buminate, Flexbumin, Human Albumin Grifols, Kedbumin, Plasbumin-25, Plasbumin-5  What is the most important information I should know about albumin?  You should not receive albumin if you have severe anemia (lack of red blood cells), or severe heart failure.  What is albumin?  Albumin is a protein produced by the liver that circulates in plasma (the clear liquid portion of your blood). Medicinal albumin is made of plasma proteins from human blood. This medicine works by increasing plasma volume or levels of albumin in the blood.  Albumin is used to replace blood volume loss resulting from trauma such as a severe burns or an injury that causes blood loss. This medicine is also used to treat low albumin levels caused by surgery, dialysis, abdominal infections, liver failure, pancreatitis, respiratory distress, bypass surgery, ovarian problems caused by fertility drugs, and other many other conditions.  Albumin may also be used for purposes not listed in this medication guide.  What should I discuss with my healthcare provider before receiving albumin?  You should not use albumin if you are allergic to it, or if you have:  severe anemia (lack of red blood cells); or  severe heart failure.  If possible before you receive albumin, tell your doctor if you have:  anemia;  heart disease, high blood pressure;  bleeding or blood clotting disorder such as hemophilia;  lung problems;  kidney disease;  a latex allergy; or  if you are unable to urinate.  In an emergency situation it may not be possible to tell your caregivers about your health conditions. Make sure any doctor caring for you afterward knows you have received this medicine.  Albumin is made from human plasma (part of the blood) which may contain viruses and other infectious agents. Donated plasma is tested and treated to reduce the risk of it containing  chest pain, and fast or uneven heart rate.  Common side effects may include:  nausea, vomiting;  fever, chills;  fast heart rate;  mild rash; or  flushing (warmth, redness, or tingly feeling).  This is not a complete list of side effects and others may occur. Call your doctor for medical advice about side effects. You may report side effects to FDA at 9-613-AKF-7382.  What other drugs will affect albumin?  Other drugs may interact with albumin, including prescription and over-the-counter medicines, vitamins, and herbal products. Tell each of your health care providers about all medicines you use now and any medicine you start or stop using.  Where can I get more information?  Your pharmacist can provide more information about albumin.    FOLLOW UP WITH DR. RODRIGUEZ AS NEEDED OR SCHEDULED

## 2025-05-28 ENCOUNTER — HOSPITAL ENCOUNTER (OUTPATIENT)
Dept: NURSING | Age: 70
Setting detail: INFUSION SERIES
Discharge: HOME | End: 2025-05-28
Payer: MEDICARE

## 2025-05-28 ENCOUNTER — HOSPITAL ENCOUNTER (OUTPATIENT)
Dept: ULTRASOUND IMAGING | Age: 70
Discharge: HOME | End: 2025-05-28
Payer: MEDICARE

## 2025-05-28 VITALS — HEART RATE: 94 BPM | SYSTOLIC BLOOD PRESSURE: 130 MMHG | DIASTOLIC BLOOD PRESSURE: 59 MMHG | OXYGEN SATURATION: 100 %

## 2025-05-28 VITALS
HEIGHT: 67 IN | WEIGHT: 225.09 LBS | DIASTOLIC BLOOD PRESSURE: 50 MMHG | RESPIRATION RATE: 16 BRPM | SYSTOLIC BLOOD PRESSURE: 135 MMHG | HEART RATE: 78 BPM | BODY MASS INDEX: 35.33 KG/M2

## 2025-05-28 DIAGNOSIS — R18.8 CIRRHOSIS OF LIVER WITH ASCITES, UNSPECIFIED HEPATIC CIRRHOSIS TYPE (HCC): ICD-10-CM

## 2025-05-28 DIAGNOSIS — K74.60 CIRRHOSIS OF LIVER WITH ASCITES, UNSPECIFIED HEPATIC CIRRHOSIS TYPE (HCC): ICD-10-CM

## 2025-05-28 PROCEDURE — 6360000002 HC RX W HCPCS: Performed by: INTERNAL MEDICINE

## 2025-05-28 PROCEDURE — 49083 ABD PARACENTESIS W/IMAGING: CPT

## 2025-05-28 PROCEDURE — P9047 ALBUMIN (HUMAN), 25%, 50ML: HCPCS | Performed by: INTERNAL MEDICINE

## 2025-05-28 PROCEDURE — 76937 US GUIDE VASCULAR ACCESS: CPT

## 2025-05-28 PROCEDURE — 99211 OFF/OP EST MAY X REQ PHY/QHP: CPT

## 2025-05-28 PROCEDURE — 96365 THER/PROPH/DIAG IV INF INIT: CPT

## 2025-05-28 RX ORDER — ALBUMIN (HUMAN) 12.5 G/50ML
50 SOLUTION INTRAVENOUS ONCE
Status: COMPLETED | OUTPATIENT
Start: 2025-05-28 | End: 2025-05-28

## 2025-05-28 RX ADMIN — ALBUMIN (HUMAN) 50 G: 0.25 INJECTION, SOLUTION INTRAVENOUS at 13:30

## 2025-05-28 ASSESSMENT — PAIN SCALES - GENERAL: PAINLEVEL_OUTOF10: 0

## 2025-05-28 NOTE — PROGRESS NOTES
[facility]  Ultrasound Guided Peripheral Insertion Procedure Note.     Carroll Wells   Admitted- 5/28/2025 11:51 AM  Admission diagnosis- Cirrhosis of liver with ascites, unspecified hepatic cirrhosis type (HCC) [K74.60, R18.8]      Attending Physician- No att. providers found  Ordering Physician- Eva  Indication for Insertion: Limited Access    USG PIV placed to right upper arm using sterile technique. Brisk blood return noted, line flushes with ease. Patient tolerated well. Bed returned to lowest position, call light within reach. See US images below.        Destini Zamarripa RN  Vascular Access Team

## 2025-05-28 NOTE — PROCEDURES
PROCEDURE NOTE  Date: 5/28/2025   Name: Carroll Wells  YOB: 1955    Procedures    Image guided right Paracentesis completed.  10.3 liters of cloudy yellow colored withdrawn.  Pt tolerated procedure without any signs or symptoms of distress. Vital signs stable.     DISCHARGED:  SDS: Discharge instructions reviewed with patient. Verbalized understanding. Patient taken to [facility] for albumin infusion. Report given to nurse.    SPECIMEN SENT:  No    Vital Signs  Vitals:    05/28/25 1245   BP: (!) 130/59   Pulse:    SpO2: 100%    (vital signs in table format)

## 2025-05-28 NOTE — PROGRESS NOTES
Pt here via w/c from radiology for an Albumin Infusion after having a paracentesis completed with removal of 10.3 liters of fluid per [facility] tech   Pt without c/o's at present time  Pt has an IV 22 ga in his RAC started in US Site unremarkable  Albumin started and IV immediately infiltrated  IV was removed cath tip intact  Pressure then pressure dressing was applied and secured with a coban dressing  IV site unremarkable  IV # 24 was started in his rt Hand on 1st attempt per S [place]ivas HENDERSON then Albumin 50 gm was infused over 33 mins without issues Pt reports that he feels ok  IV was removed  cath tip intact  Pressure then pressure dressing was applied and then secured with a coban dressing  IV site unremarkable  Pt yenny well  Verbal discharge instructions reviewed with pt and his wife and understanding was verbalized   Pt refused a written copy today  Pt was then discharged ambulatory using his cane with his wife in stable condition  Pt was escorted to his vehicle per nursing staff

## 2025-05-28 NOTE — PROCEDURES
PROCEDURE NOTE  Date: 5/28/2025   Name: Carroll Wells  YOB: 1955    Procedures    Pt arrived for image guided right Paracentesis. Dr. Medina explained the procedure including the risk and benefits of the procedure. All questions answered. Pt verbalizes understanding of the procedure and states no more questions. Consent confirmed. Vital signs stable. Labs, allergies, medications, and code status reviewed. No contraindications noted. Time out completed prior to procedure.    Vital Signs  Vitals:    05/28/25 1145   BP: (!) 147/68   Pulse: 94   SpO2: 100%    (vital signs in table format)      Allergies  Aspirin, Fentanyl, and Oxycontin [oxycodone hcl] (allergies)    Labs  Lab Results   Component Value Date    INR 1.25 (H) 05/07/2025    PROTIME 15.8 (H) 05/07/2025     Lab Results   Component Value Date    CREATININE 1.0 04/01/2025    BUN 14 04/01/2025     04/01/2025    K 3.5 04/01/2025     04/01/2025    CO2 24 04/01/2025     Lab Results   Component Value Date    WBC 7.2 05/07/2025    HGB 10.6 (L) 05/07/2025    HCT 32.7 (L) 05/07/2025    MCV 81.4 05/07/2025     05/07/2025

## 2025-06-02 ENCOUNTER — PATIENT MESSAGE (OUTPATIENT)
Dept: FAMILY MEDICINE CLINIC | Age: 70
End: 2025-06-02

## 2025-06-02 NOTE — TELEPHONE ENCOUNTER
Do you want to see patient first or refer to urology?    Urology referral pended.    Please advise.

## 2025-06-04 ENCOUNTER — HOSPITAL ENCOUNTER (OUTPATIENT)
Dept: ULTRASOUND IMAGING | Age: 70
Discharge: HOME | End: 2025-06-04
Payer: MEDICARE

## 2025-06-04 ENCOUNTER — HOSPITAL ENCOUNTER (OUTPATIENT)
Dept: NURSING | Age: 70
Setting detail: INFUSION SERIES
Discharge: HOME | End: 2025-06-04
Payer: MEDICARE

## 2025-06-04 VITALS — SYSTOLIC BLOOD PRESSURE: 159 MMHG | DIASTOLIC BLOOD PRESSURE: 86 MMHG | HEART RATE: 98 BPM | OXYGEN SATURATION: 98 %

## 2025-06-04 VITALS
TEMPERATURE: 97.8 F | SYSTOLIC BLOOD PRESSURE: 141 MMHG | DIASTOLIC BLOOD PRESSURE: 64 MMHG | RESPIRATION RATE: 16 BRPM | HEART RATE: 84 BPM

## 2025-06-04 DIAGNOSIS — K74.60 CIRRHOSIS OF LIVER WITH ASCITES, UNSPECIFIED HEPATIC CIRRHOSIS TYPE (HCC): ICD-10-CM

## 2025-06-04 DIAGNOSIS — R18.8 CIRRHOSIS OF LIVER WITH ASCITES, UNSPECIFIED HEPATIC CIRRHOSIS TYPE (HCC): ICD-10-CM

## 2025-06-04 PROCEDURE — 49083 ABD PARACENTESIS W/IMAGING: CPT

## 2025-06-04 PROCEDURE — 99211 OFF/OP EST MAY X REQ PHY/QHP: CPT

## 2025-06-04 PROCEDURE — 6360000002 HC RX W HCPCS: Performed by: INTERNAL MEDICINE

## 2025-06-04 PROCEDURE — P9047 ALBUMIN (HUMAN), 25%, 50ML: HCPCS | Performed by: INTERNAL MEDICINE

## 2025-06-04 PROCEDURE — 96365 THER/PROPH/DIAG IV INF INIT: CPT

## 2025-06-04 RX ORDER — ALBUMIN (HUMAN) 12.5 G/50ML
50 SOLUTION INTRAVENOUS ONCE
Status: COMPLETED | OUTPATIENT
Start: 2025-06-04 | End: 2025-06-04

## 2025-06-04 RX ADMIN — ALBUMIN (HUMAN) 50 G: 0.25 INJECTION, SOLUTION INTRAVENOUS at 12:48

## 2025-06-04 ASSESSMENT — PAIN SCALES - GENERAL: PAINLEVEL_OUTOF10: 0

## 2025-06-04 ASSESSMENT — PAIN - FUNCTIONAL ASSESSMENT: PAIN_FUNCTIONAL_ASSESSMENT: 0-10

## 2025-06-04 NOTE — PROGRESS NOTES
Image guided Paracentesis completed.  8 liters of cloudy yellow colored withdrawn.  Pt tolerated procedure without any signs or symptoms of distress. Vital signs stable.     DISCHARGED:  SDS: Discharge instructions reviewed with patient. Verbalized understanding. Patient taken to [facility] for albumin infusion. Report given to nurse.    SPECIMEN SENT:  No    Vital Signs  Vitals:    06/04/25 1148   BP: (!) 159/86   Pulse: 98   SpO2: 98%    (vital signs in table format)

## 2025-06-04 NOTE — PROGRESS NOTES
Pt arrived for image guided Paracentesis. Dr. Medina explained the procedure including the risk and benefits of the procedure. All questions answered. Pt verbalizes understanding of the procedure and states no more questions. Consent confirmed. Vital signs stable. Labs, allergies, medications, and code status reviewed. No contraindications noted. Time out completed prior to procedure.    Vital Signs  Vitals:    06/04/25 1148   BP: (!) 159/86   Pulse: 98   SpO2: 98%    (vital signs in table format)      Allergies  Aspirin, Fentanyl, and Oxycontin [oxycodone hcl] (allergies)    Labs  Lab Results   Component Value Date    INR 1.25 (H) 05/07/2025    PROTIME 15.8 (H) 05/07/2025     Lab Results   Component Value Date    CREATININE 1.0 04/01/2025    BUN 14 04/01/2025     04/01/2025    K 3.5 04/01/2025     04/01/2025    CO2 24 04/01/2025     Lab Results   Component Value Date    WBC 7.2 05/07/2025    HGB 10.6 (L) 05/07/2025    HCT 32.7 (L) 05/07/2025    MCV 81.4 05/07/2025     05/07/2025

## 2025-06-04 NOTE — PROGRESS NOTES
Pt discharged to home in stable condition.  Verbal and written discharge instructions given pt verbalized understanding.

## 2025-06-11 ENCOUNTER — HOSPITAL ENCOUNTER (OUTPATIENT)
Age: 70
Discharge: HOME | End: 2025-06-11
Payer: MEDICARE

## 2025-06-11 ENCOUNTER — HOSPITAL ENCOUNTER (OUTPATIENT)
Dept: ULTRASOUND IMAGING | Age: 70
Discharge: HOME | End: 2025-06-11
Payer: MEDICARE

## 2025-06-11 ENCOUNTER — HOSPITAL ENCOUNTER (OUTPATIENT)
Dept: NURSING | Age: 70
Setting detail: INFUSION SERIES
Discharge: HOME | End: 2025-06-11
Payer: MEDICARE

## 2025-06-11 VITALS
SYSTOLIC BLOOD PRESSURE: 131 MMHG | DIASTOLIC BLOOD PRESSURE: 64 MMHG | BODY MASS INDEX: 35.33 KG/M2 | RESPIRATION RATE: 16 BRPM | HEIGHT: 67 IN | HEART RATE: 70 BPM | WEIGHT: 225.09 LBS

## 2025-06-11 VITALS — SYSTOLIC BLOOD PRESSURE: 123 MMHG | OXYGEN SATURATION: 97 % | DIASTOLIC BLOOD PRESSURE: 71 MMHG | HEART RATE: 71 BPM

## 2025-06-11 DIAGNOSIS — R18.8 CIRRHOSIS OF LIVER WITH ASCITES, UNSPECIFIED HEPATIC CIRRHOSIS TYPE (HCC): ICD-10-CM

## 2025-06-11 DIAGNOSIS — K74.60 CIRRHOSIS OF LIVER WITH ASCITES, UNSPECIFIED HEPATIC CIRRHOSIS TYPE (HCC): ICD-10-CM

## 2025-06-11 LAB
BASOPHILS # BLD: 0 K/UL (ref 0–0.2)
BASOPHILS NFR BLD: 0.7 %
DEPRECATED RDW RBC AUTO: 17.5 % (ref 12.4–15.4)
EOSINOPHIL # BLD: 0.1 K/UL (ref 0–0.6)
EOSINOPHIL NFR BLD: 0.8 %
HCT VFR BLD AUTO: 26.2 % (ref 40.5–52.5)
HGB BLD-MCNC: 8.7 G/DL (ref 13.5–17.5)
INR PPP: 1.49 (ref 0.86–1.14)
LYMPHOCYTES # BLD: 0.9 K/UL (ref 1–5.1)
LYMPHOCYTES NFR BLD: 14.5 %
MCH RBC QN AUTO: 27.9 PG (ref 26–34)
MCHC RBC AUTO-ENTMCNC: 33.3 G/DL (ref 31–36)
MCV RBC AUTO: 83.8 FL (ref 80–100)
MONOCYTES # BLD: 0.8 K/UL (ref 0–1.3)
MONOCYTES NFR BLD: 11.8 %
NEUTROPHILS # BLD: 4.6 K/UL (ref 1.7–7.7)
NEUTROPHILS NFR BLD: 72.2 %
PLATELET # BLD AUTO: 220 K/UL (ref 135–450)
PMV BLD AUTO: 7.6 FL (ref 5–10.5)
PROTHROMBIN TIME: 18.2 SEC (ref 12.1–14.9)
RBC # BLD AUTO: 3.13 M/UL (ref 4.2–5.9)
WBC # BLD AUTO: 6.4 K/UL (ref 4–11)

## 2025-06-11 PROCEDURE — 6360000002 HC RX W HCPCS: Performed by: INTERNAL MEDICINE

## 2025-06-11 PROCEDURE — 96365 THER/PROPH/DIAG IV INF INIT: CPT

## 2025-06-11 PROCEDURE — P9047 ALBUMIN (HUMAN), 25%, 50ML: HCPCS | Performed by: INTERNAL MEDICINE

## 2025-06-11 PROCEDURE — 36415 COLL VENOUS BLD VENIPUNCTURE: CPT

## 2025-06-11 PROCEDURE — 99211 OFF/OP EST MAY X REQ PHY/QHP: CPT

## 2025-06-11 PROCEDURE — 76937 US GUIDE VASCULAR ACCESS: CPT

## 2025-06-11 PROCEDURE — 49083 ABD PARACENTESIS W/IMAGING: CPT

## 2025-06-11 PROCEDURE — 85610 PROTHROMBIN TIME: CPT

## 2025-06-11 PROCEDURE — 85025 COMPLETE CBC W/AUTO DIFF WBC: CPT

## 2025-06-11 RX ORDER — ALBUMIN (HUMAN) 12.5 G/50ML
50 SOLUTION INTRAVENOUS ONCE
Status: COMPLETED | OUTPATIENT
Start: 2025-06-11 | End: 2025-06-11

## 2025-06-11 RX ADMIN — ALBUMIN (HUMAN) 50 G: 0.25 INJECTION, SOLUTION INTRAVENOUS at 13:07

## 2025-06-11 ASSESSMENT — PAIN SCALES - GENERAL: PAINLEVEL_OUTOF10: 0

## 2025-06-11 NOTE — PROGRESS NOTES
Image guided Paracentesis completed.  8.7 liters of cloudy yellow colored withdrawn.  Pt tolerated procedure without any signs or symptoms of distress. Vital signs stable.     DISCHARGED:  SDS: Discharge instructions reviewed with patient. Verbalized understanding. [facility] infusion. Report given to nurse.    SPECIMEN SENT:  No    Vital Signs  Vitals:    06/11/25 1200   BP: 123/71   Pulse: 71   SpO2: 97%    (vital signs in table format)

## 2025-06-11 NOTE — PROGRESS NOTES
[facility] Vascular Access  Ultrasound Guided Peripheral Insertion Procedure Note.     Carroll Wells   Admitted- 6/11/2025 11:12 AM  Admission diagnosis- Cirrhosis of liver with ascites, unspecified hepatic cirrhosis type (HCC) [K74.60, R18.8]      Attending Physician- No att. providers found  Ordering Physician- Eva  Indication for Insertion: Limited Access    USG PIV placed to right upper arm using sterile technique. Brisk blood return noted, line flushes with ease. Labs drawn and labeled and specimens walked to lab. Patient tolerated well. Bed returned to lowest position, call light within reach. See US images below.         Destini Zamarripa RN  Vascular Access Team

## 2025-06-11 NOTE — PROGRESS NOTES
Pt here via w/c from radiology for an Albumin Infusion after having a paracentesis completed with removal of 8.7 liters of fluid per Brisa US tech   Pt without c/o's at present time  Pt has an IV 20 ga in his upper Rt arm started in US Site unremarkable   Albumin 50 gm was infused over 31 mins without issues Pt reports that he feels ok  IV was removed  cath tip intact  Pressure then pressure dressing was applied and then secured with a coban dressing  IV site unremarkable  Pt yenny well  Verbal discharge instructions reviewed with pt and his wife and understanding was verbalized   Pt refused a written copy today  Pt was up to the BR with a steady gait and yenny well  then he was discharged ambulatory using his cane with his wife in stable condition  Pt was escorted to his vehicle per nursing staff

## 2025-06-11 NOTE — PROGRESS NOTES
Pt arrived for image guided Paracentesis. Dr. Watson explained the procedure including the risk and benefits of the procedure. All questions answered. Pt verbalizes understanding of the procedure and states no more questions. Consent confirmed. Vital signs stable. Labs, allergies, medications, and code status reviewed. No contraindications noted. Time out completed prior to procedure.    Vital Signs  Vitals:    06/11/25 1200   BP: 123/71   Pulse: 71   SpO2: 97%    (vital signs in table format)      Allergies  Aspirin, Fentanyl, and Oxycontin [oxycodone hcl] (allergies)    Labs  Lab Results   Component Value Date    INR 1.49 (H) 06/11/2025    PROTIME 18.2 (H) 06/11/2025     Lab Results   Component Value Date    CREATININE 1.0 04/01/2025    BUN 14 04/01/2025     04/01/2025    K 3.5 04/01/2025     04/01/2025    CO2 24 04/01/2025     Lab Results   Component Value Date    WBC 6.4 06/11/2025    HGB 8.7 (L) 06/11/2025    HCT 26.2 (L) 06/11/2025    MCV 83.8 06/11/2025     06/11/2025

## 2025-06-16 NOTE — DISCHARGE INSTRUCTIONS
albumin (human)  Pronunciation:  al BUE min (HUE man)  Brand:  Albuked, Albuminar-25, Alburx, Albutein, Buminate, Flexbumin, Human Albumin Grifols, Kedbumin, Plasbumin-25, Plasbumin-5  What is the most important information I should know about albumin?  You should not receive albumin if you have severe anemia (lack of red blood cells), or severe heart failure.  What is albumin?  Albumin is a protein produced by the liver that circulates in plasma (the clear liquid portion of your blood). Medicinal albumin is made of plasma proteins from human blood. This medicine works by increasing plasma volume or levels of albumin in the blood.  Albumin is used to replace blood volume loss resulting from trauma such as a severe burns or an injury that causes blood loss. This medicine is also used to treat low albumin levels caused by surgery, dialysis, abdominal infections, liver failure, pancreatitis, respiratory distress, bypass surgery, ovarian problems caused by fertility drugs, and other many other conditions.  Albumin may also be used for purposes not listed in this medication guide.  What should I discuss with my healthcare provider before receiving albumin?  You should not use albumin if you are allergic to it, or if you have:  severe anemia (lack of red blood cells); or  severe heart failure.  If possible before you receive albumin, tell your doctor if you have:  anemia;  heart disease, high blood pressure;  bleeding or blood clotting disorder such as hemophilia;  lung problems;  kidney disease;  a latex allergy; or  if you are unable to urinate.  In an emergency situation it may not be possible to tell your caregivers about your health conditions. Make sure any doctor caring for you afterward knows you have received this medicine.  Albumin is made from human plasma (part of the blood) which may contain viruses and other infectious agents. Donated plasma is tested and treated to reduce the risk of it containing  infectious agents, but there is still a small possibility it could transmit disease. Talk with your doctor about the risks and benefits of using this medication.  It is not known whether albumin will harm an unborn baby. Tell your doctor if you are pregnant.  It is not known whether albumin passes into breast milk or if it could harm a nursing baby. Tell your doctor if you are breast-feeding a baby.  In an emergency situation it may not be possible to tell your caregivers if you are pregnant or breast feeding. Make sure any doctor caring for your pregnancy or your baby knows you have received this medication.  How is albumin given?  Albumin is injected into a vein through an IV. A healthcare provider will give you this injection.  Your breathing, pulse, blood pressure, electrolyte levels, kidney function, and other vital signs will be watched closely while you are receiving albumin. Your blood will also need to be tested regularly during treatment.  Drink plenty of liquids while you are being treated with albumin.  What happens if I miss a dose?  Because you will receive albumin in a clinical setting, you are not likely to miss a dose.  What happens if I overdose?  Since this medication is given by a healthcare professional in a medical setting, an overdose is unlikely to occur.  What should I avoid after receiving albumin?  Follow your doctor's instructions about any restrictions on food, beverages, or activity.  What are the possible side effects of albumin?  Get emergency medical help if you have signs of an allergic reaction: hives; cough, difficult breathing; swelling of your face, lips, tongue, or throat.  Tell your caregiver right away if you have:  a light-headed feeling, like you might pass out;  weak or shallow breathing;  throbbing headache, blurred vision, buzzing in your ears;  anxiety, confusion, sweating, pale skin; or  severe shortness of breath, wheezing, gasping for breath, cough with foamy mucus,

## 2025-06-18 ENCOUNTER — HOSPITAL ENCOUNTER (OUTPATIENT)
Dept: ULTRASOUND IMAGING | Age: 70
Discharge: HOME | End: 2025-06-18
Payer: MEDICARE

## 2025-06-18 ENCOUNTER — HOSPITAL ENCOUNTER (OUTPATIENT)
Dept: NURSING | Age: 70
Setting detail: INFUSION SERIES
Discharge: HOME | End: 2025-06-18
Payer: MEDICARE

## 2025-06-18 VITALS
BODY MASS INDEX: 35.33 KG/M2 | RESPIRATION RATE: 16 BRPM | TEMPERATURE: 98 F | WEIGHT: 225.09 LBS | SYSTOLIC BLOOD PRESSURE: 119 MMHG | HEART RATE: 80 BPM | HEIGHT: 67 IN | DIASTOLIC BLOOD PRESSURE: 58 MMHG

## 2025-06-18 VITALS — OXYGEN SATURATION: 97 % | DIASTOLIC BLOOD PRESSURE: 64 MMHG | HEART RATE: 88 BPM | SYSTOLIC BLOOD PRESSURE: 137 MMHG

## 2025-06-18 DIAGNOSIS — R18.8 CIRRHOSIS OF LIVER WITH ASCITES, UNSPECIFIED HEPATIC CIRRHOSIS TYPE (HCC): ICD-10-CM

## 2025-06-18 DIAGNOSIS — K74.60 CIRRHOSIS OF LIVER WITH ASCITES, UNSPECIFIED HEPATIC CIRRHOSIS TYPE (HCC): ICD-10-CM

## 2025-06-18 PROCEDURE — 6360000002 HC RX W HCPCS: Performed by: INTERNAL MEDICINE

## 2025-06-18 PROCEDURE — 76937 US GUIDE VASCULAR ACCESS: CPT

## 2025-06-18 PROCEDURE — 96365 THER/PROPH/DIAG IV INF INIT: CPT

## 2025-06-18 PROCEDURE — 49083 ABD PARACENTESIS W/IMAGING: CPT

## 2025-06-18 PROCEDURE — 99211 OFF/OP EST MAY X REQ PHY/QHP: CPT

## 2025-06-18 PROCEDURE — P9047 ALBUMIN (HUMAN), 25%, 50ML: HCPCS | Performed by: INTERNAL MEDICINE

## 2025-06-18 RX ORDER — ALBUMIN (HUMAN) 12.5 G/50ML
50 SOLUTION INTRAVENOUS ONCE
Status: COMPLETED | OUTPATIENT
Start: 2025-06-18 | End: 2025-06-18

## 2025-06-18 RX ADMIN — ALBUMIN (HUMAN) 50 G: 0.25 INJECTION, SOLUTION INTRAVENOUS at 13:22

## 2025-06-18 ASSESSMENT — PAIN SCALES - GENERAL: PAINLEVEL_OUTOF10: 0

## 2025-06-18 NOTE — PROGRESS NOTES
Pt here via w/c from radiology for an Albumin Infusion after having a paracentesis completed with removal of 7.7 liters of fluid per Brisa US tech   Pt without c/o's at present time  Pt has an IV 20 ga in his upper Rt arm started in US Site unremarkable   Albumin 50 gm was infused over 36 mins without issues Pt reports that he feels ok  IV was removed  cath tip intact  Pressure then pressure dressing was applied and then secured with a coban dressing  IV site unremarkable  Pt yenny well  Verbal discharge instructions reviewed with pt and his wife and understanding was verbalized   Pt refused a written copy today  Pt was up to the BR with a steady gait and yenny well  then he was discharged ambulatory using his cane with his wife in stable condition  Pt was escorted to his vehicle per nursing staff

## 2025-06-18 NOTE — PROGRESS NOTES
[facility] Vascular Access  Ultrasound Guided Peripheral Insertion Procedure Note.     Carroll Wells   Admitted- 6/18/2025 11:37 AM  Admission diagnosis- Cirrhosis of liver with ascites, unspecified hepatic cirrhosis type (HCC) [K74.60, R18.8]      Attending Physician- No att. providers found  Ordering Physician- Dr. Velasquez  Indication for Insertion: No Access          USG PIV placed to right upper arm using sterile technique. Brisk blood return noted, line flushes with ease. Patient tolerated well. Bed returned to lowest position, call light within reach. See US images below.

## 2025-06-18 NOTE — PROGRESS NOTES
Pt arrived for image guided Paracentesis. Dr. Medina explained the procedure including the risk and benefits of the procedure. All questions answered. Pt verbalizes understanding of the procedure and states no more questions. Consent confirmed. Vital signs stable. Labs, allergies, medications, and code status reviewed. No contraindications noted. Time out completed prior to procedure.    Vital Signs  Vitals:    06/18/25 1145   BP: 137/64   Pulse: 88   SpO2: 97%    (vital signs in table format)      Allergies  Aspirin, Fentanyl, and Oxycontin [oxycodone hcl] (allergies)    Labs  Lab Results   Component Value Date    INR 1.49 (H) 06/11/2025    PROTIME 18.2 (H) 06/11/2025     Lab Results   Component Value Date    CREATININE 1.0 04/01/2025    BUN 14 04/01/2025     04/01/2025    K 3.5 04/01/2025     04/01/2025    CO2 24 04/01/2025     Lab Results   Component Value Date    WBC 6.4 06/11/2025    HGB 8.7 (L) 06/11/2025    HCT 26.2 (L) 06/11/2025    MCV 83.8 06/11/2025     06/11/2025

## 2025-06-19 ENCOUNTER — PATIENT MESSAGE (OUTPATIENT)
Dept: FAMILY MEDICINE CLINIC | Age: 70
End: 2025-06-19

## 2025-06-19 NOTE — TELEPHONE ENCOUNTER
Spoke with wife.  Wife will contact pharmacy to get out of pocket spent year to date on scripts and will get financial documents ready.  Advised I would call tomorrow morning as I will be back in the office so that I could look at the poverty guidelines for household size of 4.  Wife stated understanding.

## 2025-06-19 NOTE — TELEPHONE ENCOUNTER
Dx DVT 3/2027 with vascular recommending 6 months of treatment. Will need treatment until 9/27 and then repeat imaging.   Is there a chance he would now qualify for PAP ,if so that would be great.   I assume we can't use samples for the next 3.5 months?

## 2025-06-19 NOTE — TELEPHONE ENCOUNTER
How long is patient going to be on Eliquis?   Patient was given 2 months of samples in April, is patient going to be on this for 6 months total?  Do you want me to revisit PAP and see if they may qualify now?

## 2025-06-20 NOTE — TELEPHONE ENCOUNTER
Called [facility].  Patients cost would still be over $300 for the first month but would drop to $47 after.  Report patient still has $353.21 left of $400 deductible.  Pharmacy advised me to check with [facility] to see if they are able to help patient as well.    Called [facility], spoke with Manager Torobabar.  Reports they do have assistance programs available for patients that qualify.  Gave Edita patient's information and he pulled patient's insurance and stated he would be able to do $100/month supply and if that is still too high for patient then he could discuss their assistance program with patient.  Edita stated he will also bring their program information with medications they are able to help with to the office, advised him to ask for me when he gets here.    Spoke with wife.  Wife states they are able to afford $100 a month since he will not be on it much longer.    Please send pended script to [facility].  Wife is aware the pharmacy is in [place].

## 2025-06-23 LAB
ESTIMATED AVERAGE GLUCOSE: NORMAL
HBA1C MFR BLD: 9.2 %

## 2025-06-25 ENCOUNTER — HOSPITAL ENCOUNTER (OUTPATIENT)
Dept: ULTRASOUND IMAGING | Age: 70
Discharge: HOME | End: 2025-06-25
Payer: MEDICARE

## 2025-06-25 ENCOUNTER — HOSPITAL ENCOUNTER (OUTPATIENT)
Dept: NURSING | Age: 70
Setting detail: INFUSION SERIES
Discharge: HOME | End: 2025-06-25
Payer: MEDICARE

## 2025-06-25 VITALS
HEIGHT: 67 IN | SYSTOLIC BLOOD PRESSURE: 143 MMHG | WEIGHT: 225.09 LBS | TEMPERATURE: 97.8 F | BODY MASS INDEX: 35.33 KG/M2 | DIASTOLIC BLOOD PRESSURE: 64 MMHG | HEART RATE: 86 BPM | RESPIRATION RATE: 16 BRPM

## 2025-06-25 VITALS — OXYGEN SATURATION: 99 % | DIASTOLIC BLOOD PRESSURE: 64 MMHG | SYSTOLIC BLOOD PRESSURE: 130 MMHG | HEART RATE: 92 BPM

## 2025-06-25 DIAGNOSIS — K75.81 LIVER CIRRHOSIS SECONDARY TO NASH (HCC): ICD-10-CM

## 2025-06-25 DIAGNOSIS — R18.8 ASCITES OF LIVER: ICD-10-CM

## 2025-06-25 DIAGNOSIS — K75.81 LIVER CIRRHOSIS SECONDARY TO NASH (HCC): Primary | ICD-10-CM

## 2025-06-25 DIAGNOSIS — K74.60 LIVER CIRRHOSIS SECONDARY TO NASH (HCC): ICD-10-CM

## 2025-06-25 DIAGNOSIS — K74.60 LIVER CIRRHOSIS SECONDARY TO NASH (HCC): Primary | ICD-10-CM

## 2025-06-25 PROCEDURE — 49083 ABD PARACENTESIS W/IMAGING: CPT

## 2025-06-25 PROCEDURE — P9047 ALBUMIN (HUMAN), 25%, 50ML: HCPCS | Performed by: INTERNAL MEDICINE

## 2025-06-25 PROCEDURE — 96365 THER/PROPH/DIAG IV INF INIT: CPT

## 2025-06-25 PROCEDURE — 99211 OFF/OP EST MAY X REQ PHY/QHP: CPT

## 2025-06-25 PROCEDURE — 6360000002 HC RX W HCPCS: Performed by: INTERNAL MEDICINE

## 2025-06-25 RX ORDER — ALBUMIN (HUMAN) 12.5 G/50ML
50 SOLUTION INTRAVENOUS ONCE
Status: COMPLETED | OUTPATIENT
Start: 2025-06-25 | End: 2025-06-25

## 2025-06-25 RX ADMIN — ALBUMIN (HUMAN) 50 G: 0.25 INJECTION, SOLUTION INTRAVENOUS at 13:06

## 2025-06-25 ASSESSMENT — PAIN SCALES - GENERAL: PAINLEVEL_OUTOF10: 0

## 2025-06-25 NOTE — PROGRESS NOTES
Pt arrived for image guided Paracentesis. Dr. Medina explained the procedure including the risk and benefits of the procedure. All questions answered. Pt verbalizes understanding of the procedure and states no more questions. Consent confirmed. Vital signs stable. Labs, allergies, medications, and code status reviewed. No contraindications noted. Time out completed prior to procedure.    Vital Signs  Vitals:    06/25/25 1145   BP: 130/64   Pulse: 92   SpO2: 99%    (vital signs in table format)      Allergies  Aspirin, Fentanyl, and Oxycontin [oxycodone hcl] (allergies)    Labs  Lab Results   Component Value Date    INR 1.49 (H) 06/11/2025    PROTIME 18.2 (H) 06/11/2025     Lab Results   Component Value Date    CREATININE 1.0 04/01/2025    BUN 14 04/01/2025     04/01/2025    K 3.5 04/01/2025     04/01/2025    CO2 24 04/01/2025     Lab Results   Component Value Date    WBC 6.4 06/11/2025    HGB 8.7 (L) 06/11/2025    HCT 26.2 (L) 06/11/2025    MCV 83.8 06/11/2025     06/11/2025

## 2025-06-25 NOTE — PROGRESS NOTES
Pt here via w/c from radiology for an Albumin Infusion after having a paracentesis completed with removal of 7.9 liters of fluid per Brisa US tech   Pt without c/o's at present time but reports in general he isn't feeling the best   Pt has an IV 20 ga in his upper Rt arm started in US Site unremarkable   Albumin 50 gm was infused over 27 mins without issues  IV was removed  cath tip intact  Pressure then pressure dressing was applied and then secured with a coban dressing  IV site unremarkable  Pt yenny well  Verbal discharge instructions reviewed with pt and his wife and understanding was verbalized   Pt refused a written copy today  Pt was discharged via w/c  in stable condition per this RN  Wife at side

## 2025-06-25 NOTE — PROGRESS NOTES
Image guided Paracentesis completed.  7.9 liters of cloudy yellow colored withdrawn.  Pt tolerated procedure without any signs or symptoms of distress. Vital signs stable. IV done by IR RN.    DISCHARGED:  [facility]: Discharge instructions reviewed with patient. Verbalized understanding. Patient taken to [facility] for albumin infusion. Report given to nurse.    SPECIMEN SENT:  No    Vital Signs  Vitals:    06/25/25 1145   BP: 130/64   Pulse: 92   SpO2: 99%    (vital signs in table format)

## 2025-06-26 RX ORDER — ONDANSETRON 4 MG/1
4 TABLET, ORALLY DISINTEGRATING ORAL 3 TIMES DAILY PRN
Qty: 21 TABLET | Refills: 0 | Status: SHIPPED | OUTPATIENT
Start: 2025-06-26

## 2025-06-26 NOTE — TELEPHONE ENCOUNTER
Refill Request     CONFIRM preferred pharmacy with the patient.    If Mail Order Rx - Pend for 90 day refill.      Last Seen: Last Seen Department: 4/4/2025  Last Seen by PCP: 4/4/2025    Last Written: 5/1/25 21 with no refills     If no future appointment scheduled:  Review the last OV with PCP and review information for follow-up visit,  Route STAFF MESSAGE with patient name to the  Pool for scheduling with the following information:            -  Timing of next visit           -  Visit type ie Physical, OV, etc           -  Diagnoses/Reason ie. COPD, HTN - Do not use MEDICATION, Follow-up or CHECK UP - Give reason for visit      Next Appointment:   Future Appointments   Date Time Provider Department Center   7/2/2025 12:00 PM [facility] [place] [place] [facility] [place] [facility]   7/2/2025  1:15 [place] [facility] [place] [facility] [place] [facility] [place] [facility] [place] [facility]   7/7/2025 10:30 AM Karina Mendoza PA [facility]   7/9/2025 12:00 PM [facility] [place] [place] [facility] [place] [facility] [place] [facility]       Message sent to  to schedule appt with patient?  NO      Requested Prescriptions     Pending Prescriptions Disp Refills    ondansetron (ZOFRAN-ODT) 4 MG disintegrating tablet [Pharmacy Med Name: ONDANSETRON ODT 4 MG TABLET] 21 tablet 0     Sig: TAKE 1 TABLET BY MOUTH 3 TIMES DAILY AS NEEDED FOR NAUSEA OR VOMITING

## 2025-06-29 ENCOUNTER — APPOINTMENT (OUTPATIENT)
Dept: CT IMAGING | Age: 70
DRG: 872 | End: 2025-06-29
Payer: MEDICARE

## 2025-06-29 ENCOUNTER — APPOINTMENT (OUTPATIENT)
Dept: ULTRASOUND IMAGING | Age: 70
DRG: 872 | End: 2025-06-29
Payer: MEDICARE

## 2025-06-29 ENCOUNTER — HOSPITAL ENCOUNTER (INPATIENT)
Age: 70
LOS: 1 days | Discharge: ANOTHER HOSPITAL | DRG: 872 | End: 2025-06-30
Attending: STUDENT IN AN ORGANIZED HEALTH CARE EDUCATION/TRAINING PROGRAM | Admitting: HOSPITALIST
Payer: MEDICARE

## 2025-06-29 ENCOUNTER — TRANSCRIBE ORDERS (OUTPATIENT)
Dept: ADMINISTRATIVE | Age: 70
End: 2025-06-29

## 2025-06-29 ENCOUNTER — APPOINTMENT (OUTPATIENT)
Dept: GENERAL RADIOLOGY | Age: 70
DRG: 872 | End: 2025-06-29
Payer: MEDICARE

## 2025-06-29 DIAGNOSIS — R10.84 GENERALIZED ABDOMINAL PAIN: ICD-10-CM

## 2025-06-29 DIAGNOSIS — R53.1 GENERAL WEAKNESS: Primary | ICD-10-CM

## 2025-06-29 DIAGNOSIS — A41.9 SEPTICEMIA (HCC): ICD-10-CM

## 2025-06-29 DIAGNOSIS — M81.0 OSTEOPOROSIS WITHOUT CURRENT PATHOLOGICAL FRACTURE, UNSPECIFIED OSTEOPOROSIS TYPE: ICD-10-CM

## 2025-06-29 DIAGNOSIS — K92.2 GASTROINTESTINAL HEMORRHAGE, UNSPECIFIED GASTROINTESTINAL HEMORRHAGE TYPE: ICD-10-CM

## 2025-06-29 DIAGNOSIS — D64.9 ANEMIA, UNSPECIFIED TYPE: ICD-10-CM

## 2025-06-29 DIAGNOSIS — Z78.9 ADMITS TO ALCOHOL CONSUMPTION: ICD-10-CM

## 2025-06-29 DIAGNOSIS — I82.622 ACUTE DEEP VEIN THROMBOSIS (DVT) OF LEFT UPPER EXTREMITY, UNSPECIFIED VEIN (HCC): ICD-10-CM
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
## 2025-06-29 DIAGNOSIS — E55.9 VITAMIN D DEFICIENCY: Primary | ICD-10-CM

## 2025-06-29 DIAGNOSIS — N49.2 CELLULITIS OF SCROTUM: ICD-10-CM

## 2025-06-29 DIAGNOSIS — N43.3 RIGHT HYDROCELE: ICD-10-CM

## 2025-06-29 DIAGNOSIS — I95.9 HYPOTENSION, UNSPECIFIED HYPOTENSION TYPE: ICD-10-CM

## 2025-06-29 DIAGNOSIS — Z01.818 PRE-TRANSPLANT EVALUATION FOR LIVER TRANSPLANT: ICD-10-CM

## 2025-06-29 LAB
ALBUMIN SERPL-MCNC: 3 G/DL (ref 3.4–5)
ALBUMIN/GLOB SERPL: 1 {RATIO} (ref 1.1–2.2)
ALP SERPL-CCNC: 130 U/L (ref 40–129)
ALT SERPL-CCNC: 20 U/L (ref 10–40)
AMMONIA PLAS-SCNC: 59 UMOL/L (ref 16–60)
ANION GAP SERPL CALCULATED.3IONS-SCNC: 13 MMOL/L (ref 3–16)
AST SERPL-CCNC: 23 U/L (ref 15–37)
BASOPHILS # BLD: 0 K/UL (ref 0–0.2)
BASOPHILS NFR BLD: 0.2 %
BILIRUB SERPL-MCNC: 0.9 MG/DL (ref 0–1)
BUN SERPL-MCNC: 44 MG/DL (ref 7–20)
CALCIUM SERPL-MCNC: 9.3 MG/DL (ref 8.3–10.6)
CHLORIDE SERPL-SCNC: 92 MMOL/L (ref 99–110)
CO2 SERPL-SCNC: 21 MMOL/L (ref 21–32)
CREAT SERPL-MCNC: 1.3 MG/DL (ref 0.8–1.3)
DEPRECATED RDW RBC AUTO: 15.9 % (ref 12.4–15.4)
EOSINOPHIL # BLD: 0 K/UL (ref 0–0.6)
EOSINOPHIL NFR BLD: 0.1 %
GFR SERPLBLD CREATININE-BSD FMLA CKD-EPI: 58 ML/MIN/{1.73_M2}
GLUCOSE SERPL-MCNC: 354 MG/DL (ref 70–99)
HCT VFR BLD AUTO: 24.2 % (ref 40.5–52.5)
HEMOCCULT STL QL: ABNORMAL
HGB BLD-MCNC: 8.1 G/DL (ref 13.5–17.5)
LACTATE BLDV-SCNC: 2.3 MMOL/L (ref 0.4–1.9)
LACTATE BLDV-SCNC: 2.7 MMOL/L (ref 0.4–1.9)
LYMPHOCYTES # BLD: 0.5 K/UL (ref 1–5.1)
LYMPHOCYTES NFR BLD: 5 %
MCH RBC QN AUTO: 28.1 PG (ref 26–34)
MCHC RBC AUTO-ENTMCNC: 33.5 G/DL (ref 31–36)
MCV RBC AUTO: 83.7 FL (ref 80–100)
MONOCYTES # BLD: 1.4 K/UL (ref 0–1.3)
MONOCYTES NFR BLD: 13.4 %
NEUTROPHILS # BLD: 8.4 K/UL (ref 1.7–7.7)
NEUTROPHILS NFR BLD: 81.3 %
NT-PROBNP SERPL-MCNC: 763 PG/ML (ref 0–124)
PLATELET # BLD AUTO: 214 K/UL (ref 135–450)
PMV BLD AUTO: 8.4 FL (ref 5–10.5)
POTASSIUM SERPL-SCNC: 5.2 MMOL/L (ref 3.5–5.1)
PROCALCITONIN SERPL IA-MCNC: 0.73 NG/ML (ref 0–0.15)
PROT SERPL-MCNC: 6 G/DL (ref 6.4–8.2)
RBC # BLD AUTO: 2.89 M/UL (ref 4.2–5.9)
SARS-COV-2 RDRP RESP QL NAA+PROBE: NOT DETECTED
SODIUM SERPL-SCNC: 126 MMOL/L (ref 136–145)
TROPONIN, HIGH SENSITIVITY: 58 NG/L (ref 0–22)
TROPONIN, HIGH SENSITIVITY: 59 NG/L (ref 0–22)
WBC # BLD AUTO: 10.3 K/UL (ref 4–11)

## 2025-06-29 PROCEDURE — 84145 PROCALCITONIN (PCT): CPT

## 2025-06-29 PROCEDURE — 96365 THER/PROPH/DIAG IV INF INIT: CPT

## 2025-06-29 PROCEDURE — 85025 COMPLETE CBC W/AUTO DIFF WBC: CPT

## 2025-06-29 PROCEDURE — 83880 ASSAY OF NATRIURETIC PEPTIDE: CPT

## 2025-06-29 PROCEDURE — 83605 ASSAY OF LACTIC ACID: CPT

## 2025-06-29 PROCEDURE — 71046 X-RAY EXAM CHEST 2 VIEWS: CPT

## 2025-06-29 PROCEDURE — 80053 COMPREHEN METABOLIC PANEL: CPT

## 2025-06-29 PROCEDURE — 96375 TX/PRO/DX INJ NEW DRUG ADDON: CPT

## 2025-06-29 PROCEDURE — 6370000000 HC RX 637 (ALT 250 FOR IP): Performed by: STUDENT IN AN ORGANIZED HEALTH CARE EDUCATION/TRAINING PROGRAM

## 2025-06-29 PROCEDURE — 82270 OCCULT BLOOD FECES: CPT

## 2025-06-29 PROCEDURE — 6360000004 HC RX CONTRAST MEDICATION: Performed by: PHYSICIAN ASSISTANT

## 2025-06-29 PROCEDURE — 99285 EMERGENCY DEPT VISIT HI MDM: CPT

## 2025-06-29 PROCEDURE — 82140 ASSAY OF AMMONIA: CPT

## 2025-06-29 PROCEDURE — 84484 ASSAY OF TROPONIN QUANT: CPT

## 2025-06-29 PROCEDURE — 96367 TX/PROPH/DG ADDL SEQ IV INF: CPT

## 2025-06-29 PROCEDURE — 2580000003 HC RX 258: Performed by: PHYSICIAN ASSISTANT

## 2025-06-29 PROCEDURE — 96366 THER/PROPH/DIAG IV INF ADDON: CPT

## 2025-06-29 PROCEDURE — 93975 VASCULAR STUDY: CPT

## 2025-06-29 PROCEDURE — 36415 COLL VENOUS BLD VENIPUNCTURE: CPT

## 2025-06-29 PROCEDURE — 87635 SARS-COV-2 COVID-19 AMP PRB: CPT

## 2025-06-29 PROCEDURE — 93005 ELECTROCARDIOGRAM TRACING: CPT | Performed by: STUDENT IN AN ORGANIZED HEALTH CARE EDUCATION/TRAINING PROGRAM

## 2025-06-29 PROCEDURE — 74177 CT ABD & PELVIS W/CONTRAST: CPT

## 2025-06-29 PROCEDURE — 6360000002 HC RX W HCPCS: Performed by: PHYSICIAN ASSISTANT

## 2025-06-29 PROCEDURE — 87040 BLOOD CULTURE FOR BACTERIA: CPT

## 2025-06-29 RX ORDER — KETOROLAC TROMETHAMINE 15 MG/ML
15 INJECTION, SOLUTION INTRAMUSCULAR; INTRAVENOUS ONCE
Status: COMPLETED | OUTPATIENT
Start: 2025-06-29 | End: 2025-06-29

## 2025-06-29 RX ORDER — MIDODRINE HYDROCHLORIDE 5 MG/1
10 TABLET ORAL ONCE
Status: COMPLETED | OUTPATIENT
Start: 2025-06-29 | End: 2025-06-29

## 2025-06-29 RX ORDER — IOPAMIDOL 755 MG/ML
75 INJECTION, SOLUTION INTRAVASCULAR
Status: COMPLETED | OUTPATIENT
Start: 2025-06-29 | End: 2025-06-29

## 2025-06-29 RX ORDER — ACETAMINOPHEN 500 MG
1000 TABLET ORAL ONCE
Status: DISCONTINUED | OUTPATIENT
Start: 2025-06-29 | End: 2025-06-30

## 2025-06-29 RX ADMIN — MIDODRINE HYDROCHLORIDE 10 MG: 5 TABLET ORAL at 23:39

## 2025-06-29 RX ADMIN — VANCOMYCIN HYDROCHLORIDE 2000 MG: 1 INJECTION, POWDER, LYOPHILIZED, FOR SOLUTION INTRAVENOUS at 21:30

## 2025-06-29 RX ADMIN — IOPAMIDOL 75 ML: 755 INJECTION, SOLUTION INTRAVENOUS at 21:03

## 2025-06-29 RX ADMIN — KETOROLAC TROMETHAMINE 15 MG: 15 INJECTION, SOLUTION INTRAMUSCULAR; INTRAVENOUS at 20:48

## 2025-06-29 RX ADMIN — PIPERACILLIN AND TAZOBACTAM 3375 MG: 3; .375 INJECTION, POWDER, LYOPHILIZED, FOR SOLUTION INTRAVENOUS at 20:22

## 2025-06-29 ASSESSMENT — PAIN SCALES - GENERAL: PAINLEVEL_OUTOF10: 7

## 2025-06-30 ENCOUNTER — HOSPITAL ENCOUNTER (INPATIENT)
Age: 70
Discharge: HOME | DRG: 872 | End: 2025-07-02
Payer: MEDICARE

## 2025-06-30 VITALS
TEMPERATURE: 97.9 F | SYSTOLIC BLOOD PRESSURE: 110 MMHG | HEART RATE: 76 BPM | RESPIRATION RATE: 16 BRPM | WEIGHT: 215 LBS | HEIGHT: 67 IN | OXYGEN SATURATION: 95 % | BODY MASS INDEX: 33.74 KG/M2 | DIASTOLIC BLOOD PRESSURE: 60 MMHG

## 2025-06-30 PROBLEM — N45.1 EPIDIDYMITIS, RIGHT: Status: ACTIVE | Noted: 2025-06-30

## 2025-06-30 PROBLEM — D50.0 IRON DEFICIENCY ANEMIA DUE TO CHRONIC BLOOD LOSS: Status: ACTIVE | Noted: 2025-06-30

## 2025-06-30 PROBLEM — R18.8 OTHER ASCITES: Status: ACTIVE | Noted: 2025-06-30

## 2025-06-30 LAB
ABO/RH: NORMAL
ANION GAP SERPL CALCULATED.3IONS-SCNC: 13 MMOL/L (ref 3–16)
ANTIBODY SCREEN: NORMAL
BASOPHILS # BLD: 0 K/UL (ref 0–0.2)
BASOPHILS NFR BLD: 0.4 %
BLOOD BANK DISPENSE STATUS: NORMAL
BLOOD BANK PRODUCT CODE: NORMAL
BPU ID: NORMAL
BUN SERPL-MCNC: 46 MG/DL (ref 7–20)
CALCIUM SERPL-MCNC: 8.8 MG/DL (ref 8.3–10.6)
CHLORIDE SERPL-SCNC: 95 MMOL/L (ref 99–110)
CO2 SERPL-SCNC: 20 MMOL/L (ref 21–32)
CREAT SERPL-MCNC: 1.6 MG/DL (ref 0.8–1.3)
DEPRECATED RDW RBC AUTO: 16.1 % (ref 12.4–15.4)
DESCRIPTION BLOOD BANK: NORMAL
EKG ATRIAL RATE: 89 BPM
EKG DIAGNOSIS: NORMAL
EKG P AXIS: 66 DEGREES
EKG P-R INTERVAL: 240 MS
EKG Q-T INTERVAL: 368 MS
EKG QRS DURATION: 96 MS
EKG QTC CALCULATION (BAZETT): 447 MS
EKG R AXIS: -67 DEGREES
EKG T AXIS: 83 DEGREES
EKG VENTRICULAR RATE: 89 BPM
EOSINOPHIL # BLD: 0.1 K/UL (ref 0–0.6)
EOSINOPHIL NFR BLD: 1.6 %
GFR SERPLBLD CREATININE-BSD FMLA CKD-EPI: 46 ML/MIN/{1.73_M2}
GLUCOSE BLD-MCNC: 238 MG/DL (ref 70–99)
GLUCOSE BLD-MCNC: 301 MG/DL (ref 70–99)
GLUCOSE BLD-MCNC: 441 MG/DL (ref 70–99)
GLUCOSE SERPL-MCNC: 283 MG/DL (ref 70–99)
HCT VFR BLD AUTO: 20.1 % (ref 40.5–52.5)
HGB BLD-MCNC: 6.7 G/DL (ref 13.5–17.5)
LYMPHOCYTES # BLD: 0.7 K/UL (ref 1–5.1)
LYMPHOCYTES NFR BLD: 10.5 %
MCH RBC QN AUTO: 28 PG (ref 26–34)
MCHC RBC AUTO-ENTMCNC: 33.5 G/DL (ref 31–36)
MCV RBC AUTO: 83.6 FL (ref 80–100)
MONOCYTES # BLD: 1.1 K/UL (ref 0–1.3)
MONOCYTES NFR BLD: 17 %
NEUTROPHILS # BLD: 4.7 K/UL (ref 1.7–7.7)
NEUTROPHILS NFR BLD: 70.5 %
OSMOLALITY SERPL: 304 MOSM/KG (ref 280–301)
PERFORMED ON: ABNORMAL
PLATELET # BLD AUTO: 180 K/UL (ref 135–450)
PMV BLD AUTO: 8.1 FL (ref 5–10.5)
POTASSIUM SERPL-SCNC: 4.4 MMOL/L (ref 3.5–5.1)
RBC # BLD AUTO: 2.4 M/UL (ref 4.2–5.9)
SODIUM SERPL-SCNC: 128 MMOL/L (ref 136–145)
SODIUM UR-SCNC: <20 MMOL/L
URATE SERPL-MCNC: 7.7 MG/DL (ref 3.5–7.2)
WBC # BLD AUTO: 6.7 K/UL (ref 4–11)

## 2025-06-30 PROCEDURE — 86923 COMPATIBILITY TEST ELECTRIC: CPT

## 2025-06-30 PROCEDURE — 85025 COMPLETE CBC W/AUTO DIFF WBC: CPT

## 2025-06-30 PROCEDURE — P9016 RBC LEUKOCYTES REDUCED: HCPCS

## 2025-06-30 PROCEDURE — 84550 ASSAY OF BLOOD/URIC ACID: CPT

## 2025-06-30 PROCEDURE — G0378 HOSPITAL OBSERVATION PER HR: HCPCS

## 2025-06-30 PROCEDURE — 36430 TRANSFUSION BLD/BLD COMPNT: CPT

## 2025-06-30 PROCEDURE — 86850 RBC ANTIBODY SCREEN: CPT

## 2025-06-30 PROCEDURE — 36415 COLL VENOUS BLD VENIPUNCTURE: CPT

## 2025-06-30 PROCEDURE — 96375 TX/PRO/DX INJ NEW DRUG ADDON: CPT

## 2025-06-30 PROCEDURE — 99223 1ST HOSP IP/OBS HIGH 75: CPT

## 2025-06-30 PROCEDURE — 2500000003 HC RX 250 WO HCPCS

## 2025-06-30 PROCEDURE — 30233N1 TRANSFUSION OF NONAUTOLOGOUS RED BLOOD CELLS INTO PERIPHERAL VEIN, PERCUTANEOUS APPROACH: ICD-10-PCS | Performed by: INTERNAL MEDICINE

## 2025-06-30 PROCEDURE — 83935 ASSAY OF URINE OSMOLALITY: CPT

## 2025-06-30 PROCEDURE — 96367 TX/PROPH/DG ADDL SEQ IV INF: CPT

## 2025-06-30 PROCEDURE — 2060000000 HC ICU INTERMEDIATE R&B

## 2025-06-30 PROCEDURE — 6360000002 HC RX W HCPCS: Performed by: HOSPITALIST

## 2025-06-30 PROCEDURE — 86901 BLOOD TYPING SEROLOGIC RH(D): CPT

## 2025-06-30 PROCEDURE — 84300 ASSAY OF URINE SODIUM: CPT

## 2025-06-30 PROCEDURE — 93010 ELECTROCARDIOGRAM REPORT: CPT | Performed by: STUDENT IN AN ORGANIZED HEALTH CARE EDUCATION/TRAINING PROGRAM

## 2025-06-30 PROCEDURE — 6370000000 HC RX 637 (ALT 250 FOR IP)

## 2025-06-30 PROCEDURE — 80048 BASIC METABOLIC PNL TOTAL CA: CPT

## 2025-06-30 PROCEDURE — 6370000000 HC RX 637 (ALT 250 FOR IP): Performed by: HOSPITALIST

## 2025-06-30 PROCEDURE — 2500000003 HC RX 250 WO HCPCS: Performed by: HOSPITALIST

## 2025-06-30 PROCEDURE — 86900 BLOOD TYPING SEROLOGIC ABO: CPT

## 2025-06-30 PROCEDURE — 83930 ASSAY OF BLOOD OSMOLALITY: CPT

## 2025-06-30 PROCEDURE — 6360000002 HC RX W HCPCS: Performed by: STUDENT IN AN ORGANIZED HEALTH CARE EDUCATION/TRAINING PROGRAM

## 2025-06-30 PROCEDURE — 96361 HYDRATE IV INFUSION ADD-ON: CPT

## 2025-06-30 PROCEDURE — 87086 URINE CULTURE/COLONY COUNT: CPT

## 2025-06-30 PROCEDURE — 2580000003 HC RX 258: Performed by: STUDENT IN AN ORGANIZED HEALTH CARE EDUCATION/TRAINING PROGRAM

## 2025-06-30 PROCEDURE — P9047 ALBUMIN (HUMAN), 25%, 50ML: HCPCS | Performed by: STUDENT IN AN ORGANIZED HEALTH CARE EDUCATION/TRAINING PROGRAM

## 2025-06-30 PROCEDURE — 2580000003 HC RX 258: Performed by: HOSPITALIST

## 2025-06-30 PROCEDURE — 6360000002 HC RX W HCPCS

## 2025-06-30 PROCEDURE — 96366 THER/PROPH/DIAG IV INF ADDON: CPT

## 2025-06-30 PROCEDURE — 2580000003 HC RX 258

## 2025-06-30 RX ORDER — HYDROCODONE BITARTRATE AND ACETAMINOPHEN 5; 325 MG/1; MG/1
1 TABLET ORAL EVERY 6 HOURS PRN
Status: DISCONTINUED | OUTPATIENT
Start: 2025-06-30 | End: 2025-06-30 | Stop reason: HOSPADM

## 2025-06-30 RX ORDER — POTASSIUM CHLORIDE 7.45 MG/ML
10 INJECTION INTRAVENOUS PRN
Status: DISCONTINUED | OUTPATIENT
Start: 2025-06-30 | End: 2025-06-30 | Stop reason: HOSPADM

## 2025-06-30 RX ORDER — SODIUM CHLORIDE 9 MG/ML
INJECTION, SOLUTION INTRAVENOUS PRN
Status: DISCONTINUED | OUTPATIENT
Start: 2025-06-30 | End: 2025-06-30 | Stop reason: HOSPADM

## 2025-06-30 RX ORDER — ONDANSETRON 2 MG/ML
4 INJECTION INTRAMUSCULAR; INTRAVENOUS ONCE
Status: COMPLETED | OUTPATIENT
Start: 2025-06-30 | End: 2025-06-30

## 2025-06-30 RX ORDER — SODIUM CHLORIDE 0.9 % (FLUSH) 0.9 %
5-40 SYRINGE (ML) INJECTION PRN
Status: DISCONTINUED | OUTPATIENT
Start: 2025-06-30 | End: 2025-06-30 | Stop reason: HOSPADM

## 2025-06-30 RX ORDER — SUCRALFATE 1 G/1
1 TABLET ORAL PRN
COMMUNITY

## 2025-06-30 RX ORDER — MORPHINE SULFATE 2 MG/ML
2 INJECTION, SOLUTION INTRAMUSCULAR; INTRAVENOUS
Refills: 0 | Status: DISCONTINUED | OUTPATIENT
Start: 2025-06-30 | End: 2025-06-30

## 2025-06-30 RX ORDER — SODIUM CHLORIDE 9 MG/ML
INJECTION, SOLUTION INTRAVENOUS CONTINUOUS
Status: DISCONTINUED | OUTPATIENT
Start: 2025-06-30 | End: 2025-06-30 | Stop reason: HOSPADM

## 2025-06-30 RX ORDER — MORPHINE SULFATE 4 MG/ML
4 INJECTION, SOLUTION INTRAMUSCULAR; INTRAVENOUS
Refills: 0 | Status: DISCONTINUED | OUTPATIENT
Start: 2025-06-30 | End: 2025-06-30

## 2025-06-30 RX ORDER — INSULIN GLARGINE 100 [IU]/ML
12 INJECTION, SOLUTION SUBCUTANEOUS DAILY
Status: DISCONTINUED | OUTPATIENT
Start: 2025-06-30 | End: 2025-06-30 | Stop reason: HOSPADM

## 2025-06-30 RX ORDER — ONDANSETRON 2 MG/ML
4 INJECTION INTRAMUSCULAR; INTRAVENOUS EVERY 6 HOURS PRN
Status: DISCONTINUED | OUTPATIENT
Start: 2025-06-30 | End: 2025-06-30 | Stop reason: HOSPADM

## 2025-06-30 RX ORDER — INSULIN LISPRO 100 [IU]/ML
0-8 INJECTION, SOLUTION INTRAVENOUS; SUBCUTANEOUS
Status: DISCONTINUED | OUTPATIENT
Start: 2025-06-30 | End: 2025-06-30 | Stop reason: HOSPADM

## 2025-06-30 RX ORDER — ALBUMIN (HUMAN) 12.5 G/50ML
50 SOLUTION INTRAVENOUS ONCE
Status: COMPLETED | OUTPATIENT
Start: 2025-06-30 | End: 2025-06-30

## 2025-06-30 RX ORDER — OXYCODONE HYDROCHLORIDE 5 MG/1
5 TABLET ORAL EVERY 4 HOURS PRN
Refills: 0 | Status: DISCONTINUED | OUTPATIENT
Start: 2025-06-30 | End: 2025-06-30

## 2025-06-30 RX ORDER — MAGNESIUM SULFATE IN WATER 40 MG/ML
2000 INJECTION, SOLUTION INTRAVENOUS PRN
Status: DISCONTINUED | OUTPATIENT
Start: 2025-06-30 | End: 2025-06-30 | Stop reason: HOSPADM

## 2025-06-30 RX ORDER — INSULIN LISPRO 100 [IU]/ML
5 INJECTION, SOLUTION INTRAVENOUS; SUBCUTANEOUS
Status: DISCONTINUED | OUTPATIENT
Start: 2025-06-30 | End: 2025-06-30 | Stop reason: HOSPADM

## 2025-06-30 RX ORDER — ACETAMINOPHEN 650 MG/1
650 SUPPOSITORY RECTAL EVERY 6 HOURS PRN
Status: DISCONTINUED | OUTPATIENT
Start: 2025-06-30 | End: 2025-06-30 | Stop reason: HOSPADM

## 2025-06-30 RX ORDER — SODIUM CHLORIDE 0.9 % (FLUSH) 0.9 %
5-40 SYRINGE (ML) INJECTION EVERY 12 HOURS SCHEDULED
Status: DISCONTINUED | OUTPATIENT
Start: 2025-06-30 | End: 2025-06-30 | Stop reason: HOSPADM

## 2025-06-30 RX ORDER — FENTANYL CITRATE 50 UG/ML
25 INJECTION, SOLUTION INTRAMUSCULAR; INTRAVENOUS
Status: DISCONTINUED | OUTPATIENT
Start: 2025-06-30 | End: 2025-06-30

## 2025-06-30 RX ORDER — POLYETHYLENE GLYCOL 3350 17 G/17G
17 POWDER, FOR SOLUTION ORAL DAILY PRN
Status: DISCONTINUED | OUTPATIENT
Start: 2025-06-30 | End: 2025-06-30 | Stop reason: HOSPADM

## 2025-06-30 RX ORDER — ACETAMINOPHEN 325 MG/1
650 TABLET ORAL EVERY 6 HOURS PRN
Status: DISCONTINUED | OUTPATIENT
Start: 2025-06-30 | End: 2025-06-30 | Stop reason: HOSPADM

## 2025-06-30 RX ORDER — ONDANSETRON 4 MG/1
4 TABLET, ORALLY DISINTEGRATING ORAL EVERY 8 HOURS PRN
Status: DISCONTINUED | OUTPATIENT
Start: 2025-06-30 | End: 2025-06-30 | Stop reason: HOSPADM

## 2025-06-30 RX ORDER — ENOXAPARIN SODIUM 100 MG/ML
40 INJECTION SUBCUTANEOUS DAILY
Status: DISCONTINUED | OUTPATIENT
Start: 2025-06-30 | End: 2025-06-30

## 2025-06-30 RX ORDER — SODIUM CHLORIDE 9 MG/ML
INJECTION, SOLUTION INTRAVENOUS CONTINUOUS
Status: CANCELLED | OUTPATIENT
Start: 2025-06-30 | End: 2025-07-01

## 2025-06-30 RX ORDER — POTASSIUM CHLORIDE 1500 MG/1
40 TABLET, EXTENDED RELEASE ORAL PRN
Status: DISCONTINUED | OUTPATIENT
Start: 2025-06-30 | End: 2025-06-30 | Stop reason: HOSPADM

## 2025-06-30 RX ORDER — 0.9 % SODIUM CHLORIDE 0.9 %
500 INTRAVENOUS SOLUTION INTRAVENOUS ONCE
Status: COMPLETED | OUTPATIENT
Start: 2025-06-30 | End: 2025-06-30

## 2025-06-30 RX ORDER — GLUCAGON 1 MG/ML
1 KIT INJECTION PRN
Status: DISCONTINUED | OUTPATIENT
Start: 2025-06-30 | End: 2025-06-30 | Stop reason: HOSPADM

## 2025-06-30 RX ORDER — DEXTROSE MONOHYDRATE 100 MG/ML
INJECTION, SOLUTION INTRAVENOUS CONTINUOUS PRN
Status: DISCONTINUED | OUTPATIENT
Start: 2025-06-30 | End: 2025-06-30 | Stop reason: HOSPADM

## 2025-06-30 RX ORDER — MIDODRINE HYDROCHLORIDE 5 MG/1
5 TABLET ORAL 2 TIMES DAILY WITH MEALS
Status: DISCONTINUED | OUTPATIENT
Start: 2025-06-30 | End: 2025-06-30 | Stop reason: HOSPADM

## 2025-06-30 RX ADMIN — Medication 10 ML: at 08:34

## 2025-06-30 RX ADMIN — INSULIN LISPRO 5 UNITS: 100 INJECTION, SOLUTION INTRAVENOUS; SUBCUTANEOUS at 08:34

## 2025-06-30 RX ADMIN — SODIUM CHLORIDE: 0.9 INJECTION, SOLUTION INTRAVENOUS at 09:35

## 2025-06-30 RX ADMIN — SODIUM CHLORIDE 500 ML: 0.9 INJECTION, SOLUTION INTRAVENOUS at 01:24

## 2025-06-30 RX ADMIN — ONDANSETRON 4 MG: 2 INJECTION, SOLUTION INTRAMUSCULAR; INTRAVENOUS at 00:36

## 2025-06-30 RX ADMIN — INSULIN GLARGINE 12 UNITS: 100 INJECTION, SOLUTION SUBCUTANEOUS at 05:04

## 2025-06-30 RX ADMIN — ONDANSETRON 4 MG: 4 TABLET, ORALLY DISINTEGRATING ORAL at 12:08

## 2025-06-30 RX ADMIN — ALBUMIN (HUMAN) 50 G: 0.25 INJECTION, SOLUTION INTRAVENOUS at 02:28

## 2025-06-30 RX ADMIN — MIDODRINE HYDROCHLORIDE 5 MG: 5 TABLET ORAL at 10:41

## 2025-06-30 RX ADMIN — HYDROCODONE BITARTRATE AND ACETAMINOPHEN 1 TABLET: 5; 325 TABLET ORAL at 12:04

## 2025-06-30 RX ADMIN — HYDROCODONE BITARTRATE AND ACETAMINOPHEN 1 TABLET: 5; 325 TABLET ORAL at 05:03

## 2025-06-30 RX ADMIN — INSULIN LISPRO 8 UNITS: 100 INJECTION, SOLUTION INTRAVENOUS; SUBCUTANEOUS at 05:04

## 2025-06-30 RX ADMIN — PIPERACILLIN AND TAZOBACTAM 3375 MG: 3; .375 INJECTION, POWDER, LYOPHILIZED, FOR SOLUTION INTRAVENOUS at 05:15

## 2025-06-30 RX ADMIN — SODIUM CHLORIDE 40 MG: 9 INJECTION INTRAMUSCULAR; INTRAVENOUS; SUBCUTANEOUS at 09:42

## 2025-06-30 RX ADMIN — INSULIN LISPRO 2 UNITS: 100 INJECTION, SOLUTION INTRAVENOUS; SUBCUTANEOUS at 12:04

## 2025-06-30 ASSESSMENT — PAIN DESCRIPTION - LOCATION
LOCATION: ABDOMEN
LOCATION: ABDOMEN;SCROTUM
LOCATION: ABDOMEN;SCROTUM

## 2025-06-30 ASSESSMENT — PAIN SCALES - GENERAL
PAINLEVEL_OUTOF10: 7
PAINLEVEL_OUTOF10: 5

## 2025-06-30 ASSESSMENT — PAIN DESCRIPTION - DESCRIPTORS
DESCRIPTORS: DISCOMFORT
DESCRIPTORS: DISCOMFORT
DESCRIPTORS: ACHING;DISCOMFORT

## 2025-06-30 NOTE — CONSENT
Informed Consent for Blood Component Transfusion Note    I have discussed with the patient the rationale for blood component transfusion; its benefits in treating or preventing fatigue, organ damage, or death; and its risk which includes mild transfusion reactions, rare risk of blood borne infection, or more serious but rare reactions. I have discussed the alternatives to transfusion, including the risk and consequences of not receiving transfusion. The patient had an opportunity to ask questions and had agreed to proceed with transfusion of blood components.    Electronically signed by HO Vega on 6/30/25 at 7:44 AM EDT

## 2025-06-30 NOTE — PROGRESS NOTES
4 Eyes Skin Assessment     NAME:  Carroll Wells  YOB: 1955  MEDICAL RECORD NUMBER:  [id number]    The patient is being assessed for  Admission    I agree that at least one RN has performed a thorough Head to Toe Skin Assessment on the patient. ALL assessment sites listed below have been assessed.      Areas assessed by both nurses:    Head, Face, Ears, Shoulders, Back, Chest, Arms, Elbows, Hands, Sacrum. Buttock, Coccyx, Ischium, and Legs. Feet and Heels        Does the Patient have a Wound? No noted wound(s)       Deni Prevention initiated by RN: no  Wound Care Orders initiated by RN: no    Pressure Injury (Stage 1,2,3,4, Unstageable, DTI, NWPT, and Complex wounds) if present, place Wound referral order by RN under : No    New Ostomies, if present place, Ostomy referral order under : No     Nurse 1 eSignature: Electronically signed by Sofi Contreras RN on 6/30/25 at 4:18 AM EDT    **SHARE this note so that the co-signing nurse can place an eSignature**    Nurse 2 eSignature: Electronically signed by Sussy Adams RN on 6/30/25 at 5:19 AM EDT   
Access center called with bed assignment MICU bed 20. Report number [id number]  [phone] accepting provider Dr. Jase Mejia. ETA on transport unknown at this time.   
Assessment completed and medications given. Patient is A&Ox4 and denies any needs at this time. Call light and personal belongings are within reach. Standard safety measures in place.   
Consult called in to [facility], Dr Velasquez. 6-30-25 @0517. Radha Cohn  
PRBC complete, no reactions noted. PRBC completed as transport is here to  patient. Report given to transporter. Patient's belongings sent home with wife.     Jazz Velazquez RN    
Paged Dr. Roger    Patient just admittedd for Epididymitis.  Patient is a DM. His blood sugar is 441. Can we have some coverage? Thank you, Sofi    New orders given      Patient admitted for Epididymitis. Patient is c/o pain. He has an allergy to aspirin, fentanyl, Oxycontin, and Oxycodone.  Can we please have something for pain. His BP are running soft currently though 109/46. Thank you, Sofi    New orders given.  
Patient admitted to room 323 from ED.  Patient oriented to room, call light, bed rails, phone, lights and bathroom.  Bed alarm in place, patient aware of placement and reason.   Telemetry box  in place, patient aware of placement and reason.  Bed locked, in lowest position, side rails up 2/4, call light within reach.  Will continue to monitor.     
Physical and Occupational Therapy    Orders received and chart reviewed. Per PA pt is possible transfer to  this date in addition to complex medical course. Will continue to follow. Re-attempt as appropriate and schedule permits.    Thanks  Dalia Kirkpatrick, PT, DPT PT [id number]  Chely Khan, OTR/L [id number]      
Prior to receiving transfusion of blood products, patient educated on the following:    Blood product transfusion process  Benefits of receiving blood product transfusion  Risks associated with receiving the transfusion  Signs and symptoms of complications associated with blood product transfusion  Vague, uneasy feeling  Onset of pain (especially at the IV site, back, or chest)  Chills  Flushing  Fever  Nausea  Dizziness  Rash  Itching  Dark or red urine  Instructed patient to notify RN immediately if any sign or symptom occurs     
[facility]    Imaging:     CT ABDOMEN PELVIS W IV CONTRAST Additional Contrast? None   Final Result   1. Cirrhotic liver with a recanalized umbilical vein and large abdominal wall collateral portal-systemic venous shunt communicating to the right femoral vein.   2. Large volume of ascites.   3. No evidence of bowel obstruction.   4. No other acute findings.            US SCROTUM W COMPLETE DUPLEX   Final Result   1. Large complex hydrocele in the right scrotal sac with several internal septations, measuring 6.2 x 6 x 5.7 cm.   2. Normal testes with normal doppler flow.            XR CHEST (2 VW)   Final Result   No acute cardiopulmonary disease or significant change from prior examination         Vascular duplex upper extremity venous left    (Results Pending)           Impression/Plan:     #Acute right epididymitis  #Right hydrocele     - continue antibiotics   - send a urinalysis and send this for culture - may not grow anything since he is already on antibiotics   - no surgical needs at this time but will continue to monitor for improvement with medical therapy     Vivian Cornejo PA-C  [facility]

## 2025-06-30 NOTE — PLAN OF CARE
Patient with a history of cirrhosis of transplanted list presented with subjective fever and chills and found to have right testicular epididymitis.  Urology was at the bedside and did not think the there is any abscess to be drained.  Patient was started on Zosyn at the emergency department and that would be continued.  He was hypotensive and was given home dose of midodrine and a small IV fluid bolus.  The patient was given albumin at the ED.  Urology will see while inpatient and Zosyn will be continued.   Also he has hyponatremic consistent with his cirrhosis.  Pain will be controlled with IV fentanyl since patient is on the borderline of ROWAN and blood pressures are soft.

## 2025-06-30 NOTE — ED PROVIDER NOTES
[facility] TELEMETRY  EMERGENCY DEPARTMENT ENCOUNTER        Pt Name: Carroll Wells  MRN: [id number]  Birthdate 1955  Date of evaluation: 6/29/2025  Provider: Marisela Cho PA-C  PCP: Karina Mendoza PA  Note Started: 1:42 PM EDT 6/30/25       I have seen and evaluated this patient with my supervising physician Dr. Palumbo      CHIEF COMPLAINT       Chief Complaint   Patient presents with    Abdominal Pain     States he's been experiencing abd pain for 2 months. Going to receive a liver transplant at the end of August. Did have a fall today. Fell out of bed trying to go to the bathroom. Denies hitting head. On eliquis. Also c/o of swelling in the groin area.        HISTORY OF PRESENT ILLNESS: 1 or more Elements     History From: Patient and family            Chief Complaint: Abdominal pain    Carroll Wells is a 69 y.o. male who presents via EMS with sepsis alert.  Fever of 101 en route and reported tachycardia.  Patient is a poor historian.  He reports chronic weakness although it seems to be worse recently.  He has plans for a liver transplant in August.  He reports abdominal pain and receives weekly paracentesis on Wednesday for which he recently received without complication.  He does feel like his abdomen is swelling again.  He reports new pain though on the right side and into his right groin and testicle.  He denies nausea vomiting diarrhea.  No reported black or bloody stools.  He does report constipation even though he is taking his lactulose.  He went to get out of his bed today and slipped on the side of the bed.  He denies any injury.  No head injury.  He just overall complains of weakness and feeling ill.  He denies any new urinary complaints.  No chest pain shortness of breath cough.    Nursing Notes were all reviewed and agreed with or any disagreements were addressed in the HPI.    REVIEW OF SYSTEMS :      Review of Systems    Positives and Pertinent negatives as per HPI.

## 2025-06-30 NOTE — FLOWSHEET NOTE
06/30/25 1103   Treatment Team Notification   Reason for Communication Critical results   Type of Critical Result Laboratory   Critical Lab Information with Verbal Readback hemoglobin/hematocrit   Person Result Received From lab   Critical POC Result Type Other (comment)  (H/H)   Name of Team Member Notified Dr. Echavarria   Treatment Team Role Attending Provider   Method of Communication Secure Message   Response Waiting for response   Notification Time 9042

## 2025-06-30 NOTE — H&P
Hospital Medicine History & Physical      PCP: Karina Mendoza PA    Date of Admission: 6/29/2025    Date of Service: Pt seen/examined on 6/30/2025     Chief Complaint:    Chief Complaint   Patient presents with    Abdominal Pain     States he's been experiencing abd pain for 2 months. Going to receive a liver transplant at the end of August. Did have a fall today. Fell out of bed trying to go to the bathroom. Denies hitting head. On eliquis. Also c/o of swelling in the groin area.          History Of Present Illness:      The patient is a 69 y.o. male with pmhx of cirrhosis, DVT, DM, HTN, hx of GI bleed who presented to [facility] ED with complaint of abdominal pain. He was trying to get out of bed to go to the bathroom when he fell on his buttocks due to weakness. He denies hitting his head. Was unable to get up on his own, son in law had to help him. He also is reporting scrotal swelling and pain. Patient first noticed the swelling around 6/2 and at the time is was not painful, just bothersome. The swelling has gotten progressively worse and is now painful to touch. Patient feels that the pain is shooting up from the right side of his scrotum to the right side of his abdomen and back. Patient admits hx of GI bleeding, but has not noticed any blood in his stool or urine recently. No vomiting. He typically gets a paracentesis every Wednesday for his ascites and does have abdominal distension and discomfort. Wife at bedside and said patient has been lethargic and generally weak recently, but is worse today due to lack of sleep last night. Has also not had good PO intake at home.     She would like him transferred to  because he is following with transplant surgery.     No chest pain, shortness of breath, vomiting, diarrhea, urinary symptoms.     Past Medical History:        Diagnosis Date    Cervical radiculopathy 04/22/2016    Chronic pain     Chronic renal disease, stage III (HCC) [321143] 05/06/2022

## 2025-06-30 NOTE — CONSULTS
Urology Attending Consult Note     Reason for Consultation: orchalgia, scrotal swelling     Chief Complaint: \"my scrotum swelled up and hurt and my stomach and back hurt\"     HPI:   70yo M who presented with worsening abd pain and rt groin pain and then noticed severe orchalgia and swelling.   He came to ER due to the swelling and pain.   CT and scrotal US was done and showed complex hydrocele and possible infection but no obvious abscess or drainable fluid collection. The unable to UA yet.     Has ascites complicating bladder pressure fullness;  taping once per week.   LANG.     DM uncontrolled A1c 9  Sugars high 200s a in past  Planning liver transplant at           Past Medical History        Past Medical History:   Diagnosis Date    ADHD (attention deficit hyperactivity disorder)      Bipolar 1 disorder (HCC)      Dyslexia      GERD (gastroesophageal reflux disease)      Primary osteoarthritis of right knee 1/25/2018    Schizophrenia (HCC)       Mary Villegas and [facility]- psychiatrist            Past Surgical History         Past Surgical History:   Procedure Laterality Date    CARPAL TUNNEL RELEASE Bilateral      HAND SURGERY Left      HERNIA REPAIR        KNEE ARTHROSCOPY Left 2/18/16    KNEE SURGERY        SHOULDER SURGERY         bilateral            Medication List reviewed:      Current Facility-Administered Medications   No current facility-administered medications for this encounter.             Current Outpatient Medications   Medication Sig Dispense Refill    meloxicam (MOBIC) 15 MG tablet TAKE 1 TABLET BY MOUTH DAILY 90 tablet 3    EPINEPHrine (EPIPEN 2-CARLINE) 0.3 MG/0.3ML SOAJ injection Inject 0.3 mLs into the muscle once for 1 dose Use as directed for allergic reaction 2 each 0    trihexyphenidyl (ARTANE) 2 MG tablet Take 2 mg by mouth 3 times daily (Patient not taking: Reported on 7/22/2022)        sucralfate (CARAFATE) 1 GM/10ML suspension Take 1 g by mouth (Patient not taking: Reported on

## 2025-06-30 NOTE — ED PROVIDER NOTES
I independently examined and evaluated Carroll Wells.  I personally saw the patient and performed a substantive portion of the visit including all aspects of the medical decision making.    In brief, this 69-year-old male is presenting with right groin pain and swelling.  States that he has been feeling poorly for the last week with increasing pain and swelling.  Endorses subjective fevers and chills at home.  He did have a fall today when he felt weak.  Denies any nausea or vomiting.  Has significant history of cirrhosis, plans for liver transplant.    Focused exam revealed   General: Alert, no acute distress, patient resting comfortably   Skin: warm, intact, no pallor noted   Head: Normocephalic, atraumatic   Eye: Normal conjunctiva   Cardiac: Normal peripheral perfusion  Respiratory: No acute distress   Musculoskeletal: No deformity, full ROM.   Neurological: alert and oriented, normal sensory and motor observed.   Psychiatric: Cooperative    ED course: Labwork obtained and does show a moderate hyponatremia and lactic acidosis.  Hemoglobin has downtrend to 8.1, downtrend from 8.7 a couple of weeks ago.  Rest was and was performed by [facility] who reported brown stool that is Hemoccult positive.  Patient does have an indurated and erythematous right scrotum injury that is treated with Vanco and Zosyn.  Testicular ultrasound shows complex varicocele with multiple septations and CT on pelvis shows cirrhosis with ascites.  Discussed the case with on-call urology, Dr. Levy who did evaluate the patient in the ED.  He reported that he suspects that he actually has an epididymitis and he does not think there is a drainable abscess at this time.  He agreed with continue broad-spectrum antibiotics and hospitalization.  Patient was borderline hypotensive in the ED, given his home dose of midodrine as well as a dose of albumin.  He is not tachycardic and is mentating well.  I do suspect that he chronically sustains a lower

## 2025-06-30 NOTE — ED NOTES
Carroll Wells is a 69 y.o. male admitted for  Active Problems:    * No active hospital problems. *  Resolved Problems:    * No resolved hospital problems. *  .   Patient Home via EMS transportation with   Chief Complaint   Patient presents with    Abdominal Pain     States he's been experiencing abd pain for 2 months. Going to receive a liver transplant at the end of August. Did have a fall today. Fell out of bed trying to go to the bathroom. Denies hitting head. On eliquis. Also c/o of swelling in the groin area.    .  Patient is alert and Person, Place, Time, and Situation  Patient's baseline mobility: Baseline Mobility: walk  Code Status: Prior   Cardiac Rhythm:       Is patient on baseline Oxygen: no how many Liters:   Abnormal Assessment Findings: swelling to feet and abdomen, soon to be liver transplant patient     Isolation: None      NIH Score:    C-SSRS: Risk of Suicide: No Risk  Bedside swallow:        Active LDA's:   Peripheral IV 06/29/25 Right Forearm (Active)       Peripheral IV 06/29/25 Right Antecubital (Active)     Patient admitted with a dias: no If the dias is chronic was it exchanged:NA  Reason for dias:   Patient admitted with Central Line:  NA . PICC line placement confirmed: YES OR NO:[id number]}   Reason for Central line:   Was central line Inserted from an outside facility:        Family/Caregiver Present yes Any Concerns: no   Restraints no  Sitter no         Vitals: MEWS Score: 3    Vitals:    06/29/25 2130 06/29/25 2236 06/29/25 2330 06/30/25 0000   BP: (!) 104/50 (!) 110/54 (!) 95/47 (!) 100/45   Pulse: 75 77 71 64   Resp:       Temp: 100.1 °F (37.8 °C)      TempSrc: Oral      SpO2: 96% 98% 96% 95%   Weight:       Height:           Last documented pain score (0-10 scale) Pain Level: 7  Pain medication administered No.    Pertinent or High Risk Medications/Drips: No.    Pending Blood Product Administration: no    Abnormal labs:   Abnormal Labs Reviewed   LACTATE, SEPSIS - Abnormal;

## 2025-06-30 NOTE — DISCHARGE SUMMARY
Name:  Carroll Wells  Room:  /0310-01  MRN:    [id number]    Discharge Summary      This discharge summary is in conjunction with a complete physical exam done on the day of discharge.    Discharging Provider: Jocelyn Mackenzie PA-C  Discharging Attending Physician: Dr. Echavarria       Admit: 6/29/2025  Discharge:  6/30/2025    HPI taken from admission H&P:    The patient is a 69 y.o. male with pmhx of cirrhosis, DVT, DM, HTN, hx of GI bleed who presented to [facility] ED with complaint of abdominal pain. He was trying to get out of bed to go to the bathroom when he fell on his buttocks due to weakness. He denies hitting his head. Was unable to get up on his own, son in law had to help him. He also is reporting scrotal swelling and pain. Patient first noticed the swelling around 6/2 and at the time is was not painful, just bothersome. The swelling has gotten progressively worse and is now painful to touch. Patient feels that the pain is shooting up from the right side of his scrotum to the right side of his abdomen and back. Patient admits hx of GI bleeding, but has not noticed any blood in his stool or urine recently. No vomiting. He typically gets a paracentesis every Wednesday for his ascites and does have abdominal distension and discomfort. Wife at bedside and said patient has been lethargic and generally weak recently, but is worse today due to lack of sleep last night. Has also not had good PO intake at home.      She would like him transferred to  because he is following with transplant surgery.      No chest pain, shortness of breath, vomiting, diarrhea, urinary symptoms.      Diagnoses this Admission and Hospital Course   #Sepsis criteria with hypotension   #Large complex hydrocele in the right scrotal sac with several internal septations   #Acute right sided epididymitis   -febrile, hypotension, procalc, source, LA elevation  -will start IVF   -IV zosyn started   -blood cultures pending

## 2025-06-30 NOTE — FLOWSHEET NOTE
Patient transferring to  report called to RN. Patient has PRBC transfusing, notified of this        06/30/25 1309   Handoff   Communication Given Transfer Handoff   Handoff Given To Kayla HENDERSON   Handoff Received From Ozzy RN   Handoff Communication Telephone   Time Handoff Given 1307   End of Shift Check Performed Yes

## 2025-07-01 ENCOUNTER — TELEPHONE (OUTPATIENT)
Dept: FAMILY MEDICINE CLINIC | Age: 70
End: 2025-07-01

## 2025-07-01 LAB — BACTERIA UR CULT: NORMAL

## 2025-07-01 NOTE — TELEPHONE ENCOUNTER
Care Transitions Initial Follow Up Call    Outreach made within 2 business days of discharge: Yes    Patient: Carroll Wells Patient : 1955   MRN: [id number]  Reason for Admission: Epididymitis right  Discharge Date: 25       Spoke with: Patient transferred and is currently admitted at [facility].  Once the patient is discharged I will schedule for a Hospital follow up appointment.    Discharge department/facility: [facility]        Scheduled appointment with PCP within 7-14 days    Follow Up  Future Appointments   Date Time Provider Department Center   2025 12:00 PM [facility]   2025  1:15 PM [facility] OP RN Ariel HOD   2025 10:30 AM Karina Mendoza PA [facility] DEP   2025 12:00 PM [facility] US RM [facility]       Betzy Oakley LPN

## 2025-07-03 LAB
BACTERIA BLD CULT ORG #2: NORMAL
BACTERIA BLD CULT: NORMAL

## 2025-07-04 LAB
REASON FOR REJECTION: NORMAL
REJECTED TEST: NORMAL

## 2025-07-07 ENCOUNTER — OFFICE VISIT (OUTPATIENT)
Dept: FAMILY MEDICINE CLINIC | Age: 70
End: 2025-07-07

## 2025-07-07 VITALS
SYSTOLIC BLOOD PRESSURE: 112 MMHG | OXYGEN SATURATION: 99 % | HEART RATE: 68 BPM | DIASTOLIC BLOOD PRESSURE: 48 MMHG | BODY MASS INDEX: 32.93 KG/M2 | TEMPERATURE: 99.4 F | WEIGHT: 209.8 LBS | HEIGHT: 67 IN

## 2025-07-07 DIAGNOSIS — E11.65 TYPE 2 DIABETES MELLITUS WITH HYPERGLYCEMIA, WITH LONG-TERM CURRENT USE OF INSULIN (HCC): ICD-10-CM

## 2025-07-07 DIAGNOSIS — I82.B12 THROMBOSIS OF LEFT SUBCLAVIAN VEIN (HCC): ICD-10-CM

## 2025-07-07 DIAGNOSIS — Z79.4 TYPE 2 DIABETES MELLITUS WITH HYPERGLYCEMIA, WITH LONG-TERM CURRENT USE OF INSULIN (HCC): ICD-10-CM

## 2025-07-07 DIAGNOSIS — I82.C12: ICD-10-CM

## 2025-07-07 DIAGNOSIS — A41.9 SEPSIS, DUE TO UNSPECIFIED ORGANISM, UNSPECIFIED WHETHER ACUTE ORGAN DYSFUNCTION PRESENT (HCC): ICD-10-CM

## 2025-07-07 DIAGNOSIS — F41.9 ANXIETY: ICD-10-CM

## 2025-07-07 DIAGNOSIS — E11.65 UNCONTROLLED TYPE 2 DIABETES MELLITUS WITH HYPERGLYCEMIA (HCC): ICD-10-CM

## 2025-07-07 DIAGNOSIS — R18.8 CIRRHOSIS OF LIVER WITH ASCITES, UNSPECIFIED HEPATIC CIRRHOSIS TYPE (HCC): ICD-10-CM

## 2025-07-07 DIAGNOSIS — N49.2 SCROTAL INFECTION: ICD-10-CM

## 2025-07-07 DIAGNOSIS — Z09 HOSPITAL DISCHARGE FOLLOW-UP: Primary | ICD-10-CM

## 2025-07-07 DIAGNOSIS — K74.60 CIRRHOSIS OF LIVER WITH ASCITES, UNSPECIFIED HEPATIC CIRRHOSIS TYPE (HCC): ICD-10-CM

## 2025-07-07 LAB
BASOPHILS # BLD: 0.1 K/UL (ref 0–0.2)
BASOPHILS NFR BLD: 0.8 %
DEPRECATED RDW RBC AUTO: 16.1 % (ref 12.4–15.4)
EOSINOPHIL # BLD: 0 K/UL (ref 0–0.6)
EOSINOPHIL NFR BLD: 0.4 %
HCT VFR BLD AUTO: 24.7 % (ref 40.5–52.5)
HGB BLD-MCNC: 8.4 G/DL (ref 13.5–17.5)
LYMPHOCYTES # BLD: 0.6 K/UL (ref 1–5.1)
LYMPHOCYTES NFR BLD: 6.9 %
MCH RBC QN AUTO: 28.5 PG (ref 26–34)
MCHC RBC AUTO-ENTMCNC: 34.1 G/DL (ref 31–36)
MCV RBC AUTO: 83.5 FL (ref 80–100)
MONOCYTES # BLD: 0.9 K/UL (ref 0–1.3)
MONOCYTES NFR BLD: 11 %
NEUTROPHILS # BLD: 6.7 K/UL (ref 1.7–7.7)
NEUTROPHILS NFR BLD: 80.9 %
PLATELET # BLD AUTO: 224 K/UL (ref 135–450)
PMV BLD AUTO: 8.6 FL (ref 5–10.5)
RBC # BLD AUTO: 2.96 M/UL (ref 4.2–5.9)
WBC # BLD AUTO: 8.3 K/UL (ref 4–11)

## 2025-07-07 RX ORDER — BUSPIRONE HYDROCHLORIDE 7.5 MG/1
7.5 TABLET ORAL 2 TIMES DAILY
Qty: 60 TABLET | Refills: 2 | Status: SHIPPED | OUTPATIENT
Start: 2025-07-07 | End: 2025-08-06

## 2025-07-07 RX ORDER — PROMETHAZINE HYDROCHLORIDE 12.5 MG/1
12.5 TABLET ORAL 3 TIMES DAILY PRN
Qty: 12 TABLET | Refills: 0 | Status: ON HOLD | OUTPATIENT
Start: 2025-07-07 | End: 2025-07-11 | Stop reason: HOSPADM

## 2025-07-07 RX ORDER — INSULIN LISPRO 100 [IU]/ML
15 INJECTION, SOLUTION INTRAVENOUS; SUBCUTANEOUS
Qty: 15 ML | Refills: 1
Start: 2025-07-07

## 2025-07-07 RX ORDER — LEVOFLOXACIN 750 MG/1
750 TABLET, FILM COATED ORAL DAILY
Status: ON HOLD | COMMUNITY
End: 2025-07-11 | Stop reason: HOSPADM

## 2025-07-07 RX ORDER — ERGOCALCIFEROL 1.25 MG/1
50000 CAPSULE, LIQUID FILLED ORAL WEEKLY
COMMUNITY

## 2025-07-07 RX ORDER — OXYCODONE HYDROCHLORIDE 5 MG/1
5 CAPSULE ORAL EVERY 6 HOURS
COMMUNITY

## 2025-07-07 RX ORDER — PANTOPRAZOLE SODIUM 40 MG/1
40 TABLET, DELAYED RELEASE ORAL DAILY
COMMUNITY

## 2025-07-07 RX ORDER — INSULIN GLARGINE 100 [IU]/ML
15 INJECTION, SOLUTION SUBCUTANEOUS 2 TIMES DAILY
Qty: 15 ADJUSTABLE DOSE PRE-FILLED PEN SYRINGE | Refills: 1
Start: 2025-07-07

## 2025-07-07 SDOH — ECONOMIC STABILITY: FOOD INSECURITY: WITHIN THE PAST 12 MONTHS, YOU WORRIED THAT YOUR FOOD WOULD RUN OUT BEFORE YOU GOT MONEY TO BUY MORE.: NEVER TRUE

## 2025-07-07 SDOH — ECONOMIC STABILITY: FOOD INSECURITY: WITHIN THE PAST 12 MONTHS, THE FOOD YOU BOUGHT JUST DIDN'T LAST AND YOU DIDN'T HAVE MONEY TO GET MORE.: NEVER TRUE

## 2025-07-07 ASSESSMENT — PATIENT HEALTH QUESTIONNAIRE - PHQ9
4. FEELING TIRED OR HAVING LITTLE ENERGY: NEARLY EVERY DAY
7. TROUBLE CONCENTRATING ON THINGS, SUCH AS READING THE NEWSPAPER OR WATCHING TELEVISION: NEARLY EVERY DAY
8. MOVING OR SPEAKING SO SLOWLY THAT OTHER PEOPLE COULD HAVE NOTICED. OR THE OPPOSITE, BEING SO FIGETY OR RESTLESS THAT YOU HAVE BEEN MOVING AROUND A LOT MORE THAN USUAL: NEARLY EVERY DAY
5. POOR APPETITE OR OVEREATING: NEARLY EVERY DAY
6. FEELING BAD ABOUT YOURSELF - OR THAT YOU ARE A FAILURE OR HAVE LET YOURSELF OR YOUR FAMILY DOWN: SEVERAL DAYS
SUM OF ALL RESPONSES TO PHQ QUESTIONS 1-9: 22
9. THOUGHTS THAT YOU WOULD BE BETTER OFF DEAD, OR OF HURTING YOURSELF: NOT AT ALL
SUM OF ALL RESPONSES TO PHQ QUESTIONS 1-9: 22
10. IF YOU CHECKED OFF ANY PROBLEMS, HOW DIFFICULT HAVE THESE PROBLEMS MADE IT FOR YOU TO DO YOUR WORK, TAKE CARE OF THINGS AT HOME, OR GET ALONG WITH OTHER PEOPLE: VERY DIFFICULT
1. LITTLE INTEREST OR PLEASURE IN DOING THINGS: NEARLY EVERY DAY
2. FEELING DOWN, DEPRESSED OR HOPELESS: NEARLY EVERY DAY
3. TROUBLE FALLING OR STAYING ASLEEP: NEARLY EVERY DAY
SUM OF ALL RESPONSES TO PHQ QUESTIONS 1-9: 22
SUM OF ALL RESPONSES TO PHQ QUESTIONS 1-9: 22

## 2025-07-07 ASSESSMENT — COLUMBIA-SUICIDE SEVERITY RATING SCALE - C-SSRS
6. HAVE YOU EVER DONE ANYTHING, STARTED TO DO ANYTHING, OR PREPARED TO DO ANYTHING TO END YOUR LIFE?: NO
2. HAVE YOU ACTUALLY HAD ANY THOUGHTS OF KILLING YOURSELF?: NO
1. WITHIN THE PAST MONTH, HAVE YOU WISHED YOU WERE DEAD OR WISHED YOU COULD GO TO SLEEP AND NOT WAKE UP?: NO

## 2025-07-07 NOTE — PROGRESS NOTES
Have you been to the ER, urgent care clinic since your last visit?  Hospitalized since your last visit?   YES - When: approximately 7 days ago.  Where and Why: [facility] sepsis .    Have you seen or consulted any other health care providers outside our system since your last visit?   YES - When: approximately 7 days ago.  Where and Why: [facility] .    “Have you had a diabetic eye exam?”    NO     Date of last diabetic eye exam: 3/30/2021            
(abdominal pain)     vitamin D 1.25 MG (34881 UT) Caps capsule  Commonly known as: ERGOCALCIFEROL               Where to Get Your Medications        These medications were sent to [id number] - [place], [place] - [address] - P [phone] - F [phone]  [address] [place] [address]      Phone: [phone]   busPIRone 7.5 MG tablet  promethazine 12.5 MG tablet       Information about where to get these medications is not yet available    Ask your nurse or doctor about these medications  insulin lispro (1 Unit Dial) 100 UNIT/ML Sopn  Lantus SoloStar 100 UNIT/ML injection pen           Medications marked \"taking\" at this time  Outpatient Medications Marked as Taking for the 7/7/25 encounter (Office Visit) with Karina Mendoza PA   Medication Sig Dispense Refill    levoFLOXacin (LEVAQUIN) 750 MG tablet Take 1 tablet by mouth daily      pantoprazole (PROTONIX) 40 MG tablet Take 1 tablet by mouth daily      oxyCODONE 5 MG capsule Take 1 capsule by mouth every 6 hours. Max Daily Amount: 20 mg      vitamin D (ERGOCALCIFEROL) 1.25 MG (33973 UT) CAPS capsule Take 1 capsule by mouth once a week      rifAXIMin (XIFAXAN) 550 MG tablet Take 1 tablet by mouth 2 times daily      insulin glargine (LANTUS SOLOSTAR) 100 UNIT/ML injection pen Inject 15 Units into the skin 2 times daily 15 Adjustable Dose Pre-filled Pen Syringe 1    insulin lispro, 1 Unit Dial, (HUMALOG KWIKPEN) 100 UNIT/ML SOPN Inject 15 Units into the skin 3 times daily (before meals) 15 mL 1    busPIRone (BUSPAR) 7.5 MG tablet Take 1 tablet by mouth 2 times daily 60 tablet 2    promethazine (PHENERGAN) 12.5 MG tablet Take 1 tablet by mouth 3 times daily as needed for Nausea 12 tablet 0    ondansetron (ZOFRAN-ODT) 4 MG disintegrating tablet TAKE 1 TABLET BY MOUTH 3 TIMES DAILY AS NEEDED FOR NAUSEA OR VOMITING (Patient taking differently: Take 1 tablet by mouth every 8 hours as needed for Nausea or Vomiting) 21 tablet 0    ferrous sulfate

## 2025-07-08 ENCOUNTER — APPOINTMENT (OUTPATIENT)
Dept: GENERAL RADIOLOGY | Age: 70
End: 2025-07-08
Payer: MEDICARE

## 2025-07-08 ENCOUNTER — TELEPHONE (OUTPATIENT)
Dept: PRIMARY CARE CLINIC | Age: 70
End: 2025-07-08

## 2025-07-08 ENCOUNTER — HOSPITAL ENCOUNTER (INPATIENT)
Age: 70
LOS: 3 days | Discharge: HOME | End: 2025-07-11
Attending: EMERGENCY MEDICINE | Admitting: INTERNAL MEDICINE
Payer: MEDICARE

## 2025-07-08 DIAGNOSIS — I95.9 HYPOTENSION, UNSPECIFIED HYPOTENSION TYPE: ICD-10-CM

## 2025-07-08 DIAGNOSIS — R53.1 GENERALIZED WEAKNESS: ICD-10-CM

## 2025-07-08 DIAGNOSIS — E87.5 HYPERKALEMIA: ICD-10-CM

## 2025-07-08 DIAGNOSIS — N17.9 ACUTE KIDNEY INJURY: Primary | ICD-10-CM

## 2025-07-08 LAB
ALBUMIN SERPL-MCNC: 3.6 G/DL (ref 3.4–5)
ALBUMIN SERPL-MCNC: 3.9 G/DL (ref 3.4–5)
ALBUMIN/GLOB SERPL: 1.4 {RATIO} (ref 1.1–2.2)
ALBUMIN/GLOB SERPL: 1.9 {RATIO} (ref 1.1–2.2)
ALP SERPL-CCNC: 125 U/L (ref 40–129)
ALP SERPL-CCNC: 128 U/L (ref 40–129)
ALT SERPL-CCNC: 15 U/L (ref 10–40)
ALT SERPL-CCNC: 16 U/L (ref 10–40)
ANION GAP SERPL CALCULATED.3IONS-SCNC: 11 MMOL/L (ref 3–16)
ANION GAP SERPL CALCULATED.3IONS-SCNC: 11 MMOL/L (ref 3–16)
ANION GAP SERPL CALCULATED.3IONS-SCNC: 13 MMOL/L (ref 3–16)
ANION GAP SERPL CALCULATED.3IONS-SCNC: 13 MMOL/L (ref 3–16)
AST SERPL-CCNC: 20 U/L (ref 15–37)
AST SERPL-CCNC: 23 U/L (ref 15–37)
BASE EXCESS BLDV CALC-SCNC: -2.8 MMOL/L (ref -3–3)
BASOPHILS # BLD: 0 K/UL (ref 0–0.2)
BASOPHILS NFR BLD: 0.5 %
BILIRUB SERPL-MCNC: 0.8 MG/DL (ref 0–1)
BILIRUB SERPL-MCNC: 1 MG/DL (ref 0–1)
BUN SERPL-MCNC: 42 MG/DL (ref 7–20)
BUN SERPL-MCNC: 48 MG/DL (ref 7–20)
BUN SERPL-MCNC: 49 MG/DL (ref 7–20)
BUN SERPL-MCNC: 49 MG/DL (ref 7–20)
CALCIUM SERPL-MCNC: 8.4 MG/DL (ref 8.3–10.6)
CALCIUM SERPL-MCNC: 8.5 MG/DL (ref 8.3–10.6)
CALCIUM SERPL-MCNC: 8.9 MG/DL (ref 8.3–10.6)
CALCIUM SERPL-MCNC: 9.8 MG/DL (ref 8.3–10.6)
CHLORIDE SERPL-SCNC: 96 MMOL/L (ref 99–110)
CHLORIDE SERPL-SCNC: 96 MMOL/L (ref 99–110)
CHLORIDE SERPL-SCNC: 98 MMOL/L (ref 99–110)
CHLORIDE SERPL-SCNC: 99 MMOL/L (ref 99–110)
CO2 BLDV-SCNC: 23 MMOL/L
CO2 SERPL-SCNC: 21 MMOL/L (ref 21–32)
CO2 SERPL-SCNC: 22 MMOL/L (ref 21–32)
COHGB MFR BLDV: 1.5 % (ref 0–1.5)
CREAT SERPL-MCNC: 2.6 MG/DL (ref 0.8–1.3)
CREAT SERPL-MCNC: 2.8 MG/DL (ref 0.8–1.3)
CREAT SERPL-MCNC: 2.8 MG/DL (ref 0.8–1.3)
CREAT SERPL-MCNC: 2.9 MG/DL (ref 0.8–1.3)
DEPRECATED RDW RBC AUTO: 15.8 % (ref 12.4–15.4)
EKG DIAGNOSIS: NORMAL
EKG Q-T INTERVAL: 456 MS
EKG QRS DURATION: 98 MS
EKG QTC CALCULATION (BAZETT): 456 MS
EKG R AXIS: -50 DEGREES
EKG T AXIS: 47 DEGREES
EKG VENTRICULAR RATE: 60 BPM
EOSINOPHIL # BLD: 0.1 K/UL (ref 0–0.6)
EOSINOPHIL NFR BLD: 0.8 %
ERYTHROCYTE [SEDIMENTATION RATE] IN BLOOD BY WESTERGREN METHOD: 22 MM/HR (ref 0–20)
GFR SERPLBLD CREATININE-BSD FMLA CKD-EPI: 22 ML/MIN/{1.73_M2}
GFR SERPLBLD CREATININE-BSD FMLA CKD-EPI: 23 ML/MIN/{1.73_M2}
GFR SERPLBLD CREATININE-BSD FMLA CKD-EPI: 23 ML/MIN/{1.73_M2}
GFR SERPLBLD CREATININE-BSD FMLA CKD-EPI: 25 ML/MIN/{1.73_M2}
GLUCOSE BLD-MCNC: 222 MG/DL (ref 70–99)
GLUCOSE BLD-MCNC: 224 MG/DL (ref 70–99)
GLUCOSE BLD-MCNC: 229 MG/DL (ref 70–99)
GLUCOSE BLD-MCNC: 249 MG/DL (ref 70–99)
GLUCOSE SERPL-MCNC: 199 MG/DL (ref 70–99)
GLUCOSE SERPL-MCNC: 210 MG/DL (ref 70–99)
GLUCOSE SERPL-MCNC: 211 MG/DL (ref 70–99)
GLUCOSE SERPL-MCNC: 221 MG/DL (ref 70–99)
HCO3 BLDV-SCNC: 21.9 MMOL/L (ref 23–29)
HCT VFR BLD AUTO: 24.4 % (ref 40.5–52.5)
HGB BLD-MCNC: 8.2 G/DL (ref 13.5–17.5)
INR PPP: 1.28 (ref 0.86–1.14)
LACTATE BLDV-SCNC: 1.9 MMOL/L (ref 0.4–1.9)
LYMPHOCYTES # BLD: 0.5 K/UL (ref 1–5.1)
LYMPHOCYTES NFR BLD: 7.5 %
MAGNESIUM SERPL-MCNC: 2.06 MG/DL (ref 1.8–2.4)
MCH RBC QN AUTO: 28.2 PG (ref 26–34)
MCHC RBC AUTO-ENTMCNC: 33.6 G/DL (ref 31–36)
MCV RBC AUTO: 84 FL (ref 80–100)
METHGB MFR BLDV: 0 %
MONOCYTES # BLD: 1 K/UL (ref 0–1.3)
MONOCYTES NFR BLD: 14.3 %
NEUTROPHILS # BLD: 5.5 K/UL (ref 1.7–7.7)
NEUTROPHILS NFR BLD: 76.9 %
NT-PROBNP SERPL-MCNC: 1833 PG/ML (ref 0–124)
O2 CT VFR BLDV CALC: 11 VOL %
O2 THERAPY: ABNORMAL
PCO2 BLDV: 37.6 MMHG (ref 40–50)
PERFORMED ON: ABNORMAL
PH BLDV: 7.38 [PH] (ref 7.35–7.45)
PLATELET # BLD AUTO: 194 K/UL (ref 135–450)
PMV BLD AUTO: 7.9 FL (ref 5–10.5)
PO2 BLDV: 55.9 MMHG (ref 25–40)
POTASSIUM SERPL-SCNC: 5.7 MMOL/L (ref 3.5–5.1)
POTASSIUM SERPL-SCNC: 5.9 MMOL/L (ref 3.5–5.1)
POTASSIUM SERPL-SCNC: 6 MMOL/L (ref 3.5–5.1)
POTASSIUM SERPL-SCNC: 6.2 MMOL/L (ref 3.5–5.1)
POTASSIUM SERPL-SCNC: ABNORMAL MMOL/L (ref 3.5–5.1)
PROCALCITONIN SERPL IA-MCNC: 0.25 NG/ML (ref 0–0.15)
PROCALCITONIN SERPL IA-MCNC: 0.26 NG/ML (ref 0–0.15)
PROT SERPL-MCNC: 6 G/DL (ref 6.4–8.2)
PROT SERPL-MCNC: 6.2 G/DL (ref 6.4–8.2)
PROTHROMBIN TIME: 16.2 SEC (ref 12.1–14.9)
RBC # BLD AUTO: 2.91 M/UL (ref 4.2–5.9)
REASON FOR REJECTION: NORMAL
REJECTED TEST: NORMAL
SAO2 % BLDV: 89 %
SODIUM SERPL-SCNC: 131 MMOL/L (ref 136–145)
T4 FREE SERPL-MCNC: 1.2 NG/DL (ref 0.9–1.8)
TROPONIN, HIGH SENSITIVITY: 76 NG/L (ref 0–22)
TROPONIN, HIGH SENSITIVITY: 79 NG/L (ref 0–22)
TSH SERPL DL<=0.005 MIU/L-ACNC: 5.2 UIU/ML (ref 0.27–4.2)
WBC # BLD AUTO: 7.2 K/UL (ref 4–11)

## 2025-07-08 PROCEDURE — 84145 PROCALCITONIN (PCT): CPT

## 2025-07-08 PROCEDURE — 6370000000 HC RX 637 (ALT 250 FOR IP): Performed by: NURSE PRACTITIONER

## 2025-07-08 PROCEDURE — 93010 ELECTROCARDIOGRAM REPORT: CPT | Performed by: INTERNAL MEDICINE

## 2025-07-08 PROCEDURE — 85610 PROTHROMBIN TIME: CPT

## 2025-07-08 PROCEDURE — 0W9G3ZZ DRAINAGE OF PERITONEAL CAVITY, PERCUTANEOUS APPROACH: ICD-10-PCS | Performed by: INTERNAL MEDICINE

## 2025-07-08 PROCEDURE — 96361 HYDRATE IV INFUSION ADD-ON: CPT

## 2025-07-08 PROCEDURE — 2500000003 HC RX 250 WO HCPCS: Performed by: INTERNAL MEDICINE

## 2025-07-08 PROCEDURE — 36556 INSERT NON-TUNNEL CV CATH: CPT

## 2025-07-08 PROCEDURE — 36415 COLL VENOUS BLD VENIPUNCTURE: CPT

## 2025-07-08 PROCEDURE — 6370000000 HC RX 637 (ALT 250 FOR IP): Performed by: INTERNAL MEDICINE

## 2025-07-08 PROCEDURE — 6360000002 HC RX W HCPCS: Performed by: INTERNAL MEDICINE

## 2025-07-08 PROCEDURE — 2580000003 HC RX 258: Performed by: INTERNAL MEDICINE

## 2025-07-08 PROCEDURE — 2580000003 HC RX 258: Performed by: EMERGENCY MEDICINE

## 2025-07-08 PROCEDURE — 93005 ELECTROCARDIOGRAM TRACING: CPT | Performed by: EMERGENCY MEDICINE

## 2025-07-08 PROCEDURE — 83735 ASSAY OF MAGNESIUM: CPT

## 2025-07-08 PROCEDURE — 99285 EMERGENCY DEPT VISIT HI MDM: CPT

## 2025-07-08 PROCEDURE — 6360000002 HC RX W HCPCS: Performed by: NURSE PRACTITIONER

## 2025-07-08 PROCEDURE — 99223 1ST HOSP IP/OBS HIGH 75: CPT | Performed by: INTERNAL MEDICINE

## 2025-07-08 PROCEDURE — 85025 COMPLETE CBC W/AUTO DIFF WBC: CPT

## 2025-07-08 PROCEDURE — 6370000000 HC RX 637 (ALT 250 FOR IP): Performed by: EMERGENCY MEDICINE

## 2025-07-08 PROCEDURE — 85652 RBC SED RATE AUTOMATED: CPT

## 2025-07-08 PROCEDURE — 83605 ASSAY OF LACTIC ACID: CPT

## 2025-07-08 PROCEDURE — 02HV33Z INSERTION OF INFUSION DEVICE INTO SUPERIOR VENA CAVA, PERCUTANEOUS APPROACH: ICD-10-PCS | Performed by: INTERNAL MEDICINE

## 2025-07-08 PROCEDURE — 36556 INSERT NON-TUNNEL CV CATH: CPT | Performed by: INTERNAL MEDICINE

## 2025-07-08 PROCEDURE — 84484 ASSAY OF TROPONIN QUANT: CPT

## 2025-07-08 PROCEDURE — 84439 ASSAY OF FREE THYROXINE: CPT

## 2025-07-08 PROCEDURE — 82803 BLOOD GASES ANY COMBINATION: CPT

## 2025-07-08 PROCEDURE — 71045 X-RAY EXAM CHEST 1 VIEW: CPT

## 2025-07-08 PROCEDURE — 2000000000 HC ICU R&B

## 2025-07-08 PROCEDURE — 84443 ASSAY THYROID STIM HORMONE: CPT

## 2025-07-08 PROCEDURE — 83880 ASSAY OF NATRIURETIC PEPTIDE: CPT

## 2025-07-08 PROCEDURE — 2500000003 HC RX 250 WO HCPCS: Performed by: NURSE PRACTITIONER

## 2025-07-08 PROCEDURE — P9045 ALBUMIN (HUMAN), 5%, 250 ML: HCPCS | Performed by: INTERNAL MEDICINE

## 2025-07-08 PROCEDURE — 80053 COMPREHEN METABOLIC PANEL: CPT

## 2025-07-08 PROCEDURE — 96360 HYDRATION IV INFUSION INIT: CPT

## 2025-07-08 RX ORDER — SODIUM CHLORIDE 0.9 % (FLUSH) 0.9 %
5-40 SYRINGE (ML) INJECTION PRN
Status: DISCONTINUED | OUTPATIENT
Start: 2025-07-08 | End: 2025-07-11 | Stop reason: HOSPADM

## 2025-07-08 RX ORDER — INSULIN GLARGINE 100 [IU]/ML
15 INJECTION, SOLUTION SUBCUTANEOUS 2 TIMES DAILY
Status: DISCONTINUED | OUTPATIENT
Start: 2025-07-08 | End: 2025-07-11 | Stop reason: HOSPADM

## 2025-07-08 RX ORDER — INSULIN LISPRO 100 [IU]/ML
0-4 INJECTION, SOLUTION INTRAVENOUS; SUBCUTANEOUS
Status: DISCONTINUED | OUTPATIENT
Start: 2025-07-08 | End: 2025-07-11 | Stop reason: HOSPADM

## 2025-07-08 RX ORDER — POLYETHYLENE GLYCOL 3350 17 G/17G
17 POWDER, FOR SOLUTION ORAL DAILY PRN
Status: DISCONTINUED | OUTPATIENT
Start: 2025-07-08 | End: 2025-07-11 | Stop reason: HOSPADM

## 2025-07-08 RX ORDER — GLUCAGON 1 MG/ML
1 KIT INJECTION PRN
Status: DISCONTINUED | OUTPATIENT
Start: 2025-07-08 | End: 2025-07-11 | Stop reason: HOSPADM

## 2025-07-08 RX ORDER — 0.9 % SODIUM CHLORIDE 0.9 %
500 INTRAVENOUS SOLUTION INTRAVENOUS ONCE
Status: COMPLETED | OUTPATIENT
Start: 2025-07-08 | End: 2025-07-08

## 2025-07-08 RX ORDER — ACETAMINOPHEN 325 MG/1
650 TABLET ORAL EVERY 6 HOURS PRN
Status: DISCONTINUED | OUTPATIENT
Start: 2025-07-08 | End: 2025-07-11 | Stop reason: HOSPADM

## 2025-07-08 RX ORDER — MIDODRINE HYDROCHLORIDE 5 MG/1
5 TABLET ORAL 2 TIMES DAILY
Status: DISCONTINUED | OUTPATIENT
Start: 2025-07-08 | End: 2025-07-08

## 2025-07-08 RX ORDER — PANTOPRAZOLE SODIUM 40 MG/1
40 TABLET, DELAYED RELEASE ORAL DAILY
Status: DISCONTINUED | OUTPATIENT
Start: 2025-07-09 | End: 2025-07-11 | Stop reason: HOSPADM

## 2025-07-08 RX ORDER — INSULIN LISPRO 100 [IU]/ML
15 INJECTION, SOLUTION INTRAVENOUS; SUBCUTANEOUS
Status: DISCONTINUED | OUTPATIENT
Start: 2025-07-08 | End: 2025-07-11 | Stop reason: HOSPADM

## 2025-07-08 RX ORDER — FUROSEMIDE 10 MG/ML
20 INJECTION INTRAMUSCULAR; INTRAVENOUS ONCE
Status: DISCONTINUED | OUTPATIENT
Start: 2025-07-08 | End: 2025-07-08

## 2025-07-08 RX ORDER — 0.9 % SODIUM CHLORIDE 0.9 %
250 INTRAVENOUS SOLUTION INTRAVENOUS ONCE
Status: COMPLETED | OUTPATIENT
Start: 2025-07-08 | End: 2025-07-08

## 2025-07-08 RX ORDER — FERROUS SULFATE 325(65) MG
325 TABLET ORAL
Status: DISCONTINUED | OUTPATIENT
Start: 2025-07-09 | End: 2025-07-11 | Stop reason: HOSPADM

## 2025-07-08 RX ORDER — SIMETHICONE 80 MG
80 TABLET,CHEWABLE ORAL 3 TIMES DAILY
Status: DISCONTINUED | OUTPATIENT
Start: 2025-07-08 | End: 2025-07-11 | Stop reason: HOSPADM

## 2025-07-08 RX ORDER — BUSPIRONE HYDROCHLORIDE 7.5 MG/1
7.5 TABLET ORAL 2 TIMES DAILY
Status: DISCONTINUED | OUTPATIENT
Start: 2025-07-08 | End: 2025-07-11 | Stop reason: HOSPADM

## 2025-07-08 RX ORDER — MIDODRINE HYDROCHLORIDE 5 MG/1
5 TABLET ORAL ONCE
Status: COMPLETED | OUTPATIENT
Start: 2025-07-08 | End: 2025-07-08

## 2025-07-08 RX ORDER — MUPIROCIN 2 %
OINTMENT (GRAM) TOPICAL 2 TIMES DAILY
Status: DISCONTINUED | OUTPATIENT
Start: 2025-07-08 | End: 2025-07-11 | Stop reason: HOSPADM

## 2025-07-08 RX ORDER — CETIRIZINE HYDROCHLORIDE 10 MG/1
5 TABLET ORAL DAILY
Status: DISCONTINUED | OUTPATIENT
Start: 2025-07-09 | End: 2025-07-11 | Stop reason: HOSPADM

## 2025-07-08 RX ORDER — HEPARIN SODIUM 5000 [USP'U]/ML
5000 INJECTION, SOLUTION INTRAVENOUS; SUBCUTANEOUS EVERY 8 HOURS SCHEDULED
Status: DISCONTINUED | OUTPATIENT
Start: 2025-07-08 | End: 2025-07-11 | Stop reason: HOSPADM

## 2025-07-08 RX ORDER — ALBUMIN HUMAN 50 G/1000ML
25 SOLUTION INTRAVENOUS EVERY 6 HOURS
Status: COMPLETED | OUTPATIENT
Start: 2025-07-08 | End: 2025-07-10

## 2025-07-08 RX ORDER — ONDANSETRON 2 MG/ML
4 INJECTION INTRAMUSCULAR; INTRAVENOUS EVERY 6 HOURS PRN
Status: DISCONTINUED | OUTPATIENT
Start: 2025-07-08 | End: 2025-07-11 | Stop reason: HOSPADM

## 2025-07-08 RX ORDER — OXYCODONE HYDROCHLORIDE 5 MG/1
5 TABLET ORAL EVERY 6 HOURS PRN
Status: DISCONTINUED | OUTPATIENT
Start: 2025-07-08 | End: 2025-07-11 | Stop reason: HOSPADM

## 2025-07-08 RX ORDER — MIDODRINE HYDROCHLORIDE 10 MG/1
10 TABLET ORAL
Status: DISCONTINUED | OUTPATIENT
Start: 2025-07-08 | End: 2025-07-11 | Stop reason: HOSPADM

## 2025-07-08 RX ORDER — ACETAMINOPHEN 650 MG/1
650 SUPPOSITORY RECTAL EVERY 6 HOURS PRN
Status: DISCONTINUED | OUTPATIENT
Start: 2025-07-08 | End: 2025-07-11 | Stop reason: HOSPADM

## 2025-07-08 RX ORDER — LACTULOSE 10 G/15ML
20 SOLUTION ORAL 3 TIMES DAILY
Status: DISCONTINUED | OUTPATIENT
Start: 2025-07-08 | End: 2025-07-11 | Stop reason: HOSPADM

## 2025-07-08 RX ORDER — SODIUM CHLORIDE 9 MG/ML
INJECTION, SOLUTION INTRAVENOUS PRN
Status: DISCONTINUED | OUTPATIENT
Start: 2025-07-08 | End: 2025-07-11 | Stop reason: HOSPADM

## 2025-07-08 RX ORDER — ONDANSETRON 4 MG/1
4 TABLET, ORALLY DISINTEGRATING ORAL EVERY 8 HOURS PRN
Status: DISCONTINUED | OUTPATIENT
Start: 2025-07-08 | End: 2025-07-11 | Stop reason: HOSPADM

## 2025-07-08 RX ORDER — SODIUM CHLORIDE 0.9 % (FLUSH) 0.9 %
5-40 SYRINGE (ML) INJECTION EVERY 12 HOURS SCHEDULED
Status: DISCONTINUED | OUTPATIENT
Start: 2025-07-08 | End: 2025-07-11 | Stop reason: HOSPADM

## 2025-07-08 RX ORDER — DEXTROSE MONOHYDRATE 100 MG/ML
INJECTION, SOLUTION INTRAVENOUS CONTINUOUS PRN
Status: DISCONTINUED | OUTPATIENT
Start: 2025-07-08 | End: 2025-07-11 | Stop reason: HOSPADM

## 2025-07-08 RX ADMIN — MIDODRINE HYDROCHLORIDE 5 MG: 5 TABLET ORAL at 09:29

## 2025-07-08 RX ADMIN — SODIUM CHLORIDE 250 ML: 0.9 INJECTION, SOLUTION INTRAVENOUS at 10:54

## 2025-07-08 RX ADMIN — SODIUM ZIRCONIUM CYCLOSILICATE 10 G: 10 POWDER, FOR SUSPENSION ORAL at 16:04

## 2025-07-08 RX ADMIN — Medication 5 MCG/MIN: at 19:47

## 2025-07-08 RX ADMIN — ONDANSETRON 4 MG: 4 TABLET, ORALLY DISINTEGRATING ORAL at 22:07

## 2025-07-08 RX ADMIN — RIFAXIMIN 550 MG: 550 TABLET ORAL at 21:46

## 2025-07-08 RX ADMIN — HEPARIN SODIUM 5000 UNITS: 5000 INJECTION INTRAVENOUS; SUBCUTANEOUS at 16:04

## 2025-07-08 RX ADMIN — CEFEPIME HYDROCHLORIDE 2000 MG: 2 INJECTION, POWDER, FOR SOLUTION INTRAVENOUS at 17:09

## 2025-07-08 RX ADMIN — INSULIN GLARGINE 15 UNITS: 100 INJECTION, SOLUTION SUBCUTANEOUS at 21:52

## 2025-07-08 RX ADMIN — ALBUMIN (HUMAN) 25 G: 12.5 INJECTION, SOLUTION INTRAVENOUS at 22:01

## 2025-07-08 RX ADMIN — SIMETHICONE 80 MG: 80 TABLET, CHEWABLE ORAL at 21:47

## 2025-07-08 RX ADMIN — OXYCODONE 5 MG: 5 TABLET ORAL at 23:14

## 2025-07-08 RX ADMIN — OXYCODONE 5 MG: 5 TABLET ORAL at 17:02

## 2025-07-08 RX ADMIN — SODIUM ZIRCONIUM CYCLOSILICATE 10 G: 10 POWDER, FOR SUSPENSION ORAL at 21:53

## 2025-07-08 RX ADMIN — ACETAMINOPHEN 650 MG: 325 TABLET ORAL at 17:02

## 2025-07-08 RX ADMIN — MIDODRINE HYDROCHLORIDE 10 MG: 10 TABLET ORAL at 17:02

## 2025-07-08 RX ADMIN — ALBUMIN (HUMAN) 25 G: 12.5 INJECTION, SOLUTION INTRAVENOUS at 15:41

## 2025-07-08 RX ADMIN — Medication 10 ML: at 21:53

## 2025-07-08 RX ADMIN — LACTULOSE 10 G: 10 SOLUTION ORAL at 21:57

## 2025-07-08 RX ADMIN — HEPARIN SODIUM 5000 UNITS: 5000 INJECTION INTRAVENOUS; SUBCUTANEOUS at 22:08

## 2025-07-08 RX ADMIN — MUPIROCIN: 20 OINTMENT TOPICAL at 21:58

## 2025-07-08 RX ADMIN — INSULIN LISPRO 7 UNITS: 100 INJECTION, SOLUTION INTRAVENOUS; SUBCUTANEOUS at 17:03

## 2025-07-08 RX ADMIN — INSULIN LISPRO 1 UNITS: 100 INJECTION, SOLUTION INTRAVENOUS; SUBCUTANEOUS at 21:52

## 2025-07-08 RX ADMIN — SODIUM CHLORIDE 500 ML: 0.9 INJECTION, SOLUTION INTRAVENOUS at 09:29

## 2025-07-08 ASSESSMENT — LIFESTYLE VARIABLES
HOW MANY STANDARD DRINKS CONTAINING ALCOHOL DO YOU HAVE ON A TYPICAL DAY: PATIENT DOES NOT DRINK
HOW OFTEN DO YOU HAVE A DRINK CONTAINING ALCOHOL: NEVER

## 2025-07-08 ASSESSMENT — PAIN DESCRIPTION - LOCATION
LOCATION: ABDOMEN
LOCATION: ABDOMEN

## 2025-07-08 ASSESSMENT — PAIN DESCRIPTION - ORIENTATION
ORIENTATION: RIGHT
ORIENTATION: RIGHT

## 2025-07-08 ASSESSMENT — PAIN SCALES - GENERAL
PAINLEVEL_OUTOF10: 8
PAINLEVEL_OUTOF10: 8
PAINLEVEL_OUTOF10: 3
PAINLEVEL_OUTOF10: 9
PAINLEVEL_OUTOF10: 5

## 2025-07-08 ASSESSMENT — PAIN DESCRIPTION - DESCRIPTORS
DESCRIPTORS: SHARP
DESCRIPTORS: SHARP

## 2025-07-08 ASSESSMENT — PAIN - FUNCTIONAL ASSESSMENT: PAIN_FUNCTIONAL_ASSESSMENT: 0-10

## 2025-07-08 NOTE — ED NOTES
Pt unsure of home medications at this time. Pt reports upon discharge from UC, they changed most of his medications and not sure what he takes anymore.

## 2025-07-08 NOTE — TELEPHONE ENCOUNTER
On call: received critical result potassium 6.2. Called patient and spoke to him and his wife; recommended evaluation in the ER. Patient declines and states that he will follow up with his PCP today after his urology appointment at 915am. No chest pain, palpitations.

## 2025-07-08 NOTE — ED NOTES
Pt spouse states pt has an infection to scrotum; has been septic before; pt also is being seen at  needing a Liver transplant; pt states he did not think he would make it to  today so came here; pt in and out of consciousness during triage; pt moved to room; provider made aware of pt assessment.

## 2025-07-08 NOTE — CONSULTS
Thank you to requesting provider:  Dr. De  , for asking us to see Carroll Wells  Reason for consultation:  Hyperkalemia with acute kidney injury   Chief Complaint:  Weakness     History of Presenting Illness      68 y/o with history of cirrhosis due to LANG followed at  for possible liver transplant and recently admitted to  with sepsis due to orchitis/epididymitis.  He was discharged last Friday with Scr of 1.1 and he was going to finish levaquin at home.  He became very weak again and he was not able to go to his urology follow up appointment.  He presents to the ER and found to have hypotension, ROWAN, and hyperkalemia.  He is passing urine.        Past Medical/Surgical History      Active Ambulatory Problems     Diagnosis Date Noted    Hyperlipidemia 04/27/2010    ARREDONDO (dyspnea on exertion) 01/05/2013    Left arm numbness 01/05/2013    Essential hypertension 05/30/2017    Mild episode of recurrent major depressive disorder 02/08/2018    Cervical radiculopathy 04/22/2016    Morbidly obese (HCC) 08/25/2020    Type 2 diabetes mellitus with hyperglycemia 08/11/2021    Secondary esophageal varices with bleeding (HCC)     Portal hypertensive gastropathy (HCC)     GAVE (gastric antral vascular ectasia)     Hepatic cirrhosis (HCC) 12/03/2021    Chronic renal disease, stage III (HCC) [183526] 05/06/2022    Severe obesity (BMI 35.0-39.9) with comorbidity (HCC) 03/27/2023    Thrombocytopenia, unspecified 11/15/2023    Dyspnea 02/18/2025    Subclavian vein thrombosis, unspecified laterality (HCC) 02/27/2025    Acute deep vein thrombosis (DVT) of left upper extremity, unspecified vein (HCC) 02/27/2025    GI bleed 03/25/2025    Epididymitis, right 06/30/2025    Other ascites 06/30/2025    Iron deficiency anemia due to chronic blood loss 06/30/2025     Resolved Ambulatory Problems     Diagnosis Date Noted    Hypertension 04/27/2010    Chronic pain 04/27/2010    Vertigo 01/05/2013    Light headedness 01/05/2013     [facility] for possible liver transplant   Typically gets LVP every Wednesday for about 7 liters each time  Diuretics stopped due to low BP  Hypotension:  Chronic component due to cirrhosis  Cirrhotic patients more sensitive to hypotension with infection   Was in [facility] ICU with sepsis due to epididymitis   On midodrine chronically   Hyponatremia:  Due to cirrhosis / hypervolemic   Hyperkalemia:  Due to acute kidney injury with impaired potassium excretion, Hb stable from hospitalization     Plan:    Admit to ICU  IV albumin to volume expand so need 5% ( x 6 )  Midodrine 10 mg po TID   Might need pressors and octreotide   Follow in/outs  Follow daily labs  Lokelma 10 gram po TID   Antibiotics     Thank you for asking us to participate in the management of your patient, please do not hesitate to contact me for any concerns regarding my recommendations as outlined above.    -----------------------------  Joanna Naylor M.D.   [facility]

## 2025-07-08 NOTE — PROCEDURES
PROCEDURE NOTE  Date: 7/8/2025   Name: Carroll Wells  YOB: 1955    Procedures                                                      [facility]                                Central line Procedure Note    Procedure:  Insertion of Central Line     Indications: shock , Hemodynamic/CVP monitoring and IV access    Anesthesia: Local infiltration of 1% lidocaine.     Consent:  Informed written consent obtained.     Surgeon: FLOWER PIÑA MD    Technique:  Procedure was done using strict aseptic technique. The patient's neck was prepped and draped in the usual sterile fashion.  Ultrasound was used to identify the jugular vein. This area of the neck overlying the IJ was injected with 1% lidocaine. Then using the Seldinger technique, a large-bore needle was placed into the jugular vein with good backflow. A guidewire was placed. Then using an 11 blade, a small incision was made in the patient's skin. The large-bore needle was removed. A dilator was passed over the guidewire. Once completed, a triple lumen catheter was placed over the guidewire with the guidewire then subsequently removed. All three ports were drawn and flushed with good flow. Then the catheter was sutured in place, and a sterile dressing was put in place.     Number of sticks: 1    Number of Kits used:  1    Complications: No immediate complication.     Estimated blood loss: Minimal.     Comment: Patient tolerated the procedure well.     Placement:  CXR was requested to confirm placement  FLOWER PIÑA MD

## 2025-07-08 NOTE — CONSULTS
[facility]                                 Pulmonary/Critical care  Consult /Progress Note :                                                                  CC :hypotension     HISTORY OF PRESENT ILLNESS:   Patient with history of LANG and suppose to have transplant   Was admitted last week with scrotum swelling and treated with sepsis   He presented again today after PCP called them with high K and advised go ED  Wife give him aldactone which was stopped by UC  Seen by renal and placed lokelma  IVF as per renal  BP was on the lower side and had central for possible pressors  Also on abx   H/o GI bleed but now no signs of GI bleed    Past Medical History:   Diagnosis Date    Cervical radiculopathy 04/22/2016    Chronic pain     Chronic renal disease, stage III (HCC) [166152] 05/06/2022    GERD (gastroesophageal reflux disease)     Hyperlipidemia     Hypertension     Irritable bowel syndrome Approx 1 year    Liver disease     Mild episode of recurrent major depressive disorder 02/08/2018    Morbidly obese (HCC) 08/25/2020    PONV (postoperative nausea and vomiting)     Type II or unspecified type diabetes mellitus without mention of complication, not stated as uncontrolled      Past Surgical History:   Procedure Laterality Date    CERVICAL SPINE SURGERY  2014    fusion    CHOLECYSTECTOMY  1995    COLONOSCOPY  2005    Normal    COLONOSCOPY N/A 08/01/2024    COLONOSCOPY POLYPECTOMY SNARE/BIOPSY performed by Dennis Velasquez MD at [facility] ENDOSCOPY    ESOPHAGOGASTRODUODENOSCOPY W/ BANDING  09/05/2024    KNEE ARTHROSCOPY Left 10/22/2019    EXAM UNDER ANESTHESIA VIDEO ARTHROSCOPY LEFT KNEE, PARTIAL MEDIAL MENISCECTOMY,  MEDIAL-FEMORAL CHONDROPLASTY, SYNOVECTOMY, EXCISION OF ADHESIONS performed by Gwyn Dickson MD at [facility] OR    NASAL SEPTUM SURGERY  1987    NECK SURGERY  1996    fusion    OTHER SURGICAL HISTORY  08/01/2024

## 2025-07-08 NOTE — ED NOTES
1212: Routine consult placed to Dr. Freed, Pulmonology, for ROWAN and kyperkalemia. Advised no call back needed to this department.

## 2025-07-08 NOTE — H&P
7/8/2025 2/19/25   Paloma Deshpande, APRN - CNP       Allergies:  Aspirin, Fentanyl, and Oxycontin [oxycodone hcl]    Social History:  The patient currently lives at home    TOBACCO:   reports that he has never smoked. He has quit using smokeless tobacco.  ETOH:   reports that he does not currently use alcohol.      Family History:   Positive as follows:        Problem Relation Age of Onset    Cancer Mother     Cancer Father     Heart Disease Father     Diabetes Father     Heart Attack Father     High Blood Pressure Father     Obesity Father     Heart Disease Brother     Arthritis Brother     High Blood Pressure Brother     Obesity Brother     Arthritis Brother     Heart Attack Brother     Heart Disease Brother     High Blood Pressure Brother     Obesity Brother     Arthritis Brother     Heart Attack Brother     High Blood Pressure Brother        REVIEW OF SYSTEMS:       Constitutional: Negative for fever + generalized weakness  HENT: Negative for sore throat   Eyes: Negative for redness   Respiratory: Negative  for dyspnea, cough   Cardiovascular: Negative for chest pain   Gastrointestinal: Negative for vomiting, diarrhea   Genitourinary: Negative for hematuria   Musculoskeletal: Negative for arthralgias   Skin: Negative for rash   Neurological: Negative for syncope   Hematological: Negative for adenopathy   Psychiatric/Behavorial: Negative for anxiety    PHYSICAL EXAM:    BP (!) 98/43   Pulse 53   Temp 98.1 °F (36.7 °C)   Resp 17   Wt 94.8 kg (209 lb)   SpO2 100%   BMI 32.73 kg/m²     Gen: No distress. Alert.  Ill-appearing  Eyes: PERRL. No sclera icterus. No conjunctival injection.   ENT: No discharge. Pharynx clear.   Neck: No JVD.  No Carotid Bruit. Trachea midline.  Resp: No accessory muscle use. No crackles. No wheezes. No rhonchi.   CV: Bradycardia. Regular rhythm. No murmur.  No rub. No edema.   GI: Non-tender. + distended. No masses. No organomegaly. Normal bowel sounds. No hernia.   : scrotal  swelling  Skin: Warm and dry. No nodule on exposed extremities. No rash on exposed extremities.   M/S: No cyanosis. No joint deformity. No clubbing.   Neuro: Awake. Grossly nonfocal    Psych: Oriented x 3. No anxiety or agitation.     CBC:   Recent Labs     07/07/25  1146 07/08/25  0912   WBC 8.3 7.2   HGB 8.4* 8.2*   HCT 24.7* 24.4*   MCV 83.5 84.0    194     BMP:   Recent Labs     07/08/25  0912 07/08/25  1015 07/08/25  1115 07/08/25  1304   * 131*  --  131*   K 5.7* see below 5.9* 6.0*   CL 96* 98*  --  99   CO2 22 22  --  21   BUN 49* 48*  --  49*   CREATININE 2.8* 2.9*  --  2.8*     LIVER PROFILE:   Recent Labs     07/07/25  1146 07/08/25  0912   AST 23 20   ALT 16 15   BILITOT 1.0 0.8   ALKPHOS 125 128     PT/INR:   Recent Labs     07/08/25  0912   PROTIME 16.2*   INR 1.28*       CULTURES  Results       Procedure Component Value Units Date/Time    Culture, Urine [[id number]] Collected: 06/30/25 1253    Order Status: Completed Specimen: Urine, clean catch Updated: 07/01/25 0956     Urine Culture, Routine No growth at 18 to 36 hours    Narrative:      ORDER#: [id number]                          ORDERED BY: VANDANA TRINIDAD  SOURCE: Urine Clean Catch                  COLLECTED:  06/30/25 12:53  ANTIBIOTICS AT TREASURE.:                      RECEIVED :  06/30/25 12:58    COVID-19, Rapid [[id number]] Collected: 06/29/25 2013    Order Status: Completed Specimen: Nasopharyngeal Swab Updated: 06/29/25 2105     SARS-CoV-2, NAAT Not Detected    Culture, Blood 1 [[id number]] Collected: 06/29/25 2002    Order Status: Completed Specimen: Blood Updated: 07/03/25 2015     Blood Culture, Routine No Growth after 4 days of incubation.    Narrative:      ORDER#: [id number]                          ORDERED BY: PRAVEEN PORTILLO  SOURCE: Blood Antecubital-Lef              COLLECTED:  06/29/25 20:02  ANTIBIOTICS AT TREASURE.:                      RECEIVED :  06/29/25 20:09  If child <=2 yrs old please draw pediatric bottle.~Blood

## 2025-07-08 NOTE — PROGRESS NOTES
Patient admitted to ICU bed 3016 from ED for hypotension and hyperkalemia. Patient oriented to room, call light, bed rails, phone, lights and bathroom. Patient instructed about the schedule of the day including: vital sign frequency, lab draws, possible tests, frequency of MD and staff rounds, daily weights, I &O's and prescribed diet. Bedside monitor in place, patient aware of placement and reason. Bed locked, in lowest position, side rails up 2/4, call light within reach.    4 Eyes Skin Assessment     The patient is being assess for   Admission    I agree that 2 RN's have performed a thorough Head to Toe Skin Assessment on the patient. ALL assessment sites listed below have been assessed.      Areas assessed for pressure by both nurses:   [x]   Head, Face, and Ears   [x]   Shoulders, Back, and Chest, Abdomen  [x]   Arms, Elbows, and Hands   [x]   Coccyx, Sacrum, and Ischium  [x]   Legs, Feet, and Heels        Skin Assessed Under all Medical Devices by both nurses:  N/a              All Mepilex Borders were peeled back and area peeked at by both nurses:  No: n/a  Please list where Mepilex Borders are located:  n/a             **SHARE this note so that the co-signing nurse is able to place an eSignature**    Co-signer eSignature: {Esignature:[id number]}    Does the Patient have Skin Breakdown related to pressure?  No   Deni Prevention initiated:  Yes   Wound Care Orders initiated:  No  [facility] nurse consulted for Pressure Injury (Stage 3,4, Unstageable, DTI, NWPT, Complex wounds)and New or Established Ostomies:  No      Primary Nurse eSignature: Electronically signed by Teodora Mcfadden RN on 7/8/25 at 6:16 PM EDT

## 2025-07-08 NOTE — ED PROVIDER NOTES
EMERGENCY DEPARTMENT ENCOUNTER        Pt Name: Carroll Wells  MRN: [id number]  Birthdate 1955  Date of evaluation: 7/8/2025  Provider: Jose De MD  PCP: Karina Mendoza PA      CHIEF COMPLAINT       Chief Complaint   Patient presents with    Abnormal Lab     Was Dc from  on Friday; needs a liver transplant; Doctor called this am blood yesterday K 6.2; pt c/o increased weakness and fatigue        HISTORY OFPRESENT ILLNESS   (Location/Symptom, Timing/Onset, Context/Setting, Quality, Duration, Modifying Factors,Severity)  Note limiting factors.     Carroll Wells is a 69 y.o. male presenting today due to concern for increased generalized weakness starting yesterday.  He was just admitted to the hospital for liver disease and discharged Friday which was 4 days ago and overall was doing fine at discharge but started to get worsening generalized weakness since yesterday per his wife.  He is complaining of some chronic pain along with some discomfort in his testicles although not as bad as whenever he was admitted a week ago.  No reported falls or trauma over the last few days.  He denies any unilateral numbness or weakness and again feels weak all over.  He was reportedly on Levaquin at home.  There was concern that he had blood work done yesterday and his potassium was 6.2 and therefore he was told to go to the emergency department last night but he declined and wanted to wait until this morning to go.  He is still able to urinate.  Due to concern for elevated potassium and not feeling well, he finally decided to come to the emergency department for further evaluation.  His wife is at the bedside.  No reported vomiting or diarrhea although he does have significantly decreased appetite and is not drinking much at home.  Based on chart review, his last creatinine on July 4 was 1.1, prior to the lab work yesterday when it was 2.6 with potassium of 6.2.                  Review of Systems:  range)   glucagon injection 1 mg (has no administration in time range)   dextrose 10 % infusion (has no administration in time range)   mupirocin (BACTROBAN) 2 % ointment ( Each Nostril Given 7/8/25 2158)   midodrine (PROAMATINE) tablet 10 mg (10 mg Oral Given 7/8/25 1702)   albumin human 5% IV solution 25 g (0 g IntraVENous Stopped 7/9/25 0504)   sodium zirconium cyclosilicate (LOKELMA) oral suspension 10 g (10 g Oral Given 7/8/25 2153)   norepinephrine (LEVOPHED) 8 mg/ mL infusion (5 mcg/min IntraVENous Rate/Dose Verify 7/9/25 0509)   cefepime (MAXIPIME) 1,000 mg in sodium chloride 0.9 % 50 mL IVPB (addEASE) (1,000 mg IntraVENous New Bag 7/9/25 0513)   midodrine (PROAMATINE) tablet 5 mg (5 mg Oral Given 7/8/25 0929)   sodium chloride 0.9 % bolus 500 mL (0 mLs IntraVENous Stopped 7/8/25 1029)   sodium chloride 0.9 % bolus 250 mL (0 mLs IntraVENous Stopped 7/8/25 1201)     Exclusion criteria - the patient is NOT to be included for SEP-1 Core Measure due to: 2+ SIRS criteria are not met      Patient was evaluated due to concern for generalized weakness and fatigue since yesterday.  He was hypotensive on arrival.  He has history of liver disease and was just discharged from the hospital a few days ago related to this.  He is supposed to have a paracentesis tomorrow.  He had some mild abdominal discomfort but nothing significant on exam.  I do not suspect spontaneous bacterial peritonitis at this point.  He did have acute kidney injury and does report not drinking or eating much over the last few days and therefore this could be dehydration related.  His potassium was elevated although improved from yesterday.  I did order IV fluids for this to see if this helps since he did appear dehydrated.  I did order oral midodrine as well to see if this help with his blood pressure.  Nephrology plans on ordering IV albumin to help with his blood pressure.  I did speak to intensivist who agreed with admission to the ICU.  I

## 2025-07-08 NOTE — ED NOTES
1020: STAT consult placed to Pulmonology/Critical Care for hypotension. Dr. Freed is on call at this time.     Case discussed with Dr. De.

## 2025-07-08 NOTE — ED NOTES
1215: Routine consult placed to Dr. Naylor, Nephrology, via [employer] for ROWAN and hyperkalemia.    1222: Message read.

## 2025-07-09 ENCOUNTER — APPOINTMENT (OUTPATIENT)
Dept: ULTRASOUND IMAGING | Age: 70
End: 2025-07-09
Payer: MEDICARE

## 2025-07-09 LAB
ALBUMIN SERPL-MCNC: 3.6 G/DL (ref 3.4–5)
ALBUMIN/GLOB SERPL: 1.7 {RATIO} (ref 1.1–2.2)
ALP SERPL-CCNC: 89 U/L (ref 40–129)
ALT SERPL-CCNC: 10 U/L (ref 10–40)
ANION GAP SERPL CALCULATED.3IONS-SCNC: 12 MMOL/L (ref 3–16)
AST SERPL-CCNC: 15 U/L (ref 15–37)
BACTERIA URNS QL MICRO: ABNORMAL /HPF
BASOPHILS # BLD: 0.1 K/UL (ref 0–0.2)
BASOPHILS NFR BLD: 0.9 %
BILIRUB SERPL-MCNC: 1 MG/DL (ref 0–1)
BILIRUB UR QL STRIP.AUTO: NEGATIVE
BUN SERPL-MCNC: 47 MG/DL (ref 7–20)
CALCIUM SERPL-MCNC: 9.4 MG/DL (ref 8.3–10.6)
CHLORIDE SERPL-SCNC: 100 MMOL/L (ref 99–110)
CK SERPL-CCNC: 27 U/L (ref 39–308)
CLARITY UR: CLEAR
CO2 SERPL-SCNC: 21 MMOL/L (ref 21–32)
COLOR UR: YELLOW
CREAT SERPL-MCNC: 2.8 MG/DL (ref 0.8–1.3)
CREAT UR-MCNC: 84.7 MG/DL (ref 39–259)
DEPRECATED RDW RBC AUTO: 15.9 % (ref 12.4–15.4)
EOSINOPHIL # BLD: 0.2 K/UL (ref 0–0.6)
EOSINOPHIL NFR BLD: 2.6 %
EPI CELLS #/AREA URNS HPF: ABNORMAL /HPF (ref 0–5)
GFR SERPLBLD CREATININE-BSD FMLA CKD-EPI: 23 ML/MIN/{1.73_M2}
GLUCOSE BLD-MCNC: 141 MG/DL (ref 70–99)
GLUCOSE BLD-MCNC: 191 MG/DL (ref 70–99)
GLUCOSE BLD-MCNC: 245 MG/DL (ref 70–99)
GLUCOSE BLD-MCNC: 255 MG/DL (ref 70–99)
GLUCOSE SERPL-MCNC: 119 MG/DL (ref 70–99)
GLUCOSE UR STRIP.AUTO-MCNC: NEGATIVE MG/DL
HCT VFR BLD AUTO: 22.4 % (ref 40.5–52.5)
HGB BLD-MCNC: 7.4 G/DL (ref 13.5–17.5)
HGB UR QL STRIP.AUTO: NEGATIVE
HYALINE CASTS #/AREA URNS LPF: ABNORMAL /LPF (ref 0–2)
INR PPP: 1.45 (ref 0.86–1.14)
KETONES UR STRIP.AUTO-MCNC: NEGATIVE MG/DL
LACTATE BLDV-SCNC: 1.3 MMOL/L (ref 0.4–1.9)
LACTATE BLDV-SCNC: 2 MMOL/L (ref 0.4–1.9)
LEUKOCYTE ESTERASE UR QL STRIP.AUTO: NEGATIVE
LYMPHOCYTES # BLD: 0.9 K/UL (ref 1–5.1)
LYMPHOCYTES NFR BLD: 14 %
MCH RBC QN AUTO: 27.9 PG (ref 26–34)
MCHC RBC AUTO-ENTMCNC: 33.1 G/DL (ref 31–36)
MCV RBC AUTO: 84.3 FL (ref 80–100)
MONOCYTES # BLD: 0.8 K/UL (ref 0–1.3)
MONOCYTES NFR BLD: 13 %
MUCOUS THREADS #/AREA URNS LPF: ABNORMAL /LPF
NEUTROPHILS # BLD: 4.5 K/UL (ref 1.7–7.7)
NEUTROPHILS NFR BLD: 69.5 %
NITRITE UR QL STRIP.AUTO: NEGATIVE
PERFORMED ON: ABNORMAL
PH UR STRIP.AUTO: 5.5 [PH] (ref 5–8)
PHOSPHATE SERPL-MCNC: 3.3 MG/DL (ref 2.5–4.9)
PLATELET # BLD AUTO: 206 K/UL (ref 135–450)
PMV BLD AUTO: 7.7 FL (ref 5–10.5)
POTASSIUM SERPL-SCNC: 4.4 MMOL/L (ref 3.5–5.1)
POTASSIUM SERPL-SCNC: 4.4 MMOL/L (ref 3.5–5.1)
PROT SERPL-MCNC: 5.7 G/DL (ref 6.4–8.2)
PROT UR STRIP.AUTO-MCNC: NEGATIVE MG/DL
PROTHROMBIN TIME: 17.8 SEC (ref 12.1–14.9)
RBC # BLD AUTO: 2.66 M/UL (ref 4.2–5.9)
RBC #/AREA URNS HPF: ABNORMAL /HPF (ref 0–4)
SODIUM SERPL-SCNC: 133 MMOL/L (ref 136–145)
SODIUM UR-SCNC: <20 MMOL/L
SP GR UR STRIP.AUTO: 1.01 (ref 1–1.03)
UA DIPSTICK W REFLEX MICRO PNL UR: ABNORMAL
URATE SERPL-MCNC: 9.1 MG/DL (ref 3.5–7.2)
URN SPEC COLLECT METH UR: ABNORMAL
UROBILINOGEN UR STRIP-ACNC: 0.2 E.U./DL
WBC # BLD AUTO: 6.4 K/UL (ref 4–11)
WBC #/AREA URNS HPF: ABNORMAL /HPF (ref 0–5)

## 2025-07-09 PROCEDURE — 82570 ASSAY OF URINE CREATININE: CPT

## 2025-07-09 PROCEDURE — P9045 ALBUMIN (HUMAN), 5%, 250 ML: HCPCS | Performed by: INTERNAL MEDICINE

## 2025-07-09 PROCEDURE — 2500000003 HC RX 250 WO HCPCS: Performed by: NURSE PRACTITIONER

## 2025-07-09 PROCEDURE — 49083 ABD PARACENTESIS W/IMAGING: CPT

## 2025-07-09 PROCEDURE — 87040 BLOOD CULTURE FOR BACTERIA: CPT

## 2025-07-09 PROCEDURE — 2060000000 HC ICU INTERMEDIATE R&B

## 2025-07-09 PROCEDURE — 6360000002 HC RX W HCPCS: Performed by: INTERNAL MEDICINE

## 2025-07-09 PROCEDURE — 99233 SBSQ HOSP IP/OBS HIGH 50: CPT | Performed by: INTERNAL MEDICINE

## 2025-07-09 PROCEDURE — 6370000000 HC RX 637 (ALT 250 FOR IP): Performed by: INTERNAL MEDICINE

## 2025-07-09 PROCEDURE — 85610 PROTHROMBIN TIME: CPT

## 2025-07-09 PROCEDURE — 81001 URINALYSIS AUTO W/SCOPE: CPT

## 2025-07-09 PROCEDURE — 2580000003 HC RX 258: Performed by: INTERNAL MEDICINE

## 2025-07-09 PROCEDURE — 36415 COLL VENOUS BLD VENIPUNCTURE: CPT

## 2025-07-09 PROCEDURE — 80053 COMPREHEN METABOLIC PANEL: CPT

## 2025-07-09 PROCEDURE — 84550 ASSAY OF BLOOD/URIC ACID: CPT

## 2025-07-09 PROCEDURE — 6360000002 HC RX W HCPCS: Performed by: NURSE PRACTITIONER

## 2025-07-09 PROCEDURE — 85025 COMPLETE CBC W/AUTO DIFF WBC: CPT

## 2025-07-09 PROCEDURE — 83605 ASSAY OF LACTIC ACID: CPT

## 2025-07-09 PROCEDURE — 3E043XZ INTRODUCTION OF VASOPRESSOR INTO CENTRAL VEIN, PERCUTANEOUS APPROACH: ICD-10-PCS | Performed by: INTERNAL MEDICINE

## 2025-07-09 PROCEDURE — 82550 ASSAY OF CK (CPK): CPT

## 2025-07-09 PROCEDURE — 84300 ASSAY OF URINE SODIUM: CPT

## 2025-07-09 PROCEDURE — 6370000000 HC RX 637 (ALT 250 FOR IP): Performed by: NURSE PRACTITIONER

## 2025-07-09 RX ADMIN — FERROUS SULFATE TAB 325 MG (65 MG ELEMENTAL FE) 325 MG: 325 (65 FE) TAB at 09:15

## 2025-07-09 RX ADMIN — MUPIROCIN: 20 OINTMENT TOPICAL at 22:25

## 2025-07-09 RX ADMIN — ALBUMIN (HUMAN) 25 G: 12.5 INJECTION, SOLUTION INTRAVENOUS at 09:19

## 2025-07-09 RX ADMIN — MIDODRINE HYDROCHLORIDE 10 MG: 10 TABLET ORAL at 09:15

## 2025-07-09 RX ADMIN — SIMETHICONE 80 MG: 80 TABLET, CHEWABLE ORAL at 22:25

## 2025-07-09 RX ADMIN — PANTOPRAZOLE SODIUM 40 MG: 40 TABLET, DELAYED RELEASE ORAL at 09:15

## 2025-07-09 RX ADMIN — Medication 10 ML: at 09:21

## 2025-07-09 RX ADMIN — MUPIROCIN: 20 OINTMENT TOPICAL at 09:21

## 2025-07-09 RX ADMIN — ALBUMIN (HUMAN) 25 G: 12.5 INJECTION, SOLUTION INTRAVENOUS at 22:33

## 2025-07-09 RX ADMIN — INSULIN LISPRO 1 UNITS: 100 INJECTION, SOLUTION INTRAVENOUS; SUBCUTANEOUS at 22:26

## 2025-07-09 RX ADMIN — CEFEPIME 1000 MG: 1 INJECTION, POWDER, FOR SOLUTION INTRAMUSCULAR; INTRAVENOUS at 17:24

## 2025-07-09 RX ADMIN — HEPARIN SODIUM 5000 UNITS: 5000 INJECTION INTRAVENOUS; SUBCUTANEOUS at 14:18

## 2025-07-09 RX ADMIN — MIDODRINE HYDROCHLORIDE 10 MG: 10 TABLET ORAL at 11:15

## 2025-07-09 RX ADMIN — MIDODRINE HYDROCHLORIDE 10 MG: 10 TABLET ORAL at 17:21

## 2025-07-09 RX ADMIN — SIMETHICONE 80 MG: 80 TABLET, CHEWABLE ORAL at 09:15

## 2025-07-09 RX ADMIN — INSULIN GLARGINE 15 UNITS: 100 INJECTION, SOLUTION SUBCUTANEOUS at 22:26

## 2025-07-09 RX ADMIN — ALBUMIN (HUMAN) 25 G: 12.5 INJECTION, SOLUTION INTRAVENOUS at 14:20

## 2025-07-09 RX ADMIN — ALBUMIN (HUMAN) 25 G: 12.5 INJECTION, SOLUTION INTRAVENOUS at 02:33

## 2025-07-09 RX ADMIN — HEPARIN SODIUM 5000 UNITS: 5000 INJECTION INTRAVENOUS; SUBCUTANEOUS at 06:47

## 2025-07-09 RX ADMIN — INSULIN LISPRO 2 UNITS: 100 INJECTION, SOLUTION INTRAVENOUS; SUBCUTANEOUS at 17:16

## 2025-07-09 RX ADMIN — SODIUM ZIRCONIUM CYCLOSILICATE 10 G: 10 POWDER, FOR SUSPENSION ORAL at 09:15

## 2025-07-09 RX ADMIN — SIMETHICONE 80 MG: 80 TABLET, CHEWABLE ORAL at 14:18

## 2025-07-09 RX ADMIN — RIFAXIMIN 550 MG: 550 TABLET ORAL at 09:15

## 2025-07-09 RX ADMIN — HEPARIN SODIUM 5000 UNITS: 5000 INJECTION INTRAVENOUS; SUBCUTANEOUS at 22:25

## 2025-07-09 RX ADMIN — CETIRIZINE HYDROCHLORIDE 5 MG: 10 TABLET ORAL at 09:15

## 2025-07-09 RX ADMIN — INSULIN GLARGINE 15 UNITS: 100 INJECTION, SOLUTION SUBCUTANEOUS at 09:15

## 2025-07-09 RX ADMIN — OXYCODONE 5 MG: 5 TABLET ORAL at 11:15

## 2025-07-09 RX ADMIN — RIFAXIMIN 550 MG: 550 TABLET ORAL at 22:25

## 2025-07-09 RX ADMIN — CEFEPIME 1000 MG: 1 INJECTION, POWDER, FOR SOLUTION INTRAMUSCULAR; INTRAVENOUS at 05:13

## 2025-07-09 ASSESSMENT — PAIN - FUNCTIONAL ASSESSMENT
PAIN_FUNCTIONAL_ASSESSMENT: PREVENTS OR INTERFERES SOME ACTIVE ACTIVITIES AND ADLS
PAIN_FUNCTIONAL_ASSESSMENT: PREVENTS OR INTERFERES SOME ACTIVE ACTIVITIES AND ADLS

## 2025-07-09 ASSESSMENT — PAIN SCALES - GENERAL
PAINLEVEL_OUTOF10: 3
PAINLEVEL_OUTOF10: 2
PAINLEVEL_OUTOF10: 6
PAINLEVEL_OUTOF10: 0

## 2025-07-09 ASSESSMENT — PAIN DESCRIPTION - ORIENTATION
ORIENTATION: LOWER
ORIENTATION: LOWER

## 2025-07-09 ASSESSMENT — PAIN DESCRIPTION - DESCRIPTORS
DESCRIPTORS: DULL
DESCRIPTORS: DULL

## 2025-07-09 ASSESSMENT — PAIN DESCRIPTION - LOCATION
LOCATION: GROIN
LOCATION: GROIN

## 2025-07-09 ASSESSMENT — PAIN DESCRIPTION - PAIN TYPE: TYPE: ACUTE PAIN

## 2025-07-09 NOTE — PROGRESS NOTES
Reassessment complete    Paracentesis drained 6.2L of fluid off  Has scheduled albumin already  BP stable, patient states relief with draining fluid      liver transplant coordinator and liver team wants patient sent to  for workup and evaluation. Started transfer with dr dunlap. Pending bed at

## 2025-07-09 NOTE — PROCEDURES
PROCEDURE NOTE  Date: 7/9/2025   Name: Carroll Wells  YOB: 1955    Procedures    Pt arrived for image guided right Paracentesis. Dr. Watson explained the procedure including the risk and benefits of the procedure. All questions answered. Pt verbalizes understanding of the procedure and states no more questions. Consent confirmed. Vital signs stable. Labs, allergies, medications, and code status reviewed. No contraindications noted. Time out completed prior to procedure.    Vital Signs  Vitals:    07/09/25 1115   BP:    Pulse:    Resp: 16   Temp:    SpO2:     (vital signs in table format)      Allergies  Aspirin, Fentanyl, and Oxycontin [oxycodone hcl] (allergies)    Labs  Lab Results   Component Value Date    INR 1.28 (H) 07/08/2025    PROTIME 16.2 (H) 07/08/2025     Lab Results   Component Value Date    CREATININE 2.8 (H) 07/09/2025    BUN 47 (H) 07/09/2025     (L) 07/09/2025    K 4.4 07/09/2025    K 4.4 07/09/2025     07/09/2025    CO2 21 07/09/2025     Lab Results   Component Value Date    WBC 6.4 07/09/2025    HGB 7.4 (L) 07/09/2025    HCT 22.4 (L) 07/09/2025    MCV 84.3 07/09/2025     07/09/2025

## 2025-07-09 NOTE — PLAN OF CARE
Problem: Chronic Conditions and Co-morbidities  Goal: Patient's chronic conditions and co-morbidity symptoms are monitored and maintained or improved  7/9/2025 0056 by Savannah Zepeda RN  Outcome: Progressing  7/8/2025 2046 by Teodora Mcfadden RN  Outcome: Progressing     Problem: Discharge Planning  Goal: Discharge to home or other facility with appropriate resources  Outcome: Progressing     Problem: Pain  Goal: Verbalizes/displays adequate comfort level or baseline comfort level  Outcome: Progressing     Problem: Skin/Tissue Integrity  Goal: Skin integrity remains intact  Description: 1.  Monitor for areas of redness and/or skin breakdown  2.  Assess vascular access sites hourly  3.  Every 4-6 hours minimum:  Change oxygen saturation probe site  4.  Every 4-6 hours:  If on nasal continuous positive airway pressure, respiratory therapy assess nares and determine need for appliance change or resting period  7/9/2025 0056 by Savannah Zepeda RN  Outcome: Progressing  7/8/2025 2046 by Teodora Mcfadden RN  Outcome: Progressing     Problem: Safety - Adult  Goal: Free from fall injury  7/9/2025 0056 by Savannah Zepeda RN  Outcome: Progressing  7/8/2025 2046 by Teodora Mcfadden RN  Outcome: Progressing

## 2025-07-09 NOTE — PROGRESS NOTES
[facility] Pulmonary, Critical Care [facility] Special[facility]                                 Pulmonary/Critical care  Consult /Progress Note :                                                                  CC :hypotension     Subjective  Doing great  On RA  Off pressors  For paracentesis  No chest pain           PHYSICAL EXAM:  Vitals:    07/09/25 0600   BP: (!) 110/45   Pulse: 67   Resp: 13   Temp:    SpO2: 92%     Gen: No distress.   Eyes: PERRL. No sclera icterus. No conjunctival injection.   ENT: No discharge. Pharynx clear.   Neck: Trachea midline. No obvious mass.    Resp: scattered rhonchi   CV: Regular rate. Regular rhythm. No murmur or rub. No edema. Peripheral pulses are 2+.  Capillary refill is less than 3 seconds.  GI: Non-tender. Non-distended. No hernia.   Skin: Warm and dry. No nodule on exposed extremities.   Lymph: No cervical LAD. No supraclavicular LAD.   M/S: No cyanosis. No joint deformity. No clubbing.   Neuro: Awake. Alert. Moves all four extremities.   Psych: Oriented x 3. No anxiety.     LABS:  CBC:   Recent Labs     07/07/25  1146 07/08/25  0912 07/09/25  0541   WBC 8.3 7.2 6.4   HGB 8.4* 8.2* 7.4*   HCT 24.7* 24.4* 22.4*   MCV 83.5 84.0 84.3    194 206     BMP:   Recent Labs     07/08/25  1015 07/08/25  1115 07/08/25  1304 07/09/25  0541   *  --  131* 133*   K see below   < > 6.0* 4.4  4.4   CL 98*  --  99 100   CO2 22  --  21 21   PHOS  --   --   --  3.3   BUN 48*  --  49* 47*   CREATININE 2.9*  --  2.8* 2.8*    < > = values in this interval not displayed.     LIVER PROFILE:   Recent Labs     07/07/25  1146 07/08/25  0912 07/09/25  0541   AST 23 20 15   ALT 16 15 10   BILITOT 1.0 0.8 1.0   ALKPHOS 125 128 89     PT/INR:   Recent Labs     07/08/25  0912   PROTIME 16.2*   INR 1.28*         Microbiology:  Sent     Imaging:  Chest imaging was reviewed by me and showed bilateral congestion     ASSESSMENT:   Acute kidney

## 2025-07-09 NOTE — PROGRESS NOTES
Extended Infusion B-Lactam Antibiotics: Cefepime     Day:  Recent Labs     07/08/25  0912 07/08/25  1015 07/08/25  1304   CREATININE 2.8* 2.9* 2.8*     Estimated Creatinine Clearance: 27 mL/min (A) (based on SCr of 2.8 mg/dL (H)).  Recent Labs     07/07/25  1146 07/08/25  0912   WBC 8.3 7.2       Cefepime-Extended Infusion (4-hour infusion) - Preferred Dosing Strategy   Renal Function (CrCl mL/min) >= 60 30 - 59 11 - 29 <= 10, HD PD CRRT   All indications - Loading dose of 2000 milligrams x 1 over 30 minutes or via IV push. Maintenance dose   should begin at the next regularly scheduled dosing interval based on indication/renal function.   Intra-abdominal infections, Skin and soft tissue infections, Urinary tract infections 2000mg q12h 2000mg q24h 1000mg q24h 500mg q24h 1000mg q24h 2000mg q12h   Bacteremia, CNS infections, Cystic fibrosis, Diabetic foot infections, Endocarditis, Febrile neutropenia, Healthcare-associated infections, Osteomyelitis/joint infections, Pneumonia, Sepsis, BMI > 40* 2000mg q8h 2000mg q12h 1000mg q12h 1000mg q24h 1000mg q24h 2000mg q8h   *Consider 2000mg q12h for indication of Urinary tract infections in BMI > 40. Adjust for decreased renal function.      Jamilah Manriquez RPH 7/8/2025 11:10 PM

## 2025-07-09 NOTE — PROGRESS NOTES
Shift assessment completed, see flow sheet.   RASS 0. Following commands. up x1 with rw    SpO2 95%. Respirations are easy, even, and unlabored.   Bilateral lung sounds clear. On room air      VSS  Ate breakfast some. States he does sliding scale insulin at home and does not do the 15u prandial humalog. He does take lantus.  Pitting edema to BLE       CVC and PIV WNL  Paracentesis later.     All lines and monitoring devices in place.   Bed in lowest position with wheels locked.

## 2025-07-09 NOTE — PROGRESS NOTES
Reassessment complete.     Some desaturation noted while sleeping, does come up very quickly once awoken.  No bed yet  BP stable. Eating dinner.

## 2025-07-09 NOTE — PROGRESS NOTES
Progress Note    HISTORY     CC:   Weakness            We are following for ROWAN       Subjective/   HPI:  BP is better with IV albumin.  He is feeling better but feels very distended and feels that he needs a paracentesis.  Kidney function is about the same.      ROS:  Constitutional:  No fevers, No Chills, + weakness  Cardiovascular:  No palpations, + edema  Respiratory:  No wheezing, no cough  Skin:  No rash, no itching  :  No hematuria, no dysuria     Social Hx:  No Family at the bedside     Past Medical and Surgical History:  - Reviewed, no changes     EXAM       Objective/     Vitals:    07/09/25 1130 07/09/25 1145 07/09/25 1200 07/09/25 1230   BP: (!) 130/57  (!) 122/59    Pulse: 78 75 75 82   Resp: 13 12 (!) 7 11   Temp:       TempSrc:       SpO2: 100% 100% 99% 100%   Weight:       Height:         24HR INTAKE/OUTPUT:    Intake/Output Summary (Last 24 hours) at 7/9/2025 1343  Last data filed at 7/9/2025 1143  Gross per 24 hour   Intake 2535.16 ml   Output 650 ml   Net 1885.16 ml     Constitutional:  Alert, awake, no apparent distress  Eyes:  Pupils reactive, sclera clear   Neck:  Normal thyroid, no masses   Cardiovascular:  Regular, no rub  Respiratory:  No distress, no wheezing  Psychiatry:  Appropriate mood/affect, alert  Abdomen: +bs, soft, nt, no masses   Musculoskeletal: + LE edema, no clubbing   Lymphatics:  No LAD in neck, no supraclavicular nodes       MEDICAL DECISION MAKING       Data/  Recent Labs     07/07/25  1146 07/08/25  0912 07/09/25  0541   WBC 8.3 7.2 6.4   HGB 8.4* 8.2* 7.4*   HCT 24.7* 24.4* 22.4*   MCV 83.5 84.0 84.3    194 206     Recent Labs     07/08/25  0912 07/08/25  1015 07/08/25  1115 07/08/25  1304 07/09/25  0541   * 131*  --  131* 133*   K 5.7* see below   < > 6.0* 4.4  4.4   CL 96* 98*  --  99 100   CO2 22 22  --  21 21   GLUCOSE 221* 211*  --  199* 119*   PHOS  --   --   --   --  3.3   MG 2.06  --   --   --   --    BUN 49* 48*  --  49* 47*

## 2025-07-09 NOTE — PROGRESS NOTES
Shift assessment completed, see flow sheet. Pt is alert and oriented. Pt denies pain at this time.     Temp-98.7  HR-77  RR-13  BP-129/47  SpO2-98%     PIV remain in place, dressing and site  C/D/I.     Call light within reach.

## 2025-07-09 NOTE — PROGRESS NOTES
Internal Medicine ICU Progress Note      Events of Last 24 hours:     Patient subjectively feeling better.  Sitting up on the commode.  Blood pressures improved.  Still on Levophed has been weaned down.  Good urine output.  Creatinine is about the same.  Plans for paracentesis today, received midodrine and IV albumin      Invasive Lines:  CVC right IJ          MV: N/A  No results for input(s): \"PHART\", \"GHR1ZWV\", \"PO2ART\" in the last 72 hours.    MV Settings:     / / /     IV:   sodium chloride      dextrose      norepinephrine Stopped (25 0908)       Vitals:  Temp  Av.3 °F (36.8 °C)  Min: 97.7 °F (36.5 °C)  Max: 98.8 °F (37.1 °C)  Pulse  Av.6  Min: 54  Max: 82  BP  Min: 78/43  Max: 138/54  SpO2  Av %  Min: 92 %  Max: 100 %  Patient Vitals for the past 4 hrs:   BP Pulse Resp SpO2   25 1230 -- 82 11 100 %   25 1200 (!) 122/59 75 (!) 7 99 %   25 1145 -- 75 12 100 %   25 1130 (!) 130/57 78 13 100 %   25 1115 -- 75 16 100 %   25 1100 (!) 102/91 -- -- --       CVP:        Intake/Output Summary (Last 24 hours) at 2025 1406  Last data filed at 2025 1143  Gross per 24 hour   Intake 2535.16 ml   Output 650 ml   Net 1885.16 ml       EXAM:  Gen: No distress. Alert.  Appears improved  Eyes: PERRL. No sclera icterus. No conjunctival injection.   ENT: No discharge. Pharynx clear.   Neck: No JVD.  No Carotid Bruit. Trachea midline.  Resp: No accessory muscle use. No crackles. No wheezes. No rhonchi.   CV: Bradycardia. Regular rhythm. No murmur.  No rub. No edema.   GI: Non-tender. + distended. No masses. No organomegaly. Normal bowel sounds. No hernia.   : scrotal swelling  Skin: Warm and dry. No nodule on exposed extremities. No rash on exposed extremities.   M/S: No cyanosis. No joint deformity. No clubbing.   Neuro: Awake. Grossly nonfocal    Psych: Oriented x 3. No anxiety or agitation.     Medications:  Scheduled Meds:   busPIRone  7.5 mg Oral BID     Units Date/Time    Culture, Blood 2 [[id number]] Collected: 07/09/25 0911    Order Status: Sent Specimen: Blood Updated: 07/09/25 0911    Culture, Blood 1 [[id number]] Collected: 07/09/25 0900    Order Status: Sent Specimen: Blood Updated: 07/09/25 0911    Culture, Urine [[id number]] Collected: 06/30/25 1253    Order Status: Completed Specimen: Urine, clean catch Updated: 07/01/25 0956     Urine Culture, Routine No growth at 18 to 36 hours    Narrative:      ORDER#: [id number]                          ORDERED BY: VANDANA TRINIDAD  SOURCE: Urine Clean Catch                  COLLECTED:  06/30/25 12:53  ANTIBIOTICS AT TREASURE.:                      RECEIVED :  06/30/25 12:58    COVID-19, Rapid [[id number]] Collected: 06/29/25 2013    Order Status: Completed Specimen: Nasopharyngeal Swab Updated: 06/29/25 2105     SARS-CoV-2, NAAT Not Detected    Culture, Blood 1 [[id number]] Collected: 06/29/25 2002    Order Status: Completed Specimen: Blood Updated: 07/03/25 2015     Blood Culture, Routine No Growth after 4 days of incubation.    Narrative:      ORDER#: [id number]                          ORDERED BY: PRAVEEN PORTILLO  SOURCE: Blood Antecubital-Lef              COLLECTED:  06/29/25 20:02  ANTIBIOTICS AT TREASURE.:                      RECEIVED :  06/29/25 20:09  If child <=2 yrs old please draw pediatric bottle.~Blood Culture 1    Culture, Blood 2 [[id number]] Collected: 06/29/25 2002    Order Status: Completed Specimen: Blood Updated: 07/03/25 2015     Culture, Blood 2 No Growth after 4 days of incubation.    Narrative:      ORDER#: [id number]                          ORDERED BY: PRAVEEN PORTILLO  SOURCE: Blood Antecubital-Lef              COLLECTED:  06/29/25 20:02  ANTIBIOTICS AT TREASURE.:                      RECEIVED :  06/29/25 20:09  If child <=2 yrs old please draw pediatric bottle.~Blood Culture #2             Films:    US GUIDED PARACENTESIS   Final Result   Successful paracentesis.         XR CHEST PORTABLE   Final Result

## 2025-07-09 NOTE — PROCEDURES
PROCEDURE NOTE  Date: 7/9/2025   Name: Carroll Wells  YOB: 1955    Procedures        Image guided right Paracentesis completed.  6.4 liters of cloudy yellow colored withdrawn.  Pt tolerated procedure without any signs or symptoms of distress. Vital signs stable.     DISCHARGED:  Pt sent back to ICU Rm with SANTIAGO Akbar    SPECIMEN SENT:  No    Vital Signs  Vitals:    07/09/25 1230   BP:    Pulse: 82   Resp: 11   Temp:    SpO2: 100%    (vital signs in table format)

## 2025-07-10 LAB
ALBUMIN SERPL-MCNC: 3.3 G/DL (ref 3.4–5)
ALBUMIN/GLOB SERPL: 1.7 {RATIO} (ref 1.1–2.2)
ALP SERPL-CCNC: 76 U/L (ref 40–129)
ALT SERPL-CCNC: 7 U/L (ref 10–40)
ANION GAP SERPL CALCULATED.3IONS-SCNC: 11 MMOL/L (ref 3–16)
AST SERPL-CCNC: 16 U/L (ref 15–37)
BASOPHILS # BLD: 0.1 K/UL (ref 0–0.2)
BASOPHILS NFR BLD: 1.1 %
BILIRUB SERPL-MCNC: 0.6 MG/DL (ref 0–1)
BUN SERPL-MCNC: 39 MG/DL (ref 7–20)
CALCIUM SERPL-MCNC: 8.5 MG/DL (ref 8.3–10.6)
CHLORIDE SERPL-SCNC: 105 MMOL/L (ref 99–110)
CO2 SERPL-SCNC: 22 MMOL/L (ref 21–32)
CREAT SERPL-MCNC: 1.8 MG/DL (ref 0.8–1.3)
DEPRECATED RDW RBC AUTO: 15.9 % (ref 12.4–15.4)
EOSINOPHIL # BLD: 0.1 K/UL (ref 0–0.6)
EOSINOPHIL NFR BLD: 2.3 %
GFR SERPLBLD CREATININE-BSD FMLA CKD-EPI: 40 ML/MIN/{1.73_M2}
GLUCOSE BLD-MCNC: 127 MG/DL (ref 70–99)
GLUCOSE BLD-MCNC: 191 MG/DL (ref 70–99)
GLUCOSE BLD-MCNC: 198 MG/DL (ref 70–99)
GLUCOSE BLD-MCNC: 217 MG/DL (ref 70–99)
GLUCOSE SERPL-MCNC: 105 MG/DL (ref 70–99)
HCT VFR BLD AUTO: 22.1 % (ref 40.5–52.5)
HGB BLD-MCNC: 7.2 G/DL (ref 13.5–17.5)
LYMPHOCYTES # BLD: 0.8 K/UL (ref 1–5.1)
LYMPHOCYTES NFR BLD: 16.9 %
MCH RBC QN AUTO: 27.6 PG (ref 26–34)
MCHC RBC AUTO-ENTMCNC: 32.6 G/DL (ref 31–36)
MCV RBC AUTO: 84.7 FL (ref 80–100)
MONOCYTES # BLD: 0.6 K/UL (ref 0–1.3)
MONOCYTES NFR BLD: 12.9 %
NEUTROPHILS # BLD: 3.2 K/UL (ref 1.7–7.7)
NEUTROPHILS NFR BLD: 66.8 %
PERFORMED ON: ABNORMAL
PHOSPHATE SERPL-MCNC: 3.1 MG/DL (ref 2.5–4.9)
PLATELET # BLD AUTO: 173 K/UL (ref 135–450)
PMV BLD AUTO: 7.9 FL (ref 5–10.5)
POTASSIUM SERPL-SCNC: 4.4 MMOL/L (ref 3.5–5.1)
POTASSIUM SERPL-SCNC: 4.4 MMOL/L (ref 3.5–5.1)
PROT SERPL-MCNC: 5.2 G/DL (ref 6.4–8.2)
RBC # BLD AUTO: 2.61 M/UL (ref 4.2–5.9)
SODIUM SERPL-SCNC: 138 MMOL/L (ref 136–145)
WBC # BLD AUTO: 4.8 K/UL (ref 4–11)

## 2025-07-10 PROCEDURE — 6370000000 HC RX 637 (ALT 250 FOR IP): Performed by: INTERNAL MEDICINE

## 2025-07-10 PROCEDURE — 2500000003 HC RX 250 WO HCPCS: Performed by: INTERNAL MEDICINE

## 2025-07-10 PROCEDURE — 6360000002 HC RX W HCPCS: Performed by: INTERNAL MEDICINE

## 2025-07-10 PROCEDURE — 80053 COMPREHEN METABOLIC PANEL: CPT

## 2025-07-10 PROCEDURE — 99233 SBSQ HOSP IP/OBS HIGH 50: CPT | Performed by: INTERNAL MEDICINE

## 2025-07-10 PROCEDURE — 85025 COMPLETE CBC W/AUTO DIFF WBC: CPT

## 2025-07-10 PROCEDURE — 2580000003 HC RX 258: Performed by: INTERNAL MEDICINE

## 2025-07-10 PROCEDURE — 2060000000 HC ICU INTERMEDIATE R&B

## 2025-07-10 RX ADMIN — OXYCODONE 5 MG: 5 TABLET ORAL at 16:21

## 2025-07-10 RX ADMIN — MIDODRINE HYDROCHLORIDE 10 MG: 10 TABLET ORAL at 12:39

## 2025-07-10 RX ADMIN — CEFEPIME 2000 MG: 2 INJECTION, POWDER, FOR SOLUTION INTRAVENOUS at 17:20

## 2025-07-10 RX ADMIN — Medication 10 ML: at 20:45

## 2025-07-10 RX ADMIN — SIMETHICONE 80 MG: 80 TABLET, CHEWABLE ORAL at 14:46

## 2025-07-10 RX ADMIN — MUPIROCIN: 20 OINTMENT TOPICAL at 20:48

## 2025-07-10 RX ADMIN — CETIRIZINE HYDROCHLORIDE 5 MG: 10 TABLET ORAL at 07:52

## 2025-07-10 RX ADMIN — LACTULOSE 20 G: 10 SOLUTION ORAL at 19:15

## 2025-07-10 RX ADMIN — RIFAXIMIN 550 MG: 550 TABLET ORAL at 07:52

## 2025-07-10 RX ADMIN — RIFAXIMIN 550 MG: 550 TABLET ORAL at 20:42

## 2025-07-10 RX ADMIN — SIMETHICONE 80 MG: 80 TABLET, CHEWABLE ORAL at 07:52

## 2025-07-10 RX ADMIN — LACTULOSE 20 G: 10 SOLUTION ORAL at 14:46

## 2025-07-10 RX ADMIN — INSULIN LISPRO 1 UNITS: 100 INJECTION, SOLUTION INTRAVENOUS; SUBCUTANEOUS at 12:44

## 2025-07-10 RX ADMIN — SIMETHICONE 80 MG: 80 TABLET, CHEWABLE ORAL at 20:43

## 2025-07-10 RX ADMIN — FERROUS SULFATE TAB 325 MG (65 MG ELEMENTAL FE) 325 MG: 325 (65 FE) TAB at 07:52

## 2025-07-10 RX ADMIN — INSULIN LISPRO 1 UNITS: 100 INJECTION, SOLUTION INTRAVENOUS; SUBCUTANEOUS at 20:38

## 2025-07-10 RX ADMIN — CEFEPIME 1000 MG: 1 INJECTION, POWDER, FOR SOLUTION INTRAMUSCULAR; INTRAVENOUS at 05:29

## 2025-07-10 RX ADMIN — PANTOPRAZOLE SODIUM 40 MG: 40 TABLET, DELAYED RELEASE ORAL at 07:52

## 2025-07-10 RX ADMIN — INSULIN GLARGINE 15 UNITS: 100 INJECTION, SOLUTION SUBCUTANEOUS at 20:37

## 2025-07-10 RX ADMIN — MIDODRINE HYDROCHLORIDE 10 MG: 10 TABLET ORAL at 07:52

## 2025-07-10 RX ADMIN — INSULIN LISPRO 1 UNITS: 100 INJECTION, SOLUTION INTRAVENOUS; SUBCUTANEOUS at 17:16

## 2025-07-10 RX ADMIN — MIDODRINE HYDROCHLORIDE 10 MG: 10 TABLET ORAL at 17:16

## 2025-07-10 RX ADMIN — HEPARIN SODIUM 5000 UNITS: 5000 INJECTION INTRAVENOUS; SUBCUTANEOUS at 05:31

## 2025-07-10 RX ADMIN — INSULIN GLARGINE 15 UNITS: 100 INJECTION, SOLUTION SUBCUTANEOUS at 07:52

## 2025-07-10 RX ADMIN — OXYCODONE 5 MG: 5 TABLET ORAL at 05:36

## 2025-07-10 RX ADMIN — HEPARIN SODIUM 5000 UNITS: 5000 INJECTION INTRAVENOUS; SUBCUTANEOUS at 22:18

## 2025-07-10 RX ADMIN — HEPARIN SODIUM 5000 UNITS: 5000 INJECTION INTRAVENOUS; SUBCUTANEOUS at 14:46

## 2025-07-10 RX ADMIN — MUPIROCIN: 20 OINTMENT TOPICAL at 07:53

## 2025-07-10 ASSESSMENT — PAIN DESCRIPTION - ORIENTATION: ORIENTATION: LOWER

## 2025-07-10 ASSESSMENT — PAIN DESCRIPTION - LOCATION
LOCATION: ABDOMEN

## 2025-07-10 ASSESSMENT — PAIN DESCRIPTION - DESCRIPTORS: DESCRIPTORS: DULL

## 2025-07-10 ASSESSMENT — PAIN SCALES - GENERAL
PAINLEVEL_OUTOF10: 5
PAINLEVEL_OUTOF10: 3
PAINLEVEL_OUTOF10: 4
PAINLEVEL_OUTOF10: 7
PAINLEVEL_OUTOF10: 0
PAINLEVEL_OUTOF10: 5

## 2025-07-10 NOTE — CARE COORDINATION
Case Management Assessment  Initial Evaluation    Date/Time of Evaluation: 7/10/2025 11:05 AM  Assessment Completed by: Teresa Boeck, RN    If patient is discharged prior to next notation, then this note serves as note for discharge by case management.    Patient Name: Carroll Wells                   YOB: 1955  Diagnosis: Hyperkalemia [E87.5]  Generalized weakness [R53.1]  Acute kidney injury [N17.9]  Hypotension, unspecified hypotension type [I95.9]                   Date / Time: 7/8/2025  8:44 AM    Patient Admission Status: Inpatient   Readmission Risk (Low < 19, Mod (19-27), High > 27): Readmission Risk Score: 24.2    Current PCP: Karina Mendoza PA  PCP verified by CM? (P) Yes    Chart Reviewed: Yes      History Provided by: (P) Patient, Spouse  Patient Orientation: (P) Alert and Oriented, Person, Place    Patient Cognition: (P) Alert    Hospitalization in the last 30 days (Readmission):  Yes    If yes, Readmission Assessment in  Navigator will be completed.    Advance Directives:      Code Status: Full Code   Patient's Primary Decision Maker is: (P) Legal Next of Kin    Primary Decision Maker: Rosa Maria Wells - Spouse - [phone]    Discharge Planning:    Patient lives with: (P) Spouse/Significant Other Type of Home: (P) Other (Comment) (duplex)  Primary Care Giver: (P) Self  Patient Support Systems include: (P) Spouse/Significant Other   Current Financial resources: (P) Medicare  Current community resources: (P) None  Current services prior to admission: (P) None            Current DME: (P) Cane, Walker            Type of Home Care services:  (P) None    ADLS  Prior functional level: (P) Independent in ADLs/IADLs  Current functional level: (P) Independent in ADLs/IADLs    PT AM-PAC:   /24  OT AM-PAC:   /24    Family can provide assistance at DC: (P) Yes  Would you like Case Management to discuss the discharge plan with any other family members/significant others, and if so, who? (P)      The Patient and/or Patient Representative Agree with the Discharge Plan?      Teresa Boeck, RN  Case Management Department  Ph: [phone]

## 2025-07-10 NOTE — PLAN OF CARE
Problem: Chronic Conditions and Co-morbidities  Goal: Patient's chronic conditions and co-morbidity symptoms are monitored and maintained or improved  7/10/2025 0954 by Raffy Sanders RN  Outcome: Progressing  7/10/2025 0157 by Savannah Zepeda RN  Outcome: Progressing     Problem: Discharge Planning  Goal: Discharge to home or other facility with appropriate resources  7/10/2025 0954 by Raffy Sanders RN  Outcome: Progressing  7/10/2025 0157 by Savannah Zepeda RN  Outcome: Progressing     Problem: Pain  Goal: Verbalizes/displays adequate comfort level or baseline comfort level  7/10/2025 0954 by Raffy Sanders RN  Outcome: Progressing  7/10/2025 0157 by Savannah Zepeda RN  Outcome: Progressing     Problem: Skin/Tissue Integrity  Goal: Skin integrity remains intact  Description: 1.  Monitor for areas of redness and/or skin breakdown  2.  Assess vascular access sites hourly  3.  Every 4-6 hours minimum:  Change oxygen saturation probe site  4.  Every 4-6 hours:  If on nasal continuous positive airway pressure, respiratory therapy assess nares and determine need for appliance change or resting period  7/10/2025 0954 by Raffy Sanders RN  Outcome: Progressing  7/10/2025 0157 by Savannah Zepeda RN  Outcome: Progressing  Flowsheets (Taken 7/9/2025 1834 by Raffy Sanders, RN)  Skin Integrity Remains Intact: Monitor for areas of redness and/or skin breakdown     Problem: Safety - Adult  Goal: Free from fall injury  7/10/2025 0954 by Raffy Sanders RN  Outcome: Progressing  7/10/2025 0157 by Savannah Zepeda RN  Outcome: Progressing

## 2025-07-10 NOTE — PROGRESS NOTES
Shift assessment completed, see flow sheet. Pt is alert and oriented x4.     Temp-97.4  HR-61  RR-14  BP-117/52  SpO2- 99% RA     Pt has been ambulating to toilet with no issues.     All ICU lines in place. Dressings and sites are C/D/I.     Call light within reach.

## 2025-07-10 NOTE — PLAN OF CARE
Problem: Chronic Conditions and Co-morbidities  Goal: Patient's chronic conditions and co-morbidity symptoms are monitored and maintained or improved  7/10/2025 0157 by Savannah Zepeda RN  Outcome: Progressing  7/9/2025 1833 by Raffy Sanders RN  Outcome: Progressing     Problem: Discharge Planning  Goal: Discharge to home or other facility with appropriate resources  7/10/2025 0157 by Savannah Zepeda RN  Outcome: Progressing  7/9/2025 1833 by Raffy Sanders RN  Outcome: Progressing     Problem: Pain  Goal: Verbalizes/displays adequate comfort level or baseline comfort level  7/10/2025 0157 by Savannah Zepeda RN  Outcome: Progressing  7/9/2025 1833 by Raffy Sanders RN  Outcome: Progressing     Problem: Skin/Tissue Integrity  Goal: Skin integrity remains intact  Description: 1.  Monitor for areas of redness and/or skin breakdown  2.  Assess vascular access sites hourly  3.  Every 4-6 hours minimum:  Change oxygen saturation probe site  4.  Every 4-6 hours:  If on nasal continuous positive airway pressure, respiratory therapy assess nares and determine need for appliance change or resting period  7/10/2025 0157 by Savannah Zepeda RN  Outcome: Progressing  Flowsheets (Taken 7/9/2025 1834 by Raffy Sanders, RN)  Skin Integrity Remains Intact: Monitor for areas of redness and/or skin breakdown  7/9/2025 1833 by Raffy Sanders RN  Outcome: Progressing     Problem: Safety - Adult  Goal: Free from fall injury  7/10/2025 0157 by Savannah Zepeda RN  Outcome: Progressing  7/9/2025 1833 by Raffy Sanders RN  Outcome: Progressing

## 2025-07-10 NOTE — PROGRESS NOTES
[facility]                                 Pulmonary/Critical care  Consult /Progress Note :                                                                  CC :hypotension     Subjective  Doing great  On RA  Off pressors  For paracentesis  No chest pain           PHYSICAL EXAM:  Vitals:    07/10/25 0606   BP:    Pulse:    Resp: 11   Temp:    SpO2:      Gen: No distress.   Eyes: PERRL. No sclera icterus. No conjunctival injection.   ENT: No discharge. Pharynx clear.   Neck: Trachea midline. No obvious mass.    Resp: scattered rhonchi   CV: Regular rate. Regular rhythm. No murmur or rub. No edema. Peripheral pulses are 2+.  Capillary refill is less than 3 seconds.  GI: Non-tender. Non-distended. No hernia.   Skin: Warm and dry. No nodule on exposed extremities.   Lymph: No cervical LAD. No supraclavicular LAD.   M/S: No cyanosis. No joint deformity. No clubbing.   Neuro: Awake. Alert. Moves all four extremities.   Psych: Oriented x 3. No anxiety.     LABS:  CBC:   Recent Labs     07/08/25  0912 07/09/25 0541 07/10/25  0534   WBC 7.2 6.4 4.8   HGB 8.2* 7.4* 7.2*   HCT 24.4* 22.4* 22.1*   MCV 84.0 84.3 84.7    206 173     BMP:   Recent Labs     07/08/25  1304 07/09/25  0541 07/10/25  0534   * 133* 138   K 6.0* 4.4  4.4 4.4  4.4   CL 99 100 105   CO2 21 21 22   PHOS  --  3.3 3.1   BUN 49* 47* 39*   CREATININE 2.8* 2.8* 1.8*     LIVER PROFILE:   Recent Labs     07/08/25  0912 07/09/25  0541 07/10/25  0534   AST 20 15 16   ALT 15 10 7*   BILITOT 0.8 1.0 0.6   ALKPHOS 128 89 76     PT/INR:   Recent Labs     07/08/25  0912 07/09/25  1456   PROTIME 16.2* 17.8*   INR 1.28* 1.45*         Microbiology:  Sent     Imaging:  Chest imaging was reviewed by me and showed bilateral congestion     ASSESSMENT:   Acute kidney injury  Hyperkalemia  LANG with liver cirrhosis   Hypotension r/o sepsis   Large complex hydrocele in the

## 2025-07-10 NOTE — PROGRESS NOTES
Progress Note    HISTORY     CC:   Weakness            We are following for ROWAN       Subjective/   HPI:  BP is better with IV albumin.  He did have a paracentesis.  Scr down to 1.8.  Better urine volume.    ROS:  Constitutional:  No fevers, No Chills, + weakness  Cardiovascular:  No palpations, + edema  Respiratory:  No wheezing, no cough  Skin:  No rash, no itching  :  No hematuria, no dysuria     Social Hx:  Wife at the bedside     Past Medical and Surgical History:  - Reviewed, no changes     EXAM       Objective/     Vitals:    07/10/25 0700 07/10/25 0800 07/10/25 1000 07/10/25 1200   BP:  117/68 123/62    Pulse: 65 78 65 83   Resp: 14 14 12 14   Temp: 97.3 °F (36.3 °C)   97.4 °F (36.3 °C)   TempSrc: Temporal   Temporal   SpO2: 99% 100% 94% 99%   Weight:       Height:         24HR INTAKE/OUTPUT:    Intake/Output Summary (Last 24 hours) at 7/10/2025 1455  Last data filed at 7/10/2025 0443  Gross per 24 hour   Intake 1033.31 ml   Output 150 ml   Net 883.31 ml     Constitutional:  Alert, awake, no apparent distress  Eyes:  Pupils reactive, sclera clear   Neck:  Normal thyroid, no masses   Cardiovascular:  Regular, no rub  Respiratory:  No distress, no wheezing  Psychiatry:  Appropriate mood/affect, alert  Abdomen: +bs, soft, nt, no masses   Musculoskeletal: + LE edema, no clubbing   Lymphatics:  No LAD in neck, no supraclavicular nodes       MEDICAL DECISION MAKING       Data/  Recent Labs     07/08/25  0912 07/09/25  0541 07/10/25  0534   WBC 7.2 6.4 4.8   HGB 8.2* 7.4* 7.2*   HCT 24.4* 22.4* 22.1*   MCV 84.0 84.3 84.7    206 173     Recent Labs     07/08/25  0912 07/08/25  1015 07/08/25  1304 07/09/25  0541 07/10/25  0534   *   < > 131* 133* 138   K 5.7*   < > 6.0* 4.4  4.4 4.4  4.4   CL 96*   < > 99 100 105   CO2 22   < > 21 21 22   GLUCOSE 221*   < > 199* 119* 105*   PHOS  --   --   --  3.3 3.1   MG 2.06  --   --   --   --    BUN 49*   < > 49* 47* 39*   CREATININE 2.8*   < > 2.8*

## 2025-07-10 NOTE — PROGRESS NOTES
Shift assessment completed, see flow sheet.   RASS 0. Following commands.        SpO2 100%. Respirations are easy, even, and unlabored.   Bilateral lung sounds clear.     K WNL, crt trending down. Patient feels good, passing gas. Refuse buspar and lactulose, will take lactulose later if not having Bms.  Eating breakfast and getting OOB to [facility] often. Urinating. BLE with pitting edema. Abd soft, no pain, s/p paracentesis 7/9 with 6.2L off.    No bed at [facility] yet.     VSS  Eating breakfast. Did not need SSI, got lantus     CVC and  PIV WNL     All lines and monitoring devices in place.  Bed in lowest position with wheels locked.

## 2025-07-11 VITALS
TEMPERATURE: 98.1 F | OXYGEN SATURATION: 96 % | HEIGHT: 67 IN | DIASTOLIC BLOOD PRESSURE: 50 MMHG | BODY MASS INDEX: 31.86 KG/M2 | SYSTOLIC BLOOD PRESSURE: 97 MMHG | WEIGHT: 203 LBS | RESPIRATION RATE: 18 BRPM | HEART RATE: 64 BPM

## 2025-07-11 PROBLEM — K75.81 NASH (NONALCOHOLIC STEATOHEPATITIS): Status: ACTIVE | Noted: 2025-07-11

## 2025-07-11 LAB
ALBUMIN SERPL-MCNC: 3.3 G/DL (ref 3.4–5)
ALBUMIN/GLOB SERPL: 2.2 {RATIO} (ref 1.1–2.2)
ALP SERPL-CCNC: 86 U/L (ref 40–129)
ALT SERPL-CCNC: 9 U/L (ref 10–40)
ANION GAP SERPL CALCULATED.3IONS-SCNC: 10 MMOL/L (ref 3–16)
AST SERPL-CCNC: 20 U/L (ref 15–37)
BASOPHILS # BLD: 0.1 K/UL (ref 0–0.2)
BASOPHILS NFR BLD: 1.3 %
BILIRUB SERPL-MCNC: 0.6 MG/DL (ref 0–1)
BUN SERPL-MCNC: 33 MG/DL (ref 7–20)
CALCIUM SERPL-MCNC: 9.1 MG/DL (ref 8.3–10.6)
CHLORIDE SERPL-SCNC: 103 MMOL/L (ref 99–110)
CO2 SERPL-SCNC: 22 MMOL/L (ref 21–32)
CREAT SERPL-MCNC: 1.5 MG/DL (ref 0.8–1.3)
DEPRECATED RDW RBC AUTO: 16.3 % (ref 12.4–15.4)
EOSINOPHIL # BLD: 0.1 K/UL (ref 0–0.6)
EOSINOPHIL NFR BLD: 2.7 %
GFR SERPLBLD CREATININE-BSD FMLA CKD-EPI: 49 ML/MIN/{1.73_M2}
GLUCOSE BLD-MCNC: 130 MG/DL (ref 70–99)
GLUCOSE BLD-MCNC: 150 MG/DL (ref 70–99)
GLUCOSE SERPL-MCNC: 116 MG/DL (ref 70–99)
HCT VFR BLD AUTO: 23.1 % (ref 40.5–52.5)
HGB BLD-MCNC: 7.5 G/DL (ref 13.5–17.5)
LYMPHOCYTES # BLD: 0.7 K/UL (ref 1–5.1)
LYMPHOCYTES NFR BLD: 12.7 %
MCH RBC QN AUTO: 27.8 PG (ref 26–34)
MCHC RBC AUTO-ENTMCNC: 32.6 G/DL (ref 31–36)
MCV RBC AUTO: 85.2 FL (ref 80–100)
MONOCYTES # BLD: 0.7 K/UL (ref 0–1.3)
MONOCYTES NFR BLD: 13.1 %
NEUTROPHILS # BLD: 3.7 K/UL (ref 1.7–7.7)
NEUTROPHILS NFR BLD: 70.2 %
PERFORMED ON: ABNORMAL
PERFORMED ON: ABNORMAL
PHOSPHATE SERPL-MCNC: 3 MG/DL (ref 2.5–4.9)
PLATELET # BLD AUTO: 175 K/UL (ref 135–450)
PMV BLD AUTO: 7.5 FL (ref 5–10.5)
POTASSIUM SERPL-SCNC: 4.2 MMOL/L (ref 3.5–5.1)
POTASSIUM SERPL-SCNC: 4.2 MMOL/L (ref 3.5–5.1)
PROT SERPL-MCNC: 4.8 G/DL (ref 6.4–8.2)
RBC # BLD AUTO: 2.71 M/UL (ref 4.2–5.9)
SODIUM SERPL-SCNC: 135 MMOL/L (ref 136–145)
WBC # BLD AUTO: 5.3 K/UL (ref 4–11)

## 2025-07-11 PROCEDURE — 2500000003 HC RX 250 WO HCPCS: Performed by: INTERNAL MEDICINE

## 2025-07-11 PROCEDURE — 85025 COMPLETE CBC W/AUTO DIFF WBC: CPT

## 2025-07-11 PROCEDURE — 99232 SBSQ HOSP IP/OBS MODERATE 35: CPT | Performed by: INTERNAL MEDICINE

## 2025-07-11 PROCEDURE — 80053 COMPREHEN METABOLIC PANEL: CPT

## 2025-07-11 PROCEDURE — 6360000002 HC RX W HCPCS: Performed by: INTERNAL MEDICINE

## 2025-07-11 PROCEDURE — 6370000000 HC RX 637 (ALT 250 FOR IP): Performed by: INTERNAL MEDICINE

## 2025-07-11 PROCEDURE — 99238 HOSP IP/OBS DSCHRG MGMT 30/<: CPT | Performed by: INTERNAL MEDICINE

## 2025-07-11 PROCEDURE — 2580000003 HC RX 258: Performed by: INTERNAL MEDICINE

## 2025-07-11 RX ORDER — MIDODRINE HYDROCHLORIDE 10 MG/1
10 TABLET ORAL DAILY
Qty: 90 TABLET | Refills: 0 | Status: SHIPPED | OUTPATIENT
Start: 2025-07-11

## 2025-07-11 RX ADMIN — HEPARIN SODIUM 5000 UNITS: 5000 INJECTION INTRAVENOUS; SUBCUTANEOUS at 05:50

## 2025-07-11 RX ADMIN — CETIRIZINE HYDROCHLORIDE 5 MG: 10 TABLET ORAL at 09:19

## 2025-07-11 RX ADMIN — Medication 10 ML: at 09:20

## 2025-07-11 RX ADMIN — PANTOPRAZOLE SODIUM 40 MG: 40 TABLET, DELAYED RELEASE ORAL at 09:19

## 2025-07-11 RX ADMIN — BUSPIRONE HYDROCHLORIDE 7.5 MG: 7.5 TABLET ORAL at 09:19

## 2025-07-11 RX ADMIN — FERROUS SULFATE TAB 325 MG (65 MG ELEMENTAL FE) 325 MG: 325 (65 FE) TAB at 09:19

## 2025-07-11 RX ADMIN — LACTULOSE 20 G: 10 SOLUTION ORAL at 09:19

## 2025-07-11 RX ADMIN — OXYCODONE 5 MG: 5 TABLET ORAL at 06:03

## 2025-07-11 RX ADMIN — MIDODRINE HYDROCHLORIDE 10 MG: 10 TABLET ORAL at 09:19

## 2025-07-11 RX ADMIN — SIMETHICONE 80 MG: 80 TABLET, CHEWABLE ORAL at 09:19

## 2025-07-11 RX ADMIN — CEFEPIME 2000 MG: 2 INJECTION, POWDER, FOR SOLUTION INTRAVENOUS at 04:57

## 2025-07-11 RX ADMIN — RIFAXIMIN 550 MG: 550 TABLET ORAL at 09:19

## 2025-07-11 RX ADMIN — INSULIN GLARGINE 15 UNITS: 100 INJECTION, SOLUTION SUBCUTANEOUS at 09:19

## 2025-07-11 RX ADMIN — INSULIN LISPRO 15 UNITS: 100 INJECTION, SOLUTION INTRAVENOUS; SUBCUTANEOUS at 09:19

## 2025-07-11 ASSESSMENT — PAIN DESCRIPTION - DESCRIPTORS
DESCRIPTORS: SHARP
DESCRIPTORS: SHARP

## 2025-07-11 ASSESSMENT — PAIN DESCRIPTION - LOCATION
LOCATION: ABDOMEN
LOCATION: ABDOMEN

## 2025-07-11 ASSESSMENT — PAIN DESCRIPTION - ORIENTATION
ORIENTATION: RIGHT
ORIENTATION: RIGHT

## 2025-07-11 ASSESSMENT — PAIN SCALES - GENERAL
PAINLEVEL_OUTOF10: 6
PAINLEVEL_OUTOF10: 6
PAINLEVEL_OUTOF10: 5

## 2025-07-11 ASSESSMENT — PAIN - FUNCTIONAL ASSESSMENT
PAIN_FUNCTIONAL_ASSESSMENT: PREVENTS OR INTERFERES SOME ACTIVE ACTIVITIES AND ADLS
PAIN_FUNCTIONAL_ASSESSMENT: ACTIVITIES ARE NOT PREVENTED

## 2025-07-11 ASSESSMENT — PAIN SCALES - WONG BAKER: WONGBAKER_NUMERICALRESPONSE: HURTS A LITTLE BIT

## 2025-07-11 NOTE — CARE COORDINATION
DC plan remains for pt to return home with spouse. Spouse to provide transport home. Pt continues on RA. Pt has cane and walker. Likely no additional DC or DME needs.

## 2025-07-11 NOTE — PROGRESS NOTES
Hospitalist Progress Note  7/10/2025 10:27 PM  Subjective:   Admit Date: 7/8/2025  PCP: Karina Mendoza PA    Overnight Events: no acute events  overnight      Diet: ADULT DIET; Regular; 4 carb choices (60 gm/meal); Low Sodium (2 gm); Low Potassium (Less than 3000 mg/day); Low Phosphorus (Less than 1000 mg)      Data:   CBC:   Recent Labs     07/08/25 0912 07/09/25  0541 07/10/25  0534   WBC 7.2 6.4 4.8   HGB 8.2* 7.4* 7.2*    206 173     BMP:    Recent Labs     07/08/25  1304 07/09/25  0541 07/10/25  0534   * 133* 138   K 6.0* 4.4  4.4 4.4  4.4   CL 99 100 105   CO2 21 21 22   BUN 49* 47* 39*   CREATININE 2.8* 2.8* 1.8*   GLUCOSE 199* 119* 105*     Hepatic:   Recent Labs     07/08/25 0912 07/09/25  0541 07/10/25  0534   AST 20 15 16   ALT 15 10 7*   BILITOT 0.8 1.0 0.6   ALKPHOS 128 89 76       Objective:   Vitals: /69   Pulse 54   Temp 98.1 °F (36.7 °C) (Oral)   Resp 18   Ht 1.702 m (5' 7\")   Wt 95.2 kg (209 lb 12.8 oz)   SpO2 97%   BMI 32.86 kg/m²     Gen: No distress. Alert.  Appears improved  Eyes: PERRL. No sclera icterus. No conjunctival injection.   ENT: No discharge. Pharynx clear.   Neck: No JVD.  No Carotid Bruit. Trachea midline.  Resp: No accessory muscle use. No crackles. No wheezes. No rhonchi.   CV: Bradycardia. Regular rhythm. No murmur.  No rub. No edema.   GI: Non-tender. + distended. No masses. No organomegaly. Normal bowel sounds. No hernia.   : scrotal swelling  Skin: Warm and dry. No nodule on exposed extremities. No rash on exposed extremities.   M/S: No cyanosis. No joint deformity. No clubbing.   Neuro: Awake. Grossly nonfocal    Psych: Oriented x 3. No anxiety or agitation.        Assessment:   Principal Problem:    Hyperkalemia  Active Problems:    Acute kidney injury    Generalized weakness    Hypotension  Resolved Problems:    * No resolved hospital problems. *    Plan:       #ROWAN on CKD.  Patient

## 2025-07-11 NOTE — CARE COORDINATION
DC order noted, pt to DC home with spouse. Pt denies any DC or DME needs at this time. Spouse to provide transport home.

## 2025-07-11 NOTE — FLOWSHEET NOTE
07/11/25 0715   Vitals   Temp 98.1 °F (36.7 °C)   Temp Source Oral   Pulse 64   Heart Rate Source Monitor   Respirations 18   BP (!) 97/50   MAP (Calculated) 66   BP Location Right upper arm   BP Method Automatic   Patient Position Semi fowlers   Oxygen Therapy   O2 Device None (Room air)   O2 Flow Rate (L/min) 97 L/min     Shift assessment completed-see flow sheet. Patient in bed awake,alert and oriented x4.  Vitals stable. 97% L/min on RA. HR 64,sinus on monitor.  Morning medications given per order.  Patient denies any further needs at this time.

## 2025-07-11 NOTE — FLOWSHEET NOTE
07/11/25 0559   Vital Signs   Pulse 65   Heart Rate Source Monitor   BP (!) 109/51   MAP (Calculated) 70   BP Location Right upper arm   BP Method Automatic   Patient Position Semi fowlers   Pain Assessment   Pain Assessment 0-10   Pain Level 6   Patient's Stated Pain Goal 2   Pain Location Abdomen   Pain Orientation Right   Pain Descriptors Sharp   Functional Pain Assessment Activities are not prevented     Perfect serve Dr. Hyde and informed him about the patient complaints and he agreed to give the roxicodone med as ordered.

## 2025-07-11 NOTE — PROGRESS NOTES
Hospitalist Progress Note  7/11/2025 7:48 AM  Subjective:   Admit Date: 7/8/2025  PCP: Karina Mendoza PA    Admitted wit hypotension,ROWAN on CKD    Overnight Events: no acute events  overnight  He feels better       Diet: ADULT DIET; Regular; 4 carb choices (60 gm/meal); Low Sodium (2 gm); Low Potassium (Less than 3000 mg/day); Low Phosphorus (Less than 1000 mg)      Data:   CBC:   Recent Labs     07/09/25  0541 07/10/25  0534 07/11/25  0450   WBC 6.4 4.8 5.3   HGB 7.4* 7.2* 7.5*    173 175     BMP:    Recent Labs     07/09/25  0541 07/10/25  0534 07/11/25  0450   * 138 135*   K 4.4  4.4 4.4  4.4 4.2  4.2    105 103   CO2 21 22 22   BUN 47* 39* 33*   CREATININE 2.8* 1.8* 1.5*   GLUCOSE 119* 105* 116*     Hepatic:   Recent Labs     07/09/25  0541 07/10/25  0534 07/11/25  0450   AST 15 16 20   ALT 10 7* 9*   BILITOT 1.0 0.6 0.6   ALKPHOS 89 76 86       Objective:   Vitals: BP (!) 97/50   Pulse 64   Temp 98.1 °F (36.7 °C) (Oral)   Resp 18   Ht 1.702 m (5' 7\")   Wt 92.1 kg (203 lb)   SpO2 96%   BMI 31.79 kg/m²     Gen: No distress. Alert.  Appears improved  Eyes: PERRL. No sclera icterus. No conjunctival injection.   ENT: No discharge. Pharynx clear.   Neck: No JVD.  No Carotid Bruit. Trachea midline.  Resp: No accessory muscle use. No crackles. No wheezes. No rhonchi.   CV: Bradycardia. Regular rhythm. No murmur.  No rub. No edema.   GI: Non-tender. + distended. No masses. No organomegaly. Normal bowel sounds. No hernia.   : scrotal swelling  Skin: Warm and dry. No nodule on exposed extremities. No rash on exposed extremities.   M/S: No cyanosis. No joint deformity. No clubbing.   Neuro: Awake. Grossly nonfocal    Psych: Oriented x 3. No anxiety or agitation.        Assessment:   Principal Problem:    Hyperkalemia  Active Problems:    Acute kidney injury    Generalized weakness    Hypotension  Resolved Problems:    * No resolved

## 2025-07-11 NOTE — PROGRESS NOTES
Patient alert and oriented resting in bed, watching tv. With telemetry. On room air. With IV infusion regulated via alaris. Assessment completed. Respiration easy, even and unlabored. Patient tolerated night meds well. Call light and bedside table within reach. Bed at lowest position, locked, side rails x2, bed alarm on. Pt denies needs at this time.

## 2025-07-11 NOTE — DISCHARGE SUMMARY
ORDERED BY: FLOWER PIÑA  SOURCE: Blood                              COLLECTED:  07/09/25 09:00  ANTIBIOTICS AT TREASURE.:                      RECEIVED :  07/09/25 09:10  If child <=2 yrs old please draw pediatric bottle.~Blood Culture 1    Culture, Urine [[id number]] Collected: 06/30/25 1253    Order Status: Completed Specimen: Urine, clean catch Updated: 07/01/25 0956     Urine Culture, Routine No growth at 18 to 36 hours    Narrative:      ORDER#: [id number]                          ORDERED BY: VANDANA TRINIDAD  SOURCE: Urine Clean Catch                  COLLECTED:  06/30/25 12:53  ANTIBIOTICS AT TREASURE.:                      RECEIVED :  06/30/25 12:58    COVID-19, Rapid [[id number]] Collected: 06/29/25 2013    Order Status: Completed Specimen: Nasopharyngeal Swab Updated: 06/29/25 2105     SARS-CoV-2, NAAT Not Detected    Culture, Blood 1 [[id number]] Collected: 06/29/25 2002    Order Status: Completed Specimen: Blood Updated: 07/03/25 2015     Blood Culture, Routine No Growth after 4 days of incubation.    Narrative:      ORDER#: [id number]                          ORDERED BY: PRAVEEN PORTILLO  SOURCE: Blood Antecubital-Lef              COLLECTED:  06/29/25 20:02  ANTIBIOTICS AT TREASURE.:                      RECEIVED :  06/29/25 20:09  If child <=2 yrs old please draw pediatric bottle.~Blood Culture 1    Culture, Blood 2 [[id number]] Collected: 06/29/25 2002    Order Status: Completed Specimen: Blood Updated: 07/03/25 2015     Culture, Blood 2 No Growth after 4 days of incubation.    Narrative:      ORDER#: [id number]                          ORDERED BY: PRAVEEN PORTILLO  SOURCE: Blood Antecubital-Lef              COLLECTED:  06/29/25 20:02  ANTIBIOTICS AT TREASURE.:                      RECEIVED :  06/29/25 20:09  If child <=2 yrs old please draw pediatric bottle.~Blood Culture #2               Discharge Medications     Medication List        CHANGE how you take these medications      midodrine 10 MG tablet  Commonly  Stable    Follow Up:  Follow up with PCP,liver tea mat          DALE Kiser.

## 2025-07-11 NOTE — PROGRESS NOTES
Patient is sleeping on bed but easily to wake up.  Still with Telemetry.  Still with IV infusion regulated via alaris  No distress noted at this time.  Will continue to monitor patient.  Denies need at this time.  Call light and bedside table within reach. Bed at lowest position, locked, side rails x2, bed alarm on.

## 2025-07-11 NOTE — PROGRESS NOTES
[facility]                                 Pulmonary/Critical care  Consult /Progress Note :                                                                  CC :hypotension     Subjective  Doing great  On RA  Off pressors  For paracentesis  No chest pain           PHYSICAL EXAM:  Vitals:    07/11/25 0715   BP: (!) 97/50   Pulse: 64   Resp: 18   Temp: 98.1 °F (36.7 °C)   SpO2:      Gen: No distress.   Eyes: PERRL. No sclera icterus. No conjunctival injection.   ENT: No discharge. Pharynx clear.   Neck: Trachea midline. No obvious mass.    Resp: scattered rhonchi   CV: Regular rate. Regular rhythm. No murmur or rub. No edema. Peripheral pulses are 2+.  Capillary refill is less than 3 seconds.  GI: Non-tender. Non-distended. No hernia.   Skin: Warm and dry. No nodule on exposed extremities.   Lymph: No cervical LAD. No supraclavicular LAD.   M/S: No cyanosis. No joint deformity. No clubbing.   Neuro: Awake. Alert. Moves all four extremities.   Psych: Oriented x 3. No anxiety.     LABS:  CBC:   Recent Labs     07/09/25  0541 07/10/25  0534 07/11/25  0450   WBC 6.4 4.8 5.3   HGB 7.4* 7.2* 7.5*   HCT 22.4* 22.1* 23.1*   MCV 84.3 84.7 85.2    173 175     BMP:   Recent Labs     07/09/25  0541 07/10/25  0534 07/11/25  0450   * 138 135*   K 4.4  4.4 4.4  4.4 4.2  4.2    105 103   CO2 21 22 22   PHOS 3.3 3.1 3.0   BUN 47* 39* 33*   CREATININE 2.8* 1.8* 1.5*     LIVER PROFILE:   Recent Labs     07/09/25  0541 07/10/25  0534 07/11/25  0450   AST 15 16 20   ALT 10 7* 9*   BILITOT 1.0 0.6 0.6   ALKPHOS 89 76 86     PT/INR:   Recent Labs     07/09/25  1456   PROTIME 17.8*   INR 1.45*         Microbiology:  Sent     Imaging:  Chest imaging was reviewed by me and showed bilateral congestion     ASSESSMENT:   Acute kidney injury  Hyperkalemia  LANG with liver cirrhosis   Hypotension r/o sepsis   Large complex hydrocele in

## 2025-07-11 NOTE — PROGRESS NOTES
Transferred care to SANTIAGO Thomas. Face to face bedside report given, no need voiced at this time.  Pt awake in bed.   No signs of distress noted.  Call light within reach.   Denies needs.

## 2025-07-11 NOTE — PLAN OF CARE
Problem: Chronic Conditions and Co-morbidities  Goal: Patient's chronic conditions and co-morbidity symptoms are monitored and maintained or improved  Outcome: Progressing  Flowsheets  Taken 7/11/2025 0034 by Cristiano Horn RN  Care Plan - Patient's Chronic Conditions and Co-Morbidity Symptoms are Monitored and Maintained or Improved:   Monitor and assess patient's chronic conditions and comorbid symptoms for stability, deterioration, or improvement   Collaborate with multidisciplinary team to address chronic and comorbid conditions and prevent exacerbation or deterioration    Problem: Discharge Planning  Goal: Discharge to home or other facility with appropriate resources  Outcome: Progressing  Flowsheets  Taken 7/11/2025 0034 by Cristiano Horn RN  Discharge to home or other facility with appropriate resources:   Identify barriers to discharge with patient and caregiver   Arrange for needed discharge resources and transportation as appropriate  Taken 7/10/2025 1615 by Maryam Frias RN  Discharge to home or other facility with appropriate resources: Refer to discharge planning if patient needs post-hospital services based on physician order or complex needs related to functional status, cognitive ability or social support system     Problem: Pain  Goal: Verbalizes/displays adequate comfort level or baseline comfort level  Outcome: Progressing  Flowsheets (Taken 7/11/2025 0034)  Verbalizes/displays adequate comfort level or baseline comfort level:   Encourage patient to monitor pain and request assistance   Assess pain using appropriate pain scale   Administer analgesics based on type and severity of pain and evaluate response   Implement non-pharmacological measures as appropriate and evaluate response     Problem: Skin/Tissue Integrity  Goal: Skin integrity remains intact  Description: 1.  Monitor for areas of redness and/or skin breakdown  2.  Assess vascular access sites hourly  3.  Every 4-6 hours minimum:

## 2025-07-11 NOTE — PROGRESS NOTES
Progress Note    HISTORY     CC:   Weakness            We are following for ROWAN       Subjective/   HPI:  BP is remaining stable.  On midodrine.  He is feeling better.  Scr is down to 1.5.    ROS:  Constitutional:  No fevers, No Chills, + weakness  Cardiovascular:  No palpations, + edema  Respiratory:  No wheezing, no cough  Skin:  No rash, no itching  :  No hematuria, no dysuria     Social Hx:  Wife at the bedside     Past Medical and Surgical History:  - Reviewed, no changes     EXAM       Objective/     Vitals:    07/11/25 0559 07/11/25 0603 07/11/25 0633 07/11/25 0715   BP: (!) 109/51  (!) 101/54 (!) 97/50   Pulse: 65   64   Resp:  20 17 18   Temp:    98.1 °F (36.7 °C)   TempSrc:    Oral   SpO2:       Weight:       Height:         24HR INTAKE/OUTPUT:    Intake/Output Summary (Last 24 hours) at 7/11/2025 1548  Last data filed at 7/11/2025 0715  Gross per 24 hour   Intake 360 ml   Output --   Net 360 ml     Constitutional:  Alert, awake, no apparent distress  Eyes:  Pupils reactive, sclera clear   Neck:  Normal thyroid, no masses   Cardiovascular:  Regular, no rub  Respiratory:  No distress, no wheezing  Psychiatry:  Appropriate mood/affect, alert  Abdomen: +bs, soft, nt, no masses   Musculoskeletal: + LE edema, no clubbing   Lymphatics:  No LAD in neck, no supraclavicular nodes       MEDICAL DECISION MAKING       Data/  Recent Labs     07/09/25  0541 07/10/25  0534 07/11/25  0450   WBC 6.4 4.8 5.3   HGB 7.4* 7.2* 7.5*   HCT 22.4* 22.1* 23.1*   MCV 84.3 84.7 85.2    173 175     Recent Labs     07/09/25  0541 07/10/25  0534 07/11/25  0450   * 138 135*   K 4.4  4.4 4.4  4.4 4.2  4.2    105 103   CO2 21 22 22   GLUCOSE 119* 105* 116*   PHOS 3.3 3.1 3.0   BUN 47* 39* 33*   CREATININE 2.8* 1.8* 1.5*   LABGLOM 23* 40* 49*       Assessment/     Acute Kidney Injury:  KDIGO stage 2  Scr up to 1.7 upon presentation to  last week and 1.1 at discharge  Now at 2.9 just over a few days.

## 2025-07-13 LAB
BACTERIA BLD CULT ORG #2: NORMAL
BACTERIA BLD CULT: NORMAL

## 2025-07-14 ENCOUNTER — TELEPHONE (OUTPATIENT)
Dept: FAMILY MEDICINE CLINIC | Age: 70
End: 2025-07-14

## 2025-07-14 NOTE — TELEPHONE ENCOUNTER
Care Transitions Initial Follow Up Call    Outreach made within 2 business days of discharge: Yes    Patient: Carroll Wells Patient : 1955   MRN: [id number]  Reason for Admission: Hyperkalemia  Discharge Date: 25       Spoke with: Patient     Discharge department/facility: [facility]    TCM Interactive Patient Contact:  Was patient able to fill all prescriptions: Yes  Was patient instructed to bring all medications to the follow-up visit: Yes  Is patient taking all medications as directed in the discharge summary? Yes  Does patient understand their discharge instructions: Yes  Does patient have questions or concerns that need addressed prior to 7-14 day follow up office visit: no        Scheduled appointment with PCP within 7-14 days    Follow Up  Future Appointments   Date Time Provider Department Center   10/8/2025 11:30 AM [facility]       Betzy Oakley LPN

## 2025-07-14 NOTE — PROGRESS NOTES
Physician Progress Note      PATIENT:               JACOB POST  CSN #:                  [id number]  :                       1955  ADMIT DATE:       2025 8:44 AM  DISCH DATE:        2025 11:39 AM  RESPONDING  PROVIDER #:        Dipak Hu MD          QUERY TEXT:    Sepsis is documented in the medical record  and  by Nephrology and   Pulmonology. Please provide additional clinical indicators supportive of your   documentation. Or please document if the diagnosis of sepsis has been ruled   out after study.    The clinical indicators include:  70 yo w/ recent sepsis due to epididymitis discharged from  , needs liver   transplant    Per Pulmonology :  Possible sepsis  Per Nephrology :  With low BP there would be concern for early sepsis.  WBC 8.3 - 4.8.  Temps 97.3 - 98.8. Lactic acid 1.9, Procalcitonin 0.26.  Per Discharge summary :   Acute kidney injury  Generalized weakness  Hypotension    Lactic acid, Procalcitonin, blood cultures, IVF bolus  Options provided:  -- Sepsis was ruled out after study  -- Other - I will add my own diagnosis  -- Disagree - Not applicable / Not valid  -- Disagree - Clinically unable to determine / Unknown  -- Refer to Clinical Documentation Reviewer    PROVIDER RESPONSE TEXT:    Sepsis was ruled out after study.    Query created by: Cathleen Robb on 2025 5:42 AM      Electronically signed by:  Dipak Hu MD 2025 1:23 PM

## 2025-07-16 ENCOUNTER — HOSPITAL ENCOUNTER (OUTPATIENT)
Dept: ULTRASOUND IMAGING | Age: 70
Discharge: HOME | End: 2025-07-16
Payer: MEDICARE

## 2025-07-16 ENCOUNTER — HOSPITAL ENCOUNTER (OUTPATIENT)
Dept: NURSING | Age: 70
Setting detail: INFUSION SERIES
Discharge: HOME | End: 2025-07-16
Payer: MEDICARE

## 2025-07-16 VITALS
RESPIRATION RATE: 16 BRPM | DIASTOLIC BLOOD PRESSURE: 54 MMHG | HEIGHT: 68 IN | HEART RATE: 72 BPM | SYSTOLIC BLOOD PRESSURE: 113 MMHG | WEIGHT: 203 LBS | BODY MASS INDEX: 30.77 KG/M2 | TEMPERATURE: 97 F

## 2025-07-16 VITALS — SYSTOLIC BLOOD PRESSURE: 143 MMHG | DIASTOLIC BLOOD PRESSURE: 71 MMHG | HEART RATE: 72 BPM | OXYGEN SATURATION: 100 %

## 2025-07-16 DIAGNOSIS — R18.8 ASCITES OF LIVER: ICD-10-CM

## 2025-07-16 DIAGNOSIS — K74.60 LIVER CIRRHOSIS SECONDARY TO NASH (HCC): ICD-10-CM

## 2025-07-16 DIAGNOSIS — K75.81 LIVER CIRRHOSIS SECONDARY TO NASH (HCC): ICD-10-CM

## 2025-07-16 PROCEDURE — 99211 OFF/OP EST MAY X REQ PHY/QHP: CPT

## 2025-07-16 PROCEDURE — 49083 ABD PARACENTESIS W/IMAGING: CPT

## 2025-07-16 PROCEDURE — P9047 ALBUMIN (HUMAN), 25%, 50ML: HCPCS | Performed by: INTERNAL MEDICINE

## 2025-07-16 PROCEDURE — 6360000002 HC RX W HCPCS: Performed by: INTERNAL MEDICINE

## 2025-07-16 PROCEDURE — 96365 THER/PROPH/DIAG IV INF INIT: CPT

## 2025-07-16 RX ORDER — ALBUMIN (HUMAN) 12.5 G/50ML
50 SOLUTION INTRAVENOUS ONCE
Status: COMPLETED | OUTPATIENT
Start: 2025-07-16 | End: 2025-07-16

## 2025-07-16 RX ADMIN — ALBUMIN (HUMAN) 50 G: 0.25 INJECTION, SOLUTION INTRAVENOUS at 13:01

## 2025-07-16 NOTE — PROGRESS NOTES
Pt arrived for image guided Paracentesis. Dr. Robb explained the procedure including the risk and benefits of the procedure. All questions answered. Pt verbalizes understanding of the procedure and states no more questions. Consent confirmed. Vital signs stable. Labs, allergies, medications, and code status reviewed. No contraindications noted. Time out completed prior to procedure.    Vital Signs  Vitals:    07/16/25 1200   BP: (!) 143/71   Pulse: 72   SpO2: 100%    (vital signs in table format)      Allergies  Aspirin, Fentanyl, and Oxycontin [oxycodone hcl] (allergies)    Labs  Lab Results   Component Value Date    INR 1.45 (H) 07/09/2025    PROTIME 17.8 (H) 07/09/2025     Lab Results   Component Value Date    CREATININE 1.5 (H) 07/11/2025    BUN 33 (H) 07/11/2025     (L) 07/11/2025    K 4.2 07/11/2025    K 4.2 07/11/2025     07/11/2025    CO2 22 07/11/2025     Lab Results   Component Value Date    WBC 5.3 07/11/2025    HGB 7.5 (L) 07/11/2025    HCT 23.1 (L) 07/11/2025    MCV 85.2 07/11/2025     07/11/2025

## 2025-07-16 NOTE — PROGRESS NOTES
1300 Pt here via w/c from radiology for an Albumin Infusion after having a paracentesis completed with removal of 7.3 liters of fluid per Brisa US tech  Pt has an IV 20 ga in his upper left wrist started in radiology Site unremarkable       1445 Albumin 50 gm was infused without issues  IV was removed  cath tip intact  Pressure then pressure dressing was applied and then secured with a coban dressing  IV site unremarkable  Pt yenny well  Verbal discharge instructions reviewed with pt and his wife and understanding was verbalized   Pt refused a written copy today  Pt was discharged via w/c  in stable condition per this RN

## 2025-07-16 NOTE — PROGRESS NOTES
Image guided Paracentesis completed.  7.3 liters of cloudy colored withdrawn.  Pt tolerated procedure without any signs or symptoms of distress. Vital signs stable. IV in left wrist done by IR RN.     DISCHARGED:  \[facility]\"": Discharge instructions reviewed with patient. Verbalized understanding. Patient taken to \[facility]\"" for albumin infusion. Report given to nurse.    SPECIMEN SENT:  No    Vital Signs  Vitals:    07/16/25 1200   BP: (!) 143/71   Pulse: 72   SpO2: 100%    (vital signs in table format)

## 2025-07-23 ENCOUNTER — HOSPITAL ENCOUNTER (OUTPATIENT)
Dept: ULTRASOUND IMAGING | Age: 70
Discharge: HOME | End: 2025-07-23
Payer: MEDICARE

## 2025-07-23 ENCOUNTER — HOSPITAL ENCOUNTER (OUTPATIENT)
Dept: NURSING | Age: 70
Setting detail: INFUSION SERIES
Discharge: HOME | End: 2025-07-23
Payer: MEDICARE

## 2025-07-23 VITALS
DIASTOLIC BLOOD PRESSURE: 65 MMHG | BODY MASS INDEX: 30.77 KG/M2 | OXYGEN SATURATION: 98 % | HEIGHT: 68 IN | WEIGHT: 203.04 LBS | SYSTOLIC BLOOD PRESSURE: 139 MMHG | HEART RATE: 79 BPM | RESPIRATION RATE: 16 BRPM | TEMPERATURE: 98 F

## 2025-07-23 VITALS — SYSTOLIC BLOOD PRESSURE: 143 MMHG | DIASTOLIC BLOOD PRESSURE: 70 MMHG | HEART RATE: 85 BPM | OXYGEN SATURATION: 98 %

## 2025-07-23 DIAGNOSIS — R18.8 ASCITES OF LIVER: ICD-10-CM

## 2025-07-23 DIAGNOSIS — K75.81 LIVER CIRRHOSIS SECONDARY TO NASH (HCC): ICD-10-CM

## 2025-07-23 DIAGNOSIS — K74.60 LIVER CIRRHOSIS SECONDARY TO NASH (HCC): ICD-10-CM

## 2025-07-23 PROCEDURE — 96365 THER/PROPH/DIAG IV INF INIT: CPT

## 2025-07-23 PROCEDURE — 99211 OFF/OP EST MAY X REQ PHY/QHP: CPT

## 2025-07-23 PROCEDURE — 6360000002 HC RX W HCPCS: Performed by: INTERNAL MEDICINE

## 2025-07-23 PROCEDURE — 49083 ABD PARACENTESIS W/IMAGING: CPT

## 2025-07-23 PROCEDURE — P9047 ALBUMIN (HUMAN), 25%, 50ML: HCPCS | Performed by: INTERNAL MEDICINE

## 2025-07-23 RX ORDER — ALBUMIN (HUMAN) 12.5 G/50ML
50 SOLUTION INTRAVENOUS ONCE
Status: COMPLETED | OUTPATIENT
Start: 2025-07-23 | End: 2025-07-23

## 2025-07-23 RX ADMIN — ALBUMIN (HUMAN) 50 G: 0.25 INJECTION, SOLUTION INTRAVENOUS at 12:47

## 2025-07-23 ASSESSMENT — PAIN SCALES - GENERAL: PAINLEVEL_OUTOF10: 4

## 2025-07-23 ASSESSMENT — PAIN DESCRIPTION - DESCRIPTORS: DESCRIPTORS: ACHING

## 2025-07-23 ASSESSMENT — PAIN DESCRIPTION - LOCATION: LOCATION: ABDOMEN

## 2025-07-23 ASSESSMENT — PAIN DESCRIPTION - FREQUENCY: FREQUENCY: CONTINUOUS

## 2025-07-23 ASSESSMENT — PAIN SCALES - WONG BAKER: WONGBAKER_NUMERICALRESPONSE: NO HURT

## 2025-07-23 NOTE — PROGRESS NOTES
Image guided Paracentesis completed.  5.7 liters of dark yellow colored withdrawn.  Pt tolerated procedure without any signs or symptoms of distress. Vital signs stable. IV in left hand done by IR RN.    DISCHARGED:  [facility]: Discharge instructions reviewed with patient. Verbalized understanding. Patient taken to [facility] for albumin infusion. Report given to nurse.    SPECIMEN SENT:  No    Vital Signs  Vitals:    07/23/25 1145   BP: (!) 143/70   Pulse: 85   SpO2: 98%    (vital signs in table format)

## 2025-07-23 NOTE — PROGRESS NOTES
Pt arrived for image guided Paracentesis. Dr. Medina explained the procedure including the risk and benefits of the procedure. All questions answered. Pt verbalizes understanding of the procedure and states no more questions. Consent confirmed. Vital signs stable. Labs, allergies, medications, and code status reviewed. No contraindications noted. Time out completed prior to procedure.    Vital Signs  Vitals:    07/23/25 1145   BP: (!) 143/70   Pulse: 85   SpO2: 98%    (vital signs in table format)      Allergies  Aspirin, Fentanyl, and Oxycontin [oxycodone hcl] (allergies)    Labs  Lab Results   Component Value Date    INR 1.45 (H) 07/09/2025    PROTIME 17.8 (H) 07/09/2025     Lab Results   Component Value Date    CREATININE 1.5 (H) 07/11/2025    BUN 33 (H) 07/11/2025     (L) 07/11/2025    K 4.2 07/11/2025    K 4.2 07/11/2025     07/11/2025    CO2 22 07/11/2025     Lab Results   Component Value Date    WBC 5.3 07/11/2025    HGB 7.5 (L) 07/11/2025    HCT 23.1 (L) 07/11/2025    MCV 85.2 07/11/2025     07/11/2025

## 2025-07-23 NOTE — PROGRESS NOTES
Pt here via w/c from radiology for an Albumin Infusion after having a paracentesis completed with removal of 5.7 liters of fluid per [facility] US tech   Pt reporting some abdominal aching today rated a 4 out of 10  Pt has an IV 22 ga in his L hand that was started in US Site unremarkable   Albumin 50 gm was infused over 30 mins without issues Pt reports that he feels ok  IV was removed  cath tip intact  Pressure then pressure dressing was applied and then secured with a coban dressing  IV site unremarkable  Pt yenny well  Verbal discharge instructions reviewed with pt and his wife and understanding was verbalized   Pt refused a written copy today  Pt was then discharged ambulatory using his cane with his wife in stable condition  Pt was escorted to his vehicle per nursing staff

## 2025-07-29 RX ORDER — ALBUMIN (HUMAN) 12.5 G/50ML
50 SOLUTION INTRAVENOUS ONCE
Status: CANCELLED | OUTPATIENT
Start: 2025-07-29 | End: 2025-07-29

## 2025-07-30 ENCOUNTER — HOSPITAL ENCOUNTER (OUTPATIENT)
Dept: NURSING | Age: 70
Setting detail: INFUSION SERIES
Discharge: HOME | End: 2025-07-30
Payer: MEDICARE

## 2025-07-30 ENCOUNTER — HOSPITAL ENCOUNTER (OUTPATIENT)
Dept: ULTRASOUND IMAGING | Age: 70
Discharge: HOME | End: 2025-07-30
Payer: MEDICARE

## 2025-07-30 VITALS
WEIGHT: 203 LBS | BODY MASS INDEX: 30.77 KG/M2 | TEMPERATURE: 97.8 F | SYSTOLIC BLOOD PRESSURE: 130 MMHG | HEIGHT: 68 IN | DIASTOLIC BLOOD PRESSURE: 65 MMHG | HEART RATE: 75 BPM | RESPIRATION RATE: 16 BRPM

## 2025-07-30 VITALS — HEART RATE: 75 BPM | SYSTOLIC BLOOD PRESSURE: 137 MMHG | OXYGEN SATURATION: 99 % | DIASTOLIC BLOOD PRESSURE: 64 MMHG

## 2025-07-30 DIAGNOSIS — R18.8 ASCITES OF LIVER: ICD-10-CM

## 2025-07-30 PROCEDURE — P9047 ALBUMIN (HUMAN), 25%, 50ML: HCPCS | Performed by: INTERNAL MEDICINE

## 2025-07-30 PROCEDURE — 96365 THER/PROPH/DIAG IV INF INIT: CPT

## 2025-07-30 PROCEDURE — 49083 ABD PARACENTESIS W/IMAGING: CPT

## 2025-07-30 PROCEDURE — 6360000002 HC RX W HCPCS: Performed by: INTERNAL MEDICINE

## 2025-07-30 PROCEDURE — 99211 OFF/OP EST MAY X REQ PHY/QHP: CPT

## 2025-07-30 RX ORDER — BUSPIRONE HYDROCHLORIDE 7.5 MG/1
7.5 TABLET ORAL 2 TIMES DAILY
Qty: 180 TABLET | Refills: 1 | Status: SHIPPED | OUTPATIENT
Start: 2025-07-30

## 2025-07-30 RX ORDER — ALBUMIN (HUMAN) 12.5 G/50ML
50 SOLUTION INTRAVENOUS ONCE
Status: COMPLETED | OUTPATIENT
Start: 2025-07-30 | End: 2025-07-30

## 2025-07-30 RX ADMIN — ALBUMIN (HUMAN) 50 G: 0.25 INJECTION, SOLUTION INTRAVENOUS at 12:50

## 2025-07-30 ASSESSMENT — PAIN SCALES - GENERAL: PAINLEVEL_OUTOF10: 0

## 2025-07-30 NOTE — PROGRESS NOTES
Pt here via w/c from radiology for an Albumin Infusion after having a paracentesis completed with removal of 5.8 liters of fluid per [facility] US tech   Pt without c/o's of discomfort today  Pt has an IV 20 ga in his L upper arm that was started in US Site unremarkable   Albumin 50 gm was infused over 30 mins without issues Pt reports that he feels ok  IV was removed  cath tip intact  Pressure then pressure dressing was applied and then secured with a coban dressing  IV site unremarkable  Pt yenny well  Verbal discharge instructions reviewed with pt and his wife and understanding was verbalized   Pt refused a written copy today  Pt was then discharged ambulatory using his cane with his wife in stable condition  Pt was escorted to his vehicle per nursing staff

## 2025-07-30 NOTE — PROGRESS NOTES
Image guided Paracentesis completed.  5.8 liters of light yellow colored withdrawn.  Pt tolerated procedure without any signs or symptoms of distress. Vital signs stable. IV done by IR RN in left arm.    DISCHARGED:  [facility]: Discharge instructions reviewed with patient. Verbalized understanding. Patient taken to [facility] for albumin infusion. Report given to nurse.    SPECIMEN SENT:  No    Vital Signs  Vitals:    07/30/25 1157   BP: 137/64   Pulse: 75   SpO2: 99%    (vital signs in table format)

## 2025-07-31 DIAGNOSIS — R18.8 CIRRHOSIS OF LIVER WITH ASCITES, UNSPECIFIED HEPATIC CIRRHOSIS TYPE (HCC): Primary | ICD-10-CM

## 2025-07-31 DIAGNOSIS — K74.60 CIRRHOSIS OF LIVER WITH ASCITES, UNSPECIFIED HEPATIC CIRRHOSIS TYPE (HCC): Primary | ICD-10-CM

## 2025-07-31 DIAGNOSIS — K75.81 NASH (NONALCOHOLIC STEATOHEPATITIS): ICD-10-CM

## 2025-08-06 ENCOUNTER — HOSPITAL ENCOUNTER (OUTPATIENT)
Dept: ULTRASOUND IMAGING | Age: 70
Discharge: HOME | End: 2025-08-06
Payer: MEDICARE

## 2025-08-06 ENCOUNTER — HOSPITAL ENCOUNTER (OUTPATIENT)
Dept: NURSING | Age: 70
Setting detail: INFUSION SERIES
Discharge: HOME | End: 2025-08-06
Payer: MEDICARE

## 2025-08-06 VITALS
WEIGHT: 207 LBS | RESPIRATION RATE: 16 BRPM | HEART RATE: 76 BPM | SYSTOLIC BLOOD PRESSURE: 139 MMHG | BODY MASS INDEX: 32.49 KG/M2 | DIASTOLIC BLOOD PRESSURE: 58 MMHG | HEIGHT: 67 IN | TEMPERATURE: 98.1 F

## 2025-08-06 VITALS — DIASTOLIC BLOOD PRESSURE: 78 MMHG | SYSTOLIC BLOOD PRESSURE: 172 MMHG | HEART RATE: 87 BPM | OXYGEN SATURATION: 98 %

## 2025-08-06 DIAGNOSIS — K74.60 CIRRHOSIS OF LIVER WITH ASCITES, UNSPECIFIED HEPATIC CIRRHOSIS TYPE (HCC): ICD-10-CM

## 2025-08-06 DIAGNOSIS — K75.81 NASH (NONALCOHOLIC STEATOHEPATITIS): ICD-10-CM

## 2025-08-06 DIAGNOSIS — R18.8 CIRRHOSIS OF LIVER WITH ASCITES, UNSPECIFIED HEPATIC CIRRHOSIS TYPE (HCC): ICD-10-CM

## 2025-08-06 PROCEDURE — 6360000002 HC RX W HCPCS: Performed by: INTERNAL MEDICINE

## 2025-08-06 PROCEDURE — P9047 ALBUMIN (HUMAN), 25%, 50ML: HCPCS | Performed by: INTERNAL MEDICINE

## 2025-08-06 PROCEDURE — 96365 THER/PROPH/DIAG IV INF INIT: CPT

## 2025-08-06 PROCEDURE — 49083 ABD PARACENTESIS W/IMAGING: CPT

## 2025-08-06 PROCEDURE — 99211 OFF/OP EST MAY X REQ PHY/QHP: CPT

## 2025-08-06 RX ORDER — ALBUMIN (HUMAN) 12.5 G/50ML
50 SOLUTION INTRAVENOUS ONCE
Status: COMPLETED | OUTPATIENT
Start: 2025-08-06 | End: 2025-08-06

## 2025-08-06 RX ADMIN — ALBUMIN (HUMAN) 50 G: 0.25 INJECTION, SOLUTION INTRAVENOUS at 12:55

## 2025-08-12 ENCOUNTER — PATIENT MESSAGE (OUTPATIENT)
Dept: FAMILY MEDICINE CLINIC | Age: 70
End: 2025-08-12

## 2025-08-13 ENCOUNTER — HOSPITAL ENCOUNTER (OUTPATIENT)
Dept: NURSING | Age: 70
Setting detail: INFUSION SERIES
Discharge: HOME | End: 2025-08-13
Payer: MEDICARE

## 2025-08-13 ENCOUNTER — HOSPITAL ENCOUNTER (OUTPATIENT)
Dept: ULTRASOUND IMAGING | Age: 70
Discharge: HOME | End: 2025-08-13
Payer: MEDICARE

## 2025-08-13 VITALS — OXYGEN SATURATION: 98 % | DIASTOLIC BLOOD PRESSURE: 59 MMHG | SYSTOLIC BLOOD PRESSURE: 125 MMHG | HEART RATE: 79 BPM

## 2025-08-13 VITALS
TEMPERATURE: 97.9 F | OXYGEN SATURATION: 98 % | BODY MASS INDEX: 32.49 KG/M2 | RESPIRATION RATE: 16 BRPM | DIASTOLIC BLOOD PRESSURE: 56 MMHG | HEIGHT: 67 IN | WEIGHT: 207.01 LBS | SYSTOLIC BLOOD PRESSURE: 138 MMHG | HEART RATE: 64 BPM

## 2025-08-13 DIAGNOSIS — R18.8 ASCITES OF LIVER: ICD-10-CM

## 2025-08-13 PROCEDURE — 99211 OFF/OP EST MAY X REQ PHY/QHP: CPT

## 2025-08-13 PROCEDURE — 6360000002 HC RX W HCPCS: Performed by: INTERNAL MEDICINE

## 2025-08-13 PROCEDURE — 96365 THER/PROPH/DIAG IV INF INIT: CPT

## 2025-08-13 PROCEDURE — 49083 ABD PARACENTESIS W/IMAGING: CPT

## 2025-08-13 PROCEDURE — P9047 ALBUMIN (HUMAN), 25%, 50ML: HCPCS | Performed by: INTERNAL MEDICINE

## 2025-08-13 RX ORDER — ESCITALOPRAM OXALATE 5 MG/1
5 TABLET ORAL DAILY
Qty: 90 TABLET | Refills: 1 | Status: SHIPPED | OUTPATIENT
Start: 2025-08-13

## 2025-08-13 RX ORDER — ALBUMIN (HUMAN) 12.5 G/50ML
50 SOLUTION INTRAVENOUS ONCE
Status: COMPLETED | OUTPATIENT
Start: 2025-08-13 | End: 2025-08-13

## 2025-08-13 RX ADMIN — ALBUMIN (HUMAN) 50 G: 0.25 INJECTION, SOLUTION INTRAVENOUS at 13:05

## 2025-08-13 ASSESSMENT — PAIN SCALES - GENERAL: PAINLEVEL_OUTOF10: 0

## 2025-08-20 ENCOUNTER — HOSPITAL ENCOUNTER (OUTPATIENT)
Dept: ULTRASOUND IMAGING | Age: 70
Discharge: HOME | End: 2025-08-20
Payer: MEDICARE

## 2025-08-20 ENCOUNTER — HOSPITAL ENCOUNTER (OUTPATIENT)
Dept: NURSING | Age: 70
Setting detail: INFUSION SERIES
Discharge: HOME | End: 2025-08-20
Payer: MEDICARE

## 2025-08-20 VITALS — DIASTOLIC BLOOD PRESSURE: 80 MMHG | HEART RATE: 87 BPM | SYSTOLIC BLOOD PRESSURE: 161 MMHG | OXYGEN SATURATION: 98 %

## 2025-08-20 VITALS
BODY MASS INDEX: 32.49 KG/M2 | WEIGHT: 207.01 LBS | HEIGHT: 67 IN | SYSTOLIC BLOOD PRESSURE: 159 MMHG | TEMPERATURE: 97.9 F | RESPIRATION RATE: 16 BRPM | HEART RATE: 74 BPM | DIASTOLIC BLOOD PRESSURE: 72 MMHG

## 2025-08-20 DIAGNOSIS — K75.81 NASH (NONALCOHOLIC STEATOHEPATITIS): ICD-10-CM

## 2025-08-20 DIAGNOSIS — R18.8 CIRRHOSIS OF LIVER WITH ASCITES, UNSPECIFIED HEPATIC CIRRHOSIS TYPE (HCC): ICD-10-CM

## 2025-08-20 DIAGNOSIS — K74.60 CIRRHOSIS OF LIVER WITH ASCITES, UNSPECIFIED HEPATIC CIRRHOSIS TYPE (HCC): ICD-10-CM

## 2025-08-20 PROCEDURE — 6360000002 HC RX W HCPCS: Performed by: INTERNAL MEDICINE

## 2025-08-20 PROCEDURE — 99211 OFF/OP EST MAY X REQ PHY/QHP: CPT

## 2025-08-20 PROCEDURE — P9047 ALBUMIN (HUMAN), 25%, 50ML: HCPCS | Performed by: INTERNAL MEDICINE

## 2025-08-20 PROCEDURE — 49083 ABD PARACENTESIS W/IMAGING: CPT

## 2025-08-20 PROCEDURE — 96365 THER/PROPH/DIAG IV INF INIT: CPT

## 2025-08-20 RX ORDER — ALBUMIN (HUMAN) 12.5 G/50ML
50 SOLUTION INTRAVENOUS ONCE
Status: COMPLETED | OUTPATIENT
Start: 2025-08-20 | End: 2025-08-20

## 2025-08-20 RX ADMIN — ALBUMIN (HUMAN) 50 G: 0.25 INJECTION, SOLUTION INTRAVENOUS at 13:19

## 2025-08-20 ASSESSMENT — PAIN DESCRIPTION - LOCATION: LOCATION: ABDOMEN

## 2025-08-20 ASSESSMENT — PAIN SCALES - GENERAL: PAINLEVEL_OUTOF10: 3

## 2025-08-20 ASSESSMENT — PAIN DESCRIPTION - PAIN TYPE: TYPE: CHRONIC PAIN

## 2025-08-20 ASSESSMENT — PAIN DESCRIPTION - FREQUENCY: FREQUENCY: CONTINUOUS

## 2025-08-20 ASSESSMENT — PAIN DESCRIPTION - DESCRIPTORS: DESCRIPTORS: ACHING

## 2025-08-20 ASSESSMENT — PAIN DESCRIPTION - ORIENTATION: ORIENTATION: RIGHT

## 2025-08-27 ENCOUNTER — HOSPITAL ENCOUNTER (OUTPATIENT)
Dept: LAB | Age: 70
Discharge: HOME | End: 2025-08-27
Payer: MEDICARE

## 2025-08-27 ENCOUNTER — HOSPITAL ENCOUNTER (OUTPATIENT)
Dept: NURSING | Age: 70
Setting detail: INFUSION SERIES
Discharge: HOME | End: 2025-08-27
Payer: MEDICARE

## 2025-08-27 ENCOUNTER — HOSPITAL ENCOUNTER (OUTPATIENT)
Dept: ULTRASOUND IMAGING | Age: 70
Discharge: HOME | End: 2025-08-27
Payer: MEDICARE

## 2025-08-27 VITALS
TEMPERATURE: 98 F | SYSTOLIC BLOOD PRESSURE: 155 MMHG | DIASTOLIC BLOOD PRESSURE: 66 MMHG | WEIGHT: 207.01 LBS | RESPIRATION RATE: 16 BRPM | BODY MASS INDEX: 32.49 KG/M2 | HEIGHT: 67 IN | HEART RATE: 80 BPM

## 2025-08-27 VITALS — SYSTOLIC BLOOD PRESSURE: 132 MMHG | OXYGEN SATURATION: 99 % | HEART RATE: 85 BPM | DIASTOLIC BLOOD PRESSURE: 66 MMHG

## 2025-08-27 DIAGNOSIS — K74.60 LIVER CIRRHOSIS SECONDARY TO NASH (HCC): ICD-10-CM

## 2025-08-27 DIAGNOSIS — K75.81 LIVER CIRRHOSIS SECONDARY TO NASH (HCC): ICD-10-CM

## 2025-08-27 DIAGNOSIS — R18.8 ASCITES OF LIVER: ICD-10-CM

## 2025-08-27 LAB
INR PPP: 1.09 (ref 0.86–1.14)
PLATELET # BLD AUTO: 143 K/UL (ref 135–450)
PROTHROMBIN TIME: 14.4 SEC (ref 12.1–14.9)

## 2025-08-27 PROCEDURE — 85610 PROTHROMBIN TIME: CPT

## 2025-08-27 PROCEDURE — 96365 THER/PROPH/DIAG IV INF INIT: CPT

## 2025-08-27 PROCEDURE — P9047 ALBUMIN (HUMAN), 25%, 50ML: HCPCS | Performed by: INTERNAL MEDICINE

## 2025-08-27 PROCEDURE — 6360000002 HC RX W HCPCS: Performed by: INTERNAL MEDICINE

## 2025-08-27 PROCEDURE — 85049 AUTOMATED PLATELET COUNT: CPT

## 2025-08-27 PROCEDURE — 99211 OFF/OP EST MAY X REQ PHY/QHP: CPT

## 2025-08-27 PROCEDURE — 49083 ABD PARACENTESIS W/IMAGING: CPT

## 2025-08-27 PROCEDURE — 36415 COLL VENOUS BLD VENIPUNCTURE: CPT

## 2025-08-27 RX ORDER — ALBUMIN (HUMAN) 12.5 G/50ML
50 SOLUTION INTRAVENOUS ONCE
Status: COMPLETED | OUTPATIENT
Start: 2025-08-27 | End: 2025-08-27

## 2025-08-27 RX ADMIN — ALBUMIN (HUMAN) 50 G: 0.25 INJECTION, SOLUTION INTRAVENOUS at 12:53

## 2025-08-27 ASSESSMENT — PAIN DESCRIPTION - LOCATION: LOCATION: ABDOMEN

## 2025-08-27 ASSESSMENT — PAIN DESCRIPTION - DESCRIPTORS: DESCRIPTORS: ACHING

## 2025-08-27 ASSESSMENT — PAIN DESCRIPTION - FREQUENCY: FREQUENCY: CONTINUOUS

## 2025-08-27 ASSESSMENT — PAIN SCALES - GENERAL: PAINLEVEL_OUTOF10: 3

## 2025-08-27 ASSESSMENT — PAIN DESCRIPTION - PAIN TYPE: TYPE: CHRONIC PAIN

## 2025-08-27 ASSESSMENT — PAIN DESCRIPTION - ORIENTATION: ORIENTATION: RIGHT

## 2025-09-03 ENCOUNTER — HOSPITAL ENCOUNTER (OUTPATIENT)
Dept: NURSING | Age: 70
Setting detail: INFUSION SERIES
Discharge: HOME | End: 2025-09-03
Payer: MEDICARE

## 2025-09-03 ENCOUNTER — HOSPITAL ENCOUNTER (OUTPATIENT)
Dept: ULTRASOUND IMAGING | Age: 70
Discharge: HOME | End: 2025-09-03
Payer: MEDICARE

## 2025-09-03 VITALS
TEMPERATURE: 97.8 F | BODY MASS INDEX: 32.49 KG/M2 | SYSTOLIC BLOOD PRESSURE: 134 MMHG | RESPIRATION RATE: 16 BRPM | WEIGHT: 207.01 LBS | DIASTOLIC BLOOD PRESSURE: 68 MMHG | HEART RATE: 76 BPM | HEIGHT: 67 IN

## 2025-09-03 VITALS — HEART RATE: 90 BPM | DIASTOLIC BLOOD PRESSURE: 77 MMHG | OXYGEN SATURATION: 99 % | SYSTOLIC BLOOD PRESSURE: 161 MMHG

## 2025-09-03 DIAGNOSIS — R18.8 ASCITES OF LIVER: ICD-10-CM

## 2025-09-03 PROCEDURE — P9047 ALBUMIN (HUMAN), 25%, 50ML: HCPCS | Performed by: INTERNAL MEDICINE

## 2025-09-03 PROCEDURE — 99211 OFF/OP EST MAY X REQ PHY/QHP: CPT

## 2025-09-03 PROCEDURE — 6360000002 HC RX W HCPCS: Performed by: INTERNAL MEDICINE

## 2025-09-03 PROCEDURE — 96365 THER/PROPH/DIAG IV INF INIT: CPT

## 2025-09-03 PROCEDURE — 49083 ABD PARACENTESIS W/IMAGING: CPT

## 2025-09-03 RX ORDER — ALBUMIN (HUMAN) 12.5 G/50ML
50 SOLUTION INTRAVENOUS ONCE
Status: COMPLETED | OUTPATIENT
Start: 2025-09-03 | End: 2025-09-03

## 2025-09-03 RX ADMIN — ALBUMIN (HUMAN) 50 G: 0.25 INJECTION, SOLUTION INTRAVENOUS at 13:13

## 2025-09-03 ASSESSMENT — PAIN DESCRIPTION - FREQUENCY: FREQUENCY: CONTINUOUS

## 2025-09-03 ASSESSMENT — PAIN DESCRIPTION - LOCATION: LOCATION: ABDOMEN

## 2025-09-03 ASSESSMENT — PAIN SCALES - GENERAL: PAINLEVEL_OUTOF10: 4

## 2025-09-03 ASSESSMENT — PAIN DESCRIPTION - PAIN TYPE: TYPE: CHRONIC PAIN

## 2025-09-03 ASSESSMENT — PAIN DESCRIPTION - ORIENTATION: ORIENTATION: RIGHT

## 2025-09-03 ASSESSMENT — PAIN DESCRIPTION - DESCRIPTORS: DESCRIPTORS: ACHING

## (undated) DEVICE — YANKAUER,BULB TIP,W/O VENT,RIGID,STERILE: Brand: MEDLINE

## (undated) DEVICE — ELECTRODE ECG MONITR FOAM TEAR DROP ADLT RED

## (undated) DEVICE — CONMED SCOPE SAVER BITE BLOCK, 20X27 MM: Brand: SCOPE SAVER

## (undated) DEVICE — 4.5 MM INCISOR PLUS STRAIGHT                                    BLADES, POWER/EP-1, VIOLET, PACKAGED                                    6 PER BOX, STERILE

## (undated) DEVICE — CONVERTED USE 304929 SPONGE GAUZE CURITY 4X4IN 16-PLY ST

## (undated) DEVICE — CANNULA NSL AD TBNG L7FT PVC STR NONFLARED PRNG O2 DEL W STD

## (undated) DEVICE — GLOVE SURG SZ 65 L12IN FNGR THK94MIL STD WHT LTX FREE

## (undated) DEVICE — LIGATOR ENDOSCP DIA8.6-11.5MM MULT DISP SPDBND LIGATOR SUP

## (undated) DEVICE — ELECTRODE PT RET AD L9FT HI MOIST COND ADH HYDRGEL CORDED

## (undated) DEVICE — MULTIPLE BAND LIGATOR: Brand: SPEEDBAND SUPERVIEW SUPER 7

## (undated) DEVICE — TUBE IRRIG L8IN LNG PT W/ CONN FOR PMP SYS REDEUCE

## (undated) DEVICE — FIAPC® PROBE W/ FILTER 2200 A OD 2.3MM/6.9FR; L 2.2M/7.2FT: Brand: ERBE

## (undated) DEVICE — ENDO CARRY-ON PROCEDURE KIT INCLUDES SUCTION TUBING, LUBRICANT, GAUZE, BIOHAZARD STICKER, TRANSPORT PAD AND INTERCEPT BEDSIDE KIT.: Brand: ENDO CARRY-ON PROCEDURE KIT

## (undated) DEVICE — CANNULA,OXY,ADULT,SUPERSOFT,W/7'TUB,SC: Brand: MEDLINE INDUSTRIES, INC.

## (undated) DEVICE — ENDOSCOPIC KIT 2 12 FT OP4 DE2 GWN SYR

## (undated) DEVICE — PACK PROCEDURE SURG ARTHSCP CUST

## (undated) DEVICE — FORCEPS ENDOSCP BX L230CM DIA28MM ALGTR CUP SPEC RETRV GI

## (undated) DEVICE — Z DISCONTINUED USE 2276105 GOWN PROTCT UNIV CHST W28IN L49IN SL 24IN BLU SPUNBOND FLM

## (undated) DEVICE — TRAP POLYP ETRAP

## (undated) DEVICE — LIGATOR ENDOSCP L2.8MM DIA8.6-11.5MM MULT BND SPEEDBAND

## (undated) DEVICE — Z CONVERTED USE 2273232 BANDAGE COMPR W6INXL11YD E KNIT DBL SELF CLSR EZE-BAND

## (undated) DEVICE — CATHETER IV 20GA L1.25IN PNK FEP SFTY STR HUB RADPQ DISP

## (undated) DEVICE — T-DRAPE,EXTREMITY,STERILE: Brand: MEDLINE

## (undated) DEVICE — TRAP SPEC POLYPR SGL CHMBR FN MESH SCRN

## (undated) DEVICE — SUTURE MCRYL SZ 4-0 L18IN ABSRB UD L19MM PS-2 3/8 CIR PRIM Y496G

## (undated) DEVICE — GLOVE,SURG,SENSICARE,ALOE,LF,PF,9: Brand: MEDLINE

## (undated) DEVICE — PADDING CAST W4INXL4YD NONSTERILE COT RAYON MICROPLEATED

## (undated) DEVICE — STERILE POLYISOPRENE POWDER-FREE SURGICAL GLOVES: Brand: PROTEXIS

## (undated) DEVICE — CATHETER ENDOSCP L135CM W7.5XL15.7MM DIA2.8MM FLX RFA

## (undated) DEVICE — PADDING CAST W6INXL4YD ST COT RAYON MICROPLEATED HIGHLY

## (undated) DEVICE — FORCEP BX STD CAP 240CM RAD JAW 4

## (undated) DEVICE — ZIMMER® STERILE DISPOSABLE TOURNIQUET CUFF WITH PLC, DUAL PORT, SINGLE BLADDER, 30 IN. (76 CM)

## (undated) DEVICE — SET GRAV VENT NVENT CK VLV 3 NDL FREE PRT 10 GTT

## (undated) DEVICE — KENDALL SCD EXPRESS SLEEVES, KNEE LENGTH, LARGE: Brand: KENDALL SCD

## (undated) DEVICE — GLOVE,SURG,SENSICARE,ALOE,LF,PF,7: Brand: MEDLINE

## (undated) DEVICE — 3M™ STERI-STRIP™ REINFORCED ADHESIVE SKIN CLOSURES, R1547, 1/2 IN X 4 IN (12 MM X 100 MM), 6 STRIPS/ENVELOPE: Brand: 3M™ STERI-STRIP™

## (undated) DEVICE — GOWN,SIRUS,NON REINFRCD,LARGE,SET IN SL: Brand: MEDLINE

## (undated) DEVICE — SET ADMIN PRIMING 7ML L30IN 7.35LB 20 GTT 2ND RLER CLMP

## (undated) DEVICE — ELECTRODE,RADIOTRANSLUCENT,FOAM,3PK: Brand: MEDLINE

## (undated) DEVICE — SNARE ENDOSCP L240CM SHTH DIA24MM LOOP W10MM POLYP RND REINF

## (undated) DEVICE — SOLUTION IV 1000ML LAC RINGERS PH 6.5 INJ USP VIAFLX PLAS

## (undated) DEVICE — ELECTRODE,ECG,STRESS,FOAM,3PK: Brand: MEDLINE

## (undated) DEVICE — FIAPC® PROBE W/ FILTER 2200 C OD 2.3MM/6.9FR; L 2.2M/7.2FT: Brand: ERBE

## (undated) DEVICE — 3M™ TEGADERM™ TRANSPARENT FILM DRESSING FRAME STYLE, 1624W, 2-3/8 IN X 2-3/4 IN (6 CM X 7 CM), 100/CT 4CT/CASE: Brand: 3M™ TEGADERM™